# Patient Record
Sex: FEMALE | Race: BLACK OR AFRICAN AMERICAN | NOT HISPANIC OR LATINO | Employment: FULL TIME | URBAN - METROPOLITAN AREA
[De-identification: names, ages, dates, MRNs, and addresses within clinical notes are randomized per-mention and may not be internally consistent; named-entity substitution may affect disease eponyms.]

---

## 2017-01-05 ENCOUNTER — TELEPHONE (OUTPATIENT)
Dept: ORTHOPEDICS | Facility: CLINIC | Age: 52
End: 2017-01-05

## 2017-01-05 DIAGNOSIS — M17.12 PRIMARY OSTEOARTHRITIS OF LEFT KNEE: Primary | ICD-10-CM

## 2017-01-05 DIAGNOSIS — F90.2 ATTENTION DEFICIT HYPERACTIVITY DISORDER (ADHD), COMBINED TYPE: Primary | ICD-10-CM

## 2017-01-05 RX ORDER — DEXTROAMPHETAMINE SACCHARATE, AMPHETAMINE ASPARTATE MONOHYDRATE, DEXTROAMPHETAMINE SULFATE AND AMPHETAMINE SULFATE 5; 5; 5; 5 MG/1; MG/1; MG/1; MG/1
20 CAPSULE, EXTENDED RELEASE ORAL EVERY MORNING
Qty: 30 CAPSULE | Refills: 0 | Status: SHIPPED | OUTPATIENT
Start: 2017-01-05 | End: 2017-02-28

## 2017-01-05 RX ORDER — DEXTROAMPHETAMINE SACCHARATE, AMPHETAMINE ASPARTATE MONOHYDRATE, DEXTROAMPHETAMINE SULFATE AND AMPHETAMINE SULFATE 7.5; 7.5; 7.5; 7.5 MG/1; MG/1; MG/1; MG/1
30 CAPSULE, EXTENDED RELEASE ORAL DAILY
Qty: 30 CAPSULE | Refills: 0 | Status: SHIPPED | OUTPATIENT
Start: 2017-01-05 | End: 2017-02-28

## 2017-01-05 NOTE — TELEPHONE ENCOUNTER
Adderall XR 20mg      Last Written Prescription Date: 11/22/16  Last Fill Quantity: 30,  # refills: 0   Last Office Visit with [facility], P or  Health prescribing provider: 12/14/16          Adderall XR 30mg      Last Written Prescription Date: 11/22/16  Last Fill Quantity: 30,  # refills: 0   Last Office Visit with [facility], [facility] or [facility] prescribing provider: 12/14/16          Thanks,  Angélica Garg, Technician ([facility])  [facility]

## 2017-02-20 DIAGNOSIS — B00.9 HERPES SIMPLEX VIRUS INFECTION: ICD-10-CM

## 2017-02-20 RX ORDER — VALACYCLOVIR HYDROCHLORIDE 500 MG/1
500 TABLET, FILM COATED ORAL 2 TIMES DAILY
Qty: 180 TABLET | Refills: 2 | Status: SHIPPED | OUTPATIENT
Start: 2017-02-20 | End: 2017-04-17 | Stop reason: DRUGHIGH

## 2017-02-20 NOTE — TELEPHONE ENCOUNTER
Valtrex 1gm tablet     Last Written Prescription Date: 11-22-16  Last Fill Quantity: 180, # refills: 2  Last Office Visit with G, P or [facility] prescribing provider: 12-14-16   Next 5 appointments (look out 90 days)     Apr 12, 2017 11:30 AM CDT   Office Visit with Susan Rachele Haase, APRN CNP   [facility] ([facility])    [address] [place] [id number] [phone]            May 02, 2017 10:40 AM CDT   Return Visit with Jimenez Knight PA-C   [facility] ([facility])    [address] [address] [address] [phone]                   Creatinine   Date Value Ref Range Status   12/14/2016 1.02 0.52 - 1.04 mg/dL Final     Thank you,  Yogesh Green, Pharmacy Technician  [facility]

## 2017-02-27 ENCOUNTER — TELEPHONE (OUTPATIENT)
Dept: FAMILY MEDICINE | Facility: CLINIC | Age: 52
End: 2017-02-27

## 2017-02-27 NOTE — TELEPHONE ENCOUNTER
Patient is requesting an rx for once daily aspirin and it was not on her active med list.  Please send rx if that is appropriate for her.  Thank you,   Seema Bejarano, PharmD  [facility]

## 2017-02-28 DIAGNOSIS — F90.2 ATTENTION DEFICIT HYPERACTIVITY DISORDER (ADHD), COMBINED TYPE: ICD-10-CM

## 2017-02-28 NOTE — TELEPHONE ENCOUNTER
Controlled Substance Refill Request for Adderall  Problem List Complete:  Yes    Patient is followed by HAASE, SUSAN RACHELE for ongoing prescription of stimulants.  All refills should be approved by this provider, or covering partner.    Medication(s): Aderall XR 20 mg every day.  Adderall XR 30 mg every day.   Maximum quantity per month: #30;  #30   Clinic visit frequency required: Q 6  months     Controlled substance agreement on file: Yes       Date(s): 3/10/2016  Neuropsych evaluation for ADD completed:  No    Last [facility] website verification:  done on 3/10/2016   [email or web address]       checked in past 6 months?  No, route to RN     Edelmira Escobar, Pharmacy [facility]

## 2017-02-28 NOTE — TELEPHONE ENCOUNTER
Can you see see why she wants to take this, is it a daily aspirin 81 mg for heart prevention?   Thanks,  Susan Haase, CNP

## 2017-03-01 RX ORDER — DEXTROAMPHETAMINE SACCHARATE, AMPHETAMINE ASPARTATE MONOHYDRATE, DEXTROAMPHETAMINE SULFATE AND AMPHETAMINE SULFATE 7.5; 7.5; 7.5; 7.5 MG/1; MG/1; MG/1; MG/1
30 CAPSULE, EXTENDED RELEASE ORAL DAILY
Qty: 30 CAPSULE | Refills: 0 | Status: SHIPPED | OUTPATIENT
Start: 2017-03-01 | End: 2017-03-30

## 2017-03-01 RX ORDER — DEXTROAMPHETAMINE SACCHARATE, AMPHETAMINE ASPARTATE MONOHYDRATE, DEXTROAMPHETAMINE SULFATE AND AMPHETAMINE SULFATE 5; 5; 5; 5 MG/1; MG/1; MG/1; MG/1
20 CAPSULE, EXTENDED RELEASE ORAL EVERY MORNING
Qty: 30 CAPSULE | Refills: 0 | Status: SHIPPED | OUTPATIENT
Start: 2017-03-01 | End: 2017-03-30

## 2017-03-01 NOTE — TELEPHONE ENCOUNTER
Routed,  updated, in your bin, NO ASSOCIATED DIAGNOSIS FOR ATT DEF IN PAST 12 MONTHS, CONFIRM IF APPOINTMENT DUE    Last OV: 12/14/16  Reason for visit: preop  Date last filled: 1/6/17 PER , CONFIRM START DATE    Genet Wolfe RN, BSN  Message handled by Nurse Triage.

## 2017-03-17 DIAGNOSIS — B00.9 HERPES SIMPLEX VIRUS INFECTION: Primary | ICD-10-CM

## 2017-03-17 RX ORDER — VALACYCLOVIR HYDROCHLORIDE 1 G/1
TABLET, FILM COATED ORAL
Qty: 180 TABLET | Refills: 2 | Status: SHIPPED | OUTPATIENT
Start: 2017-03-17 | End: 2017-04-17

## 2017-03-17 NOTE — TELEPHONE ENCOUNTER
Resent per e prescribe, has refills  Prescription approved per [facility] Refill Protocol.  Genet Wolfe RN, BSN

## 2017-03-17 NOTE — TELEPHONE ENCOUNTER
90 day supply sent 2/20/17 with 2RF.      valACYclovir (VALTREX) 1000 mg tablet     Sig: Take 1 tablet (500 mg) by mouth 2 times daily      Last Written Prescription Date: 2/20/17  Last Fill Quantity: 180, # refills: 2  Last Office Visit with [facility], [facility] or [facility] prescribing provider: 12/14/2016     Next 5 appointments (look out 90 days)     Apr 12, 2017 11:30 AM CDT   Office Visit with Susan Rachele Haase, APRN [facility] ([facility])    [address] [place] [id number] [phone]            May 02, 2017 10:40 AM CDT   Return Visit with Jimenez Knight PA-C   [facility] ([facility])    [address] [address] [address] [phone]                   Creatinine   Date Value Ref Range Status   12/14/2016 1.02 0.52 - 1.04 mg/dL Final       Hermelinda Mckinney, ORIONT

## 2017-03-30 DIAGNOSIS — F90.2 ATTENTION DEFICIT HYPERACTIVITY DISORDER (ADHD), COMBINED TYPE: ICD-10-CM

## 2017-03-30 NOTE — TELEPHONE ENCOUNTER
Adderall XR 20mg      Last Written Prescription Date:  3/1/17  Last Fill Quantity: 30,   # refills: 0  Last Office Visit with [facility], [facility] or [facility] prescribing provider: 12/14/16  Future Office visit:    Next 5 appointments (look out 90 days)     Apr 12, 2017 11:30 AM CDT   Office Visit with Susan Rachele Haase, APRN CNP   [facility]    [address] [place] [id number]   [phone]            May 02, 2017 10:40 AM CDT   Return Visit with Jimenez Knight PA-C   [facility] ([facility])    [address] [place] [address]  [address] [place] [address]   [phone]                   Routing refill request to provider for review/approval because:  Drug not on the [facility], [facility] or [facility] refill protocol or controlled substance      Adderall XR 30mg      Last Written Prescription Date:  3/1/17  Last Fill Quantity: 30,   # refills: 0  Last Office Visit with [facility], [facility] or [facility] prescribing provider: 12/14/16  Future Office visit:    Next 5 appointments (look out 90 days)     Apr 12, 2017 11:30 AM CDT   Office Visit with Susan Rachele Haase, APRN CNP   [facility] ([facility])    [address] [place] [id number] [phone]            May 02, 2017 10:40 AM CDT   Return Visit with Jimenez Knight PA-C   [facility] ([facility])    [address] [place] [address]   [phone]                   Routing refill request to provider for review/approval because:  Drug not on the [facility], [facility] or [facility] refill protocol or controlled substance        Silke Valdes CPhT  [facility]    On behalf of [facility]

## 2017-03-31 RX ORDER — DEXTROAMPHETAMINE SACCHARATE, AMPHETAMINE ASPARTATE MONOHYDRATE, DEXTROAMPHETAMINE SULFATE AND AMPHETAMINE SULFATE 7.5; 7.5; 7.5; 7.5 MG/1; MG/1; MG/1; MG/1
30 CAPSULE, EXTENDED RELEASE ORAL DAILY
Qty: 30 CAPSULE | Refills: 0 | Status: SHIPPED | OUTPATIENT
Start: 2017-03-31 | End: 2017-05-02

## 2017-03-31 RX ORDER — DEXTROAMPHETAMINE SACCHARATE, AMPHETAMINE ASPARTATE MONOHYDRATE, DEXTROAMPHETAMINE SULFATE AND AMPHETAMINE SULFATE 5; 5; 5; 5 MG/1; MG/1; MG/1; MG/1
20 CAPSULE, EXTENDED RELEASE ORAL EVERY MORNING
Qty: 30 CAPSULE | Refills: 0 | Status: SHIPPED | OUTPATIENT
Start: 2017-03-31 | End: 2017-05-02

## 2017-04-12 ENCOUNTER — OFFICE VISIT (OUTPATIENT)
Dept: FAMILY MEDICINE | Facility: CLINIC | Age: 52
End: 2017-04-12
Payer: COMMERCIAL

## 2017-04-12 ENCOUNTER — THERAPY VISIT (OUTPATIENT)
Dept: PHYSICAL THERAPY | Facility: CLINIC | Age: 52
End: 2017-04-12
Payer: COMMERCIAL

## 2017-04-12 VITALS
DIASTOLIC BLOOD PRESSURE: 72 MMHG | RESPIRATION RATE: 12 BRPM | SYSTOLIC BLOOD PRESSURE: 114 MMHG | BODY MASS INDEX: 24.83 KG/M2 | HEART RATE: 84 BPM | OXYGEN SATURATION: 100 % | HEIGHT: 65 IN | WEIGHT: 149 LBS | TEMPERATURE: 99.2 F

## 2017-04-12 DIAGNOSIS — G89.29 CHRONIC PAIN OF RIGHT KNEE: Primary | ICD-10-CM

## 2017-04-12 DIAGNOSIS — Z01.818 PREOP GENERAL PHYSICAL EXAM: Primary | ICD-10-CM

## 2017-04-12 DIAGNOSIS — M25.561 RIGHT KNEE PAIN, UNSPECIFIED CHRONICITY: ICD-10-CM

## 2017-04-12 DIAGNOSIS — M25.561 CHRONIC PAIN OF RIGHT KNEE: Primary | ICD-10-CM

## 2017-04-12 DIAGNOSIS — S83.281A TEAR OF LATERAL CARTILAGE OR MENISCUS OF KNEE, CURRENT, RIGHT, INITIAL ENCOUNTER: ICD-10-CM

## 2017-04-12 PROBLEM — M23.207 CHRONIC BUCKET HANDLE TEAR OF MEDIAL MENISCUS OF LEFT KNEE: Status: RESOLVED | Noted: 2017-04-12 | Resolved: 2017-04-12

## 2017-04-12 PROBLEM — M25.562 CHRONIC PAIN OF LEFT KNEE: Status: RESOLVED | Noted: 2017-04-12 | Resolved: 2017-04-12

## 2017-04-12 PROBLEM — M25.562 CHRONIC PAIN OF LEFT KNEE: Status: ACTIVE | Noted: 2017-04-12

## 2017-04-12 PROBLEM — M23.207 CHRONIC BUCKET HANDLE TEAR OF MEDIAL MENISCUS OF LEFT KNEE: Status: ACTIVE | Noted: 2017-04-12

## 2017-04-12 LAB
BASOPHILS # BLD AUTO: 0 10E9/L (ref 0–0.2)
BASOPHILS NFR BLD AUTO: 0.4 %
DIFFERENTIAL METHOD BLD: NORMAL
EOSINOPHIL # BLD AUTO: 0.1 10E9/L (ref 0–0.7)
EOSINOPHIL NFR BLD AUTO: 1 %
ERYTHROCYTE [DISTWIDTH] IN BLOOD BY AUTOMATED COUNT: 12.3 % (ref 10–15)
HCT VFR BLD AUTO: 42 % (ref 35–47)
HGB BLD-MCNC: 14.7 G/DL (ref 11.7–15.7)
LYMPHOCYTES # BLD AUTO: 1.8 10E9/L (ref 0.8–5.3)
LYMPHOCYTES NFR BLD AUTO: 34.1 %
MCH RBC QN AUTO: 32.2 PG (ref 26.5–33)
MCHC RBC AUTO-ENTMCNC: 35 G/DL (ref 31.5–36.5)
MCV RBC AUTO: 92 FL (ref 78–100)
MONOCYTES # BLD AUTO: 0.4 10E9/L (ref 0–1.3)
MONOCYTES NFR BLD AUTO: 7.1 %
NEUTROPHILS # BLD AUTO: 3 10E9/L (ref 1.6–8.3)
NEUTROPHILS NFR BLD AUTO: 57.4 %
PLATELET # BLD AUTO: 289 10E9/L (ref 150–450)
RBC # BLD AUTO: 4.56 10E12/L (ref 3.8–5.2)
WBC # BLD AUTO: 5.2 10E9/L (ref 4–11)

## 2017-04-12 PROCEDURE — 97161 PT EVAL LOW COMPLEX 20 MIN: CPT | Mod: GP | Performed by: PHYSICAL THERAPIST

## 2017-04-12 PROCEDURE — 36415 COLL VENOUS BLD VENIPUNCTURE: CPT | Performed by: NURSE PRACTITIONER

## 2017-04-12 PROCEDURE — 99396 PREV VISIT EST AGE 40-64: CPT | Performed by: NURSE PRACTITIONER

## 2017-04-12 PROCEDURE — 97116 GAIT TRAINING THERAPY: CPT | Mod: GP | Performed by: PHYSICAL THERAPIST

## 2017-04-12 PROCEDURE — 97110 THERAPEUTIC EXERCISES: CPT | Mod: GP | Performed by: PHYSICAL THERAPIST

## 2017-04-12 PROCEDURE — 85025 COMPLETE CBC W/AUTO DIFF WBC: CPT | Performed by: NURSE PRACTITIONER

## 2017-04-12 ASSESSMENT — ACTIVITIES OF DAILY LIVING (ADL)
RISE FROM A CHAIR: ACTIVITY IS SOMEWHAT DIFFICULT
LIMPING: NOT ANSWERED
KNEE_ACTIVITY_OF_DAILY_LIVING_SCORE: 27.69
PAIN: THE SYMPTOM PREVENTS ME FROM ALL DAILY ACTIVITIES
SQUAT: ACTIVITY IS VERY DIFFICULT
WALK: ACTIVITY IS VERY DIFFICULT
RAW_SCORE: 19.38
AS_A_RESULT_OF_YOUR_KNEE_INJURY,_HOW_WOULD_YOU_RATE_YOUR_CURRENT_LEVEL_OF_DAILY_ACTIVITY?: SEVERELY ABNORMAL
STIFFNESS: THE SYMPTOM AFFECTS MY ACTIVITY MODERATELY
KNEE_ACTIVITY_OF_DAILY_LIVING_SUM: 18
KNEEL ON THE FRONT OF YOUR KNEE: ACTIVITY IS VERY DIFFICULT
HOW_WOULD_YOU_RATE_THE_CURRENT_FUNCTION_OF_YOUR_KNEE_DURING_YOUR_USUAL_DAILY_ACTIVITIES_ON_A_SCALE_FROM_0_TO_100_WITH_100_BEING_YOUR_LEVEL_OF_KNEE_FUNCTION_PRIOR_TO_YOUR_INJURY_AND_0_BEING_THE_INABILITY_TO_PERFORM_ANY_OF_YOUR_USUAL_DAILY_ACTIVITIES?: 90
WEAKNESS: THE SYMPTOM PREVENTS ME FROM ALL DAILY ACTIVITIES
HOW_WOULD_YOU_RATE_THE_OVERALL_FUNCTION_OF_YOUR_KNEE_DURING_YOUR_USUAL_DAILY_ACTIVITIES?: SEVERELY ABNORMAL
SIT WITH YOUR KNEE BENT: ACTIVITY IS SOMEWHAT DIFFICULT
SWELLING: THE SYMPTOM AFFECTS MY ACTIVITY MODERATELY
GO DOWN STAIRS: ACTIVITY IS VERY DIFFICULT
STAND: ACTIVITY IS VERY DIFFICULT
GO UP STAIRS: ACTIVITY IS VERY DIFFICULT
GIVING WAY, BUCKLING OR SHIFTING OF KNEE: THE SYMPTOM AFFECTS MY ACTIVITY MODERATELY

## 2017-04-12 NOTE — PROGRESS NOTES
[facility] Initial Evaluation'  Subjective:    Patient is a 52 year old female presenting with rehab right knee hpi and rehab composite hpi. The history is provided by the patient. No  was used.   Additional Answers from Therapist interview    Condition occurred with:  Repetition/overuse.  This is a new condition   Patient reports that she had the onset of right knee pain in November 2015 after spending 4-5 days laying a vinyl floor. She had an MRI 10/2016 which showed fraying of the lateral  Meniscus with a possible tear.  She used Naproxsyn and had a cortisone injection without permanent relief.  She is scheduled for a scope on April 26, 2017.  Chief complaints are of constant right medial knee pain, stiffness, edema and weakness. Pain is worse with twisting, standing, walking, squatting, stairs one at a time.  She uses ice and a knee sleeve..                                  Objective:      Gait:    Gait Type:  Antalgic   Assistive Devices:  None  Deviations:  Knee:  Knee extension decr LGeneral Deviations:  Stance time decr                                                      Knee Evaluation:  ROM:    AROM    Hyperextension:  Left:  0    Right: 0  Extension:  Left: 0    Right:  5  Flexion: Left: 135    Right: 120        Strength:     Extension:  Left: 5-/5   Pain:      Right: 4-/5   Pain:  Flexion:  Left: 5-/5   Pain:      Right: 4/5   Pain:    Quad Set Left: Good    Pain:   Quad Set Right: Poor    Pain:  Ligament Testing:  Not Assessed                Special Tests: Not Assessed      Palpation:      Right knee tenderness present at:  Medial Joint Line  Edema:  Normal      Functional Testing:            Proprioception:   Stork Balance Test:  Left:  10 seconds.  Right:  5 seconds  % of Uninvolved:           General     ROS    Assessment/Plan:      Patient is a 52 year old female with left side knee complaints.    Patient has the following significant findings with  corresponding treatment plan.                Diagnosis 1:  Left knee pain/meniscal tear    Pain -  hot/cold therapy and home program  Decreased ROM/flexibility - home program  Decreased strength - home program  Inflammation - self management/home program  Impaired gait - gait training  Impaired muscle performance - home program  Decreased function - home program    Therapy Evaluation Codes:   1) History comprised of:   Personal factors that impact the plan of care:      None.    Comorbidity factors that impact the plan of care are:      Migraines/headaches.     Medications impacting care: None.  2) Examination of Body Systems comprised of:   Body structures and functions that impact the plan of care:      Knee.   Activity limitations that impact the plan of care are:      Squatting/kneeling, Stairs and Walking.  3) Clinical presentation characteristics are:   Stable/Uncomplicated.  4) Decision-Making    Low complexity using standardized patient assessment instrument and/or measureable assessment of functional outcome.  Cumulative Therapy Evaluation is: Low complexity.    Previous and current functional limitations:  (See Goal Flow Sheet for this information)    Short term and Long term goals: (See Goal Flow Sheet for this information)     Communication ability:  Patient appears to be able to clearly communicate and understand verbal and written communication and follow directions correctly.  Treatment Explanation - The following has been discussed with the patient:   RX ordered/plan of care  Anticipated outcomes  Possible risks and side effects  This patient would benefit from PT intervention to resume normal activities.   Rehab potential is good.    Frequency:  1 time only  Duration:  1 time only  Discharge Plan:  Independent in home treatment program.    Please refer to the daily flowsheet for treatment today, total treatment time and time spent performing 1:1 timed codes.

## 2017-04-12 NOTE — NURSING NOTE
"Chief Complaint   Patient presents with     Pre-Op Exam       Initial /72 (BP Location: Left arm, Patient Position: Chair, Cuff Size: Adult Regular)  Pulse 84  Temp 99.2  F (37.3  C) (Oral)  Resp 12  Ht 5' 4.5\" (1.638 m)  Wt 149 lb (67.6 kg)  LMP 02/25/2014  SpO2 100%  BMI 25.18 kg/m2 Estimated body mass index is 25.18 kg/(m^2) as calculated from the following:    Height as of this encounter: 5' 4.5\" (1.638 m).    Weight as of this encounter: 149 lb (67.6 kg).  Medication Reconciliation: complete   Krystal Chino CMA      "

## 2017-04-12 NOTE — PROGRESS NOTES
[facility]  [address] [facility] [address]  [phone]  Dept: [phone]    PRE-OP EVALUATION:  Today's date: 2017    Laney Maynard (: 1965) presents for pre-operative evaluation assessment as requested by Dr. Lindsay.  She requires evaluation and anesthesia risk assessment prior to undergoing surgery/procedure for treatment of R knee. Proposed procedure: scope.    Date of Surgery/ Procedure: 2017  Time of Surgery/ Procedure: 12:00pm  Hospital/Surgical Facility: [facility]  Primary Physician: Haase, Susan Rachele  Type of Anesthesia Anticipated: General    Patient has a Health Care Directive or Living Will:  NO    1. NO - Do you have a history of heart attack, stroke, stent, bypass or surgery on an artery in the head, neck, heart or legs?  2. NO - Do you ever have any pain or discomfort in your chest?  3. NO - Do you have a history of  Heart Failure?  4. NO - Are you troubled by shortness of breath when: walking on the level, up a slight hill or at night?  5. NO - Do you currently have a cold, bronchitis or other respiratory infection?  6. NO - Do you have a cough, shortness of breath or wheezing?  7. NO - Do you sometimes get pains in the calves of your legs when you walk?  8. NO - Do you or anyone in your family have previous history of blood clots?  9. NO - Do you or does anyone in your family have a serious bleeding problem such as prolonged bleeding following surgeries or cuts?  10. NO - Have you ever had problems with anemia or been told to take iron pills?  11. NO - Have you had any abnormal blood loss such as black, tarry or bloody stools, or abnormal vaginal bleeding?  12. NO - Have you ever had a blood transfusion?  13. NO - Have you or any of your relatives ever had problems with anesthesia?  14. NO - Do you have sleep apnea, excessive snoring or daytime drowsiness?  15. NO - Do you have any prosthetic heart  valves?  16. NO - Do you have prosthetic joints?  17. NO - Is there any chance that you may be pregnant?    This document serves as a record of the services and decisions personally performed and made by Susan Haase, CNP. It was created on her behalf by Marilynn Marquez, a trained medical scribe. The creation of this document is based the provider's statements to the medical scribe.  Marilynn Marquez April 12, 2017 11:48 AM   HPI:                                                      Brief HPI related to upcoming procedure: Laney reports for preoperative evaluation before R knee scope on 4/26. Pain is located along medial aspect of R knee w/ occasional swelling. Pain affects too many daily activities and consumes her thoughts. Denies sore throat, cough, fever, or chills.    MEDICAL HISTORY:                                                      Patient Active Problem List    Diagnosis Date Noted     Ureterolithiasis 04/22/2016     Priority: Medium     Complete rupture of rotator cuff 04/02/2015     Priority: Medium     Mixed incontinence urge and stress (male)(female) 04/02/2015     Priority: Medium     Plantar fasciitis, bilateral 11/05/2014     Priority: Medium     Attention deficit hyperactivity disorder (ADHD) 01/12/2014     Priority: Medium     Patient is followed by HAASE, SUSAN RACHELE for ongoing prescription of stimulants.  All refills should be approved by this provider, or covering partner.    Medication(s): Aderall XR 20 mg every day.  Adderall XR 30 mg every day.   Maximum quantity per month: #30;  #30   Clinic visit frequency required: Q 6  months     Controlled substance agreement on file: Yes       Date(s): 3/10/2016  Neuropsych evaluation for ADD completed:  No    Last [facility] website verification:  3/1/17   [email or web address]         Herpes simplex virus infection 08/02/2013     Priority: Medium     Urinary frequency      Priority: Medium     burning       Family history of rheumatoid  arthritis 2013     Priority: Medium     Family history of sarcoidosis (mother) 2013     Priority: Medium     Sleep disorder 10/05/2012     Priority: Medium     Lymphocytopenia 2011     Priority: Medium     Migraine headache 2011     Priority: Medium     (Problem list name updated by automated process. Provider to review and confirm.)       CARDIOVASCULAR SCREENING; LDL GOAL LESS THAN 160 10/31/2010     Priority: Medium     GERD (gastroesophageal reflux disease) 2010     Priority: Medium     Seasonal allergic rhinitis 2010     Priority: Medium     Alopecia 2004     Priority: Medium     Problem list name updated by automated process. Provider to review        Past Medical History:   Diagnosis Date     Abnormal glandular Papanicolaou smear of cervix      Allergic rhinitis, cause unspecified      Constipation      Gastro-oesophageal reflux disease      Heart murmur     murmur     Hematuria      Herpes simplex virus infection      Hydronephrosis, right      Migraine, unspecified, without mention of intractable migraine without mention of status migrainosus      Mumps      Numbness and tingling     LEFT ARM     Palpitations      Renal disease     hx stones x 2 episodes     Ureterolithiasis      Urinary frequency     burning     Vertigo      Past Surgical History:   Procedure Laterality Date     ARTHROSCOPY SHOULDER, OPEN ROTATOR CUFF REPAIR, COMBINED  2013    Procedure: COMBINED ARTHROSCOPY SHOULDER, OPEN ROTATOR CUFF REPAIR;  Left Shoulder Arthroscopy, Distal Clavicale Resection, Decompression, Mini Open Rotator Cuff Repair    ;  Surgeon: Fredi Lindsay MD;  Location: [facility] OR     C NONSPECIFIC PROCEDURE  age 16    colposcopy for abnormal pap     C NONSPECIFIC PROCEDURE      surgery L thumb     C NONSPECIFIC PROCEDURE      breast augmentation-silicone     C NONSPECIFIC PROCEDURE      s/p  x 2     C NONSPECIFIC PROCEDURE      Bilateral tubal ligation     C REMOVAL OF  KIDNEY STONE       COLONOSCOPY  2/7/2014    Procedure: COLONOSCOPY;  Colonoscopy/WW;  Surgeon: Pancho Olsen MD;  Location: [facility] GI     COMBINED CYSTOSCOPY, RETROGRADES, URETEROSCOPY, LASER HOLMIUM LITHOTRIPSY URETER(S), INSERT STENT Right 4/23/2016    Procedure: COMBINED CYSTOSCOPY, RETROGRADES, URETEROSCOPY, LASER HOLMIUM LITHOTRIPSY URETER(S), INSERT STENT;  Surgeon: Kushal Noriega MD;  Location: [facility] OR     CYSTOSCOPY       CYSTOSCOPY, RETROGRADES, EXTRACT STONE, COMBINED  1/9/2013    Procedure: COMBINED CYSTOSCOPY, RETROGRADES, EXTRACT STONE;  Video Cystoscopy,  Right Ureteroscopy, ureteral dilatation,  Stone extraction;  Surgeon: Subhash Vann MD;  Location: [facility] OR     EXTRACORPOREAL SHOCK WAVE LITHOTRIPSY (ESWL) Bilateral 4/29/2016    Procedure: EXTRACORPOREAL SHOCK WAVE LITHOTRIPSY (ESWL);  Surgeon: Bassam Vu MD;  Location: [facility] OR     GYN SURGERY      laparoscopy     LAPAROSCOPIC CHOLECYSTECTOMY WITH CHOLANGIOGRAMS  11/21/2012    Procedure: LAPAROSCOPIC CHOLECYSTECTOMY WITH CHOLANGIOGRAMS;  LAPAROSCOPIC CHOLECYSTECTOMY WITH CHOLANGIOGRAMS ;  Surgeon: Nataliya Gabriel MD;  Location: [facility] OR     TUBAL LIGATION       Current Outpatient Prescriptions   Medication Sig Dispense Refill     amphetamine-dextroamphetamine (ADDERALL XR) 30 MG per 24 hr capsule Take 1 capsule (30 mg) by mouth daily 30 capsule 0     amphetamine-dextroamphetamine (ADDERALL XR) 20 MG per 24 hr capsule Take 1 capsule (20 mg) by mouth every morning 30 capsule 0     valACYclovir (VALTREX) 1000 mg tablet TAKE ONE-HALF TABLET (500MG) BY MOUTH TWO TIMES A  tablet 2     valACYclovir (VALTREX) 500 MG tablet Take 1 tablet (500 mg) by mouth 2 times daily 180 tablet 2     naproxen (NAPROSYN) 500 MG tablet Take 1 tablet (500 mg) by mouth 2 times daily as needed for moderate pain 180 tablet 3     acyclovir (ZOVIRAX) 5 % ointment Apply topically 5 times daily 30 g 5     solifenacin (VESICARE) 5 MG tablet Take 1 tablet  (5 mg) by mouth every evening 90 tablet 2     ondansetron (ZOFRAN ODT) 4 MG disintegrating tablet Take 1-2 tablets (4-8 mg) by mouth every 8 hours as needed for nausea 20 tablet 1     topiramate (TOPAMAX) 50 MG tablet Take 150 mg by mouth 2 times daily       tobramycin-dexamethasone (TOBRADEX) ophthalmic suspension Place 2 drops into both eyes daily 1 Bottle 11     fluticasone (FLONASE) 50 MCG/ACT nasal spray Spray 2 sprays into both nostrils daily as needed 16 g 11     fexofenadine (ALLEGRA) 180 MG tablet Take 1 tablet (180 mg) by mouth daily as needed 30 tablet 6     zolpidem (AMBIEN) 5 MG tablet Take 1 tablet (5 mg) by mouth nightly as needed for sleep 30 tablet 2     meclizine (ANTIVERT) 25 MG tablet Take 1 tablet (25 mg) by mouth 3 times daily as needed 30 tablet 3     fluocinolone acetonide 0.01 % OIL Use on scalp once a week. 1 Bottle 11     ketoconazole (NIZORAL) 2 % shampoo Apply to the affected area and wash off after 5 minutes. 120 mL 1     conjugated estrogens (PREMARIN) vaginal cream Place 0.5 g vaginally three times a week 30 g 12     OTC products: none    Allergies   Allergen Reactions     Aloe Itching and Rash     Codeine      GI upset     Compazine Nausea and Itching     Darvocet [Acetaminophen] Nausea and Vomiting     Macrobid [Nitrofuran Derivatives]      Bladder spasms     Percocet [Oxycodone-Acetaminophen] Nausea and Vomiting     Sulphadimidine [Sulfamethazine] Rash      Latex Allergy: NO    Social History   Substance Use Topics     Smoking status: Never Smoker     Smokeless tobacco: Never Used     Alcohol use No     History   Drug Use No     REVIEW OF SYSTEMS:                                                    C: NEGATIVE for fever, chills, change in weight  I: NEGATIVE for worrisome rashes, moles or lesions  E: NEGATIVE for vision changes or irritation  E/M: NEGATIVE for ear, mouth and throat problems  R: NEGATIVE for significant cough or SOB  B: NEGATIVE for masses, tenderness or  "discharge  CV: NEGATIVE for chest pain, palpitations or peripheral edema  GI: NEGATIVE for nausea, abdominal pain, heartburn, or change in bowel habits  : NEGATIVE for frequency, dysuria, or hematuria  M: NEGATIVE for significant arthralgias or myalgia  N: NEGATIVE for weakness, dizziness or paresthesias  E: NEGATIVE for temperature intolerance, skin/hair changes  H: NEGATIVE for bleeding problems  P: NEGATIVE for changes in mood or affect    This document serves as a record of the services and decisions personally performed and made by Susan Haase, CNP. It was created on her behalf by Marilynn Marquez, a trained medical scribe. The creation of this document is based the provider's statements to the medical scribe.  Marilynn Marquez April 12, 2017 11:44 AM   EXAM:                                                    /72 (BP Location: Left arm, Patient Position: Chair, Cuff Size: Adult Regular)  Pulse 84  Temp 99.2  F (37.3  C) (Oral)  Resp 12  Ht 1.638 m (5' 4.5\")  Wt 67.6 kg (149 lb)  LMP 02/25/2014  SpO2 100%  BMI 25.18 kg/m2    GENERAL APPEARANCE: healthy, alert and no distress     HENT: Ear canals clear. R TM w/ fullness, no erythema. L TM normal and nose and mouth without ulcers or lesions     NECK: no adenopathy, no asymmetry, masses, or scars and thyroid normal to palpation     RESP: lungs clear to auscultation - no rales, rhonchi or wheezes     CV: regular rates and rhythm, normal S1 S2, no S3 or S4 and no murmur, click or rub     ABDOMEN:  soft, nontender, no HSM or masses and bowel sounds normal     MS: medial aspect of R knee tender to palpation, extremities normal- no gross deformities noted, no evidence of inflammation in joints, FROM in all extremities.     NEURO: Normal strength and tone, sensory exam grossly normal, mentation intact and speech normal     PSYCH: mentation appears normal. and affect normal/bright     LYMPHATICS: No axillary, cervical, or supraclavicular " nodes    DIAGNOSTICS:                                                    HGB: 14.7  IMPRESSION:                                                    Reason for surgery/procedure: R knee arthroscopy  Diagnosis/reason for consult: evaluation and anesthesia risk assessment prior to undergoing surgery/procedure       The proposed surgical procedure is considered INTERMEDIATE risk.    REVISED CARDIAC RISK INDEX  The patient has the following serious cardiovascular risks for perioperative complications such as (MI, PE, VFib and 3  AV Block):  No serious cardiac risks  INTERPRETATION: 0 risks: Class I (very low risk - 0.4% complication rate)    The patient has the following additional risks for perioperative complications:  No identified additional risks      ICD-10-CM    1. Preop general physical exam Z01.818        RECOMMENDATIONS:                                                    Laney was seen today for pre-op exam.    Diagnoses and all orders for this visit:    Preop general physical exam  -     CBC with platelets differential    Right knee pain, unspecified chronicity    Approval given to proceed with proposed procedure, without further diagnostic evaluation  Is aware of NPO orders and need to refrain from taking NSAIDS, Aspirin prior to surgery.  Follow up in 4 weeks for physical exam, arrive fasting, mammogram also due.    The information in this document, created by the medical scribe for me, accurately reflects the services I personally performed and the decisions made by me. I have reviewed and approved this document for accuracy.   Susan Haase, CNP     Signed Electronically by: Susan Haase, APRN CNP    Copy of this evaluation report is provided to requesting physician.    [facility] Preop Guidelines

## 2017-04-12 NOTE — MR AVS SNAPSHOT
After Visit Summary   4/12/2017    Laney Maynard    MRN: [id number]           Patient Information     Date Of Birth          1965        Visit Information        Provider Department      4/12/2017 10:50 AM Anaya Echols PT [place] [facility]        Today's Diagnoses     Chronic pain of right knee    -  1    Tear of lateral cartilage or meniscus of knee, current, right, initial encounter           Follow-ups after your visit        Your next 10 appointments already scheduled     Apr 17, 2017  8:00 AM CDT   PHYSICAL with Susan Rachele Haase, APRN CNP   [facility] ([facility]    [address] [address]   [phone]            Apr 18, 2017  9:15 AM CDT   MA SCREEN WITH IMPLANTS DIGITAL BILAT with RHBCMA2   [facility] ([facility])    [address]  [facility] [address]   [phone]           Do not use any powder, lotion or deodorant under your arms or on your breast. If you do, we will ask you to remove it before your exam.  Wear comfortable, two-piece clothing.  If you have any allergies, tell your care team.  Bring any previous mammograms from other facilities or have them mailed to the breast center. This mammogram location, [facility], now offers 3D mammography. It doesn't replace a screening mammogram and can be done with a regular screening mammogram. It is optional and not all insurances will pay for it. 3D mammography is a special kind of mammogram that produces a three-dimensional image of the breast by using low dose-xrays. 3D allows the radiologist to see the breast tissue differently from 2D, which reduces the chance of repeat testing due to overlapping breast tissue. If you are interested in have a 3D mammogram, please check with your insurance before you arrive for your exam. On the day of your  exam you will be asked if you would like 3D imaging.            May 02, 2017  9:30 AM CDT   FADUMO Extremity with Anaya Echols PT   [place] [facility] ([facility]    [address]  [facility] [id number] [phone]            May 02, 2017 10:40 AM CDT   Return Visit with Jimenez Knight PA-C   [facility] ORTHOPEDIC SURGERY ([facility] [place])    [address]  [address]  [facility] [address]   [phone]              Future tests that were ordered for you today     Open Future Orders        Priority Expected Expires Ordered    MA Screen w Implants Digital Bilat Routine  4/12/2018 4/12/2017            Who to contact     If you have questions or need follow up information about today's clinic visit or your schedule please contact [facility]ICAL THERAP[facility] directly at [phone].  Normal or non-critical lab and imaging results will be communicated to you by Shoppilothart, letter or phone within 4 business days after the clinic has received the results. If you do not hear from us within 7 days, please contact the clinic through [employer] or phone. If you have a critical or abnormal lab result, we will notify you by phone as soon as possible.  Submit refill requests through [employer] or call your pharmacy and they will forward the refill request to us. Please allow 3 business days for your refill to be completed.          Additional Information About Your Visit        [employer] Information     [employer] gives you secure access to your electronic health record. If you see a primary care provider, you can also send messages to your care team and make appointments. If you have questions, please call your primary care clinic.  If you do not have a primary care provider, please call [phone] and they will assist you.        Care EveryWhere ID     This is your Care EveryWhere ID. This could be used  by other organizations to access your [place] medical records  [id number]        Your Vitals Were     Last Period                   02/25/2014            Blood Pressure from Last 3 Encounters:   04/12/17 114/72   12/14/16 130/80   12/09/16 108/72    Weight from Last 3 Encounters:   04/12/17 67.6 kg (149 lb)   12/14/16 71.7 kg (158 lb)   12/09/16 71.2 kg (157 lb)              We Performed the Following     GAIT TRAINING THERAPY     HC PT EVAL, LOW COMPLEXITY     FADUMO INITIAL EVAL REPORT     THERAPEUTIC EXERCISES        Primary Care Provider Office Phone # Fax #    Susan Rachele Haase, APRN [phone]       [address] [address]        Thank you!     Thank you for choosing [place] [facility] PHYSICAL THERAP[facility]  for your care. Our goal is always to provide you with excellent care. Hearing back from our patients is one way we can continue to improve our services. Please take a few minutes to complete the written survey that you may receive in the mail after your visit with us. Thank you!             Your Updated Medication List - Protect others around you: Learn how to safely use, store and throw away your medicines at www.disposemymeds.org.          This list is accurate as of: 4/12/17  3:10 PM.  Always use your most recent med list.                   Brand Name Dispense Instructions for use    acyclovir 5 % ointment    ZOVIRAX    30 g    Apply topically 5 times daily       * amphetamine-dextroamphetamine 30 MG per 24 hr capsule    ADDERALL XR    30 capsule    Take 1 capsule (30 mg) by mouth daily       * amphetamine-dextroamphetamine 20 MG per 24 hr capsule    ADDERALL XR    30 capsule    Take 1 capsule (20 mg) by mouth every morning       conjugated estrogens cream    PREMARIN    30 g    Place 0.5 g vaginally three times a week       fexofenadine 180 MG tablet    ALLEGRA    30 tablet    Take 1 tablet (180 mg) by mouth daily  as needed       fluocinolone acetonide 0.01 % Oil     1 Bottle    Use on scalp once a week.       fluticasone 50 MCG/ACT spray    FLONASE    16 g    Spray 2 sprays into both nostrils daily as needed       ketoconazole 2 % shampoo    NIZORAL    120 mL    Apply to the affected area and wash off after 5 minutes.       meclizine 25 MG tablet    ANTIVERT    30 tablet    Take 1 tablet (25 mg) by mouth 3 times daily as needed       naproxen 500 MG tablet    NAPROSYN    180 tablet    Take 1 tablet (500 mg) by mouth 2 times daily as needed for moderate pain       ondansetron 4 MG ODT tab    ZOFRAN ODT    20 tablet    Take 1-2 tablets (4-8 mg) by mouth every 8 hours as needed for nausea       solifenacin 5 MG tablet    VESICARE    90 tablet    Take 1 tablet (5 mg) by mouth every evening       tobramycin-dexamethasone 0.3-0.1 % ophthalmic susp    TOBRADEX    1 Bottle    Place 2 drops into both eyes daily       topiramate 50 MG tablet    TOPAMAX     Take 150 mg by mouth 2 times daily       * valACYclovir 500 MG tablet    VALTREX    180 tablet    Take 1 tablet (500 mg) by mouth 2 times daily       * valACYclovir 1000 mg tablet    VALTREX    180 tablet    TAKE ONE-HALF TABLET (500MG) BY MOUTH TWO TIMES A DAY       zolpidem 5 MG tablet    AMBIEN    30 tablet    Take 1 tablet (5 mg) by mouth nightly as needed for sleep       * Notice:  This list has 4 medication(s) that are the same as other medications prescribed for you. Read the directions carefully, and ask your doctor or other care provider to review them with you.

## 2017-04-12 NOTE — MR AVS SNAPSHOT
After Visit Summary   4/12/2017    Laney Maynard    MRN: [id number]           Patient Information     Date Of Birth          1965        Visit Information        Provider Department      4/12/2017 11:30 AM Haase, Susan Rachele, APRN [facility]        Today's Diagnoses     Preop general physical exam    -  1      Care Instructions      Before Your Surgery      Call your surgeon if there is any change in your health. This includes signs of a cold or flu (such as a sore throat, runny nose, cough, rash or fever).    Do not smoke, drink alcohol or take over the counter medicine (unless your surgeon or primary care doctor tells you to) for the 24 hours before and after surgery.    If you take prescribed drugs: Follow your doctor s orders about which medicines to take and which to stop until after surgery.    Eating and drinking prior to surgery: follow the instructions from your surgeon    Take a shower or bath the night before surgery. Use the soap your surgeon gave you to gently clean your skin. If you do not have soap from your surgeon, use your regular soap. Do not shave or scrub the surgery site.  Wear clean pajamas and have clean sheets on your bed.         Follow-ups after your visit        Follow-up notes from your care team     Return in about 4 weeks (around 5/10/2017) for Physical Exam.      Your next 10 appointments already scheduled     May 02, 2017  9:30 AM CDT   [facility] Extremity with Anaya Echols PT   [place] [facility] ([facility]    [address]  [facility] [address] [phone]            May 02, 2017 10:40 AM CDT   Return Visit with Jimenez Knight PA-C   [facility]TH[facility]EDIC [facility] ([facility] [place])    [address]  [facility] [address]   [phone]              Who to contact     If you have questions or need follow up information  "about today's clinic visit or your schedule please contact [facility] directly at [phone].  Normal or non-critical lab and imaging results will be communicated to you by MyChart, letter or phone within 4 business days after the clinic has received the results. If you do not hear from us within 7 days, please contact the clinic through [employer] or phone. If you have a critical or abnormal lab result, we will notify you by phone as soon as possible.  Submit refill requests through [employer]iry or call your pharmacy and they will forward the refill request to us. Please allow 3 business days for your refill to be completed.          Additional Information About Your Visit        [employer] Information     [employer] gives you secure access to your electronic health record. If you see a primary care provider, you can also send messages to your care team and make appointments. If you have questions, please call your primary care clinic.  If you do not have a primary care provider, please call [phone] and they will assist you.        Care EveryWhere ID     This is your Care EveryWhere ID. This could be used by other organizations to access your [place] medical records  [id number]        Your Vitals Were     Pulse Temperature Respirations Height Last Period Pulse Oximetry    84 99.2  F (37.3  C) (Oral) 12 5' 4.5\" (1.638 m) 02/25/2014 100%    BMI (Body Mass Index)                   25.18 kg/m2            Blood Pressure from Last 3 Encounters:   04/12/17 114/72   12/14/16 130/80   12/09/16 108/72    Weight from Last 3 Encounters:   04/12/17 149 lb (67.6 kg)   12/14/16 158 lb (71.7 kg)   12/09/16 157 lb (71.2 kg)              We Performed the Following     CBC with platelets differential        Primary Care Provider Office Phone # Fax #    Susan Rachele Haase, APRN [phone]       [facility] [id number] [place] [address]        Thank you!     Thank you for " choosing [facility]  for your care. Our goal is always to provide you with excellent care. Hearing back from our patients is one way we can continue to improve our services. Please take a few minutes to complete the written survey that you may receive in the mail after your visit with us. Thank you!             Your Updated Medication List - Protect others around you: Learn how to safely use, store and throw away your medicines at www.disposemymeds.org.          This list is accurate as of: 4/12/17 11:53 AM.  Always use your most recent med list.                   Brand Name Dispense Instructions for use    acyclovir 5 % ointment    ZOVIRAX    30 g    Apply topically 5 times daily       * amphetamine-dextroamphetamine 30 MG per 24 hr capsule    ADDERALL XR    30 capsule    Take 1 capsule (30 mg) by mouth daily       * amphetamine-dextroamphetamine 20 MG per 24 hr capsule    ADDERALL XR    30 capsule    Take 1 capsule (20 mg) by mouth every morning       conjugated estrogens cream    PREMARIN    30 g    Place 0.5 g vaginally three times a week       fexofenadine 180 MG tablet    ALLEGRA    30 tablet    Take 1 tablet (180 mg) by mouth daily as needed       fluocinolone acetonide 0.01 % Oil     1 Bottle    Use on scalp once a week.       fluticasone 50 MCG/ACT spray    FLONASE    16 g    Spray 2 sprays into both nostrils daily as needed       ketoconazole 2 % shampoo    NIZORAL    120 mL    Apply to the affected area and wash off after 5 minutes.       meclizine 25 MG tablet    ANTIVERT    30 tablet    Take 1 tablet (25 mg) by mouth 3 times daily as needed       naproxen 500 MG tablet    NAPROSYN    180 tablet    Take 1 tablet (500 mg) by mouth 2 times daily as needed for moderate pain       ondansetron 4 MG ODT tab    ZOFRAN ODT    20 tablet    Take 1-2 tablets (4-8 mg) by mouth every 8 hours as needed for nausea       solifenacin 5 MG tablet    VESICARE    90 tablet    Take 1 tablet (5 mg) by mouth  every evening       tobramycin-dexamethasone 0.3-0.1 % ophthalmic susp    TOBRADEX    1 Bottle    Place 2 drops into both eyes daily       topiramate 50 MG tablet    TOPAMAX     Take 150 mg by mouth 2 times daily       * valACYclovir 500 MG tablet    VALTREX    180 tablet    Take 1 tablet (500 mg) by mouth 2 times daily       * valACYclovir 1000 mg tablet    VALTREX    180 tablet    TAKE ONE-HALF TABLET (500MG) BY MOUTH TWO TIMES A DAY       zolpidem 5 MG tablet    AMBIEN    30 tablet    Take 1 tablet (5 mg) by mouth nightly as needed for sleep       * Notice:  This list has 4 medication(s) that are the same as other medications prescribed for you. Read the directions carefully, and ask your doctor or other care provider to review them with you.

## 2017-04-13 ENCOUNTER — OFFICE VISIT (OUTPATIENT)
Dept: FAMILY MEDICINE | Facility: CLINIC | Age: 52
End: 2017-04-13
Payer: COMMERCIAL

## 2017-04-13 VITALS
BODY MASS INDEX: 25.18 KG/M2 | RESPIRATION RATE: 12 BRPM | TEMPERATURE: 98.3 F | WEIGHT: 149 LBS | DIASTOLIC BLOOD PRESSURE: 78 MMHG | OXYGEN SATURATION: 98 % | HEART RATE: 90 BPM | SYSTOLIC BLOOD PRESSURE: 102 MMHG

## 2017-04-13 DIAGNOSIS — N30.01 ACUTE CYSTITIS WITH HEMATURIA: Primary | ICD-10-CM

## 2017-04-13 LAB
ALBUMIN UR-MCNC: ABNORMAL MG/DL
APPEARANCE UR: CLEAR
BACTERIA #/AREA URNS HPF: ABNORMAL /HPF
BILIRUB UR QL STRIP: NEGATIVE
COLOR UR AUTO: YELLOW
GLUCOSE UR STRIP-MCNC: NEGATIVE MG/DL
HGB UR QL STRIP: ABNORMAL
KETONES UR STRIP-MCNC: NEGATIVE MG/DL
LEUKOCYTE ESTERASE UR QL STRIP: ABNORMAL
MUCOUS THREADS #/AREA URNS LPF: PRESENT /LPF
NITRATE UR QL: NEGATIVE
NON-SQ EPI CELLS #/AREA URNS LPF: ABNORMAL /LPF
PH UR STRIP: 6 PH (ref 5–7)
RBC #/AREA URNS AUTO: ABNORMAL /HPF (ref 0–2)
SP GR UR STRIP: 1.02 (ref 1–1.03)
URN SPEC COLLECT METH UR: ABNORMAL
UROBILINOGEN UR STRIP-ACNC: 0.2 EU/DL (ref 0.2–1)
WBC #/AREA URNS AUTO: ABNORMAL /HPF (ref 0–2)

## 2017-04-13 PROCEDURE — 81001 URINALYSIS AUTO W/SCOPE: CPT | Performed by: FAMILY MEDICINE

## 2017-04-13 PROCEDURE — 99213 OFFICE O/P EST LOW 20 MIN: CPT | Performed by: FAMILY MEDICINE

## 2017-04-13 RX ORDER — LEVOFLOXACIN 750 MG/1
750 TABLET, FILM COATED ORAL DAILY
Qty: 10 TABLET | Refills: 0 | Status: SHIPPED | OUTPATIENT
Start: 2017-04-13 | End: 2017-04-17

## 2017-04-13 NOTE — MR AVS SNAPSHOT
After Visit Summary   4/13/2017    Laney Maynard    MRN: [id number]           Patient Information     Date Of Birth          1965        Visit Information        Provider Department      4/13/2017 8:20 AM Monica Huitron,  [facility]        Today's Diagnoses     Acute cystitis with hematuria    -  1       Follow-ups after your visit        Your next 10 appointments already scheduled     Apr 17, 2017  8:00 AM CDT   PHYSICAL with Susan Rachele Haase, APRN CNP   [facility] ([facility])    [address] [place] [id number] [phone]            Apr 18, 2017  9:15 AM CDT   MA SCREEN WITH IMPLANTS DIGITAL BILAT with RHBCMA2   [facility] ([facility])    [address]  [facility] [address] [phone]           Do not use any powder, lotion or deodorant under your arms or on your breast. If you do, we will ask you to remove it before your exam.  Wear comfortable, two-piece clothing.  If you have any allergies, tell your care team.  Bring any previous mammograms from other facilities or have them mailed to the breast center. This mammogram location, [facility], now offers 3D mammography. It doesn't replace a screening mammogram and can be done with a regular screening mammogram. It is optional and not all insurances will pay for it. 3D mammography is a special kind of mammogram that produces a three-dimensional image of the breast by using low dose-xrays. 3D allows the radiologist to see the breast tissue differently from 2D, which reduces the chance of repeat testing due to overlapping breast tissue. If you are interested in have a 3D mammogram, please check with your insurance before you arrive for your exam. On the day of your exam you will be asked if you would like 3D imaging.            May 02, 2017  9:40 AM CDT   FADUMO Extremity with  Tray Stark, PT   [place] [facility] [place] [facility] ([facility]    [address]  [facility] [address] [phone]            May 02, 2017 10:40 AM CDT   Return Visit with Jimenez Knight PA-C   [facility] ([facility] [place])    [address]  [address]  [facility] [address]   [phone]              Future tests that were ordered for you today     Open Future Orders        Priority Expected Expires Ordered    MA Screen w Implants Digital Bilat Routine  4/12/2018 4/12/2017            Who to contact     If you have questions or need follow up information about today's clinic visit or your schedule please contact [facility] directly at [phone].  Normal or non-critical lab and imaging results will be communicated to you by Ded[employer], letter or phone within 4 business days after the clinic has received the results. If you do not hear from us within 7 days, please contact the clinic through [employer] or phone. If you have a critical or abnormal lab result, we will notify you by phone as soon as possible.  Submit refill requests through [employer] or call your pharmacy and they will forward the refill request to us. Please allow 3 business days for your refill to be completed.          Additional Information About Your Visit        [employer] Information     [employer] gives you secure access to your electronic health record. If you see a primary care provider, you can also send messages to your care team and make appointments. If you have questions, please call your primary care clinic.  If you do not have a primary care provider, please call [phone] and they will assist you.        Care EveryWhere ID     This is your Care EveryWhere ID. This could be used by other organizations to access your [place] medical records  [id number]        Your Vitals Were     Pulse Temperature Respirations Last  Period Pulse Oximetry BMI (Body Mass Index)    90 98.3  F (36.8  C) (Oral) 12 02/25/2014 98% 25.18 kg/m2       Blood Pressure from Last 3 Encounters:   04/13/17 102/78   04/12/17 114/72   12/14/16 130/80    Weight from Last 3 Encounters:   04/13/17 149 lb (67.6 kg)   04/12/17 149 lb (67.6 kg)   12/14/16 158 lb (71.7 kg)              We Performed the Following     UA reflex to Microscopic and Culture     Urine Microscopic          Today's Medication Changes          These changes are accurate as of: 4/13/17  1:28 PM.  If you have any questions, ask your nurse or doctor.               Start taking these medicines.        Dose/Directions    levofloxacin 750 MG tablet   Commonly known as:  LEVAQUIN   Used for:  Acute cystitis with hematuria   Started by:  Monica Huitron DO        Dose:  750 mg   Take 1 tablet (750 mg) by mouth daily   Quantity:  10 tablet   Refills:  0            Where to get your medicines      These medications were sent to [address] [address]     Phone:  [phone]     levofloxacin 750 MG tablet                Primary Care Provider Office Phone # Fax #    Susan Rachele Haase, APRN [phone]       [address] [address]        Thank you!     Thank you for choosing [facility]  for your care. Our goal is always to provide you with excellent care. Hearing back from our patients is one way we can continue to improve our services. Please take a few minutes to complete the written survey that you may receive in the mail after your visit with us. Thank you!             Your Updated Medication List - Protect others around you: Learn how to safely use, store and throw away your medicines at www.disposemymeds.org.          This list is accurate as of: 4/13/17  1:28 PM.  Always use your most recent med list.                   Brand Name  Dispense Instructions for use    acyclovir 5 % ointment    ZOVIRAX    30 g    Apply topically 5 times daily       * amphetamine-dextroamphetamine 30 MG per 24 hr capsule    ADDERALL XR    30 capsule    Take 1 capsule (30 mg) by mouth daily       * amphetamine-dextroamphetamine 20 MG per 24 hr capsule    ADDERALL XR    30 capsule    Take 1 capsule (20 mg) by mouth every morning       conjugated estrogens cream    PREMARIN    30 g    Place 0.5 g vaginally three times a week       fexofenadine 180 MG tablet    ALLEGRA    30 tablet    Take 1 tablet (180 mg) by mouth daily as needed       fluocinolone acetonide 0.01 % Oil     1 Bottle    Use on scalp once a week.       fluticasone 50 MCG/ACT spray    FLONASE    16 g    Spray 2 sprays into both nostrils daily as needed       ketoconazole 2 % shampoo    NIZORAL    120 mL    Apply to the affected area and wash off after 5 minutes.       levofloxacin 750 MG tablet    LEVAQUIN    10 tablet    Take 1 tablet (750 mg) by mouth daily       meclizine 25 MG tablet    ANTIVERT    30 tablet    Take 1 tablet (25 mg) by mouth 3 times daily as needed       naproxen 500 MG tablet    NAPROSYN    180 tablet    Take 1 tablet (500 mg) by mouth 2 times daily as needed for moderate pain       ondansetron 4 MG ODT tab    ZOFRAN ODT    20 tablet    Take 1-2 tablets (4-8 mg) by mouth every 8 hours as needed for nausea       solifenacin 5 MG tablet    VESICARE    90 tablet    Take 1 tablet (5 mg) by mouth every evening       tobramycin-dexamethasone 0.3-0.1 % ophthalmic susp    TOBRADEX    1 Bottle    Place 2 drops into both eyes daily       topiramate 50 MG tablet    TOPAMAX     Take 150 mg by mouth 2 times daily       * valACYclovir 500 MG tablet    VALTREX    180 tablet    Take 1 tablet (500 mg) by mouth 2 times daily       * valACYclovir 1000 mg tablet    VALTREX    180 tablet    TAKE ONE-HALF TABLET (500MG) BY MOUTH TWO TIMES A DAY       zolpidem 5 MG tablet    AMBIEN    30 tablet    Take 1  tablet (5 mg) by mouth nightly as needed for sleep       * Notice:  This list has 4 medication(s) that are the same as other medications prescribed for you. Read the directions carefully, and ask your doctor or other care provider to review them with you.

## 2017-04-13 NOTE — PROGRESS NOTES
SUBJECTIVE:                                                    Laney Maynard is a 52 year old female who presents to clinic today for the following health issues:      URINARY TRACT SYMPTOMS     Onset: 3 days    Description:   Painful urination (Dysuria): YES  Blood in urine (Hematuria): no   Delay in urine (Hesitency): YES    Intensity: moderate    Progression of Symptoms:  worsening    Accompanying Signs & Symptoms:  Fever/chills: YES  Flank pain no   Nausea and vomiting: no   Any vaginal symptoms: none  Abdominal/Pelvic Pain: no    History:   History of frequent UTI's: YES  History of kidney stones: YES  Sexually Active: YES  Possibility of pregnancy: No    Precipitating factors:   none         Therapies Tried and outcome: none      Problem list and histories reviewed & adjusted, as indicated.  Additional history: as documented      ROS:  Constitutional, HEENT, cardiovascular, pulmonary, gi and gu systems are negative, except as otherwise noted.    OBJECTIVE:                                                    /78 (BP Location: Right arm, Patient Position: Chair, Cuff Size: Adult Regular)  Pulse 90  Temp 98.3  F (36.8  C) (Oral)  Resp 12  Wt 149 lb (67.6 kg)  LMP 02/25/2014  SpO2 98%  BMI 25.18 kg/m2  Body mass index is 25.18 kg/(m^2).  GENERAL: healthy, alert and no distress  ABDOMEN: soft, nontender, no hepatosplenomegaly, no masses and bowel sounds normal  BACK: no CVA tenderness, no paralumbar tenderness    Diagnostic Test Results:  Results for orders placed or performed in visit on 04/13/17 (from the past 24 hour(s))   UA reflex to Microscopic and Culture   Result Value Ref Range    Color Urine Yellow     Appearance Urine Clear     Glucose Urine Negative NEG mg/dL    Bilirubin Urine Negative NEG    Ketones Urine Negative NEG mg/dL    Specific Gravity Urine 1.020 1.003 - 1.035    Blood Urine Moderate (A) NEG    pH Urine 6.0 5.0 - 7.0 pH    Protein Albumin Urine Trace (A) NEG mg/dL     Urobilinogen Urine 0.2 0.2 - 1.0 EU/dL    Nitrite Urine Negative NEG    Leukocyte Esterase Urine Small (A) NEG    Source Midstream Urine    Urine Microscopic   Result Value Ref Range    WBC Urine 5-10 (A) 0 - 2 /HPF    RBC Urine 2-5 (A) 0 - 2 /HPF    Squamous Epithelial /LPF Urine Moderate (A) FEW /LPF    Bacteria Urine Moderate (A) NEG /HPF    Mucous Urine Present (A) NEG /LPF        ASSESSMENT/PLAN:                                                      1. Acute cystitis with hematuria  - Patient gets UTIs frequently and notes the most effective tx for her has been Levaquin for 10 days   - UA reflex to Microscopic and Culture; Future  - UA reflex to Microscopic and Culture  - Urine Microscopic  - levofloxacin (LEVAQUIN) 750 MG tablet; Take 1 tablet (750 mg) by mouth daily  Dispense: 10 tablet; Refill: 0    Follow up if symptoms are worsening or not improving    Monica Huitron,   [facility]

## 2017-04-14 NOTE — PROGRESS NOTES
[facility] Initial Evaluation    Subjective:    Patient is a 52 year old female presenting with rehab right knee hpi. The history is provided by the patient.   Laney Maynard is a 52 year old female with a right knee condition.  Condition occurred with:  Other reason (laying vinyl refugio).    This is a chronic condition   Patient reports that she had the onset of right knee pain in November 2015 after spending 4-5 days laying a vinyl floor. She had an MRI 10/2016 which showed fraying of the lateral  Meniscus with a possible tear.  She used Naproxsyn and had a cortisone injection without permanent relief.  She is scheduled for a scope on April 26, 2017.  Chief complaints are of constant right medial knee pain, stiffness, edema and weakness. Pain is worse with twisting, standing, walking, squatting, stairs one at a time.  She uses ice and a knee sleeve..              .    Patient reports pain:  Medial.    Pain is described as sharp and aching and is constant and reported as 7/10.  Associated symptoms:  Loss of motion/stiffness, loss of strength and edema. Pain is worse during the day.  Symptoms are exacerbated by activity, walking, bending/squatting, ascending stairs, certain positions, descending stairs, standing and kneeling and relieved by rest, ice, bracing/immobilizing and NSAID's.  Since onset symptoms are gradually worsening.  Special tests:  MRI (fraying and possible tear of right medial meniscus).      General health as reported by patient is good.  Pertinent medical history includes:  Migraines.  Medical allergies: no.  Other surgeries include:  None reported.  Current medications:  None as reported by the patient.  Current occupation is nurse.  Patient is working in normal job without restrictions.  Primary job tasks include:  Prolonged sitting.                   Knee Activity of Daily Living Score: 27.69            Objective:    Standing Alignment:              Knee:   Normal      Gait:    Gait Type:  Antalgic   Assistive Devices:  None  Deviations:  Knee:  Knee extension decr RGeneral Deviations:  Stance time decr and alivia decr                                                      Knee Evaluation:  ROM:    AROM    Hyperextension:  Left:  0    Right: 0  Extension:  Left: 0    Right:  5  Flexion: Left: 135    Right: 120        Strength:     Extension:  Left: 5-/5   Pain:      Right: 4-/5   Pain:  Flexion:  Left: 5-/5   Pain:      Right: 4-/5   Pain:    Quad Set Left: Good    Pain:   Quad Set Right: Poor    Pain:  Ligament Testing:  Not Assessed                Special Tests: Not Assessed      Palpation:      Right knee tenderness present at:  Medial Joint Line  Edema:  Normal      Functional Testing:            Proprioception:   Stork Balance Test:  Left:  10 seconds  Right:  5 seconds  % of Uninvolved:           General     ROS    Assessment/Plan:      Patient is a 52 year old female with right side knee complaints.    Patient has the following significant findings with corresponding treatment plan.                Diagnosis 1:  Right knee pain/meniscal tear   Pain -  hot/cold therapy and home program  Decreased ROM/flexibility - home program  Decreased strength - home program  Inflammation - cold therapy and self management/home program  Impaired gait - gait training  Impaired muscle performance - home program  Decreased function - home program    Therapy Evaluation Codes:   1) History comprised of:   Personal factors that impact the plan of care:      None.    Comorbidity factors that impact the plan of care are:      Migraines/headaches.     Medications impacting care: None.  2) Examination of Body Systems comprised of:   Body structures and functions that impact the plan of care:      Knee.   Activity limitations that impact the plan of care are:      Squatting/kneeling, Stairs, Standing and Walking.  3) Clinical presentation characteristics  are:   Stable/Uncomplicated.  4) Decision-Making    Low complexity using standardized patient assessment instrument and/or measureable assessment of functional outcome.  Cumulative Therapy Evaluation is: Low complexity.    Previous and current functional limitations:  (See Goal Flow Sheet for this information)    Short term and Long term goals: (See Goal Flow Sheet for this information)     Communication ability:  Patient appears to be able to clearly communicate and understand verbal and written communication and follow directions correctly.  Treatment Explanation - The following has been discussed with the patient:   RX ordered/plan of care  Anticipated outcomes  Possible risks and side effects  This patient would benefit from PT intervention to resume normal activities.   Rehab potential is good.    Frequency:  1 time only  Duration:  1 time only  Discharge Plan:  Independent in home treatment program.    Please refer to the daily flowsheet for treatment today, total treatment time and time spent performing 1:1 timed codes.

## 2017-04-17 ENCOUNTER — OFFICE VISIT (OUTPATIENT)
Dept: FAMILY MEDICINE | Facility: CLINIC | Age: 52
End: 2017-04-17
Payer: COMMERCIAL

## 2017-04-17 VITALS
OXYGEN SATURATION: 96 % | BODY MASS INDEX: 25.18 KG/M2 | WEIGHT: 149 LBS | HEART RATE: 84 BPM | SYSTOLIC BLOOD PRESSURE: 110 MMHG | RESPIRATION RATE: 12 BRPM | DIASTOLIC BLOOD PRESSURE: 70 MMHG | TEMPERATURE: 98.2 F

## 2017-04-17 DIAGNOSIS — Z00.00 ENCOUNTER FOR ROUTINE ADULT HEALTH EXAMINATION WITHOUT ABNORMAL FINDINGS: Primary | ICD-10-CM

## 2017-04-17 DIAGNOSIS — B00.9 HERPES SIMPLEX VIRUS INFECTION: ICD-10-CM

## 2017-04-17 DIAGNOSIS — N30.01 ACUTE CYSTITIS WITH HEMATURIA: ICD-10-CM

## 2017-04-17 LAB
ALBUMIN SERPL-MCNC: 3.2 G/DL (ref 3.4–5)
ALP SERPL-CCNC: 126 U/L (ref 40–150)
ALT SERPL W P-5'-P-CCNC: 15 U/L (ref 0–50)
ANION GAP SERPL CALCULATED.3IONS-SCNC: 6 MMOL/L (ref 3–14)
AST SERPL W P-5'-P-CCNC: 12 U/L (ref 0–45)
BILIRUB SERPL-MCNC: 0.4 MG/DL (ref 0.2–1.3)
BUN SERPL-MCNC: 13 MG/DL (ref 7–30)
CALCIUM SERPL-MCNC: 9.2 MG/DL (ref 8.5–10.1)
CHLORIDE SERPL-SCNC: 112 MMOL/L (ref 94–109)
CHOLEST SERPL-MCNC: 199 MG/DL
CO2 SERPL-SCNC: 26 MMOL/L (ref 20–32)
CREAT SERPL-MCNC: 1.03 MG/DL (ref 0.52–1.04)
GFR SERPL CREATININE-BSD FRML MDRD: 56 ML/MIN/1.7M2
GLUCOSE SERPL-MCNC: 102 MG/DL (ref 70–99)
HDLC SERPL-MCNC: 56 MG/DL
LDLC SERPL CALC-MCNC: 129 MG/DL
NONHDLC SERPL-MCNC: 143 MG/DL
POTASSIUM SERPL-SCNC: 3.7 MMOL/L (ref 3.4–5.3)
PROT SERPL-MCNC: 7.6 G/DL (ref 6.8–8.8)
SODIUM SERPL-SCNC: 144 MMOL/L (ref 133–144)
TRIGL SERPL-MCNC: 72 MG/DL
TSH SERPL DL<=0.005 MIU/L-ACNC: 0.7 MU/L (ref 0.4–4)

## 2017-04-17 PROCEDURE — 99396 PREV VISIT EST AGE 40-64: CPT | Performed by: NURSE PRACTITIONER

## 2017-04-17 PROCEDURE — 80061 LIPID PANEL: CPT | Performed by: NURSE PRACTITIONER

## 2017-04-17 PROCEDURE — 36415 COLL VENOUS BLD VENIPUNCTURE: CPT | Performed by: NURSE PRACTITIONER

## 2017-04-17 PROCEDURE — 80053 COMPREHEN METABOLIC PANEL: CPT | Performed by: NURSE PRACTITIONER

## 2017-04-17 PROCEDURE — 84443 ASSAY THYROID STIM HORMONE: CPT | Performed by: NURSE PRACTITIONER

## 2017-04-17 RX ORDER — VALACYCLOVIR HYDROCHLORIDE 1 G/1
1000 TABLET, FILM COATED ORAL DAILY
Qty: 90 TABLET | Refills: 3 | Status: SHIPPED | OUTPATIENT
Start: 2017-04-17 | End: 2018-03-11

## 2017-04-17 RX ORDER — LEVOFLOXACIN 750 MG/1
750 TABLET, FILM COATED ORAL DAILY
COMMUNITY
Start: 2017-04-17 | End: 2017-12-11

## 2017-04-17 NOTE — PROGRESS NOTES
SUBJECTIVE:     CC: Laney Maynard is an 52 year old woman who presents for preventive health visit.     Healthy Habits:    Do you get at least three servings of calcium containing foods daily (dairy, green leafy vegetables, etc.)? no, taking calcium and/or vitamin D supplement: no    Amount of exercise or daily activities, outside of work: 7 day(s) per week    Problems taking medications regularly No    Medication side effects: No    Have you had an eye exam in the past two years? no    Do you see a dentist twice per year? yes    Do you have sleep apnea, excessive snoring or daytime drowsiness?no    Health Maintenance -- Not taking Vit. D. Exercising as best as she can w/ knee problems (surgery on 4/26, R knee scope). Denies vaginal discharge or abdominal pain.      Mammogram -- last 1/2016, normal; recommended yearly - DUE (has scheduled for tomorrow)    Pap Smear -- last 92015, NIL; recommended every 3 years - due 9/2018   Colonoscopy -- last 2/2014, normal; recommended every 10 years - due 2/2024    Today's PHQ-2 Score:   PHQ-2 ( 1999 [employer]) 4/17/2017 8/26/2016   Q1: Little interest or pleasure in doing things 0 0   Q2: Feeling down, depressed or hopeless 0 0   PHQ-2 Score 0 0     Abuse: Current or Past(Physical, Sexual or Emotional)- No  Do you feel safe in your environment - Yes    Social History   Substance Use Topics     Smoking status: Never Smoker     Smokeless tobacco: Never Used     Alcohol use No     The patient does not drink >3 drinks per day nor >7 drinks per week.    Recent Labs   Lab Test  09/28/15   0934  03/18/14   0943   CHOL  184  193   HDL  52  38*   LDL  117  140*   TRIG  76  74   CHOLHDLRATIO  3.5  5.1*     Reviewed orders with patient.  Reviewed health maintenance and updated orders accordingly - Yes    Mammo Decision Support:  Patient over age 50, mutual decision to screen reflected in health maintenance.    Pertinent mammograms are reviewed under the imaging tab.  History of  abnormal Pap smear: NO - age 30- 65 PAP every 3 years recommended    Reviewed and updated as needed this visit by clinical staff  Tobacco  Allergies  Med Hx  Surg Hx  Fam Hx  Soc Hx      Reviewed and updated as needed this visit by Provider    Past Medical History:   Diagnosis Date     Abnormal glandular Papanicolaou smear of cervix      Allergic rhinitis, cause unspecified      Constipation      Gastro-oesophageal reflux disease      Heart murmur     murmur     Hematuria      Herpes simplex virus infection      Hydronephrosis, right      Migraine, unspecified, without mention of intractable migraine without mention of status migrainosus      Mumps      Numbness and tingling     LEFT ARM     Palpitations      Renal disease     hx stones x 2 episodes     Ureterolithiasis      Urinary frequency     burning     Vertigo       ROS:  Constitutional, HEENT, cardiovascular, pulmonary, GI, , musculoskeletal, neuro, skin, endocrine and psych systems are negative, except as in HPI or otherwise noted     This document serves as a record of the services and decisions personally performed and made by Susan Haase, CNP. It was created on her behalf by Marilynn Marquez, a trained medical scribe. The creation of this document is based the provider's statements to the medical scribe.  Marilynn Marquez April 17, 2017 8:44 AM     Problem list, Medication list, Allergies, and Medical/Social/Surgical histories reviewed in [facility] and updated as appropriate.  OBJECTIVE:     /70 (BP Location: Left arm, Patient Position: Chair, Cuff Size: Adult Regular)  Pulse 84  Temp 98.2  F (36.8  C) (Oral)  Resp 12  Wt 67.6 kg (149 lb)  LMP 02/25/2014  SpO2 96%  BMI 25.18 kg/m2  EXAM:  GENERAL: healthy, alert and no distress  HENT: ear canals and TM's normal, nose and mouth without ulcers or lesions  NECK: no adenopathy, no asymmetry, masses, or scars and thyroid normal to palpation  RESP: lungs clear to auscultation - no rales, rhonchi  "or wheezes  BREAST: normal without masses, tenderness or nipple discharge and no palpable axillary masses or adenopathy  CV: regular rate and rhythm, normal S1 S2, no S3 or S4, no murmur, click or rub, no peripheral edema  ABDOMEN: soft, nontender, no hepatosplenomegaly, no masses and bowel sounds normal  MS: no gross musculoskeletal defects noted, no edema  SKIN: no suspicious lesions or rashes  NEURO: Normal strength and tone, mentation intact and speech normal  PSYCH: mentation appears normal, affect normal/bright    ASSESSMENT/PLAN:       Laney was seen today for physical.    Diagnoses and all orders for this visit:    Encounter for routine adult health examination without abnormal findings  -     Lipid panel reflex to direct LDL  -     TSH with free T4 reflex  -     Comprehensive metabolic panel    Herpes simplex virus infection:  Well controlled, refill  -     valACYclovir (VALTREX) 1000 mg tablet; Take 1 tablet (1,000 mg) by mouth daily      COUNSELING:   Reviewed preventive health counseling, as reflected in patient instructions     reports that she has never smoked. She has never used smokeless tobacco.    Estimated body mass index is 25.18 kg/(m^2) as calculated from the following:    Height as of 4/12/17: 1.638 m (5' 4.5\").    Weight as of this encounter: 67.6 kg (149 lb).     Counseling Resources:  ATP IV Guidelines  Pooled Cohorts Equation Calculator  Breast Cancer Risk Calculator  FRAX Risk Assessment  ICSI Preventive Guidelines  Dietary Guidelines for Americans, 2010  USDA's MyPlate  ASA Prophylaxis  Lung CA Screening  Follow up in 6 months, sooner as needed.  The information in this document, created by the medical scribe for me, accurately reflects the services I personally performed and the decisions made by me. I have reviewed and approved this document for accuracy.   Susan Haase, APRN [facility]"

## 2017-04-17 NOTE — MR AVS SNAPSHOT
After Visit Summary   4/17/2017    Laney Maynard    MRN: [id number]           Patient Information     Date Of Birth          1965        Visit Information        Provider Department      4/17/2017 8:00 AM Haase, Susan Rachele, APRN [facility]        Today's Diagnoses     Encounter for routine adult health examination without abnormal findings    -  1    Acute cystitis with hematuria          Care Instructions      Preventive Health Recommendations  Female Ages 50 - 64    Yearly exam: See your health care provider every year in order to  o Review health changes.   o Discuss preventive care.    o Review your medicines if your doctor has prescribed any.      Get a Pap test every three years (unless you have an abnormal result and your provider advises testing more often).    If you get Pap tests with HPV test, you only need to test every 5 years, unless you have an abnormal result.     You do not need a Pap test if your uterus was removed (hysterectomy) and you have not had cancer.    You should be tested each year for STDs (sexually transmitted diseases) if you're at risk.     Have a mammogram every 1 to 2 years.    Have a colonoscopy at age 50, or have a yearly FIT test (stool test). These exams screen for colon cancer.      Have a cholesterol test every 5 years, or more often if advised.    Have a diabetes test (fasting glucose) every three years. If you are at risk for diabetes, you should have this test more often.     If you are at risk for osteoporosis (brittle bone disease), think about having a bone density scan (DEXA).    Shots: Get a flu shot each year. Get a tetanus shot every 10 years.    Nutrition:     Eat at least 5 servings of fruits and vegetables each day.    Eat whole-grain bread, whole-wheat pasta and brown rice instead of white grains and rice.    Talk to your provider about Calcium and Vitamin D.     Lifestyle    Exercise at least 150 minutes a  week (30 minutes a day, 5 days a week). This will help you control your weight and prevent disease.    Limit alcohol to one drink per day.    No smoking.     Wear sunscreen to prevent skin cancer.     See your dentist every six months for an exam and cleaning.    See your eye doctor every 1 to 2 years.          Follow-ups after your visit        Your next 10 appointments already scheduled     Apr 18, 2017  9:15 AM CDT   MA SCREEN WITH IMPLANTS DIGITAL BILAT with RHBCMA2   [facility] ([facility])    [address]  [facility] [address]   [phone]           Do not use any powder, lotion or deodorant under your arms or on your breast. If you do, we will ask you to remove it before your exam.  Wear comfortable, two-piece clothing.  If you have any allergies, tell your care team.  Bring any previous mammograms from other facilities or have them mailed to the breast center. This mammogram location, [facility], now offers 3D mammography. It doesn't replace a screening mammogram and can be done with a regular screening mammogram. It is optional and not all insurances will pay for it. 3D mammography is a special kind of mammogram that produces a three-dimensional image of the breast by using low dose-xrays. 3D allows the radiologist to see the breast tissue differently from 2D, which reduces the chance of repeat testing due to overlapping breast tissue. If you are interested in have a 3D mammogram, please check with your insurance before you arrive for your exam. On the day of your exam you will be asked if you would like 3D imaging.            May 02, 2017  9:40 AM CDT   FADUMO Extremity with Tray Stark, PT   [facility] ([facility] [place])    [address]  [facility] [id number] [phone]            May 02, 2017 10:40 AM CDT   Return Visit with Jimenez Knight PA-C   [facility] ([facility])    [address]  [address]  [facility] [address]   [phone]              Who to contact     If you have questions or need follow up information about today's clinic visit or your schedule please contact [facility] directly at [phone].  Normal or non-critical lab and imaging results will be communicated to you by MyChart, letter or phone within 4 business days after the clinic has received the results. If you do not hear from us within 7 days, please contact the clinic through [email or web address] or phone. If you have a critical or abnormal lab result, we will notify you by phone as soon as possible.  Submit refill requests through [employer] or call your pharmacy and they will forward the refill request to us. Please allow 3 business days for your refill to be completed.          Additional Information About Your Visit        [email or web address] Information     [employer] gives you secure access to your electronic health record. If you see a primary care provider, you can also send messages to your care team and make appointments. If you have questions, please call your primary care clinic.  If you do not have a primary care provider, please call [phone] and they will assist you.        Care EveryWhere ID     This is your Care EveryWhere ID. This could be used by other organizations to access your [facility] medical records  [id number]        Your Vitals Were     Pulse Temperature Respirations Last Period Pulse Oximetry BMI (Body Mass Index)    84 98.2  F (36.8  C) (Oral) 12 02/25/2014 96% 25.18 kg/m2       Blood Pressure from Last 3 Encounters:   04/17/17 110/70   04/13/17 102/78   04/12/17 114/72    Weight from Last 3 Encounters:   04/17/17 149 lb (67.6 kg)   04/13/17 149 lb (67.6 kg)   04/12/17 149 lb (67.6 kg)              We Performed the Following     Comprehensive metabolic panel     Lipid panel reflex to direct LDL     TSH with free  T4 reflex        Primary Care Provider Office Phone # Fax #    Susan Rachele Haase, APRN [phone]       [facility] [address] [address]        Thank you!     Thank you for choosing [facility]  for your care. Our goal is always to provide you with excellent care. Hearing back from our patients is one way we can continue to improve our services. Please take a few minutes to complete the written survey that you may receive in the mail after your visit with us. Thank you!             Your Updated Medication List - Protect others around you: Learn how to safely use, store and throw away your medicines at www.disposemymeds.org.          This list is accurate as of: 4/17/17  9:01 AM.  Always use your most recent med list.                   Brand Name Dispense Instructions for use    acyclovir 5 % ointment    ZOVIRAX    30 g    Apply topically 5 times daily       * amphetamine-dextroamphetamine 30 MG per 24 hr capsule    ADDERALL XR    30 capsule    Take 1 capsule (30 mg) by mouth daily       * amphetamine-dextroamphetamine 20 MG per 24 hr capsule    ADDERALL XR    30 capsule    Take 1 capsule (20 mg) by mouth every morning       conjugated estrogens cream    PREMARIN    30 g    Place 0.5 g vaginally three times a week       fexofenadine 180 MG tablet    ALLEGRA    30 tablet    Take 1 tablet (180 mg) by mouth daily as needed       fluocinolone acetonide 0.01 % Oil     1 Bottle    Use on scalp once a week.       fluticasone 50 MCG/ACT spray    FLONASE    16 g    Spray 2 sprays into both nostrils daily as needed       ketoconazole 2 % shampoo    NIZORAL    120 mL    Apply to the affected area and wash off after 5 minutes.       levofloxacin 750 MG tablet    LEVAQUIN     Take 1 tablet (750 mg) by mouth daily       meclizine 25 MG tablet    ANTIVERT    30 tablet    Take 1 tablet (25 mg) by mouth 3 times daily as needed       naproxen 500 MG tablet     NAPROSYN    180 tablet    Take 1 tablet (500 mg) by mouth 2 times daily as needed for moderate pain       ondansetron 4 MG ODT tab    ZOFRAN ODT    20 tablet    Take 1-2 tablets (4-8 mg) by mouth every 8 hours as needed for nausea       solifenacin 5 MG tablet    VESICARE    90 tablet    Take 1 tablet (5 mg) by mouth every evening       tobramycin-dexamethasone 0.3-0.1 % ophthalmic susp    TOBRADEX    1 Bottle    Place 2 drops into both eyes daily       topiramate 50 MG tablet    TOPAMAX     Take 150 mg by mouth 2 times daily       * valACYclovir 500 MG tablet    VALTREX    180 tablet    Take 1 tablet (500 mg) by mouth 2 times daily       * valACYclovir 1000 mg tablet    VALTREX    180 tablet    TAKE ONE-HALF TABLET (500MG) BY MOUTH TWO TIMES A DAY       zolpidem 5 MG tablet    AMBIEN    30 tablet    Take 1 tablet (5 mg) by mouth nightly as needed for sleep       * Notice:  This list has 4 medication(s) that are the same as other medications prescribed for you. Read the directions carefully, and ask your doctor or other care provider to review them with you.

## 2017-04-17 NOTE — NURSING NOTE
"Chief Complaint   Patient presents with     Physical       Initial /70 (BP Location: Left arm, Patient Position: Chair, Cuff Size: Adult Regular)  Pulse 84  Temp 98.2  F (36.8  C) (Oral)  Resp 12  Wt 149 lb (67.6 kg)  LMP 02/25/2014  SpO2 96%  BMI 25.18 kg/m2 Estimated body mass index is 25.18 kg/(m^2) as calculated from the following:    Height as of 4/12/17: 5' 4.5\" (1.638 m).    Weight as of this encounter: 149 lb (67.6 kg).  Medication Reconciliation: complete   Krystal Chino CMA      "

## 2017-04-18 ENCOUNTER — HOSPITAL ENCOUNTER (OUTPATIENT)
Dept: MAMMOGRAPHY | Facility: CLINIC | Age: 52
Discharge: HOME | End: 2017-04-18
Attending: NURSE PRACTITIONER | Admitting: NURSE PRACTITIONER
Payer: COMMERCIAL

## 2017-04-18 DIAGNOSIS — Z12.31 VISIT FOR SCREENING MAMMOGRAM: ICD-10-CM

## 2017-04-18 PROCEDURE — G0202 SCR MAMMO BI INCL CAD: HCPCS

## 2017-04-18 NOTE — PROGRESS NOTES
Pj Davison,  Your lab results are as below:  1)  TSH (thyroid level) 0.70 which is normal (range 0.4-4)  2)  Cholesterol was normal at 199, your LDL (bad cholesterol) and your HDL (good cholesterol) were also  within normal range. Continue to follow a low cholesterol diet and we will recheck this in 1 year.  3)  Glucose was slightly elevated at 102  (normal fasting is <100).    If you have any questions do not hesitate to call the clinic to discuss the results with me further.     Sincerely,    Susan Haase, CNP

## 2017-04-25 ENCOUNTER — TELEPHONE (OUTPATIENT)
Dept: ORTHOPEDICS | Facility: CLINIC | Age: 52
End: 2017-04-25

## 2017-04-25 NOTE — TELEPHONE ENCOUNTER
Pt LVM stating she had surgery in January and feels she may need to cancel her knee scope on 4/26/2016.  Returned call to patient, she states she was told after liposuction she would have to wait 6 months before having any surgical procedures.  As this was done on 1/3/2017 she is not at 6 months yet.  Explained to patient this precaution is coming from her prior surgeons office, she may speak with them regarding having her knee scope done as she does not want to put it off.  She will call back if she needs to reschedule after speaking with their office.

## 2017-04-26 ENCOUNTER — TRANSFERRED RECORDS (OUTPATIENT)
Dept: HEALTH INFORMATION MANAGEMENT | Facility: CLINIC | Age: 52
End: 2017-04-26

## 2017-05-02 DIAGNOSIS — F90.2 ATTENTION DEFICIT HYPERACTIVITY DISORDER (ADHD), COMBINED TYPE: ICD-10-CM

## 2017-05-02 RX ORDER — DEXTROAMPHETAMINE SACCHARATE, AMPHETAMINE ASPARTATE MONOHYDRATE, DEXTROAMPHETAMINE SULFATE AND AMPHETAMINE SULFATE 7.5; 7.5; 7.5; 7.5 MG/1; MG/1; MG/1; MG/1
30 CAPSULE, EXTENDED RELEASE ORAL DAILY
Qty: 30 CAPSULE | Refills: 0 | Status: SHIPPED | OUTPATIENT
Start: 2017-05-02 | End: 2017-06-12

## 2017-05-02 RX ORDER — DEXTROAMPHETAMINE SACCHARATE, AMPHETAMINE ASPARTATE MONOHYDRATE, DEXTROAMPHETAMINE SULFATE AND AMPHETAMINE SULFATE 5; 5; 5; 5 MG/1; MG/1; MG/1; MG/1
20 CAPSULE, EXTENDED RELEASE ORAL EVERY MORNING
Qty: 30 CAPSULE | Refills: 0 | Status: SHIPPED | OUTPATIENT
Start: 2017-05-02 | End: 2017-06-12

## 2017-05-02 NOTE — TELEPHONE ENCOUNTER
Controlled Substance Refill Request for Adderall  Problem List Complete:  Yes    Patient is followed by HAASE, SUSAN RACHELE for ongoing prescription of stimulants.  All refills should be approved by this provider, or covering partner.    Medication(s): Aderall XR 20 mg every day.  Adderall XR 30 mg every day.   Maximum quantity per month: #30;  #30   Clinic visit frequency required: Q 6  months     Controlled substance agreement on file: Yes       Date(s): 3/10/2016  Neuropsych evaluation for ADD completed:  No    Last [facility] website verification:  3/1/17   [email or web address]     checked in past 6 months?  Yes 3/1/17     Edelmira Escobar, Pharmacy [facility]

## 2017-06-12 DIAGNOSIS — F90.2 ATTENTION DEFICIT HYPERACTIVITY DISORDER (ADHD), COMBINED TYPE: ICD-10-CM

## 2017-06-12 DIAGNOSIS — H57.9 EYE DISORDER: ICD-10-CM

## 2017-06-12 RX ORDER — DEXTROAMPHETAMINE SACCHARATE, AMPHETAMINE ASPARTATE MONOHYDRATE, DEXTROAMPHETAMINE SULFATE AND AMPHETAMINE SULFATE 5; 5; 5; 5 MG/1; MG/1; MG/1; MG/1
20 CAPSULE, EXTENDED RELEASE ORAL EVERY MORNING
Qty: 30 CAPSULE | Refills: 0 | Status: SHIPPED | OUTPATIENT
Start: 2017-06-12 | End: 2017-06-12

## 2017-06-12 NOTE — TELEPHONE ENCOUNTER
Controlled Substance Refill Request for Adderall XR 20MG  Problem List Complete:  Yes  Patient is followed by HAASE, SUSAN RACHELE for ongoing prescription of stimulants.  All refills should be approved by this provider, or covering partner.    Medication(s): Aderall XR 20 mg every day.  Adderall XR 30 mg every day.   Maximum quantity per month: #30;  #30   Clinic visit frequency required: Q 6  months     Controlled substance agreement on file: Yes       Date(s): 3/10/2016  Neuropsych evaluation for ADD completed:  No    Last [facility] website verification:  3/1/17   [email or web address]   checked in past 6 months?  Yes 3-1-2017     Thanks!    Emeka Perez  Pharmacy Technician  [facility]  [id number] [place] [id number] [phone]

## 2017-06-12 NOTE — TELEPHONE ENCOUNTER
Pending Prescriptions:                       Disp   Refills    tobramycin-dexamethasone (TOBRADEX) 0.3-0*5 mL   11           Sig: INSTILL 2 DROPS INTO BOTH EYES DAILY                Last Written Prescription Date:  3/10/2016  Last Fill Quantity: 1 bottle,   # refills: 11  Last Office Visit with [facility] or [facility] prescribing provider: 4/17/2017, Haase  Future Office visit:       Routing refill request to provider for review/approval because:  Drug not on the [facility], [facility] or [facility] refill protocol or controlled substance

## 2017-06-13 RX ORDER — DEXTROAMPHETAMINE SACCHARATE, AMPHETAMINE ASPARTATE MONOHYDRATE, DEXTROAMPHETAMINE SULFATE AND AMPHETAMINE SULFATE 7.5; 7.5; 7.5; 7.5 MG/1; MG/1; MG/1; MG/1
30 CAPSULE, EXTENDED RELEASE ORAL DAILY
Qty: 30 CAPSULE | Refills: 0 | Status: SHIPPED | OUTPATIENT
Start: 2017-06-13 | End: 2017-07-17

## 2017-06-13 RX ORDER — DEXTROAMPHETAMINE SACCHARATE, AMPHETAMINE ASPARTATE MONOHYDRATE, DEXTROAMPHETAMINE SULFATE AND AMPHETAMINE SULFATE 5; 5; 5; 5 MG/1; MG/1; MG/1; MG/1
20 CAPSULE, EXTENDED RELEASE ORAL EVERY MORNING
Qty: 30 CAPSULE | Refills: 0 | Status: SHIPPED | OUTPATIENT
Start: 2017-06-13 | End: 2017-07-14

## 2017-06-15 RX ORDER — TOBRAMYCIN AND DEXAMETHASONE 3; 1 MG/ML; MG/ML
SUSPENSION/ DROPS OPHTHALMIC
Qty: 5 ML | Refills: 11
Start: 2017-06-15

## 2017-06-15 NOTE — TELEPHONE ENCOUNTER
Contacted pt, informed to contact eye dr, voiced understanding  Miriam Vann RN, BS  Clinical Nurse Triage.

## 2017-06-15 NOTE — TELEPHONE ENCOUNTER
Routing refill request to provider for review/approval because:  Drug not on the [facility] refill protocol     Miriam Vann RN, BS  Clinical Nurse Triage.

## 2017-07-07 ENCOUNTER — OFFICE VISIT (OUTPATIENT)
Dept: FAMILY MEDICINE | Facility: CLINIC | Age: 52
End: 2017-07-07
Payer: COMMERCIAL

## 2017-07-07 VITALS
HEART RATE: 68 BPM | OXYGEN SATURATION: 100 % | RESPIRATION RATE: 10 BRPM | TEMPERATURE: 97.7 F | DIASTOLIC BLOOD PRESSURE: 70 MMHG | SYSTOLIC BLOOD PRESSURE: 110 MMHG | BODY MASS INDEX: 25.18 KG/M2 | WEIGHT: 149 LBS

## 2017-07-07 DIAGNOSIS — R39.89 URINARY PROBLEM: Primary | ICD-10-CM

## 2017-07-07 LAB
ALBUMIN UR-MCNC: NEGATIVE MG/DL
AMORPH CRY #/AREA URNS HPF: ABNORMAL /HPF
APPEARANCE UR: CLEAR
BACTERIA #/AREA URNS HPF: ABNORMAL /HPF
BILIRUB UR QL STRIP: NEGATIVE
COLOR UR AUTO: YELLOW
GLUCOSE UR STRIP-MCNC: NEGATIVE MG/DL
GRAN CASTS #/AREA URNS LPF: ABNORMAL /LPF
HGB UR QL STRIP: ABNORMAL
KETONES UR STRIP-MCNC: NEGATIVE MG/DL
LEUKOCYTE ESTERASE UR QL STRIP: ABNORMAL
NITRATE UR QL: NEGATIVE
PH UR STRIP: 7 PH (ref 5–7)
RBC #/AREA URNS AUTO: ABNORMAL /HPF (ref 0–2)
SP GR UR STRIP: 1.01 (ref 1–1.03)
URN SPEC COLLECT METH UR: ABNORMAL
UROBILINOGEN UR STRIP-ACNC: 0.2 EU/DL (ref 0.2–1)
WBC #/AREA URNS AUTO: ABNORMAL /HPF (ref 0–2)

## 2017-07-07 PROCEDURE — 81001 URINALYSIS AUTO W/SCOPE: CPT | Performed by: NURSE PRACTITIONER

## 2017-07-07 PROCEDURE — 99213 OFFICE O/P EST LOW 20 MIN: CPT | Performed by: NURSE PRACTITIONER

## 2017-07-07 PROCEDURE — 87086 URINE CULTURE/COLONY COUNT: CPT | Performed by: NURSE PRACTITIONER

## 2017-07-07 RX ORDER — CIPROFLOXACIN 500 MG/1
500 TABLET, FILM COATED ORAL 2 TIMES DAILY
Qty: 20 TABLET | Refills: 0 | Status: SHIPPED | OUTPATIENT
Start: 2017-07-07 | End: 2018-02-08

## 2017-07-07 NOTE — PROGRESS NOTES
"  SUBJECTIVE:                                                    Laney Maynard is a 52 year old female who presents to clinic today for the following health issues:    URINARY TRACT SYMPTOMS    Duration: 3 days    Description  dysuria and lower back pain, describes as \"pins and needles\"    Intensity:  moderate    Accompanying signs and symptoms:  Fever/chills: no   Flank pain no   Nausea and vomiting: no   Vaginal symptoms: none  Abdominal/Pelvic Pain: no     History  History of frequent UTI's: YES  History of kidney stones: no   Sexually Active: YES  Possibility of pregnancy: No    Precipitating or alleviating factors: None    Therapies tried and outcome: none       Last treated for UTI in 4/2017. Was treated with levofloxacin at that time. Patient has hx of recurrent UTIs. Symptoms resolve for a short period of time after antibiotic use but then return again. Patient is very frustrated with the recurrence of these symptoms and states that she needs to be treated with something other than levofloxacin \"because it is obviously not working.\" She has allergies to Macrobid and Sulfa. Last saw urology about 3 years ago. Denies fever or chills, or flank pain.    Problem list and histories reviewed & adjusted, as indicated.  Additional history: as documented    Patient Active Problem List   Diagnosis     Alopecia     Seasonal allergic rhinitis     GERD (gastroesophageal reflux disease)     CARDIOVASCULAR SCREENING; LDL GOAL LESS THAN 160     Migraine headache     Lymphocytopenia     Sleep disorder     Family history of rheumatoid arthritis     Family history of sarcoidosis (mother)     Urinary frequency     Herpes simplex virus infection     Attention deficit hyperactivity disorder (ADHD)     Plantar fasciitis, bilateral     Complete rupture of rotator cuff     Mixed incontinence urge and stress (male)(female)     Ureterolithiasis     Past Surgical History:   Procedure Laterality Date     ARTHROSCOPY SHOULDER, " OPEN ROTATOR CUFF REPAIR, COMBINED  2013    Procedure: COMBINED ARTHROSCOPY SHOULDER, OPEN ROTATOR CUFF REPAIR;  Left Shoulder Arthroscopy, Distal Clavicale Resection, Decompression, Mini Open Rotator Cuff Repair    ;  Surgeon: Fredi Lindsay MD;  Location: [facility] OR     C NONSPECIFIC PROCEDURE  age 16    colposcopy for abnormal pap     C NONSPECIFIC PROCEDURE      surgery L thumb     C NONSPECIFIC PROCEDURE      breast augmentation-silicone     C NONSPECIFIC PROCEDURE      s/p  x 2     C NONSPECIFIC PROCEDURE      Bilateral tubal ligation     C REMOVAL OF KIDNEY STONE       COLONOSCOPY  2014    Procedure: COLONOSCOPY;  Colonoscopy/WW;  Surgeon: Pancho Olsen MD;  Location: [facility] GI     COMBINED CYSTOSCOPY, RETROGRADES, URETEROSCOPY, LASER HOLMIUM LITHOTRIPSY URETER(S), INSERT STENT Right 2016    Procedure: COMBINED CYSTOSCOPY, RETROGRADES, URETEROSCOPY, LASER HOLMIUM LITHOTRIPSY URETER(S), INSERT STENT;  Surgeon: Kushal Noriega MD;  Location: [facility] OR     CYSTOSCOPY       CYSTOSCOPY, RETROGRADES, EXTRACT STONE, COMBINED  2013    Procedure: COMBINED CYSTOSCOPY, RETROGRADES, EXTRACT STONE;  Video Cystoscopy,  Right Ureteroscopy, ureteral dilatation,  Stone extraction;  Surgeon: Subhash Vann MD;  Location: [facility] OR     EXTRACORPOREAL SHOCK WAVE LITHOTRIPSY (ESWL) Bilateral 2016    Procedure: EXTRACORPOREAL SHOCK WAVE LITHOTRIPSY (ESWL);  Surgeon: Bassam Vu MD;  Location: [facility] OR     GYN SURGERY      laparoscopy     LAPAROSCOPIC CHOLECYSTECTOMY WITH CHOLANGIOGRAMS  2012    Procedure: LAPAROSCOPIC CHOLECYSTECTOMY WITH CHOLANGIOGRAMS;  LAPAROSCOPIC CHOLECYSTECTOMY WITH CHOLANGIOGRAMS ;  Surgeon: Nataliya Gabriel MD;  Location: [facility] OR     TUBAL LIGATION         Social History   Substance Use Topics     Smoking status: Never Smoker     Smokeless tobacco: Never Used     Alcohol use No     Family History   Problem Relation Age of Onset     DIABETES Mother       type 2     Alcohol/Drug Mother      alcohol         Current Outpatient Prescriptions   Medication Sig Dispense Refill     ciprofloxacin (CIPRO) 500 MG tablet Take 1 tablet (500 mg) by mouth 2 times daily 20 tablet 0     amphetamine-dextroamphetamine (ADDERALL XR) 20 MG per 24 hr capsule Take 1 capsule (20 mg) by mouth every morning 30 capsule 0     amphetamine-dextroamphetamine (ADDERALL XR) 30 MG per 24 hr capsule Take 1 capsule (30 mg) by mouth daily 30 capsule 0     valACYclovir (VALTREX) 1000 mg tablet Take 1 tablet (1,000 mg) by mouth daily 90 tablet 3     naproxen (NAPROSYN) 500 MG tablet Take 1 tablet (500 mg) by mouth 2 times daily as needed for moderate pain 180 tablet 3     acyclovir (ZOVIRAX) 5 % ointment Apply topically 5 times daily 30 g 5     solifenacin (VESICARE) 5 MG tablet Take 1 tablet (5 mg) by mouth every evening 90 tablet 2     ondansetron (ZOFRAN ODT) 4 MG disintegrating tablet Take 1-2 tablets (4-8 mg) by mouth every 8 hours as needed for nausea 20 tablet 1     topiramate (TOPAMAX) 50 MG tablet Take 150 mg by mouth 2 times daily       tobramycin-dexamethasone (TOBRADEX) ophthalmic suspension Place 2 drops into both eyes daily 1 Bottle 11     fluticasone (FLONASE) 50 MCG/ACT nasal spray Spray 2 sprays into both nostrils daily as needed 16 g 11     fexofenadine (ALLEGRA) 180 MG tablet Take 1 tablet (180 mg) by mouth daily as needed 30 tablet 6     zolpidem (AMBIEN) 5 MG tablet Take 1 tablet (5 mg) by mouth nightly as needed for sleep 30 tablet 2     meclizine (ANTIVERT) 25 MG tablet Take 1 tablet (25 mg) by mouth 3 times daily as needed 30 tablet 3     fluocinolone acetonide 0.01 % OIL Use on scalp once a week. 1 Bottle 11     ketoconazole (NIZORAL) 2 % shampoo Apply to the affected area and wash off after 5 minutes. 120 mL 1     conjugated estrogens (PREMARIN) vaginal cream Place 0.5 g vaginally three times a week 30 g 12     levofloxacin (LEVAQUIN) 750 MG tablet Take 1 tablet (750 mg) by  mouth daily       Allergies   Allergen Reactions     Aloe Itching and Rash     Codeine      GI upset     Compazine Nausea and Itching     Darvocet [Acetaminophen] Nausea and Vomiting     Macrobid [Nitrofuran Derivatives]      Bladder spasms     Percocet [Oxycodone-Acetaminophen] Nausea and Vomiting     Sulphadimidine [Sulfamethazine] Rash     Reviewed and updated as needed this visit by clinical staff  Reviewed and updated as needed this visit by Provider    ROS:  Constitutional, HEENT, cardiovascular, pulmonary, GI, , musculoskeletal, neuro, skin, endocrine and psych systems are negative, except as otherwise noted.    This document serves as a record of the services and decisions personally performed and made by Susan Haase, CNP. It was created on her behalf by Marilynn Marquez, a trained medical scribe. The creation of this document is based the provider's statements to the medical scribe.  Marilynn Marquez July 7, 2017 3:34 PM   OBJECTIVE:     /70 (BP Location: Left arm, Patient Position: Chair, Cuff Size: Adult Regular)  Pulse 68  Temp 97.7  F (36.5  C) (Oral)  Resp 10  Wt 67.6 kg (149 lb)  LMP 02/25/2014  SpO2 100%  BMI 25.18 kg/m2  Body mass index is 25.18 kg/(m^2).  GENERAL: healthy, alert and no distress  RESP: lungs clear to auscultation - no rales, rhonchi or wheezes  CV: regular rate and rhythm, normal S1 S2, no S3 or S4, no murmur, click or rub, no peripheral edema  ABDOMEN: soft, nontender, no hepatosplenomegaly, no masses and bowel sounds normal.  No CVA tenderness  PSYCH: mentation appears normal, affect normal/bright    Diagnostic Test Results:  Results for orders placed or performed in visit on 07/07/17 (from the past 24 hour(s))   *UA reflex to Microscopic and Culture ([place] and [facility] (except [place] and [place])   Result Value Ref Range    Color Urine Yellow     Appearance Urine Clear     Glucose Urine Negative NEG mg/dL    Bilirubin Urine Negative NEG    Ketones  Urine Negative NEG mg/dL    Specific Gravity Urine 1.015 1.003 - 1.035    Blood Urine Small (A) NEG    pH Urine 7.0 5.0 - 7.0 pH    Protein Albumin Urine Negative NEG mg/dL    Urobilinogen Urine 0.2 0.2 - 1.0 EU/dL    Nitrite Urine Negative NEG    Leukocyte Esterase Urine Trace (A) NEG    Source Midstream Urine    Urine Microscopic   Result Value Ref Range    WBC Urine 5-10 (A) 0 - 2 /HPF    RBC Urine 2-5 (A) 0 - 2 /HPF    Granular Casts 0-2 (A) NEG /LPF    Bacteria Urine Few (A) NEG /HPF    Amorphous Crystals Few (A) NEG /HPF     ASSESSMENT/PLAN:   Laney was seen today for urinary problem.    Diagnoses and all orders for this visit:    Urinary problem:  UA with WBC,will treat with Cipro, will follow up in 2 weeks for repeat UA to ensure resolved.  -     *UA reflex to Microscopic and Culture ([place] and [facility] (except [place] and [place]); Future  -     *UA reflex to Microscopic and Culture ([place] [facility]] (except [place] and [place])  -     Urine Microscopic  -     ciprofloxacin (CIPRO) 500 MG tablet; Take 1 tablet (500 mg) by mouth 2 times daily  -     Urine Culture Aerobic Bacterial  -     UA reflex to Microscopic and Culture; Future    Follow up visit in 2 weeks, sooner as needed.    Patient Instructions   Follow up in 2 weeks for lab only appointment.    Referral for Urology has been placed.    The information in this document, created by the medical scribe for me, accurately reflects the services I personally performed and the decisions made by me. I have reviewed and approved this document for accuracy.   Susan Haase, APRN [facility]

## 2017-07-07 NOTE — MR AVS SNAPSHOT
After Visit Summary   7/7/2017    Laney Maynard    MRN: [id number]           Patient Information     Date Of Birth          1965        Visit Information        Provider Department      7/7/2017 3:00 PM Haase, Susan Rachele, APRN CNP [facility]        Today's Diagnoses     Urinary problem    -  1       Follow-ups after your visit        Future tests that were ordered for you today     Open Future Orders        Priority Expected Expires Ordered    UA reflex to Microscopic and Culture Routine  9/7/2017 7/7/2017            Who to contact     If you have questions or need follow up information about today's clinic visit or your schedule please contact [facility] directly at [phone].  Normal or non-critical lab and imaging results will be communicated to you by [employer], letter or phone within 4 business days after the clinic has received the results. If you do not hear from us within 7 days, please contact the clinic through [employer] or phone. If you have a critical or abnormal lab result, we will notify you by phone as soon as possible.  Submit refill requests through [employer] or call your pharmacy and they will forward the refill request to us. Please allow 3 business days for your refill to be completed.          Additional Information About Your Visit        MyChart Information     [employer] gives you secure access to your electronic health record. If you see a primary care provider, you can also send messages to your care team and make appointments. If you have questions, please call your primary care clinic.  If you do not have a primary care provider, please call [phone] and they will assist you.        Care EveryWhere ID     This is your Care EveryWhere ID. This could be used by other organizations to access your [facility] medical records  [id number]        Your Vitals Were     Pulse Temperature Respirations Last Period Pulse Oximetry BMI  (Body Mass Index)    68 97.7  F (36.5  C) (Oral) 10 02/25/2014 100% 25.18 kg/m2       Blood Pressure from Last 3 Encounters:   07/07/17 110/70   04/17/17 110/70   04/13/17 102/78    Weight from Last 3 Encounters:   07/07/17 149 lb (67.6 kg)   04/17/17 149 lb (67.6 kg)   04/13/17 149 lb (67.6 kg)              We Performed the Following     *UA reflex to Microscopic and Culture ([place] and [facility] (except [place] and [place])     Urine Culture Aerobic Bacterial     Urine Microscopic          Today's Medication Changes          These changes are accurate as of: 7/7/17  3:35 PM.  If you have any questions, ask your nurse or doctor.               Start taking these medicines.        Dose/Directions    ciprofloxacin 500 MG tablet   Commonly known as:  CIPRO   Used for:  Urinary problem   Started by:  Haase, Susan Rachele, APRN CNP        Dose:  500 mg   Take 1 tablet (500 mg) by mouth 2 times daily   Quantity:  20 tablet   Refills:  0            Where to get your medicines      These medications were sent to [facility] [address] [address]     Phone:  [phone]     ciprofloxacin 500 MG tablet                Primary Care Provider Office Phone # Fax #    Susan Rachele Haase, APRN -[phone]       [address] [address]        Equal Access to Services     ERIK SWEET : Hadii ezio ku hadasho Soomaali, waaxda luqadaha, qaybta kaalmada adeegyada, waxay yessy light adegaro gabriel . So [facility] [phone].    ATENCIÓN: Si habla español, tiene a ho disposición servicios gratuitos de asistencia lingüística. Llame al [phone].    We comply with applicable federal civil rights laws and [place] laws. We do not discriminate on the basis of race, color, national origin, age, disability sex, sexual orientation or gender identity.            Thank you!     Thank you for  choosing [facility]  for your care. Our goal is always to provide you with excellent care. Hearing back from our patients is one way we can continue to improve our services. Please take a few minutes to complete the written survey that you may receive in the mail after your visit with us. Thank you!             Your Updated Medication List - Protect others around you: Learn how to safely use, store and throw away your medicines at www.disposemymeds.org.          This list is accurate as of: 7/7/17  3:35 PM.  Always use your most recent med list.                   Brand Name Dispense Instructions for use Diagnosis    acyclovir 5 % ointment    ZOVIRAX    30 g    Apply topically 5 times daily    Herpes simplex virus infection       * amphetamine-dextroamphetamine 20 MG per 24 hr capsule    ADDERALL XR    30 capsule    Take 1 capsule (20 mg) by mouth every morning    Attention deficit hyperactivity disorder (ADHD), combined type       * amphetamine-dextroamphetamine 30 MG per 24 hr capsule    ADDERALL XR    30 capsule    Take 1 capsule (30 mg) by mouth daily    Attention deficit hyperactivity disorder (ADHD), combined type       ciprofloxacin 500 MG tablet    CIPRO    20 tablet    Take 1 tablet (500 mg) by mouth 2 times daily    Urinary problem       conjugated estrogens cream    PREMARIN    30 g    Place 0.5 g vaginally three times a week    Urinary tract infection with hematuria, site unspecified       fexofenadine 180 MG tablet    ALLEGRA    30 tablet    Take 1 tablet (180 mg) by mouth daily as needed    Seasonal allergic rhinitis       fluocinolone acetonide 0.01 % Oil     1 Bottle    Use on scalp once a week.    Scalp psoriasis       fluticasone 50 MCG/ACT spray    FLONASE    16 g    Spray 2 sprays into both nostrils daily as needed    Seasonal allergic rhinitis       ketoconazole 2 % shampoo    NIZORAL    120 mL    Apply to the affected area and wash off after 5 minutes.    Seborrheic dermatitis  of scalp       levofloxacin 750 MG tablet    LEVAQUIN     Take 1 tablet (750 mg) by mouth daily    Acute cystitis with hematuria       meclizine 25 MG tablet    ANTIVERT    30 tablet    Take 1 tablet (25 mg) by mouth 3 times daily as needed    Vertigo       naproxen 500 MG tablet    NAPROSYN    180 tablet    Take 1 tablet (500 mg) by mouth 2 times daily as needed for moderate pain    Plantar fasciitis, bilateral       ondansetron 4 MG ODT tab    ZOFRAN ODT    20 tablet    Take 1-2 tablets (4-8 mg) by mouth every 8 hours as needed for nausea    Ureterolithiasis       solifenacin 5 MG tablet    VESICARE    90 tablet    Take 1 tablet (5 mg) by mouth every evening    Stress incontinence       tobramycin-dexamethasone 0.3-0.1 % ophthalmic susp    TOBRADEX    1 Bottle    Place 2 drops into both eyes daily    Eye disorder       topiramate 50 MG tablet    TOPAMAX     Take 150 mg by mouth 2 times daily        valACYclovir 1000 mg tablet    VALTREX    90 tablet    Take 1 tablet (1,000 mg) by mouth daily    Herpes simplex virus infection       zolpidem 5 MG tablet    AMBIEN    30 tablet    Take 1 tablet (5 mg) by mouth nightly as needed for sleep    Sleep disorder       * Notice:  This list has 2 medication(s) that are the same as other medications prescribed for you. Read the directions carefully, and ask your doctor or other care provider to review them with you.

## 2017-07-07 NOTE — NURSING NOTE
"Chief Complaint   Patient presents with     Urinary Problem       Initial /70 (BP Location: Left arm, Patient Position: Chair, Cuff Size: Adult Regular)  Pulse 68  Temp 97.7  F (36.5  C) (Oral)  Resp 10  Wt 149 lb (67.6 kg)  LMP 02/25/2014  SpO2 100%  BMI 25.18 kg/m2 Estimated body mass index is 25.18 kg/(m^2) as calculated from the following:    Height as of 4/12/17: 5' 4.5\" (1.638 m).    Weight as of this encounter: 149 lb (67.6 kg).  Medication Reconciliation: complete   Krystal Chino CMA      "

## 2017-07-08 LAB
BACTERIA SPEC CULT: NO GROWTH
MICRO REPORT STATUS: NORMAL
SPECIMEN SOURCE: NORMAL

## 2017-07-09 NOTE — PROGRESS NOTES
Pj Davison,  Your urine culture does not show that you have a urinary tract infection, if your symptoms have not improved I would suggest coming in for a clinic visit to make sure something else isn't causing your symptoms.  Sincerely,  Susan Haase, CNP

## 2017-07-14 DIAGNOSIS — F90.2 ATTENTION DEFICIT HYPERACTIVITY DISORDER (ADHD), COMBINED TYPE: ICD-10-CM

## 2017-07-14 RX ORDER — DEXTROAMPHETAMINE SACCHARATE, AMPHETAMINE ASPARTATE MONOHYDRATE, DEXTROAMPHETAMINE SULFATE AND AMPHETAMINE SULFATE 5; 5; 5; 5 MG/1; MG/1; MG/1; MG/1
20 CAPSULE, EXTENDED RELEASE ORAL EVERY MORNING
Qty: 30 CAPSULE | Refills: 0 | Status: SHIPPED | OUTPATIENT
Start: 2017-07-14 | End: 2017-08-23

## 2017-07-14 RX ORDER — DEXTROAMPHETAMINE SACCHARATE, AMPHETAMINE ASPARTATE MONOHYDRATE, DEXTROAMPHETAMINE SULFATE AND AMPHETAMINE SULFATE 7.5; 7.5; 7.5; 7.5 MG/1; MG/1; MG/1; MG/1
30 CAPSULE, EXTENDED RELEASE ORAL DAILY
Qty: 30 CAPSULE | Refills: 0 | OUTPATIENT
Start: 2017-07-14

## 2017-07-14 NOTE — TELEPHONE ENCOUNTER
Controlled Substance Refill Request for Adderall xr 20  Problem List Complete:  Yes    Patient is followed by HAASE, SUSAN RACHELE for ongoing prescription of stimulants.  All refills should be approved by this provider, or covering partner.    Medication(s): Aderall XR 20 mg every day.  Adderall XR 30 mg every day.   Maximum quantity per month: #30;  #30   Clinic visit frequency required: Q 6  months     Controlled substance agreement on file: Yes       Date(s): 3/10/2016  Neuropsych evaluation for ADD completed:  No    Last [facility] website verification:  3/1/17   [email or web address]     checked in past 6 months?  Yes 03/01/2017     Please walk signed prescription to pharmacy.  Thanks.  Mary Anne Desai, [place]  [facility]  [phone]

## 2017-07-14 NOTE — TELEPHONE ENCOUNTER
Controlled Substance Refill Request for Adderall xr 30mg  Problem List Complete:  Yes    Patient is followed by HAASE, SUSAN RACHELE for ongoing prescription of stimulants.  All refills should be approved by this provider, or covering partner.    Medication(s): Aderall XR 20 mg every day.  Adderall XR 30 mg every day.   Maximum quantity per month: #30;  #30   Clinic visit frequency required: Q 6  months     Controlled substance agreement on file: Yes       Date(s): 3/10/2016  Neuropsych evaluation for ADD completed:  No    Last [facility] website verification:  3/1/17   [email or web address]     checked in past 6 months?  Yes 03/01/2017     Please walk signed prescription to pharmacy.  Thanks.  Mary Anne Desai, [place]  [facility]  [phone]

## 2017-07-17 RX ORDER — DEXTROAMPHETAMINE SACCHARATE, AMPHETAMINE ASPARTATE MONOHYDRATE, DEXTROAMPHETAMINE SULFATE AND AMPHETAMINE SULFATE 7.5; 7.5; 7.5; 7.5 MG/1; MG/1; MG/1; MG/1
30 CAPSULE, EXTENDED RELEASE ORAL DAILY
Qty: 30 CAPSULE | Refills: 0 | Status: SHIPPED | OUTPATIENT
Start: 2017-07-17 | End: 2017-08-23

## 2017-07-17 NOTE — TELEPHONE ENCOUNTER
Adderall xr 20mg was approved; this is for the 30mg.  Pt is on both strengths.    Please address 30mg caps    Thanks!!  Edelmira Escobar, Pharmacy [facility]

## 2017-07-21 DIAGNOSIS — R39.89 URINARY PROBLEM: ICD-10-CM

## 2017-07-21 LAB
ALBUMIN UR-MCNC: NEGATIVE MG/DL
APPEARANCE UR: CLEAR
BACTERIA #/AREA URNS HPF: ABNORMAL /HPF
BILIRUB UR QL STRIP: NEGATIVE
COLOR UR AUTO: YELLOW
GLUCOSE UR STRIP-MCNC: NEGATIVE MG/DL
HGB UR QL STRIP: ABNORMAL
KETONES UR STRIP-MCNC: NEGATIVE MG/DL
LEUKOCYTE ESTERASE UR QL STRIP: NEGATIVE
MUCOUS THREADS #/AREA URNS LPF: PRESENT /LPF
NITRATE UR QL: NEGATIVE
NON-SQ EPI CELLS #/AREA URNS LPF: ABNORMAL /LPF
PH UR STRIP: 6 PH (ref 5–7)
RBC #/AREA URNS AUTO: ABNORMAL /HPF (ref 0–2)
SP GR UR STRIP: 1.02 (ref 1–1.03)
URN SPEC COLLECT METH UR: ABNORMAL
UROBILINOGEN UR STRIP-ACNC: 0.2 EU/DL (ref 0.2–1)
WBC #/AREA URNS AUTO: ABNORMAL /HPF (ref 0–2)

## 2017-07-21 PROCEDURE — 81001 URINALYSIS AUTO W/SCOPE: CPT | Performed by: NURSE PRACTITIONER

## 2017-07-27 DIAGNOSIS — N39.3 STRESS INCONTINENCE: ICD-10-CM

## 2017-07-27 RX ORDER — SOLIFENACIN SUCCINATE 5 MG/1
TABLET, FILM COATED ORAL
Qty: 90 TABLET | Refills: 2 | Status: SHIPPED | OUTPATIENT
Start: 2017-07-27 | End: 2018-03-11

## 2017-07-27 NOTE — TELEPHONE ENCOUNTER
Vexicare      Last Written Prescription Date: 7/28/16  Last Fill Quantity: 90,  # refills: 2   Last Office Visit with [facility] or [facility] prescribing provider: 4/17/17    Approved per pharmacist refill protocol. Patient is current on office visit and labs. Thanks.    Cheryl Eaton, Pharm.D  Pharmacist in Charge  [facility]

## 2017-08-23 DIAGNOSIS — F90.2 ATTENTION DEFICIT HYPERACTIVITY DISORDER (ADHD), COMBINED TYPE: ICD-10-CM

## 2017-08-23 RX ORDER — DEXTROAMPHETAMINE SACCHARATE, AMPHETAMINE ASPARTATE MONOHYDRATE, DEXTROAMPHETAMINE SULFATE AND AMPHETAMINE SULFATE 5; 5; 5; 5 MG/1; MG/1; MG/1; MG/1
20 CAPSULE, EXTENDED RELEASE ORAL EVERY MORNING
Qty: 30 CAPSULE | Refills: 0 | Status: SHIPPED | OUTPATIENT
Start: 2017-08-23 | End: 2017-09-13

## 2017-08-23 RX ORDER — DEXTROAMPHETAMINE SACCHARATE, AMPHETAMINE ASPARTATE MONOHYDRATE, DEXTROAMPHETAMINE SULFATE AND AMPHETAMINE SULFATE 7.5; 7.5; 7.5; 7.5 MG/1; MG/1; MG/1; MG/1
30 CAPSULE, EXTENDED RELEASE ORAL DAILY
Qty: 30 CAPSULE | Refills: 0 | Status: SHIPPED | OUTPATIENT
Start: 2017-08-23 | End: 2017-09-13

## 2017-08-23 NOTE — TELEPHONE ENCOUNTER
Controlled Substance Refill Request for Adderall's  Problem List Complete:  Yes  Patient is followed by HAASE, SUSAN RACHELE for ongoing prescription of stimulants.  All refills should be approved by this provider, or covering partner.    Medication(s): Aderall XR 20 mg every day.  Adderall XR 30 mg every day.   Maximum quantity per month: #30;  #30   Clinic visit frequency required: Q 6  months Last Apt: 7/7/17, No Show'd 8/14 apt    Controlled substance agreement on file: Yes       Date(s): 3/10/2016  Neuropsych evaluation for ADD completed:  No    Last [facility] website verification:  3/1/17   [email or web address]       checked in past 6 months?  Yes 3/1/17     Last Picked Up: 7/20/17    Edelmira Escobar, Pharmacy [facility]

## 2017-09-04 ENCOUNTER — TELEPHONE (OUTPATIENT)
Dept: FAMILY MEDICINE | Facility: CLINIC | Age: 52
End: 2017-09-04

## 2017-09-04 NOTE — TELEPHONE ENCOUNTER
Patient called in wanting a referral to a Back & Spine Specialist for her sciatica nerve problem. Patient is requesting a call back once referral is placed to discuss next steps.      Thank you,      Arnulfo Villar Scheduling

## 2017-09-05 NOTE — TELEPHONE ENCOUNTER
Can you please call Laney and ask further about her symptoms, I do not see that she has been seen recently for back issues?    Thanks,  Susan Haase, CNP

## 2017-09-06 NOTE — TELEPHONE ENCOUNTER
Called pt, happened years ago, tailbone issue, will call back to schedule appointment  Genet Wolfe RN, BSN  Message handled by Nurse Triage.

## 2017-09-13 DIAGNOSIS — F90.2 ATTENTION DEFICIT HYPERACTIVITY DISORDER (ADHD), COMBINED TYPE: ICD-10-CM

## 2017-09-13 RX ORDER — DEXTROAMPHETAMINE SACCHARATE, AMPHETAMINE ASPARTATE MONOHYDRATE, DEXTROAMPHETAMINE SULFATE AND AMPHETAMINE SULFATE 7.5; 7.5; 7.5; 7.5 MG/1; MG/1; MG/1; MG/1
30 CAPSULE, EXTENDED RELEASE ORAL DAILY
Qty: 30 CAPSULE | Refills: 0 | Status: SHIPPED | OUTPATIENT
Start: 2017-09-22 | End: 2017-10-17

## 2017-09-13 RX ORDER — DEXTROAMPHETAMINE SACCHARATE, AMPHETAMINE ASPARTATE MONOHYDRATE, DEXTROAMPHETAMINE SULFATE AND AMPHETAMINE SULFATE 5; 5; 5; 5 MG/1; MG/1; MG/1; MG/1
20 CAPSULE, EXTENDED RELEASE ORAL DAILY
Qty: 30 CAPSULE | Refills: 0 | Status: SHIPPED | OUTPATIENT
Start: 2017-09-22 | End: 2017-10-17

## 2017-09-13 NOTE — TELEPHONE ENCOUNTER
Controlled Substance Refill Request for Adderall   Problem List Complete:  Yes  Patient is followed by HAASE, SUSAN RACHELE for ongoing prescription of stimulants.  All refills should be approved by this provider, or covering partner.    Medication(s): Aderall XR 20 mg every day.  Adderall XR 30 mg every day.   Maximum quantity per month: #30;  #30   Clinic visit frequency required: Q 6  months 7/7/17 with Haase; doesn't seem to be an adhd appt though    Controlled substance agreement on file: Yes       Date(s): 3/10/2016  Neuropsych evaluation for ADD completed:  No    Last [facility] website verification:  3/1/17   [email or web address]     checked in past 6 months?  Yes 3/1/17     Last picked up 8/26; due 9/25; two days early is 9/23    Edelmira Escobar, Pharmacy [facility]

## 2017-10-17 DIAGNOSIS — F90.2 ATTENTION DEFICIT HYPERACTIVITY DISORDER (ADHD), COMBINED TYPE: ICD-10-CM

## 2017-10-17 NOTE — TELEPHONE ENCOUNTER
Controlled Substance Refill Request for adderall's  Problem List Complete:  Yes  Patient is followed by HAASE, SUSAN RACHELE for ongoing prescription of stimulants.  All refills should be approved by this provider, or covering partner.    Medication(s): Aderall XR 20 mg every day.  Adderall XR 30 mg every day.   Maximum quantity per month: #30;  #30   Clinic visit frequency required: Q 6  months 7/7/17 with dr. haase (acute appt)    Controlled substance agreement on file: Yes       Date(s): 3/10/2016  Neuropsych evaluation for ADD completed:  No    Last [facility] website verification:  3/1/17   [email or web address]   checked in past 6 months?  No, route to RN Overdue    Last picked up: 9/28; due 10/28; ok 10/26    Edelmira Escobar, Pharmacy [facility]

## 2017-10-18 NOTE — TELEPHONE ENCOUNTER
Routing refill request to provider to review approval because:  Drug not on the [facility] or [facility] refill protocol or controlled substance    PLEASE CONFIRM IF 6 MONTH VISIT DUE, DO NOT SEE ASSOCIATED DIAGNOSIS OVER 1+ YEARS, pt has been seen multiple times but not for att def   updated, in your bin  Genet Wolfe RN, BSN  Message handled by Nurse Triage.

## 2017-10-19 RX ORDER — DEXTROAMPHETAMINE SACCHARATE, AMPHETAMINE ASPARTATE MONOHYDRATE, DEXTROAMPHETAMINE SULFATE AND AMPHETAMINE SULFATE 5; 5; 5; 5 MG/1; MG/1; MG/1; MG/1
20 CAPSULE, EXTENDED RELEASE ORAL DAILY
Qty: 30 CAPSULE | Refills: 0 | Status: SHIPPED | OUTPATIENT
Start: 2017-10-19 | End: 2017-11-27

## 2017-10-19 RX ORDER — DEXTROAMPHETAMINE SACCHARATE, AMPHETAMINE ASPARTATE MONOHYDRATE, DEXTROAMPHETAMINE SULFATE AND AMPHETAMINE SULFATE 7.5; 7.5; 7.5; 7.5 MG/1; MG/1; MG/1; MG/1
30 CAPSULE, EXTENDED RELEASE ORAL DAILY
Qty: 30 CAPSULE | Refills: 0 | Status: SHIPPED | OUTPATIENT
Start: 2017-10-19 | End: 2017-11-27

## 2017-10-19 NOTE — TELEPHONE ENCOUNTER
Walked 2 prescriptions over to the [facility] pharmacy.  Adderall XR 20 Mg & Adderall XR 30 Mg.    Lor Mendieta/

## 2017-11-27 DIAGNOSIS — F90.2 ATTENTION DEFICIT HYPERACTIVITY DISORDER (ADHD), COMBINED TYPE: ICD-10-CM

## 2017-11-27 NOTE — TELEPHONE ENCOUNTER
Pending Prescriptions:                       Disp   Refills    amphetamine-dextroamphetamine (ADDERALL X*30 cap*0            Sig: Take 1 capsule (20 mg) by mouth daily    amphetamine-dextroamphetamine (ADDERALL X*30 cap*0            Sig: Take 1 capsule (30 mg) by mouth daily    Controlled Substance Refill Request for   Problem List Complete:  No     PROVIDER TO CONSIDER COMPLETION OF PROBLEM LIST AND OVERVIEW/CONTROLLED SUBSTANCE AGREEMENT    Last Written Prescription Date:  10/17/2017  Last Fill Quantity: 30,   # refills: 0    Last Office Visit with [facility] primary care provider: 08/14/2017    Future Office visit:     Controlled substance agreement on file: No.     Processing:  Fax Rx to [facility] [place] pharmacy     checked in past 6 months?  No, route to EMILY CHASE

## 2017-11-29 RX ORDER — DEXTROAMPHETAMINE SACCHARATE, AMPHETAMINE ASPARTATE MONOHYDRATE, DEXTROAMPHETAMINE SULFATE AND AMPHETAMINE SULFATE 7.5; 7.5; 7.5; 7.5 MG/1; MG/1; MG/1; MG/1
30 CAPSULE, EXTENDED RELEASE ORAL DAILY
Qty: 30 CAPSULE | Refills: 0 | Status: SHIPPED | OUTPATIENT
Start: 2017-11-29 | End: 2017-12-29

## 2017-11-29 RX ORDER — DEXTROAMPHETAMINE SACCHARATE, AMPHETAMINE ASPARTATE MONOHYDRATE, DEXTROAMPHETAMINE SULFATE AND AMPHETAMINE SULFATE 5; 5; 5; 5 MG/1; MG/1; MG/1; MG/1
20 CAPSULE, EXTENDED RELEASE ORAL DAILY
Qty: 30 CAPSULE | Refills: 0 | Status: SHIPPED | OUTPATIENT
Start: 2017-11-29 | End: 2017-12-29

## 2017-11-29 NOTE — TELEPHONE ENCOUNTER
Routing refill request to provider to review approval because:  Drug not on the [facility] or [facility] refill protocol or controlled substance  Genet Wolfe RN, BSN  Message handled by Nurse Triage.

## 2017-12-11 ENCOUNTER — OFFICE VISIT (OUTPATIENT)
Dept: FAMILY MEDICINE | Facility: CLINIC | Age: 52
End: 2017-12-11
Payer: COMMERCIAL

## 2017-12-11 VITALS
HEART RATE: 90 BPM | TEMPERATURE: 98.1 F | RESPIRATION RATE: 16 BRPM | SYSTOLIC BLOOD PRESSURE: 104 MMHG | BODY MASS INDEX: 26.19 KG/M2 | WEIGHT: 155 LBS | DIASTOLIC BLOOD PRESSURE: 58 MMHG | OXYGEN SATURATION: 100 %

## 2017-12-11 DIAGNOSIS — R39.89 URINARY PROBLEM: ICD-10-CM

## 2017-12-11 DIAGNOSIS — M79.672 INTRACTABLE LEFT HEEL PAIN: Primary | ICD-10-CM

## 2017-12-11 LAB
ALBUMIN UR-MCNC: NEGATIVE MG/DL
APPEARANCE UR: CLEAR
BILIRUB UR QL STRIP: NEGATIVE
COLOR UR AUTO: YELLOW
GLUCOSE UR STRIP-MCNC: NEGATIVE MG/DL
HGB UR QL STRIP: ABNORMAL
KETONES UR STRIP-MCNC: NEGATIVE MG/DL
LEUKOCYTE ESTERASE UR QL STRIP: NEGATIVE
NITRATE UR QL: NEGATIVE
PH UR STRIP: 6 PH (ref 5–7)
RBC #/AREA URNS AUTO: ABNORMAL /HPF
SOURCE: ABNORMAL
SP GR UR STRIP: 1.02 (ref 1–1.03)
UROBILINOGEN UR STRIP-ACNC: 0.2 EU/DL (ref 0.2–1)
WBC #/AREA URNS AUTO: ABNORMAL /HPF

## 2017-12-11 PROCEDURE — 99214 OFFICE O/P EST MOD 30 MIN: CPT | Performed by: NURSE PRACTITIONER

## 2017-12-11 PROCEDURE — 81001 URINALYSIS AUTO W/SCOPE: CPT | Performed by: NURSE PRACTITIONER

## 2017-12-11 RX ORDER — METHYLPREDNISOLONE 4 MG
TABLET ORAL
Qty: 21 TABLET | Refills: 0 | Status: ON HOLD | OUTPATIENT
Start: 2017-12-11 | End: 2018-04-26

## 2017-12-11 RX ORDER — CIPROFLOXACIN 500 MG/1
500 TABLET, FILM COATED ORAL 2 TIMES DAILY
Qty: 14 TABLET | Refills: 0 | Status: SHIPPED | OUTPATIENT
Start: 2017-12-11 | End: 2018-02-08

## 2017-12-11 NOTE — MR AVS SNAPSHOT
After Visit Summary   12/11/2017    Laney Maynard    MRN: [id number]           Patient Information     Date Of Birth          1965        Visit Information        Provider Department      12/11/2017 11:00 AM Haase, Susan Rachele, APRN CNP [facility]        Today's Diagnoses     Urinary problem    -  1    Intractable left heel pain           Follow-ups after your visit        Follow-up notes from your care team     Return in about 3 months (around 3/11/2018).      Who to contact     If you have questions or need follow up information about today's clinic visit or your schedule please contact [facility] directly at [phone].  Normal or non-critical lab and imaging results will be communicated to you by MyChart, letter or phone within 4 business days after the clinic has received the results. If you do not hear from us within 7 days, please contact the clinic through [employer] or phone. If you have a critical or abnormal lab result, we will notify you by phone as soon as possible.  Submit refill requests through [employer] or call your pharmacy and they will forward the refill request to us. Please allow 3 business days for your refill to be completed.          Additional Information About Your Visit        MyChart Information     [employer] gives you secure access to your electronic health record. If you see a primary care provider, you can also send messages to your care team and make appointments. If you have questions, please call your primary care clinic.  If you do not have a primary care provider, please call [phone] and they will assist you.        Care EveryWhere ID     This is your Care EveryWhere ID. This could be used by other organizations to access your [place] medical records  [id number]        Your Vitals Were     Pulse Temperature Respirations Last Period Pulse Oximetry BMI (Body Mass Index)    90 98.1  F (36.7  C) (Oral) 16  02/25/2014 100% 26.19 kg/m2       Blood Pressure from Last 3 Encounters:   12/11/17 104/58   07/07/17 110/70   04/17/17 110/70    Weight from Last 3 Encounters:   12/11/17 155 lb (70.3 kg)   07/07/17 149 lb (67.6 kg)   04/17/17 149 lb (67.6 kg)              We Performed the Following     *UA reflex to Microscopic and Culture ([place] and [facility] (except [place] and [place])     Urine Microscopic          Today's Medication Changes          These changes are accurate as of: 12/11/17 11:07 AM.  If you have any questions, ask your nurse or doctor.               Start taking these medicines.        Dose/Directions    methylPREDNISolone 4 MG tablet   Commonly known as:  MEDROL   Used for:  Intractable left heel pain   Started by:  Haase, Susan Rachele, APRN CNP        follow package directions   Quantity:  21 tablet   Refills:  0         These medicines have changed or have updated prescriptions.        Dose/Directions    * ciprofloxacin 500 MG tablet   Commonly known as:  CIPRO   This may have changed:  Another medication with the same name was added. Make sure you understand how and when to take each.   Used for:  Urinary problem   Changed by:  Haase, Susan Rachele, APRN CNP        Dose:  500 mg   Take 1 tablet (500 mg) by mouth 2 times daily   Quantity:  20 tablet   Refills:  0       * ciprofloxacin 500 MG tablet   Commonly known as:  CIPRO   This may have changed:  You were already taking a medication with the same name, and this prescription was added. Make sure you understand how and when to take each.   Used for:  Urinary problem   Changed by:  Haase, Susan Rachele, APRN CNP        Dose:  500 mg   Take 1 tablet (500 mg) by mouth 2 times daily   Quantity:  14 tablet   Refills:  0       * Notice:  This list has 2 medication(s) that are the same as other medications prescribed for you. Read the directions carefully, and ask your doctor or other care provider to review them with you.         Where to get  your medicines      These medications were sent to [facility], [place] - [address]  [address] [facility] [address]     Phone:  [phone]     ciprofloxacin 500 MG tablet         Some of these will need a paper prescription and others can be bought over the counter.  Ask your nurse if you have questions.     Bring a paper prescription for each of these medications     methylPREDNISolone 4 MG tablet                Primary Care Provider Office Phone # Fax #    Susan Rachele Haase, APRN [phone] [address] [facility] [address]        Equal Access to Services     [facility] CenterJORDON : Hadii ezio ku hadasho Soomaali, waaxda luqadaha, qaybta kaalmada adeegyada, jay gabriel . So [facility] [phone].    ATENCIÓN: Si habla español, tiene a ho disposición servicios gratuitos de asistencia lingüística. [facility] [phone].    We comply with applicable federal civil rights laws and [place] laws. We do not discriminate on the basis of race, color, national origin, age, disability, sex, sexual orientation, or gender identity.            Thank you!     Thank you for choosing [facility]  for your care. Our goal is always to provide you with excellent care. Hearing back from our patients is one way we can continue to improve our services. Please take a few minutes to complete the written survey that you may receive in the mail after your visit with us. Thank you!             Your Updated Medication List - Protect others around you: Learn how to safely use, store and throw away your medicines at www.disposemymeds.org.          This list is accurate as of: 12/11/17 11:07 AM.  Always use your most recent med list.                   Brand Name Dispense Instructions for use Diagnosis    acyclovir 5 % ointment    ZOVIRAX    30 g    Apply topically 5 times daily    Herpes simplex virus infection       *  amphetamine-dextroamphetamine 20 MG per 24 hr capsule    ADDERALL XR    30 capsule    Take 1 capsule (20 mg) by mouth daily    Attention deficit hyperactivity disorder (ADHD), combined type       * amphetamine-dextroamphetamine 30 MG per 24 hr capsule    ADDERALL XR    30 capsule    Take 1 capsule (30 mg) by mouth daily    Attention deficit hyperactivity disorder (ADHD), combined type       * ciprofloxacin 500 MG tablet    CIPRO    20 tablet    Take 1 tablet (500 mg) by mouth 2 times daily    Urinary problem       * ciprofloxacin 500 MG tablet    CIPRO    14 tablet    Take 1 tablet (500 mg) by mouth 2 times daily    Urinary problem       conjugated estrogens cream    PREMARIN    30 g    Place 0.5 g vaginally three times a week    Urinary tract infection with hematuria, site unspecified       fexofenadine 180 MG tablet    ALLEGRA    30 tablet    Take 1 tablet (180 mg) by mouth daily as needed    Seasonal allergic rhinitis       fluocinolone acetonide 0.01 % Oil     1 Bottle    Use on scalp once a week.    Scalp psoriasis       fluticasone 50 MCG/ACT spray    FLONASE    16 g    Spray 2 sprays into both nostrils daily as needed    Seasonal allergic rhinitis       ketoconazole 2 % shampoo    NIZORAL    120 mL    Apply to the affected area and wash off after 5 minutes.    Seborrheic dermatitis of scalp       meclizine 25 MG tablet    ANTIVERT    30 tablet    Take 1 tablet (25 mg) by mouth 3 times daily as needed    Vertigo       methylPREDNISolone 4 MG tablet    MEDROL    21 tablet    follow package directions    Intractable left heel pain       naproxen 500 MG tablet    NAPROSYN    180 tablet    Take 1 tablet (500 mg) by mouth 2 times daily as needed for moderate pain    Plantar fasciitis, bilateral       ondansetron 4 MG ODT tab    ZOFRAN ODT    20 tablet    Take 1-2 tablets (4-8 mg) by mouth every 8 hours as needed for nausea    Ureterolithiasis       tobramycin-dexamethasone 0.3-0.1 % ophthalmic susp    TOBRADEX    1  Bottle    Place 2 drops into both eyes daily    Eye disorder       topiramate 50 MG tablet    TOPAMAX     Take 150 mg by mouth 2 times daily        valACYclovir 1000 mg tablet    VALTREX    90 tablet    Take 1 tablet (1,000 mg) by mouth daily    Herpes simplex virus infection       VESICARE 5 MG tablet   Generic drug:  solifenacin     90 tablet    TAKE ONE TABLET BY MOUTH EVERY EVENING    Stress incontinence       zolpidem 5 MG tablet    AMBIEN    30 tablet    Take 1 tablet (5 mg) by mouth nightly as needed for sleep    Sleep disorder       * Notice:  This list has 4 medication(s) that are the same as other medications prescribed for you. Read the directions carefully, and ask your doctor or other care provider to review them with you.

## 2017-12-11 NOTE — PROGRESS NOTES
SUBJECTIVE:   Laney Maynard is a 52 year old female who presents to clinic today for the following health issues:    Musculoskeletal problem/pain      Duration: 4 days    Description  Location: both feet L is worse    Intensity:  severe    Accompanying signs and symptoms: swelling    History  Previous similar problem: YES  Previous evaluation:  none    Precipitating or alleviating factors:  Trauma or overuse: no   Aggravating factors include: walking    Therapies tried and outcome: heat and ice    Her CL heel is more painful than R. Symptoms include inflammation, tenderness to ambulate, and describes pain as being on fire. To alleviate pain has tried heat and ice with little help. Her feet have not bothered her in 6-7 years, she's previously had injection in L heel with great relief for years.       URINARY TRACT SYMPTOMS    Duration: 1 week    Description  dysuria and urgency    Intensity:  severe    Accompanying signs and symptoms:  Fever/chills: no   Flank pain no   Nausea and vomiting: no   Vaginal symptoms: none  Abdominal/Pelvic Pain: no     History  History of frequent UTI's: YES  History of kidney stones: no   Sexually Active: YES  Possibility of pregnancy: No    Precipitating or alleviating factors: None    Therapies tried and outcome: increase fluid intake       Symptoms began 1 week ago, those include dysuria and increased frequency. History of UTI's, last in 7/2017      Problem list and histories reviewed & adjusted, as indicated.  Additional history: as documented    Patient Active Problem List   Diagnosis     Alopecia     Seasonal allergic rhinitis     GERD (gastroesophageal reflux disease)     CARDIOVASCULAR SCREENING; LDL GOAL LESS THAN 160     Migraine headache     Lymphocytopenia     Sleep disorder     Family history of rheumatoid arthritis     Family history of sarcoidosis (mother)     Urinary frequency     Herpes simplex virus infection     Attention deficit hyperactivity disorder  (ADHD)     Plantar fasciitis, bilateral     Complete rupture of rotator cuff     Mixed incontinence urge and stress (male)(female)     Ureterolithiasis     Past Surgical History:   Procedure Laterality Date     ARTHROSCOPY SHOULDER, OPEN ROTATOR CUFF REPAIR, COMBINED  2013    Procedure: COMBINED ARTHROSCOPY SHOULDER, OPEN ROTATOR CUFF REPAIR;  Left Shoulder Arthroscopy, Distal Clavicale Resection, Decompression, Mini Open Rotator Cuff Repair    ;  Surgeon: Fredi Lindsay MD;  Location: [facility] OR     C NONSPECIFIC PROCEDURE  age 16    colposcopy for abnormal pap     C NONSPECIFIC PROCEDURE      surgery L thumb     C NONSPECIFIC PROCEDURE      breast augmentation-silicone     C NONSPECIFIC PROCEDURE      s/p  x 2     C NONSPECIFIC PROCEDURE      Bilateral tubal ligation     C REMOVAL OF KIDNEY STONE       COLONOSCOPY  2014    Procedure: COLONOSCOPY;  Colonoscopy/WW;  Surgeon: Pancho Olsen MD;  Location:  GI     COMBINED CYSTOSCOPY, RETROGRADES, URETEROSCOPY, LASER HOLMIUM LITHOTRIPSY URETER(S), INSERT STENT Right 2016    Procedure: COMBINED CYSTOSCOPY, RETROGRADES, URETEROSCOPY, LASER HOLMIUM LITHOTRIPSY URETER(S), INSERT STENT;  Surgeon: Kushal Noriega MD;  Location:  OR     CYSTOSCOPY       CYSTOSCOPY, RETROGRADES, EXTRACT STONE, COMBINED  2013    Procedure: COMBINED CYSTOSCOPY, RETROGRADES, EXTRACT STONE;  Video Cystoscopy,  Right Ureteroscopy, ureteral dilatation,  Stone extraction;  Surgeon: Subhash Vann MD;  Location:  OR     EXTRACORPOREAL SHOCK WAVE LITHOTRIPSY (ESWL) Bilateral 2016    Procedure: EXTRACORPOREAL SHOCK WAVE LITHOTRIPSY (ESWL);  Surgeon: Bassam Vu MD;  Location:  OR     GYN SURGERY      laparoscopy     LAPAROSCOPIC CHOLECYSTECTOMY WITH CHOLANGIOGRAMS  2012    Procedure: LAPAROSCOPIC CHOLECYSTECTOMY WITH CHOLANGIOGRAMS;  LAPAROSCOPIC CHOLECYSTECTOMY WITH CHOLANGIOGRAMS ;  Surgeon: Nataliya Gabriel MD;  Location:   OR     TUBAL LIGATION         Social History   Substance Use Topics     Smoking status: Never Smoker     Smokeless tobacco: Never Used     Alcohol use No     Family History   Problem Relation Age of Onset     DIABETES Mother      type 2     Alcohol/Drug Mother      alcohol         Current Outpatient Prescriptions   Medication Sig Dispense Refill     methylPREDNISolone (MEDROL) 4 MG tablet follow package directions 21 tablet 0     amphetamine-dextroamphetamine (ADDERALL XR) 20 MG per 24 hr capsule Take 1 capsule (20 mg) by mouth daily 30 capsule 0     amphetamine-dextroamphetamine (ADDERALL XR) 30 MG per 24 hr capsule Take 1 capsule (30 mg) by mouth daily 30 capsule 0     VESICARE 5 MG tablet TAKE ONE TABLET BY MOUTH EVERY EVENING 90 tablet 2     ciprofloxacin (CIPRO) 500 MG tablet Take 1 tablet (500 mg) by mouth 2 times daily 20 tablet 0     valACYclovir (VALTREX) 1000 mg tablet Take 1 tablet (1,000 mg) by mouth daily 90 tablet 3     naproxen (NAPROSYN) 500 MG tablet Take 1 tablet (500 mg) by mouth 2 times daily as needed for moderate pain 180 tablet 3     acyclovir (ZOVIRAX) 5 % ointment Apply topically 5 times daily 30 g 5     ondansetron (ZOFRAN ODT) 4 MG disintegrating tablet Take 1-2 tablets (4-8 mg) by mouth every 8 hours as needed for nausea 20 tablet 1     topiramate (TOPAMAX) 50 MG tablet Take 150 mg by mouth 2 times daily       tobramycin-dexamethasone (TOBRADEX) ophthalmic suspension Place 2 drops into both eyes daily 1 Bottle 11     fluticasone (FLONASE) 50 MCG/ACT nasal spray Spray 2 sprays into both nostrils daily as needed 16 g 11     fexofenadine (ALLEGRA) 180 MG tablet Take 1 tablet (180 mg) by mouth daily as needed 30 tablet 6     zolpidem (AMBIEN) 5 MG tablet Take 1 tablet (5 mg) by mouth nightly as needed for sleep 30 tablet 2     meclizine (ANTIVERT) 25 MG tablet Take 1 tablet (25 mg) by mouth 3 times daily as needed 30 tablet 3     fluocinolone acetonide 0.01 % OIL Use on scalp once a week. 1  Bottle 11     ketoconazole (NIZORAL) 2 % shampoo Apply to the affected area and wash off after 5 minutes. 120 mL 1     conjugated estrogens (PREMARIN) vaginal cream Place 0.5 g vaginally three times a week 30 g 12     Allergies   Allergen Reactions     Aloe Itching and Rash     Codeine      GI upset     Compazine Nausea and Itching     Darvocet [Acetaminophen] Nausea and Vomiting     Macrobid [Nitrofuran Derivatives]      Bladder spasms     Percocet [Oxycodone-Acetaminophen] Nausea and Vomiting     Sulphadimidine [Sulfamethazine] Rash         Reviewed and updated as needed this visit by clinical staff     Reviewed and updated as needed this visit by Provider         ROS:  Constitutional, HEENT, cardiovascular, pulmonary, GI, , musculoskeletal, neuro, skin, endocrine and psych systems are negative, except as otherwise noted.    This document serves as a record of the services and decisions personally performed and made by Susan Haase, CNP. It was created on her behalf by Chioma Shaw, a trained medical scribe. The creation of this document is based on the provider's statements to the medical scribe.  Chioma Shaw 11:06 AM December 11, 2017  OBJECTIVE:   /58 (BP Location: Left arm, Patient Position: Chair, Cuff Size: Adult Regular)  Pulse 90  Temp 98.1  F (36.7  C) (Oral)  Resp 16  Wt 70.3 kg (155 lb)  LMP 02/25/2014  SpO2 100%  BMI 26.19 kg/m2  Body mass index is 26.19 kg/(m^2).  GENERAL: healthy, alert and no distress  MS: no gross musculoskeletal defects noted, bilateral heels with pain upon palpation, walking on toes of left foot.  NEURO: Normal strength and tone, mentation intact and speech normal  PSYCH: mentation appears normal, affect normal/bright    ASSESSMENT/PLAN:   Laney was seen today for musculoskeletal problem and urinary problem.    Diagnoses and all orders for this visit:    Intractable left heel pain: continue to apply ice, stretching, will also prescribe medrol dose packet since it  helped in the past.  Was also given the name of podiatry, Dr. Emerson, if pain increases   -     methylPREDNISolone (MEDROL) 4 MG tablet; follow package directions  -     order for DME; Equipment being ordered:heel pads    Urinary problem:  UA with leuk and heme, will place on cipro.    -     *UA reflex to Microscopic and Culture ([place] and [facility] (except [place] and [place])  -     Urine Microscopic  -     ciprofloxacin (CIPRO) 500 MG tablet; Take 1 tablet (500 mg) by mouth 2 times daily    Follow up as needed if symptoms get worse or do not improve.     The information in this document, created by the medical scribe for me, accurately reflects the services I personally performed and the decisions made by me. I have reviewed and approved this document for accuracy prior to leaving the patient care area.  December 11, 2017 11:09 AM  Susan Haase, APRN [facility]

## 2017-12-25 DIAGNOSIS — M72.2 PLANTAR FASCIITIS, BILATERAL: ICD-10-CM

## 2017-12-26 ENCOUNTER — OFFICE VISIT (OUTPATIENT)
Dept: PODIATRY | Facility: CLINIC | Age: 52
End: 2017-12-26
Payer: COMMERCIAL

## 2017-12-26 VITALS
DIASTOLIC BLOOD PRESSURE: 62 MMHG | WEIGHT: 155 LBS | BODY MASS INDEX: 26.46 KG/M2 | SYSTOLIC BLOOD PRESSURE: 110 MMHG | HEIGHT: 64 IN

## 2017-12-26 DIAGNOSIS — M79.671 PAIN IN BOTH FEET: Primary | ICD-10-CM

## 2017-12-26 DIAGNOSIS — M21.41 PES PLANUS OF BOTH FEET: ICD-10-CM

## 2017-12-26 DIAGNOSIS — M72.2 PLANTAR FASCIITIS, BILATERAL: ICD-10-CM

## 2017-12-26 DIAGNOSIS — M21.42 PES PLANUS OF BOTH FEET: ICD-10-CM

## 2017-12-26 DIAGNOSIS — M79.672 PAIN IN BOTH FEET: Primary | ICD-10-CM

## 2017-12-26 PROCEDURE — 99203 OFFICE O/P NEW LOW 30 MIN: CPT | Performed by: PODIATRIST

## 2017-12-26 NOTE — NURSING NOTE
"Chief Complaint   Patient presents with     Foot Problems     bilateral foot pain left foot worse than the right worried about plantar fasciitis pt had an injection about 6 years ago        Initial /62  Ht 5' 4\" (1.626 m)  Wt 155 lb (70.3 kg)  LMP 02/25/2014  BMI 26.61 kg/m2 Estimated body mass index is 26.61 kg/(m^2) as calculated from the following:    Height as of this encounter: 5' 4\" (1.626 m).    Weight as of this encounter: 155 lb (70.3 kg).  Medication Reconciliation: complete   Barry Thakkar MA      "

## 2017-12-26 NOTE — PATIENT INSTRUCTIONS
Thank you for choosing [facility]!    DR. BUTLER'S CLINIC LOCATIONS:   MONDAY AM - [place] TUESDAY - [place]   [address] [address] [address] [address] [place] [address]   [phone] / [phone] / [phone]       WEDNESDAY - [place] FRIDAY PM - [place]   [address]   [place] [address] [place], [place] [id number] [phone] / [phone] / [phone]       SCHEDULE SURGERY: [phone]    APPOINTMENTS: [phone]    BILLING QUESTIONS: [phone]      Please call to schedule your MRI/CT/Ultrasound/Arthrogram appointment.  The number is [phone].        PLANTAR FASCIITIS  Plantar fasciitis is often referred to as heel spurs or heel pain. Plantar fasciitis is a very common problem that affects people of all foot shapes, age, weight and activity level. Pain may be in the arch or on the weight-bearing surface of the heel. The pain may come on without injury or identifiable cause. Pain is generally present when first getting out of bed in the morning or up from a seated break.     CAUSES  The plantar fascia is a dense fibrous band of tissue that stretches across the bottom surface of the foot. The fascia helps support the foot muscles and arch. Plantar fasciitis is thought to be caused by mechanical strain or overload. Frequent walking without shoes or wearing unsupportive shoes is thought to cause structural overload and ultimately inflammation of the plantar fascia. Some people have heel spurs that can be seen on x-ray. The heel spur is actually a minor component of plantar fascitis and is largely ignored.       SELF TREATMENT   The easiest solution is to stop walking around your home without shoes. Plantar fasciitis is largely a shoe problem. Shoes are either not being worn often enough or your current shoes are inadequate for your weight, foot structure or activity level. The  majority of shoes on the market today are not sufficient to resist development of plantar fasciitis or to promote healing. Assume that your current shoes are inadequate and will need to be replaced. Even high quality shoes wear out with 6 months to one year of frequent use. Weight loss is another option. Losing ten pounds in the next two months may be enough to resolve the problem. Ice applied to the area of pain two to three times per day for ten minutes each session can be very helpful. Warm foot soaks in epsom salts can also relieve pain. This should continue until the problem resolves. Achilles tendon stretching is essential. Stretch multiple times daily to promote healing and to prevent recurrence in the future. Over all stretching of the body is helpful as well such as the calves, thighs and lower back. Normally when one area of the body is tight, other areas are too. Gentle Yoga can be good for this.     Over the counter topical anti inflammatories can be helpful such as biofreeze, bengay, salon pas, ect...  Oral ibuprofen or aleve is recommended as well to try to calm down inflammation.     Night splints can be helpful to gradually stretch the foot at night as a lot of pain is when you get up in the morning. Taking a towel or thera band and stretching the foot back multiple times before you get ou of bed can be beneficial as well.     MEDICAL TREATMENT  Medical treatments often include custom arch supports, cortisone injections, physical therapy, splints to be worn in bed, prescription medications and surgery. The home treatments listed above will be necessary regardless of these advanced medical treatments. Surgery is rarely needed but is very helpful in selected cases.     PROGNOSIS  Plantar fasciitis can last from one day to a lifetime. Some people get intermittent fascitis that is very short-lived. Others suffer daily for years. Excessive body weight, frequent bare foot walking, long hours on the feet,  inadequate shoes, predisposing foot structures and excessive activity such as running are all potential issues that lead to chronic and/or recurring plantar fascitis. Having plantar fasciitis means that you are forever prone to this problem and will require modification of some of the above factors. Most people seek treatment within one to four months. Healing usually requires a similar one to four month time frame. Healing time is relative to the amount of effort spent treating the problem.   Plantar fasciitis is highly recurrent. Risk factors often continue, including return to bare foot walking, inadequate shoes, excessive body weight, excessive activities, etc. Your life style and foot structure may predispose you to recurrent plantar fasciitis. A daily prevention regimen can be very helpful. Ongoing use of shoe inserts, careful attention to appropriate shoes, daily Achilles stretching, etc. may prevent recurrence. Prompt attention at the earliest warning signs of heel pain can resolve the problem in as short as a few days.     EXERCISES  Stair Exercise: Step on the stairs with the ball of your foot and hold your position for at least 15 seconds, then slowly step down with the heels of your foot. You can do this daily and as often as you want.   Picking the Towel: Sit comfortably and then pick the towel up with your toes. You can use any object other than a towel as long as the material can be soft and you can pick it up with your toes.  Rolling the Bottle: Use a small ball or frozen water bottle and then roll it around with your foot.   Flex the Toes: Sit comfortably and then flex your toes by pointing it towards the floor or towards your body. This will relax and flex your foot and exercise your plantar fascia, the calf, and the Achilles tendon. The inability of the foot to stretch often causes the bunching up of the plantar fascia area leading to the pain.  Calf/Achilles Stretching: Lay on you back and raise  one foot, then point your toes towards the floor. See photo below:               Hold each stretch for 10 seconds. Stretch 10 times per set, three sets per day. Morning, afternoon and evening. If your heel pain is very severe in the morning, consider doing the first set of stretches before you get out of bed.      OVER THE COUNTER INSERT RECOMMENDATIONS  SuperFeet   Sofsole Fit Spenco   Power Step   Walk-Fit Arch Cradles     Most of these can be found at your local [employer], sporting [employer] stores, or online.  **A good high quality over the counter insert should cost around $40-$50      [employer] LOCATIONS  [place]  [address] [place] [address]  [phone]   [address]  [phone] [place]  [address] [facility]  [phone]   [place]  [address] [facility] [address]  [phone]   [place]  [address]  [phone] [place] [address]  [phone]   [place]  [address]  [phone]   [place]  [address] [place] [address]  [phone] [place]  [address]  [phone]           Body Mass Index (BMI)  Many things can cause foot and ankle problems. Foot structure, activity level, foot mechanics and injuries are common causes of pain.  One very important issue that often goes unmentioned, is body weight. Extra weight can cause increased stress on muscles, ligaments, bones and tendons.  Sometimes just a few extra pounds is all it takes to put one over her/his threshold. Without reducing that stress, it can be difficult to alleviate pain. Some people are uncomfortable addressing this issue, but we feel it is important for you to think about it. As Foot &  Ankle specialists, our job is addressing the lower extremity problem and possible causes. Regarding extra body weight, we encourage patients to discuss diet and weight management plans with their primary care doctors. It is this team approach that gives you the best opportunity for pain relief and  getting you back on your feet.

## 2017-12-26 NOTE — PROGRESS NOTES
PATIENT HISTORY:  Dr. cm requested I see this patient for their foot issue.  Laney Maynard is a 52 year old female who presents to clinic for pain to both heels. Left is worse. Had plantar fasciitis diagnosis years ago. Had inserts but said they didn't help. It went away. Noted pain a few weeks ago. Pain is 10/10. Tried a medrol dose pack which helped a little she notes. Is wondering what can be done for the pain.     Review of Systems:  Patient denies fever, chills, rash, wound, stiffness, numbness, weakness, heart burn, blood in stool, chest pain with activity, calf pain when walking, shortness of breath with activity, chronic cough, easy bleeding/bruising, swelling of ankles, excessive thirst, fatigue, depression, anxiety.  Patient admits to limping.     PAST MEDICAL HISTORY:   Past Medical History:   Diagnosis Date     Abnormal glandular Papanicolaou smear of cervix      Allergic rhinitis, cause unspecified      Constipation      Gastro-oesophageal reflux disease      Heart murmur     murmur     Hematuria      Herpes simplex virus infection      Hydronephrosis, right      Migraine, unspecified, without mention of intractable migraine without mention of status migrainosus      Mumps      Numbness and tingling     LEFT ARM     Palpitations      Renal disease     hx stones x 2 episodes     Ureterolithiasis      Urinary frequency     burning     Vertigo         PAST SURGICAL HISTORY:   Past Surgical History:   Procedure Laterality Date     ARTHROSCOPY SHOULDER, OPEN ROTATOR CUFF REPAIR, COMBINED  7/31/2013    Procedure: COMBINED ARTHROSCOPY SHOULDER, OPEN ROTATOR CUFF REPAIR;  Left Shoulder Arthroscopy, Distal Clavicale Resection, Decompression, Mini Open Rotator Cuff Repair    ;  Surgeon: Fredi Lindsay MD;  Location: [facility] OR     C NONSPECIFIC PROCEDURE  age 16    colposcopy for abnormal pap     C NONSPECIFIC PROCEDURE      surgery L thumb     C NONSPECIFIC PROCEDURE  1987    breast  augmentation-silicone     C NONSPECIFIC PROCEDURE      s/p  x 2     C NONSPECIFIC PROCEDURE      Bilateral tubal ligation     C REMOVAL OF KIDNEY STONE       COLONOSCOPY  2014    Procedure: COLONOSCOPY;  Colonoscopy/WW;  Surgeon: Pancho Olsen MD;  Location: [facility] GI     COMBINED CYSTOSCOPY, RETROGRADES, URETEROSCOPY, LASER HOLMIUM LITHOTRIPSY URETER(S), INSERT STENT Right 2016    Procedure: COMBINED CYSTOSCOPY, RETROGRADES, URETEROSCOPY, LASER HOLMIUM LITHOTRIPSY URETER(S), INSERT STENT;  Surgeon: Kushal Noriega MD;  Location: [facility] OR     CYSTOSCOPY       CYSTOSCOPY, RETROGRADES, EXTRACT STONE, COMBINED  2013    Procedure: COMBINED CYSTOSCOPY, RETROGRADES, EXTRACT STONE;  Video Cystoscopy,  Right Ureteroscopy, ureteral dilatation,  Stone extraction;  Surgeon: Subhash Vann MD;  Location: [facility] OR     EXTRACORPOREAL SHOCK WAVE LITHOTRIPSY (ESWL) Bilateral 2016    Procedure: EXTRACORPOREAL SHOCK WAVE LITHOTRIPSY (ESWL);  Surgeon: Bassam Vu MD;  Location: [facility] OR     GYN SURGERY      laparoscopy     LAPAROSCOPIC CHOLECYSTECTOMY WITH CHOLANGIOGRAMS  2012    Procedure: LAPAROSCOPIC CHOLECYSTECTOMY WITH CHOLANGIOGRAMS;  LAPAROSCOPIC CHOLECYSTECTOMY WITH CHOLANGIOGRAMS ;  Surgeon: Nataliya Gabriel MD;  Location: [facility] OR     TUBAL LIGATION          MEDICATIONS:   Current Outpatient Prescriptions:      methylPREDNISolone (MEDROL) 4 MG tablet, follow package directions, Disp: 21 tablet, Rfl: 0     order for DME, Equipment being ordered:heel pads, Disp: 1 Package, Rfl: 0     amphetamine-dextroamphetamine (ADDERALL XR) 20 MG per 24 hr capsule, Take 1 capsule (20 mg) by mouth daily, Disp: 30 capsule, Rfl: 0     amphetamine-dextroamphetamine (ADDERALL XR) 30 MG per 24 hr capsule, Take 1 capsule (30 mg) by mouth daily, Disp: 30 capsule, Rfl: 0     VESICARE 5 MG tablet, TAKE ONE TABLET BY MOUTH EVERY EVENING, Disp: 90 tablet, Rfl: 2     valACYclovir (VALTREX) 1000 mg tablet,  Take 1 tablet (1,000 mg) by mouth daily, Disp: 90 tablet, Rfl: 3     naproxen (NAPROSYN) 500 MG tablet, Take 1 tablet (500 mg) by mouth 2 times daily as needed for moderate pain, Disp: 180 tablet, Rfl: 3     acyclovir (ZOVIRAX) 5 % ointment, Apply topically 5 times daily, Disp: 30 g, Rfl: 5     ondansetron (ZOFRAN ODT) 4 MG disintegrating tablet, Take 1-2 tablets (4-8 mg) by mouth every 8 hours as needed for nausea, Disp: 20 tablet, Rfl: 1     topiramate (TOPAMAX) 50 MG tablet, Take 150 mg by mouth 2 times daily, Disp: , Rfl:      tobramycin-dexamethasone (TOBRADEX) ophthalmic suspension, Place 2 drops into both eyes daily, Disp: 1 Bottle, Rfl: 11     fluticasone (FLONASE) 50 MCG/ACT nasal spray, Spray 2 sprays into both nostrils daily as needed, Disp: 16 g, Rfl: 11     fexofenadine (ALLEGRA) 180 MG tablet, Take 1 tablet (180 mg) by mouth daily as needed, Disp: 30 tablet, Rfl: 6     zolpidem (AMBIEN) 5 MG tablet, Take 1 tablet (5 mg) by mouth nightly as needed for sleep, Disp: 30 tablet, Rfl: 2     meclizine (ANTIVERT) 25 MG tablet, Take 1 tablet (25 mg) by mouth 3 times daily as needed, Disp: 30 tablet, Rfl: 3     fluocinolone acetonide 0.01 % OIL, Use on scalp once a week., Disp: 1 Bottle, Rfl: 11     ketoconazole (NIZORAL) 2 % shampoo, Apply to the affected area and wash off after 5 minutes., Disp: 120 mL, Rfl: 1     conjugated estrogens (PREMARIN) vaginal cream, Place 0.5 g vaginally three times a week, Disp: 30 g, Rfl: 12     ciprofloxacin (CIPRO) 500 MG tablet, Take 1 tablet (500 mg) by mouth 2 times daily (Patient not taking: Reported on 12/26/2017), Disp: 14 tablet, Rfl: 0     ciprofloxacin (CIPRO) 500 MG tablet, Take 1 tablet (500 mg) by mouth 2 times daily (Patient not taking: Reported on 12/26/2017), Disp: 20 tablet, Rfl: 0     ALLERGIES:    Allergies   Allergen Reactions     Aloe Itching and Rash     Codeine      GI upset     Compazine Nausea and Itching     Darvocet [Acetaminophen] Nausea and Vomiting  "    Macrobid [Nitrofuran Derivatives]      Bladder spasms     Percocet [Oxycodone-Acetaminophen] Nausea and Vomiting     Sulphadimidine [Sulfamethazine] Rash        SOCIAL HISTORY:   Social History     Social History     Marital status:      Spouse name: N/A     Number of children: 2     Years of education: N/A     Occupational History     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, lifting, wheeling, walking      [employer]     Social History Main Topics     Smoking status: Never Smoker     Smokeless tobacco: Never Used     Alcohol use No     Drug use: No     Sexual activity: Yes     Partners: Male     Birth control/ protection: Surgical      Comment: tubal     Other Topics Concern      Service No     Blood Transfusions No     Caffeine Concern No     Occupational Exposure No     Sleep Concern Yes     Stress Concern No     Weight Concern No     Special Diet No     Exercise Yes     2 days/week     Seat Belt No     Self-Exams Yes     Parent/Sibling W/ Cabg, Mi Or Angioplasty Before 65f 55m? No     Social History Narrative        FAMILY HISTORY:   Family History   Problem Relation Age of Onset     DIABETES Mother      type 2     Alcohol/Drug Mother      alcohol        EXAM:Vitals: /62  Ht 5' 4\" (1.626 m)  Wt 155 lb (70.3 kg)  LMP 02/25/2014  BMI 26.61 kg/m2  BMI= Body mass index is 26.61 kg/(m^2).    General appearance: Patient is alert and fully cooperative with history & exam.  No sign of distress is noted during the visit.     Psychiatric: Affect is pleasant & appropriate.  Patient appears motivated to improve health.     Respiratory: Breathing is regular & unlabored while sitting.     HEENT: Hearing is intact to spoken word.  Speech is clear.  No gross evidence of visual impairment that would impact ambulation.     Dermatologic: Skin is intact to both lower extremities without significant lesions, rash or abrasion.  No paronychia or evidence of soft tissue infection is noted.   "   Vascular: DP & PT pulses are intact & regular bilaterally.  No significant edema or varicosities noted.  CFT and skin temperature is normal to both lower extremities.     Neurologic: Lower extremity sensation is intact to light touch.  No evidence of weakness or contracture in the lower extremities.  No evidence of neuropathy.     Musculoskeletal: Patient is ambulatory without assistive device or brace.  Decrease arch height. Pain on palpation of the plantar heels, left worse than right.        ASSESSMENT:    Pain in both feet  Plantar fasciitis, bilateral  Pes planus of both feet     PLAN:  [facility] in [facility]. The potential causes and nature of plantar fasciitis were discussed with the patient.  We reviewed the natural history/prognosis of the condition and risks if left untreated.  These include chronic pain, other sites of pain due to gait changes, and potential plantar fascial rupture.      We discussed possible causes of the condition as it relates to the patients specific situation.      Conservative treatment options were reviewed:  appropriate shoes, avoidance of barefoot walking, inserts/orthoses, stretching, ice, massage, immobilization and NSAIDs.     We also reviewed the options of injection therapy and surgery.  However, it was made clear that surgery is only considered when conservative therapy fails.  The risks and benefits of injection therapy, and surgery were discussed.     We also discussed that sometimes pain can come from the lower back. We discussed PT. She does note untreated sciatica pain.      Will start with offloading boot and MRI to assess soft tissue. Will call with results. If postitive for plantar fasciitis, recommend PT with ionotophoresis and orthotics. If negative, referral to sports med for lower back issues.        Darlene Emerson DPM, Podiatry/Foot and Ankle Surgery    Weight management plan: Patient was referred to their PCP to discuss a diet and exercise  plan.

## 2017-12-26 NOTE — MR AVS SNAPSHOT
After Visit Summary   12/26/2017    Laney Maynard    MRN: [id number]           Patient Information     Date Of Birth          1965        Visit Information        Provider Department      12/26/2017 10:45 AM Darlene Emerson DPM, Podiatry/Foot and Ankle Surgery [facility]        Care Instructions    Thank you for choosing [facility]!    DR. EMERSON'S CLINIC LOCATIONS:   MONDAY AM - [place] TUESDAY - [place]   [address] [facility] [address] [place] [address] [place] [address]   [phone] / [phone] / [phone]       WEDNESDAY - [place] FRIDAY PM - [place]   [address]   [place] [address] [place] [address]   [phone] / [phone] / [phone]       SCHEDULE SURGERY: [phone]    APPOINTMENTS: [phone]    BILLING QUESTIONS: [phone]      Please call to schedule your MRI/CT/Ultrasound/Arthrogram appointment.  The number is [phone].        PLANTAR FASCIITIS  Plantar fasciitis is often referred to as heel spurs or heel pain. Plantar fasciitis is a very common problem that affects people of all foot shapes, age, weight and activity level. Pain may be in the arch or on the weight-bearing surface of the heel. The pain may come on without injury or identifiable cause. Pain is generally present when first getting out of bed in the morning or up from a seated break.     CAUSES  The plantar fascia is a dense fibrous band of tissue that stretches across the bottom surface of the foot. The fascia helps support the foot muscles and arch. Plantar fasciitis is thought to be caused by mechanical strain or overload. Frequent walking without shoes or wearing unsupportive shoes is thought to cause structural overload and ultimately inflammation of the plantar fascia. Some people have heel spurs that can be seen on x-ray. The heel spur is  actually a minor component of plantar fascitis and is largely ignored.       SELF TREATMENT   The easiest solution is to stop walking around your home without shoes. Plantar fasciitis is largely a shoe problem. Shoes are either not being worn often enough or your current shoes are inadequate for your weight, foot structure or activity level. The majority of shoes on the market today are not sufficient to resist development of plantar fasciitis or to promote healing. Assume that your current shoes are inadequate and will need to be replaced. Even high quality shoes wear out with 6 months to one year of frequent use. Weight loss is another option. Losing ten pounds in the next two months may be enough to resolve the problem. Ice applied to the area of pain two to three times per day for ten minutes each session can be very helpful. Warm foot soaks in epsom salts can also relieve pain. This should continue until the problem resolves. Achilles tendon stretching is essential. Stretch multiple times daily to promote healing and to prevent recurrence in the future. Over all stretching of the body is helpful as well such as the calves, thighs and lower back. Normally when one area of the body is tight, other areas are too. Gentle Yoga can be good for this.     Over the counter topical anti inflammatories can be helpful such as biofreeze, bengay, salon pas, ect...  Oral ibuprofen or aleve is recommended as well to try to calm down inflammation.     Night splints can be helpful to gradually stretch the foot at night as a lot of pain is when you get up in the morning. Taking a towel or thera band and stretching the foot back multiple times before you get ou of bed can be beneficial as well.     MEDICAL TREATMENT  Medical treatments often include custom arch supports, cortisone injections, physical therapy, splints to be worn in bed, prescription medications and surgery. The home treatments listed above will be necessary  regardless of these advanced medical treatments. Surgery is rarely needed but is very helpful in selected cases.     PROGNOSIS  Plantar fasciitis can last from one day to a lifetime. Some people get intermittent fascitis that is very short-lived. Others suffer daily for years. Excessive body weight, frequent bare foot walking, long hours on the feet, inadequate shoes, predisposing foot structures and excessive activity such as running are all potential issues that lead to chronic and/or recurring plantar fascitis. Having plantar fasciitis means that you are forever prone to this problem and will require modification of some of the above factors. Most people seek treatment within one to four months. Healing usually requires a similar one to four month time frame. Healing time is relative to the amount of effort spent treating the problem.   Plantar fasciitis is highly recurrent. Risk factors often continue, including return to bare foot walking, inadequate shoes, excessive body weight, excessive activities, etc. Your life style and foot structure may predispose you to recurrent plantar fasciitis. A daily prevention regimen can be very helpful. Ongoing use of shoe inserts, careful attention to appropriate shoes, daily Achilles stretching, etc. may prevent recurrence. Prompt attention at the earliest warning signs of heel pain can resolve the problem in as short as a few days.     EXERCISES  Stair Exercise: Step on the stairs with the ball of your foot and hold your position for at least 15 seconds, then slowly step down with the heels of your foot. You can do this daily and as often as you want.   Picking the Towel: Sit comfortably and then pick the towel up with your toes. You can use any object other than a towel as long as the material can be soft and you can pick it up with your toes.  Rolling the Bottle: Use a small ball or frozen water bottle and then roll it around with your foot.   Flex the Toes: Sit  comfortably and then flex your toes by pointing it towards the floor or towards your body. This will relax and flex your foot and exercise your plantar fascia, the calf, and the Achilles tendon. The inability of the foot to stretch often causes the bunching up of the plantar fascia area leading to the pain.  Calf/Achilles Stretching: Lay on you back and raise one foot, then point your toes towards the floor. See photo below:               Hold each stretch for 10 seconds. Stretch 10 times per set, three sets per day. Morning, afternoon and evening. If your heel pain is very severe in the morning, consider doing the first set of stretches before you get out of bed.      OVER THE COUNTER INSERT RECOMMENDATIONS  SuperFeet   Sofsole Fit Spenco   Power Step   Walk-Fit Arch Cradles     Most of these can be found at your local [employer], sporting [employer], or online.  **A good high quality over the counter insert should cost around $40-$50      [employer] LOCATIONS  [place]  [address] [place] [address]  [phone]   [address]  [phone] [place]  [address] [facility]  [phone]   [place]  [address]  [phone]   [place]  [address] [facility]  [phone] [place]  [address] [place] [address] [place]  [phone]   [place]  [address]  [phone]   [place]  [address] [place] [address]  [phone] [place]  [address]  [phone]           Body Mass Index (BMI)  Many things can cause foot and ankle problems. Foot structure, activity level, foot mechanics and injuries are common causes of pain.  One very important issue that often goes unmentioned, is body weight. Extra weight can cause increased stress on muscles, ligaments, bones and tendons.  Sometimes just a few extra pounds is all it takes to put one over her/his threshold. Without reducing that stress, it can be difficult to alleviate pain. Some people are uncomfortable addressing this issue, but we  "feel it is important for you to think about it. As Foot &  Ankle specialists, our job is addressing the lower extremity problem and possible causes. Regarding extra body weight, we encourage patients to discuss diet and weight management plans with their primary care doctors. It is this team approach that gives you the best opportunity for pain relief and getting you back on your feet.                  Follow-ups after your visit        Who to contact     If you have questions or need follow up information about today's clinic visit or your schedule please contact [facility] directly at [phone].  Normal or non-critical lab and imaging results will be communicated to you by [employer], letter or phone within 4 business days after the clinic has received the results. If you do not hear from us within 7 days, please contact the clinic through [employer] or phone. If you have a critical or abnormal lab result, we will notify you by phone as soon as possible.  Submit refill requests through [employer] or call your pharmacy and they will forward the refill request to us. Please allow 3 business days for your refill to be completed.          Additional Information About Your Visit        [employer] Information     [employer] gives you secure access to your electronic health record. If you see a primary care provider, you can also send messages to your care team and make appointments. If you have questions, please call your primary care clinic.  If you do not have a primary care provider, please call [phone] and they will assist you.        Care EveryWhere ID     This is your Care EveryWhere ID. This could be used by other organizations to access your [place] [facility] medical records  [id number]        Your Vitals Were     Height Last Period BMI (Body Mass Index)             5' 4\" (1.626 m) 02/25/2014 26.61 kg/m2          Blood Pressure from Last 3 Encounters:   12/26/17 110/62   12/11/17 104/58   07/07/17 110/70    " Weight from Last 3 Encounters:   12/26/17 155 lb (70.3 kg)   12/11/17 155 lb (70.3 kg)   07/07/17 149 lb (67.6 kg)              Today, you had the following     No orders found for display       Primary Care Provider Office Phone # Fax #    Susan Rachele Haase, APRN [phone]       [address] [facility] [address]        Equal Access to Services     ERIK SWEET : Hadii aad ku hadasho Soomaali, waaxda luqadaha, qaybta kaalmada adeegyada, waxay idiin hayaan adeeg kharash la'gayla . So [facility] [phone].    ATENCIÓN: Si habla español, tiene a ho disposición servicios gratuitos de asistencia lingüística. [facility] [phone].    We comply with applicable federal civil rights laws and [place] laws. We do not discriminate on the basis of race, color, national origin, age, disability, sex, sexual orientation, or gender identity.            Thank you!     Thank you for choosing [facility] and [facility]  for your care. Our goal is always to provide you with excellent care. Hearing back from our patients is one way we can continue to improve our services. Please take a few minutes to complete the written survey that you may receive in the mail after your visit with us. Thank you!             Your Updated Medication List - Protect others around you: Learn how to safely use, store and throw away your medicines at www.disposemymeds.org.          This list is accurate as of: 12/26/17 11:06 AM.  Always use your most recent med list.                   Brand Name Dispense Instructions for use Diagnosis    acyclovir 5 % ointment    ZOVIRAX    30 g    Apply topically 5 times daily    Herpes simplex virus infection       * amphetamine-dextroamphetamine 20 MG per 24 hr capsule    ADDERALL XR    30 capsule    Take 1 capsule (20 mg) by mouth daily    Attention deficit hyperactivity disorder (ADHD), combined type       * amphetamine-dextroamphetamine 30 MG per 24 hr capsule    ADDERALL XR    30 capsule     Take 1 capsule (30 mg) by mouth daily    Attention deficit hyperactivity disorder (ADHD), combined type       * ciprofloxacin 500 MG tablet    CIPRO    20 tablet    Take 1 tablet (500 mg) by mouth 2 times daily    Urinary problem       * ciprofloxacin 500 MG tablet    CIPRO    14 tablet    Take 1 tablet (500 mg) by mouth 2 times daily    Urinary problem       conjugated estrogens cream    PREMARIN    30 g    Place 0.5 g vaginally three times a week    Urinary tract infection with hematuria, site unspecified       fexofenadine 180 MG tablet    ALLEGRA    30 tablet    Take 1 tablet (180 mg) by mouth daily as needed    Seasonal allergic rhinitis       fluocinolone acetonide 0.01 % Oil     1 Bottle    Use on scalp once a week.    Scalp psoriasis       fluticasone 50 MCG/ACT spray    FLONASE    16 g    Spray 2 sprays into both nostrils daily as needed    Seasonal allergic rhinitis       ketoconazole 2 % shampoo    NIZORAL    120 mL    Apply to the affected area and wash off after 5 minutes.    Seborrheic dermatitis of scalp       meclizine 25 MG tablet    ANTIVERT    30 tablet    Take 1 tablet (25 mg) by mouth 3 times daily as needed    Vertigo       methylPREDNISolone 4 MG tablet    MEDROL    21 tablet    follow package directions    Intractable left heel pain       naproxen 500 MG tablet    NAPROSYN    180 tablet    Take 1 tablet (500 mg) by mouth 2 times daily as needed for moderate pain    Plantar fasciitis, bilateral       ondansetron 4 MG ODT tab    ZOFRAN ODT    20 tablet    Take 1-2 tablets (4-8 mg) by mouth every 8 hours as needed for nausea    Ureterolithiasis       order for DME     1 Package    Equipment being ordered:heel pads    Intractable left heel pain       tobramycin-dexamethasone 0.3-0.1 % ophthalmic susp    TOBRADEX    1 Bottle    Place 2 drops into both eyes daily    Eye disorder       topiramate 50 MG tablet    TOPAMAX     Take 150 mg by mouth 2 times daily        valACYclovir 1000 mg tablet     VALTREX    90 tablet    Take 1 tablet (1,000 mg) by mouth daily    Herpes simplex virus infection       VESICARE 5 MG tablet   Generic drug:  solifenacin     90 tablet    TAKE ONE TABLET BY MOUTH EVERY EVENING    Stress incontinence       zolpidem 5 MG tablet    AMBIEN    30 tablet    Take 1 tablet (5 mg) by mouth nightly as needed for sleep    Sleep disorder       * Notice:  This list has 4 medication(s) that are the same as other medications prescribed for you. Read the directions carefully, and ask your doctor or other care provider to review them with you.

## 2017-12-26 NOTE — LETTER
12/26/2017         RE: Laney Maynard  [address] [facility] [id number]        Dear Colleague,    Thank you for referring your patient, Laney Maynard, to the [facility]. Please see a copy of my visit note below.    PATIENT HISTORY:  Dr. cm requested I see this patient for their foot issue.  Laney Maynard is a 52 year old female who presents to clinic for pain to both heels. Left is worse. Had plantar fasciitis diagnosis years ago. Had inserts but said they didn't help. It went away. Noted pain a few weeks ago. Pain is 10/10. Tried a medrol dose pack which helped a little she notes. Is wondering what can be done for the pain.     Review of Systems:  Patient denies fever, chills, rash, wound, stiffness, numbness, weakness, heart burn, blood in stool, chest pain with activity, calf pain when walking, shortness of breath with activity, chronic cough, easy bleeding/bruising, swelling of ankles, excessive thirst, fatigue, depression, anxiety.  Patient admits to limping.     PAST MEDICAL HISTORY:   Past Medical History:   Diagnosis Date     Abnormal glandular Papanicolaou smear of cervix      Allergic rhinitis, cause unspecified      Constipation      Gastro-oesophageal reflux disease      Heart murmur     murmur     Hematuria      Herpes simplex virus infection      Hydronephrosis, right      Migraine, unspecified, without mention of intractable migraine without mention of status migrainosus      Mumps      Numbness and tingling     LEFT ARM     Palpitations      Renal disease     hx stones x 2 episodes     Ureterolithiasis      Urinary frequency     burning     Vertigo         PAST SURGICAL HISTORY:   Past Surgical History:   Procedure Laterality Date     ARTHROSCOPY SHOULDER, OPEN ROTATOR CUFF REPAIR, COMBINED  7/31/2013    Procedure: COMBINED ARTHROSCOPY SHOULDER, OPEN ROTATOR CUFF REPAIR;  Left Shoulder Arthroscopy, Distal Clavicale Resection,  Decompression, Mini Open Rotator Cuff Repair    ;  Surgeon: Fredi Lindsay MD;  Location: [facility] OR     C NONSPECIFIC PROCEDURE  age 16    colposcopy for abnormal pap     C NONSPECIFIC PROCEDURE      surgery L thumb     C NONSPECIFIC PROCEDURE      breast augmentation-silicone     C NONSPECIFIC PROCEDURE      s/p  x 2     C NONSPECIFIC PROCEDURE      Bilateral tubal ligation     C REMOVAL OF KIDNEY STONE       COLONOSCOPY  2014    Procedure: COLONOSCOPY;  Colonoscopy/WW;  Surgeon: Pancho Olsen MD;  Location: [facility] GI     COMBINED CYSTOSCOPY, RETROGRADES, URETEROSCOPY, LASER HOLMIUM LITHOTRIPSY URETER(S), INSERT STENT Right 2016    Procedure: COMBINED CYSTOSCOPY, RETROGRADES, URETEROSCOPY, LASER HOLMIUM LITHOTRIPSY URETER(S), INSERT STENT;  Surgeon: Kushal Noriega MD;  Location: [facility] OR     CYSTOSCOPY       CYSTOSCOPY, RETROGRADES, EXTRACT STONE, COMBINED  2013    Procedure: COMBINED CYSTOSCOPY, RETROGRADES, EXTRACT STONE;  Video Cystoscopy,  Right Ureteroscopy, ureteral dilatation,  Stone extraction;  Surgeon: Subhash Vann MD;  Location: [facility] OR     EXTRACORPOREAL SHOCK WAVE LITHOTRIPSY (ESWL) Bilateral 2016    Procedure: EXTRACORPOREAL SHOCK WAVE LITHOTRIPSY (ESWL);  Surgeon: Bassam Vu MD;  Location: [facility] OR     GYN SURGERY      laparoscopy     LAPAROSCOPIC CHOLECYSTECTOMY WITH CHOLANGIOGRAMS  2012    Procedure: LAPAROSCOPIC CHOLECYSTECTOMY WITH CHOLANGIOGRAMS;  LAPAROSCOPIC CHOLECYSTECTOMY WITH CHOLANGIOGRAMS ;  Surgeon: Nataliya Gabriel MD;  Location: [facility] OR     TUBAL LIGATION          MEDICATIONS:   Current Outpatient Prescriptions:      methylPREDNISolone (MEDROL) 4 MG tablet, follow package directions, Disp: 21 tablet, Rfl: 0     order for DME, Equipment being ordered:heel pads, Disp: 1 Package, Rfl: 0     amphetamine-dextroamphetamine (ADDERALL XR) 20 MG per 24 hr capsule, Take 1 capsule (20 mg) by mouth daily, Disp: 30 capsule, Rfl: 0      amphetamine-dextroamphetamine (ADDERALL XR) 30 MG per 24 hr capsule, Take 1 capsule (30 mg) by mouth daily, Disp: 30 capsule, Rfl: 0     VESICARE 5 MG tablet, TAKE ONE TABLET BY MOUTH EVERY EVENING, Disp: 90 tablet, Rfl: 2     valACYclovir (VALTREX) 1000 mg tablet, Take 1 tablet (1,000 mg) by mouth daily, Disp: 90 tablet, Rfl: 3     naproxen (NAPROSYN) 500 MG tablet, Take 1 tablet (500 mg) by mouth 2 times daily as needed for moderate pain, Disp: 180 tablet, Rfl: 3     acyclovir (ZOVIRAX) 5 % ointment, Apply topically 5 times daily, Disp: 30 g, Rfl: 5     ondansetron (ZOFRAN ODT) 4 MG disintegrating tablet, Take 1-2 tablets (4-8 mg) by mouth every 8 hours as needed for nausea, Disp: 20 tablet, Rfl: 1     topiramate (TOPAMAX) 50 MG tablet, Take 150 mg by mouth 2 times daily, Disp: , Rfl:      tobramycin-dexamethasone (TOBRADEX) ophthalmic suspension, Place 2 drops into both eyes daily, Disp: 1 Bottle, Rfl: 11     fluticasone (FLONASE) 50 MCG/ACT nasal spray, Spray 2 sprays into both nostrils daily as needed, Disp: 16 g, Rfl: 11     fexofenadine (ALLEGRA) 180 MG tablet, Take 1 tablet (180 mg) by mouth daily as needed, Disp: 30 tablet, Rfl: 6     zolpidem (AMBIEN) 5 MG tablet, Take 1 tablet (5 mg) by mouth nightly as needed for sleep, Disp: 30 tablet, Rfl: 2     meclizine (ANTIVERT) 25 MG tablet, Take 1 tablet (25 mg) by mouth 3 times daily as needed, Disp: 30 tablet, Rfl: 3     fluocinolone acetonide 0.01 % OIL, Use on scalp once a week., Disp: 1 Bottle, Rfl: 11     ketoconazole (NIZORAL) 2 % shampoo, Apply to the affected area and wash off after 5 minutes., Disp: 120 mL, Rfl: 1     conjugated estrogens (PREMARIN) vaginal cream, Place 0.5 g vaginally three times a week, Disp: 30 g, Rfl: 12     ciprofloxacin (CIPRO) 500 MG tablet, Take 1 tablet (500 mg) by mouth 2 times daily (Patient not taking: Reported on 12/26/2017), Disp: 14 tablet, Rfl: 0     ciprofloxacin (CIPRO) 500 MG tablet, Take 1 tablet (500 mg) by mouth 2  "times daily (Patient not taking: Reported on 12/26/2017), Disp: 20 tablet, Rfl: 0     ALLERGIES:    Allergies   Allergen Reactions     Aloe Itching and Rash     Codeine      GI upset     Compazine Nausea and Itching     Darvocet [Acetaminophen] Nausea and Vomiting     Macrobid [Nitrofuran Derivatives]      Bladder spasms     Percocet [Oxycodone-Acetaminophen] Nausea and Vomiting     Sulphadimidine [Sulfamethazine] Rash        SOCIAL HISTORY:   Social History     Social History     Marital status:      Spouse name: N/A     Number of children: 2     Years of education: N/A     Occupational History     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, lifting, wheeling, walking      [employer]     Social History Main Topics     Smoking status: Never Smoker     Smokeless tobacco: Never Used     Alcohol use No     Drug use: No     Sexual activity: Yes     Partners: Male     Birth control/ protection: Surgical      Comment: tubal     Other Topics Concern      Service No     Blood Transfusions No     Caffeine Concern No     Occupational Exposure No     Sleep Concern Yes     Stress Concern No     Weight Concern No     Special Diet No     Exercise Yes     2 days/week     Seat Belt No     Self-Exams Yes     Parent/Sibling W/ Cabg, Mi Or Angioplasty Before 65f 55m? No     Social History Narrative        FAMILY HISTORY:   Family History   Problem Relation Age of Onset     DIABETES Mother      type 2     Alcohol/Drug Mother      alcohol        EXAM:Vitals: /62  Ht 5' 4\" (1.626 m)  Wt 155 lb (70.3 kg)  LMP 02/25/2014  BMI 26.61 kg/m2  BMI= Body mass index is 26.61 kg/(m^2).    General appearance: Patient is alert and fully cooperative with history & exam.  No sign of distress is noted during the visit.     Psychiatric: Affect is pleasant & appropriate.  Patient appears motivated to improve health.     Respiratory: Breathing is regular & unlabored while sitting.     HEENT: Hearing is intact to " spoken word.  Speech is clear.  No gross evidence of visual impairment that would impact ambulation.     Dermatologic: Skin is intact to both lower extremities without significant lesions, rash or abrasion.  No paronychia or evidence of soft tissue infection is noted.     Vascular: DP & PT pulses are intact & regular bilaterally.  No significant edema or varicosities noted.  CFT and skin temperature is normal to both lower extremities.     Neurologic: Lower extremity sensation is intact to light touch.  No evidence of weakness or contracture in the lower extremities.  No evidence of neuropathy.     Musculoskeletal: Patient is ambulatory without assistive device or brace.  Decrease arch height. Pain on palpation of the plantar heels, left worse than right.        ASSESSMENT:    Pain in both feet  Plantar fasciitis, bilateral  Pes planus of both feet     PLAN:  Reviewed patient's chart in [facility]. The potential causes and nature of plantar fasciitis were discussed with the patient.  We reviewed the natural history/prognosis of the condition and risks if left untreated.  These include chronic pain, other sites of pain due to gait changes, and potential plantar fascial rupture.      We discussed possible causes of the condition as it relates to the patients specific situation.      Conservative treatment options were reviewed:  appropriate shoes, avoidance of barefoot walking, inserts/orthoses, stretching, ice, massage, immobilization and NSAIDs.     We also reviewed the options of injection therapy and surgery.  However, it was made clear that surgery is only considered when conservative therapy fails.  The risks and benefits of injection therapy, and surgery were discussed.     We also discussed that sometimes pain can come from the lower back. We discussed PT. She does note untreated sciatica pain.      Will start with offloading boot and MRI to assess soft tissue. Will call with results. If postitive for plantar  fasciitis, recommend PT with ionotophoresis and orthotics. If negative, referral to sports med for lower back issues.        Darlene Emerson DPM, Podiatry/Foot and Ankle Surgery    Weight management plan: Patient was referred to their PCP to discuss a diet and exercise plan.        Again, thank you for allowing me to participate in the care of your patient.        Sincerely,        Darlene Emerson DPM, Podiatry/Foot and Ankle Surgery

## 2017-12-27 DIAGNOSIS — G47.9 SLEEP DISORDER: ICD-10-CM

## 2017-12-27 RX ORDER — ZOLPIDEM TARTRATE 5 MG/1
5 TABLET ORAL
Qty: 30 TABLET | Refills: 2 | Status: SHIPPED | OUTPATIENT
Start: 2017-12-27 | End: 2018-04-13

## 2017-12-27 NOTE — TELEPHONE ENCOUNTER
Requested Prescriptions   Pending Prescriptions Disp Refills     naproxen (NAPROSYN) 500 MG tablet [Pharmacy Med Name: NAPROXEN 500MG TABS] 180 tablet 3    Last Written Prescription Date:  10/12/16  Last Fill Quantity: 180,  # refills: 3   Last Office Visit with FMSOFIYA, ROBERTO or [facility] prescribing provider:  12/11/2017   Future Office Visit:    Sig: TAKE ONE TABLET BY MOUTH TWO TIMES A DAY AS NEEDED FOR MODERATE PAIN    NSAID Medications Passed    12/25/2017 11:07 AM       Passed - Blood pressure under 140/90    BP Readings from Last 3 Encounters:   12/26/17 110/62   12/11/17 104/58   07/07/17 110/70          Passed - Normal ALT on file in past 12 months    Recent Labs   Lab Test  04/17/17   0904   ALT  15          Passed - Normal AST on file in past 12 months    Recent Labs   Lab Test  04/17/17   0904   AST  12          Passed - Recent or future visit with authorizing provider's specialty    Patient had office visit in the last year or has a visit in the next 30 days with authorizing provider.  See chart review.          Passed - Patient is age 6-64 years       Passed - Normal CBC on file in past 12 months    Recent Labs   Lab Test  04/12/17   1157   WBC  5.2   RBC  4.56   HGB  14.7   HCT  42.0   PLT  289          Passed - No active pregnancy on record       Passed - Normal serum creatinine on file in past 12 months    Recent Labs   Lab Test  04/17/17   0904   CR  1.03          Passed - No positive pregnancy test in past 12 months

## 2017-12-27 NOTE — TELEPHONE ENCOUNTER
Controlled Substance Refill Request for Zolpidem  Problem List Complete:  No     PROVIDER TO CONSIDER COMPLETION OF PROBLEM LIST AND OVERVIEW/CONTROLLED SUBSTANCE AGREEMENT    Last Written Prescription Date:  3/10/16  Last Fill Quantity: 30,   # refills: 2    Last Office Visit with [facility] primary care provider: 12/11/17    Future Office visit:     Controlled substance agreement on file: Yes:  Date 3/18/16.     Processing:  Staff will hand deliver Rx to on-site pharmacy     checked in past 6 months?  Yes 10/18/17   Last picked up: 3/11/17    Edelmira Escobar, Pharmacy [facility]

## 2017-12-28 ENCOUNTER — HOSPITAL ENCOUNTER (OUTPATIENT)
Dept: MRI IMAGING | Facility: CLINIC | Age: 52
Discharge: HOME | End: 2017-12-28
Attending: PODIATRIST | Admitting: PODIATRIST
Payer: COMMERCIAL

## 2017-12-28 DIAGNOSIS — M21.42 PES PLANUS OF BOTH FEET: ICD-10-CM

## 2017-12-28 DIAGNOSIS — M79.671 PAIN IN BOTH FEET: ICD-10-CM

## 2017-12-28 DIAGNOSIS — M79.672 PAIN IN BOTH FEET: ICD-10-CM

## 2017-12-28 DIAGNOSIS — M21.41 PES PLANUS OF BOTH FEET: ICD-10-CM

## 2017-12-28 DIAGNOSIS — M72.2 PLANTAR FASCIITIS, BILATERAL: ICD-10-CM

## 2017-12-28 PROCEDURE — 73721 MRI JNT OF LWR EXTRE W/O DYE: CPT | Mod: LT

## 2017-12-28 RX ORDER — NAPROXEN 500 MG/1
500 TABLET ORAL 2 TIMES DAILY PRN
Qty: 180 TABLET | Refills: 1 | Status: SHIPPED | OUTPATIENT
Start: 2017-12-28 | End: 2018-04-13

## 2017-12-29 ENCOUNTER — TELEPHONE (OUTPATIENT)
Dept: PODIATRY | Facility: CLINIC | Age: 52
End: 2017-12-29

## 2017-12-29 DIAGNOSIS — M21.41 BILATERAL PES PLANUS: ICD-10-CM

## 2017-12-29 DIAGNOSIS — M21.42 BILATERAL PES PLANUS: ICD-10-CM

## 2017-12-29 DIAGNOSIS — M79.671 PAIN IN BOTH FEET: Primary | ICD-10-CM

## 2017-12-29 DIAGNOSIS — M79.672 PAIN IN BOTH FEET: Primary | ICD-10-CM

## 2017-12-29 DIAGNOSIS — F90.2 ATTENTION DEFICIT HYPERACTIVITY DISORDER (ADHD), COMBINED TYPE: ICD-10-CM

## 2017-12-29 DIAGNOSIS — M54.31 SCIATICA, RIGHT SIDE: ICD-10-CM

## 2017-12-29 NOTE — TELEPHONE ENCOUNTER
Controlled Substance Refill Request for adderall's  Problem List Complete:  Yes  Patient is followed by HAASE, SUSAN RACHELE for ongoing prescription of stimulants.  All refills should be approved by this provider, or covering partner.    Medication(s): Aderall XR 20 mg every day.  Adderall XR 30 mg every day.   Maximum quantity per month: #30;  #30   Clinic visit frequency required: Q 6  months     Controlled substance agreement on file: Yes       Date(s): 3/10/2016  Neuropsych evaluation for ADD completed:  No    Last [facility] website verification: 10/18/17   [email or web address]     checked in past 6 months?  Yes 10/18/17     Edelmira Escobar, Pharmacy [facility]

## 2018-01-02 RX ORDER — DEXTROAMPHETAMINE SACCHARATE, AMPHETAMINE ASPARTATE MONOHYDRATE, DEXTROAMPHETAMINE SULFATE AND AMPHETAMINE SULFATE 7.5; 7.5; 7.5; 7.5 MG/1; MG/1; MG/1; MG/1
30 CAPSULE, EXTENDED RELEASE ORAL DAILY
Qty: 30 CAPSULE | Refills: 0 | Status: SHIPPED | OUTPATIENT
Start: 2018-01-02 | End: 2018-02-12

## 2018-01-02 RX ORDER — DEXTROAMPHETAMINE SACCHARATE, AMPHETAMINE ASPARTATE MONOHYDRATE, DEXTROAMPHETAMINE SULFATE AND AMPHETAMINE SULFATE 5; 5; 5; 5 MG/1; MG/1; MG/1; MG/1
20 CAPSULE, EXTENDED RELEASE ORAL DAILY
Qty: 30 CAPSULE | Refills: 0 | Status: SHIPPED | OUTPATIENT
Start: 2018-01-02 | End: 2018-02-12

## 2018-02-08 ENCOUNTER — OFFICE VISIT (OUTPATIENT)
Dept: FAMILY MEDICINE | Facility: CLINIC | Age: 53
End: 2018-02-08
Payer: COMMERCIAL

## 2018-02-08 VITALS
TEMPERATURE: 98.1 F | DIASTOLIC BLOOD PRESSURE: 62 MMHG | SYSTOLIC BLOOD PRESSURE: 112 MMHG | RESPIRATION RATE: 14 BRPM | HEIGHT: 64 IN | HEART RATE: 89 BPM | BODY MASS INDEX: 26.98 KG/M2 | WEIGHT: 158 LBS

## 2018-02-08 DIAGNOSIS — R30.0 DYSURIA: Primary | ICD-10-CM

## 2018-02-08 LAB
ALBUMIN UR-MCNC: NEGATIVE MG/DL
AMORPH CRY #/AREA URNS HPF: ABNORMAL /HPF
APPEARANCE UR: CLEAR
BILIRUB UR QL STRIP: NEGATIVE
COLOR UR AUTO: YELLOW
GLUCOSE UR STRIP-MCNC: NEGATIVE MG/DL
HGB UR QL STRIP: ABNORMAL
KETONES UR STRIP-MCNC: NEGATIVE MG/DL
LEUKOCYTE ESTERASE UR QL STRIP: NEGATIVE
NITRATE UR QL: NEGATIVE
PH UR STRIP: 7 PH (ref 5–7)
RBC #/AREA URNS AUTO: ABNORMAL /HPF
SOURCE: ABNORMAL
SP GR UR STRIP: 1.01 (ref 1–1.03)
UROBILINOGEN UR STRIP-ACNC: 0.2 EU/DL (ref 0.2–1)
WBC #/AREA URNS AUTO: ABNORMAL /HPF

## 2018-02-08 PROCEDURE — 99213 OFFICE O/P EST LOW 20 MIN: CPT | Performed by: FAMILY MEDICINE

## 2018-02-08 PROCEDURE — 81001 URINALYSIS AUTO W/SCOPE: CPT | Performed by: FAMILY MEDICINE

## 2018-02-08 PROCEDURE — 87086 URINE CULTURE/COLONY COUNT: CPT | Performed by: FAMILY MEDICINE

## 2018-02-08 RX ORDER — PHENAZOPYRIDINE HYDROCHLORIDE 200 MG/1
200 TABLET, FILM COATED ORAL 3 TIMES DAILY PRN
Qty: 9 TABLET | Refills: 0 | Status: SHIPPED | OUTPATIENT
Start: 2018-02-08 | End: 2018-03-16

## 2018-02-08 RX ORDER — DOXYCYCLINE 100 MG/1
100 CAPSULE ORAL 2 TIMES DAILY
Qty: 20 CAPSULE | Refills: 0 | Status: SHIPPED | OUTPATIENT
Start: 2018-02-08 | End: 2018-03-16

## 2018-02-08 NOTE — PROGRESS NOTES
SUBJECTIVE:   Laney Maynard is a 53 year old female who presents to clinic today for the following health issues:    URINARY TRACT SYMPTOMS  Onset: 2-3 days    Description:   Painful urination (Dysuria): YES  Blood in urine (Hematuria): no   Delay in urine (Hesitency): no     Intensity: moderate, severe    Progression of Symptoms:  worsening    Accompanying Signs & Symptoms:Urinary urgency   Fever/chills: no   Flank pain YES  Nausea and vomiting: no   Any vaginal symptoms: vaginal discharge  Abdominal/Pelvic Pain: no     History:   History of frequent UTI's: YES  History of kidney stones: YES  Sexually Active: YES- no new partners  Possibility of pregnancy: No    Precipitating factors:       Therapies Tried and outcome:     LMP 3.5 years ago  Takes vesicare for bladder spasms, works well.     Problem list and histories reviewed & adjusted, as indicated.  Additional history: as documented    Reviewed and updated as needed this visit by clinical staff  Tobacco        Past Medical History:   Diagnosis Date     Abnormal glandular Papanicolaou smear of cervix      Allergic rhinitis, cause unspecified      Constipation      Gastro-oesophageal reflux disease      Heart murmur     murmur     Hematuria      Herpes simplex virus infection      Hydronephrosis, right      Migraine, unspecified, without mention of intractable migraine without mention of status migrainosus      Mumps      Numbness and tingling     LEFT ARM     Palpitations      Renal disease     hx stones x 2 episodes     Ureterolithiasis      Urinary frequency     burning     Vertigo        Past Surgical History:   Procedure Laterality Date     ARTHROSCOPY KNEE Right 04/26/2017    Procedure: Right knee arthroscopy and anterior fat pad resection with medial plica resection. Partial lateral menisectomy. Surgeon:  Fredi Lindsay MD  Location: [facility]     ARTHROSCOPY SHOULDER, OPEN ROTATOR CUFF REPAIR, COMBINED  7/31/2013    Procedure:  COMBINED ARTHROSCOPY SHOULDER, OPEN ROTATOR CUFF REPAIR;  Left Shoulder Arthroscopy, Distal Clavicale Resection, Decompression, Mini Open Rotator Cuff Repair    ;  Surgeon: Fredi Lindsay MD;  Location: [facility] OR     C NONSPECIFIC PROCEDURE  age 16    colposcopy for abnormal pap     C NONSPECIFIC PROCEDURE      surgery L thumb     C NONSPECIFIC PROCEDURE      breast augmentation-silicone     C NONSPECIFIC PROCEDURE      s/p  x 2     C NONSPECIFIC PROCEDURE      Bilateral tubal ligation     C REMOVAL OF KIDNEY STONE       COLONOSCOPY  2014    Procedure: COLONOSCOPY;  Colonoscopy/WW;  Surgeon: Pancho Olsen MD;  Location: [facility] GI     COMBINED CYSTOSCOPY, RETROGRADES, URETEROSCOPY, LASER HOLMIUM LITHOTRIPSY URETER(S), INSERT STENT Right 2016    Procedure: COMBINED CYSTOSCOPY, RETROGRADES, URETEROSCOPY, LASER HOLMIUM LITHOTRIPSY URETER(S), INSERT STENT;  Surgeon: Kushal Noriega MD;  Location: [facility] OR     CYSTOSCOPY       CYSTOSCOPY, RETROGRADES, EXTRACT STONE, COMBINED  2013    Procedure: COMBINED CYSTOSCOPY, RETROGRADES, EXTRACT STONE;  Video Cystoscopy,  Right Ureteroscopy, ureteral dilatation,  Stone extraction;  Surgeon: Subhash Vann MD;  Location: [facility] OR     EXTRACORPOREAL SHOCK WAVE LITHOTRIPSY (ESWL) Bilateral 2016    Procedure: EXTRACORPOREAL SHOCK WAVE LITHOTRIPSY (ESWL);  Surgeon: Bassam Vu MD;  Location: [facility]     GYN SURGERY      laparoscopy     LAPAROSCOPIC CHOLECYSTECTOMY WITH CHOLANGIOGRAMS  2012    Procedure: LAPAROSCOPIC CHOLECYSTECTOMY WITH CHOLANGIOGRAMS;  LAPAROSCOPIC CHOLECYSTECTOMY WITH CHOLANGIOGRAMS ;  Surgeon: Nataliya Gabriel MD;  Location: [facility] OR     TUBAL LIGATION         Family History   Problem Relation Age of Onset     DIABETES Mother      type 2     Alcohol/Drug Mother      alcohol       Social History   Substance Use Topics     Smoking status: Never Smoker     Smokeless tobacco: Never Used     Alcohol use No       Reviewed  "and updated as needed this visit by Provider         ROS:v no fever    This document serves as a record of the services and decisions personally performed and made by Sadi Melendez MD. It was created on his behalf by Tara Connors, a trained medical scribe.  The creation of this document is based on the scribe's personal observations and the provider's statements to the medical scribe.  Tara Connors, February 8, 2018 2:28 PM    OBJECTIVE:     /62 (BP Location: Right arm, Patient Position: Chair, Cuff Size: Adult Regular)  Pulse 89  Temp 98.1  F (36.7  C) (Oral)  Resp 14  Ht 1.626 m (5' 4\")  Wt 71.7 kg (158 lb)  LMP 02/25/2014  Breastfeeding? No  BMI 27.12 kg/m2  Body mass index is 27.12 kg/(m^2).    No results found for this or any previous visit (from the past 24 hour(s)).  Results for orders placed or performed in visit on 02/08/18   *UA reflex to Microscopic and Culture ([place] and [facility] (except [place] and [place])   Result Value Ref Range    Color Urine Yellow     Appearance Urine Clear     Glucose Urine Negative NEG^Negative mg/dL    Bilirubin Urine Negative NEG^Negative    Ketones Urine Negative NEG^Negative mg/dL    Specific Gravity Urine 1.015 1.003 - 1.035    Blood Urine Small (A) NEG^Negative    pH Urine 7.0 5.0 - 7.0 pH    Protein Albumin Urine Negative NEG^Negative mg/dL    Urobilinogen Urine 0.2 0.2 - 1.0 EU/dL    Nitrite Urine Negative NEG^Negative    Leukocyte Esterase Urine Negative NEG^Negative    Source Midstream Urine    Urine Microscopic   Result Value Ref Range    WBC Urine O - 2 OTO2^O - 2 /HPF    RBC Urine 2-5 (A) OTO2^O - 2 /HPF    Amorphous Crystals Moderate (A) NEG^Negative /HPF   Urine Culture Aerobic Bacterial   Result Value Ref Range    Specimen Description Midstream Urine     Culture Micro No growth            ASSESSMENT/PLAN:       ASSESSMENT / PLAN:  (R30.0) Dysuria  (primary encounter diagnosis)  Comment: Not clearly cystitis.  Discussed additional " possibilities.  She is convinced that this is bladder infection  Plan: *UA reflex to Microscopic and Culture ([place]         and [facility] (except [place] and         [place]), Urine Culture Aerobic Bacterial,         Urine Microscopic, doxycycline (VIBRAMYCIN) 100        MG capsule, phenazopyridine (PYRIDIUM) 200 MG         tablet        Treat, culture,      RTC depending upon results    Sadi Melendez MD                 The information in this document, created by the medical scribe for me, accurately reflects the services I personally performed and the decisions made by me. I have reviewed and approved this document for accuracy prior to leaving the patient care area.  Sadi Melendez MD February 8, 2018 2:28 PM    Sadi Melendez MD  [facility]

## 2018-02-08 NOTE — NURSING NOTE
"Chief Complaint   Patient presents with     UTI     x 2-3 days        Initial /62 (BP Location: Right arm, Patient Position: Chair, Cuff Size: Adult Regular)  Pulse 89  Temp 98.1  F (36.7  C) (Oral)  Resp 14  Ht 5' 4\" (1.626 m)  Wt 158 lb (71.7 kg)  LMP 02/25/2014  Breastfeeding? No  BMI 27.12 kg/m2 Estimated body mass index is 27.12 kg/(m^2) as calculated from the following:    Height as of this encounter: 5' 4\" (1.626 m).    Weight as of this encounter: 158 lb (71.7 kg).  Medication Reconciliation: complete rt arm Sadaf Beltran MA      "

## 2018-02-08 NOTE — MR AVS SNAPSHOT
"              After Visit Summary   2/8/2018    Laney Maynard    MRN: [id number]           Patient Information     Date Of Birth          1965        Visit Information        Provider Department      2/8/2018 2:15 PM Sadi Melendez MD [facility]        Today's Diagnoses     Dysuria    -  1       Follow-ups after your visit        Who to contact     If you have questions or need follow up information about today's clinic visit or your schedule please contact [facility] directly at [phone].  Normal or non-critical lab and imaging results will be communicated to you by MyChart, letter or phone within 4 business days after the clinic has received the results. If you do not hear from us within 7 days, please contact the clinic through [employer] or phone. If you have a critical or abnormal lab result, we will notify you by phone as soon as possible.  Submit refill requests through [employer] or call your pharmacy and they will forward the refill request to us. Please allow 3 business days for your refill to be completed.          Additional Information About Your Visit        MyChart Information     [employer] gives you secure access to your electronic health record. If you see a primary care provider, you can also send messages to your care team and make appointments. If you have questions, please call your primary care clinic.  If you do not have a primary care provider, please call [phone] and they will assist you.        Care EveryWhere ID     This is your Care EveryWhere ID. This could be used by other organizations to access your [place] medical records  [id number]        Your Vitals Were     Pulse Temperature Respirations Height Last Period Breastfeeding?    89 98.1  F (36.7  C) (Oral) 14 5' 4\" (1.626 m) 02/25/2014 No    BMI (Body Mass Index)                   27.12 kg/m2            Blood Pressure from Last 3 Encounters:   02/08/18 112/62   12/26/17 110/62 "   12/11/17 104/58    Weight from Last 3 Encounters:   02/08/18 158 lb (71.7 kg)   12/26/17 155 lb (70.3 kg)   12/11/17 155 lb (70.3 kg)              We Performed the Following     *UA reflex to Microscopic and Culture ([place] and [facility] (except [place] and [place])     Urine Culture Aerobic Bacterial     Urine Microscopic          Today's Medication Changes          These changes are accurate as of 2/8/18 11:59 PM.  If you have any questions, ask your nurse or doctor.               Start taking these medicines.        Dose/Directions    doxycycline 100 MG capsule   Commonly known as:  VIBRAMYCIN   Used for:  Dysuria   Started by:  Sadi Melendez MD        Dose:  100 mg   Take 1 capsule (100 mg) by mouth 2 times daily   Quantity:  20 capsule   Refills:  0       phenazopyridine 200 MG tablet   Commonly known as:  PYRIDIUM   Used for:  Dysuria   Started by:  Sadi Melendez MD        Dose:  200 mg   Take 1 tablet (200 mg) by mouth 3 times daily as needed for irritation   Quantity:  9 tablet   Refills:  0            Where to get your medicines      These medications were sent to [facility] [address]  [address] [facility] [address]     Phone:  [phone]     doxycycline 100 MG capsule    phenazopyridine 200 MG tablet                Primary Care Provider Office Phone # Fax #    Susan Rachele Haase, APRN -[phone] [address] [facility] [address]        Equal Access to Services     ERIK SWEET AH: Hadii ezoi arellano hadasho Soomaali, waaxda luqadaha, qaybta kaalmada adeegyada, jay hurst. So [facility] [phone].    ATENCIÓN: Si habla español, tiene a ho disposición servicios gratuitos de asistencia lingüística. Llame al [phone].    We comply with applicable federal civil rights laws and [place] laws. We do not discriminate on the basis of race, color, national origin, age, disability, sex,  sexual orientation, or gender identity.            Thank you!     Thank you for choosing [facility]  for your care. Our goal is always to provide you with excellent care. Hearing back from our patients is one way we can continue to improve our services. Please take a few minutes to complete the written survey that you may receive in the mail after your visit with us. Thank you!             Your Updated Medication List - Protect others around you: Learn how to safely use, store and throw away your medicines at www.disposemymeds.org.          This list is accurate as of 2/8/18 11:59 PM.  Always use your most recent med list.                   Brand Name Dispense Instructions for use Diagnosis    acyclovir 5 % ointment    ZOVIRAX    30 g    Apply topically 5 times daily    Herpes simplex virus infection       * amphetamine-dextroamphetamine 20 MG per 24 hr capsule    ADDERALL XR    30 capsule    Take 1 capsule (20 mg) by mouth daily    Attention deficit hyperactivity disorder (ADHD), combined type       * amphetamine-dextroamphetamine 30 MG per 24 hr capsule    ADDERALL XR    30 capsule    Take 1 capsule (30 mg) by mouth daily    Attention deficit hyperactivity disorder (ADHD), combined type       conjugated estrogens cream    PREMARIN    30 g    Place 0.5 g vaginally three times a week    Urinary tract infection with hematuria, site unspecified       doxycycline 100 MG capsule    VIBRAMYCIN    20 capsule    Take 1 capsule (100 mg) by mouth 2 times daily    Dysuria       fexofenadine 180 MG tablet    ALLEGRA    30 tablet    Take 1 tablet (180 mg) by mouth daily as needed    Seasonal allergic rhinitis       fluocinolone acetonide 0.01 % Oil     1 Bottle    Use on scalp once a week.    Scalp psoriasis       fluticasone 50 MCG/ACT spray    FLONASE    16 g    Spray 2 sprays into both nostrils daily as needed    Seasonal allergic rhinitis       ketoconazole 2 % shampoo    NIZORAL    120 mL    Apply to the  affected area and wash off after 5 minutes.    Seborrheic dermatitis of scalp       meclizine 25 MG tablet    ANTIVERT    30 tablet    Take 1 tablet (25 mg) by mouth 3 times daily as needed    Vertigo       Menthol (Topical Analgesic) 4 % Gel    BIOFREEZE COLORLESS    118 mL    Externally apply topically 3 times daily as needed    Pain in both feet, Plantar fasciitis, bilateral, Pes planus of both feet       methylPREDNISolone 4 MG tablet    MEDROL    21 tablet    follow package directions    Intractable left heel pain       naproxen 500 MG tablet    NAPROSYN    180 tablet    Take 1 tablet (500 mg) by mouth 2 times daily as needed for moderate pain    Plantar fasciitis, bilateral       ondansetron 4 MG ODT tab    ZOFRAN ODT    20 tablet    Take 1-2 tablets (4-8 mg) by mouth every 8 hours as needed for nausea    Ureterolithiasis       * order for DME     1 Package    Equipment being ordered:heel pads    Intractable left heel pain       * order for DME     1 Device    Equipment being ordered: tall aircast boot    Pain in both feet, Plantar fasciitis, bilateral, Pes planus of both feet       phenazopyridine 200 MG tablet    PYRIDIUM    9 tablet    Take 1 tablet (200 mg) by mouth 3 times daily as needed for irritation    Dysuria       tobramycin-dexamethasone 0.3-0.1 % ophthalmic susp    TOBRADEX    1 Bottle    Place 2 drops into both eyes daily    Eye disorder       topiramate 50 MG tablet    TOPAMAX     Take 150 mg by mouth 2 times daily        valACYclovir 1000 mg tablet    VALTREX    90 tablet    Take 1 tablet (1,000 mg) by mouth daily    Herpes simplex virus infection       VESICARE 5 MG tablet   Generic drug:  solifenacin     90 tablet    TAKE ONE TABLET BY MOUTH EVERY EVENING    Stress incontinence       zolpidem 5 MG tablet    AMBIEN    30 tablet    Take 1 tablet (5 mg) by mouth nightly as needed for sleep    Sleep disorder       * Notice:  This list has 4 medication(s) that are the same as other medications  prescribed for you. Read the directions carefully, and ask your doctor or other care provider to review them with you.

## 2018-02-09 LAB
BACTERIA SPEC CULT: NO GROWTH
SPECIMEN SOURCE: NORMAL

## 2018-02-09 NOTE — PROGRESS NOTES
No infection. Complete the therapy. If you still have symptoms, see [place]. If not, we'll wait for the next episode  Sadi Melendez MD

## 2018-02-12 DIAGNOSIS — F90.2 ATTENTION DEFICIT HYPERACTIVITY DISORDER (ADHD), COMBINED TYPE: ICD-10-CM

## 2018-02-12 NOTE — TELEPHONE ENCOUNTER
Controlled Substance Refill Request for Adderall  Problem List Complete:  Yes    Patient is followed by HAASE, SUSAN RACHELE for ongoing prescription of stimulants.  All refills should be approved by this provider, or covering partner.    Medication(s): Aderall XR 20 mg every day.  Adderall XR 30 mg every day.   Maximum quantity per month: #30;  #30   Clinic visit frequency required: Q 6  months     Controlled substance agreement on file: Yes       Date(s): 3/10/2016  Neuropsych evaluation for ADD completed:  No    Last [facility] website verification: 10/18/17   [email or web address]     checked in past 6 months?  Yes 10/18/17     Edelmira Escobar, Pharmacy [facility]

## 2018-02-13 RX ORDER — DEXTROAMPHETAMINE SACCHARATE, AMPHETAMINE ASPARTATE MONOHYDRATE, DEXTROAMPHETAMINE SULFATE AND AMPHETAMINE SULFATE 7.5; 7.5; 7.5; 7.5 MG/1; MG/1; MG/1; MG/1
30 CAPSULE, EXTENDED RELEASE ORAL DAILY
Qty: 30 CAPSULE | Refills: 0 | Status: SHIPPED | OUTPATIENT
Start: 2018-02-13 | End: 2018-03-15

## 2018-02-13 RX ORDER — DEXTROAMPHETAMINE SACCHARATE, AMPHETAMINE ASPARTATE MONOHYDRATE, DEXTROAMPHETAMINE SULFATE AND AMPHETAMINE SULFATE 5; 5; 5; 5 MG/1; MG/1; MG/1; MG/1
20 CAPSULE, EXTENDED RELEASE ORAL DAILY
Qty: 30 CAPSULE | Refills: 0 | Status: SHIPPED | OUTPATIENT
Start: 2018-02-13 | End: 2018-03-16

## 2018-02-23 DIAGNOSIS — M72.2 PLANTAR FASCIITIS, BILATERAL: ICD-10-CM

## 2018-02-24 NOTE — TELEPHONE ENCOUNTER
"Requested Prescriptions   Pending Prescriptions Disp Refills     naproxen (NAPROSYN) 500 MG tablet [Pharmacy Med Name: NAPROXEN 500MG TABS] 180 tablet 3    Last Written Prescription Date:  12/28/2017  Last Fill Quantity: 180 tablet,  # refills: 1   Last office visit: 2/8/2018 with prescribing provider:  02/08/2018   Future Office Visit:     Sig: TAKE ONE TABLET BY MOUTH TWO TIMES A DAY AS NEEDED FOR MODERATE PAIN    NSAID Medications Passed    2/23/2018  9:06 AM       Passed - Blood pressure under 140/90 in past 12 months    BP Readings from Last 3 Encounters:   02/08/18 112/62   12/26/17 110/62   12/11/17 104/58                Passed - Normal ALT on file in past 12 months    Recent Labs   Lab Test  04/17/17   0904   ALT  15            Passed - Normal AST on file in past 12 months    Recent Labs   Lab Test  04/17/17   0904   AST  12            Passed - Recent or future visit with authorizing provider's specialty    Patient had office visit in the last year or has a visit in the next 30 days with authorizing provider.  See \"Patient Info\" tab in inbasket, or \"Choose Columns\" in Meds & Orders section of the refill encounter.            Passed - Patient is age 6-64 years       Passed - Normal CBC on file in past 12 months    Recent Labs   Lab Test  04/12/17   1157   WBC  5.2   RBC  4.56   HGB  14.7   HCT  42.0   PLT  289            Passed - No active pregnancy on record       Passed - Normal serum creatinine on file in past 12 months    Recent Labs   Lab Test  04/17/17   0904   CR  1.03            Passed - No positive pregnancy test in past 12 months          Mason Gordon XRT  "

## 2018-02-27 RX ORDER — NAPROXEN 500 MG/1
TABLET ORAL
Qty: 180 TABLET | Refills: 0 | Status: SHIPPED | OUTPATIENT
Start: 2018-02-27 | End: 2018-04-13

## 2018-02-27 NOTE — TELEPHONE ENCOUNTER
Has refill, resent  Prescription approved per [facility] Refill Protocol.  Genet Wolfe, RN, BSN

## 2018-03-11 DIAGNOSIS — N39.3 STRESS INCONTINENCE: ICD-10-CM

## 2018-03-11 DIAGNOSIS — B00.9 HERPES SIMPLEX VIRUS INFECTION: ICD-10-CM

## 2018-03-12 RX ORDER — SOLIFENACIN SUCCINATE 5 MG/1
TABLET, FILM COATED ORAL
Qty: 90 TABLET | Refills: 2 | Status: SHIPPED | OUTPATIENT
Start: 2018-03-12 | End: 2019-01-09

## 2018-03-12 RX ORDER — VALACYCLOVIR HYDROCHLORIDE 1 G/1
TABLET, FILM COATED ORAL
Qty: 90 TABLET | Refills: 0 | Status: SHIPPED | OUTPATIENT
Start: 2018-03-12 | End: 2018-04-13

## 2018-03-12 NOTE — TELEPHONE ENCOUNTER
Annual visit for med due next month  Prescription approved per [facility] Refill Protocol.  Genet Wolfe RN, BSN

## 2018-03-12 NOTE — TELEPHONE ENCOUNTER
"Requested Prescriptions   Pending Prescriptions Disp Refills     valACYclovir (VALTREX) 1000 mg tablet [Pharmacy Med Name: VALACYCLOVIR HCL 1GM TABS] 90 tablet 3    Last Written Prescription Date:  04/17/2017  Last Fill Quantity: 90 tablet,  # refills: 3   Last office visit: 2/8/2018 with prescribing provider:  02/08/2018   Future Office Visit:     Sig: TAKE ONE TABLET BY MOUTH EVERY DAY    Antivirals for Herpes Protocol Passed    3/11/2018 11:28 AM       Passed - Patient is age 12 or older       Passed - Recent (12 mo) or future (30 days) visit within the authorizing provider's specialty    Patient had office visit in the last 12 months or has a visit in the next 30 days with authorizing provider or within the authorizing provider's specialty.  See \"Patient Info\" tab in inbasket, or \"Choose Columns\" in Meds & Orders section of the refill encounter.           Passed - Normal serum creatinine on file in past 12 months    Recent Labs   Lab Test  04/17/17   0904   CR  1.03               Mason CHASE  "

## 2018-03-12 NOTE — TELEPHONE ENCOUNTER
"Requested Prescriptions   Pending Prescriptions Disp Refills     VESICARE 5 MG tablet [Pharmacy Med Name: VESICARE 5MG TABS] 90 tablet 2    Last Written Prescription Date:  07/27/2017  Last Fill Quantity: 90 tablet,  # refills: 2   Last office visit: 2/8/2018 with prescribing provider:  02/08/2018   Future Office Visit:     Sig: TAKE ONE TABLET BY MOUTH EVERY EVENING    Muscarinic Antagonists (Urinary Incontinence Agents) Passed    3/11/2018 11:28 AM       Passed - Recent (12 mo) or future (30 days) visit within the authorizing provider's specialty    Patient had office visit in the last 12 months or has a visit in the next 30 days with authorizing provider or within the authorizing provider's specialty.  See \"Patient Info\" tab in inbasket, or \"Choose Columns\" in Meds & Orders section of the refill encounter.           Passed - Patient does not have a diagnosis of glaucoma on the problem list    If glaucoma diagnosis is new, refer refill to physician.         Passed - Patient is 18 years of age or older          Mason SEARST  "

## 2018-03-12 NOTE — TELEPHONE ENCOUNTER
Prescription approved per [facility] Refill Protocol.  Genet Wolfe RN, BSN

## 2018-03-15 DIAGNOSIS — F90.2 ATTENTION DEFICIT HYPERACTIVITY DISORDER (ADHD), COMBINED TYPE: ICD-10-CM

## 2018-03-15 RX ORDER — DEXTROAMPHETAMINE SACCHARATE, AMPHETAMINE ASPARTATE MONOHYDRATE, DEXTROAMPHETAMINE SULFATE AND AMPHETAMINE SULFATE 7.5; 7.5; 7.5; 7.5 MG/1; MG/1; MG/1; MG/1
30 CAPSULE, EXTENDED RELEASE ORAL DAILY
Qty: 30 CAPSULE | Refills: 0 | Status: SHIPPED | OUTPATIENT
Start: 2018-03-15 | End: 2018-04-13

## 2018-03-15 NOTE — TELEPHONE ENCOUNTER
Controlled Substance Refill Request for adderall xr 20  Problem List Complete:  Yes  Patient is followed by HAASE, SUSAN RACHELE for ongoing prescription of stimulants.  All refills should be approved by this provider, or covering partner.    Medication(s): Aderall XR 20 mg every day.  Adderall XR 30 mg every day.   Maximum quantity per month: #30;  #30   Clinic visit frequency required: Q 6  months     Controlled substance agreement on file: Yes       Date(s): 3/10/2016  Neuropsych evaluation for ADD completed:  No    Last [facility] website verification: 10/18/17   [email or web address]     checked in past 6 months?  Yes 10/18/2017   Please walk signed prescription over to pharmacy.  Thanks!    Mary Anne Desai, [place]  [facility]  [phone]

## 2018-03-16 ENCOUNTER — OFFICE VISIT (OUTPATIENT)
Dept: FAMILY MEDICINE | Facility: CLINIC | Age: 53
End: 2018-03-16
Payer: COMMERCIAL

## 2018-03-16 VITALS
HEART RATE: 84 BPM | DIASTOLIC BLOOD PRESSURE: 70 MMHG | BODY MASS INDEX: 27.12 KG/M2 | TEMPERATURE: 98.1 F | SYSTOLIC BLOOD PRESSURE: 110 MMHG | WEIGHT: 158 LBS | RESPIRATION RATE: 10 BRPM | OXYGEN SATURATION: 100 %

## 2018-03-16 DIAGNOSIS — F90.2 ATTENTION DEFICIT HYPERACTIVITY DISORDER (ADHD), COMBINED TYPE: ICD-10-CM

## 2018-03-16 DIAGNOSIS — R39.89 URINARY PROBLEM: Primary | ICD-10-CM

## 2018-03-16 DIAGNOSIS — B37.31 CANDIDIASIS OF VULVA AND VAGINA: ICD-10-CM

## 2018-03-16 LAB
ALBUMIN UR-MCNC: NEGATIVE MG/DL
APPEARANCE UR: CLEAR
BACTERIA #/AREA URNS HPF: ABNORMAL /HPF
BILIRUB UR QL STRIP: NEGATIVE
COLOR UR AUTO: YELLOW
GLUCOSE UR STRIP-MCNC: NEGATIVE MG/DL
HGB UR QL STRIP: ABNORMAL
KETONES UR STRIP-MCNC: NEGATIVE MG/DL
LEUKOCYTE ESTERASE UR QL STRIP: ABNORMAL
MUCOUS THREADS #/AREA URNS LPF: PRESENT /LPF
NITRATE UR QL: NEGATIVE
NON-SQ EPI CELLS #/AREA URNS LPF: ABNORMAL /LPF
PH UR STRIP: 6 PH (ref 5–7)
RBC #/AREA URNS AUTO: ABNORMAL /HPF
SOURCE: ABNORMAL
SP GR UR STRIP: 1.02 (ref 1–1.03)
UROBILINOGEN UR STRIP-ACNC: 0.2 EU/DL (ref 0.2–1)
WBC #/AREA URNS AUTO: ABNORMAL /HPF

## 2018-03-16 PROCEDURE — 99214 OFFICE O/P EST MOD 30 MIN: CPT | Performed by: NURSE PRACTITIONER

## 2018-03-16 PROCEDURE — 81001 URINALYSIS AUTO W/SCOPE: CPT | Performed by: NURSE PRACTITIONER

## 2018-03-16 PROCEDURE — 87086 URINE CULTURE/COLONY COUNT: CPT | Performed by: NURSE PRACTITIONER

## 2018-03-16 RX ORDER — PHENAZOPYRIDINE HYDROCHLORIDE 200 MG/1
200 TABLET, FILM COATED ORAL 3 TIMES DAILY PRN
Qty: 30 TABLET | Refills: 3 | Status: SHIPPED | OUTPATIENT
Start: 2018-03-16 | End: 2019-04-08

## 2018-03-16 RX ORDER — DEXTROAMPHETAMINE SACCHARATE, AMPHETAMINE ASPARTATE MONOHYDRATE, DEXTROAMPHETAMINE SULFATE AND AMPHETAMINE SULFATE 5; 5; 5; 5 MG/1; MG/1; MG/1; MG/1
20 CAPSULE, EXTENDED RELEASE ORAL DAILY
Qty: 30 CAPSULE | Refills: 0 | Status: SHIPPED | OUTPATIENT
Start: 2018-03-16 | End: 2018-04-13 | Stop reason: DRUGHIGH

## 2018-03-16 RX ORDER — DEXTROAMPHETAMINE SACCHARATE, AMPHETAMINE ASPARTATE MONOHYDRATE, DEXTROAMPHETAMINE SULFATE AND AMPHETAMINE SULFATE 5; 5; 5; 5 MG/1; MG/1; MG/1; MG/1
20 CAPSULE, EXTENDED RELEASE ORAL DAILY
Qty: 30 CAPSULE | Refills: 0 | Status: CANCELLED | OUTPATIENT
Start: 2018-03-16

## 2018-03-16 RX ORDER — FLUCONAZOLE 150 MG/1
TABLET ORAL
Qty: 2 TABLET | Refills: 0 | Status: SHIPPED | OUTPATIENT
Start: 2018-03-16 | End: 2018-03-28

## 2018-03-16 RX ORDER — CIPROFLOXACIN 500 MG/1
500 TABLET, FILM COATED ORAL 2 TIMES DAILY
Qty: 14 TABLET | Refills: 0 | Status: SHIPPED | OUTPATIENT
Start: 2018-03-16 | End: 2018-03-28

## 2018-03-16 NOTE — MR AVS SNAPSHOT
After Visit Summary   3/16/2018    Laney Maynard    MRN: [id number]           Patient Information     Date Of Birth          1965        Visit Information        Provider Department      3/16/2018 1:00 PM Haase, Susan Rachele, APRN CNP [facility]        Today's Diagnoses     Urinary problem    -  1    Candidiasis of vulva and vagina           Follow-ups after your visit        Additional Services     UROLOGY ADULT REFERRAL       Your provider has referred you to: [place]: [facility] [phone]   [email or web address]    Please be aware that coverage of these services is subject to the terms and limitations of your health insurance plan.  Call member services at your health plan with any benefit or coverage questions.      Please bring the following with you to your appointment:    (1) Any X-Rays, CTs or MRIs which have been performed.  Contact the facility where they were done to arrange for  prior to your scheduled appointment.    (2) List of current medications  (3) This referral request   (4) Any documents/labs given to you for this referral                  Future tests that were ordered for you today     Open Future Orders        Priority Expected Expires Ordered    UA reflex to Microscopic and Culture Routine  4/16/2018 3/16/2018            Who to contact     If you have questions or need follow up information about today's clinic visit or your schedule please contact [facility] directly at [phone].  Normal or non-critical lab and imaging results will be communicated to you by MyChart, letter or phone within 4 business days after the clinic has received the results. If you do not hear from us within 7 days, please contact the clinic through MyChart or phone. If you have a critical or abnormal lab result, we will notify you by phone as soon as possible.  Submit refill requests  through [employer] or call your pharmacy and they will forward the refill request to us. Please allow 3 business days for your refill to be completed.          Additional Information About Your Visit        [employer] Information     [employer] gives you secure access to your electronic health record. If you see a primary care provider, you can also send messages to your care team and make appointments. If you have questions, please call your primary care clinic.  If you do not have a primary care provider, please call [phone] and they will assist you.        Care EveryWhere ID     This is your Care EveryWhere ID. This could be used by other organizations to access your [place] medical records  [id number]        Your Vitals Were     Pulse Temperature Respirations Last Period Pulse Oximetry BMI (Body Mass Index)    84 98.1  F (36.7  C) (Oral) 10 02/25/2014 100% 27.12 kg/m2       Blood Pressure from Last 3 Encounters:   03/16/18 110/70   02/08/18 112/62   12/26/17 110/62    Weight from Last 3 Encounters:   03/16/18 158 lb (71.7 kg)   02/08/18 158 lb (71.7 kg)   12/26/17 155 lb (70.3 kg)              We Performed the Following     *UA reflex to Microscopic and Culture ([place] and [facility] (except [place] and [place])     Urine Culture Aerobic Bacterial     Urine Microscopic     UROLOGY ADULT REFERRAL          Today's Medication Changes          These changes are accurate as of 3/16/18  1:33 PM.  If you have any questions, ask your nurse or doctor.               Start taking these medicines.        Dose/Directions    ciprofloxacin 500 MG tablet   Commonly known as:  CIPRO   Used for:  Urinary problem   Started by:  Haase, Susan Rachele, APRN CNP        Dose:  500 mg   Take 1 tablet (500 mg) by mouth 2 times daily   Quantity:  14 tablet   Refills:  0       fluconazole 150 MG tablet   Commonly known as:  DIFLUCAN   Used for:  Candidiasis of vulva and vagina   Started by:  Haase, Susan Rachele, APRN CNP         Take 1 tablet today, repeat in 7 days if needed.   Quantity:  2 tablet   Refills:  0       phenazopyridine 200 MG tablet   Commonly known as:  PYRIDIUM   Used for:  Urinary problem   Started by:  Haase, Susan Rachele, APRN CNP        Dose:  200 mg   Take 1 tablet (200 mg) by mouth 3 times daily as needed for irritation   Quantity:  30 tablet   Refills:  3            Where to get your medicines      These medications were sent to [facility] [address]  [id number] [place] [address]     Phone:  [phone]     ciprofloxacin 500 MG tablet    fluconazole 150 MG tablet    phenazopyridine 200 MG tablet                Primary Care Provider Office Phone # Fax #    Susan Rachele Haase, APRN -[phone] [address] [facility] [address]        Equal Access to Services     ERICA SWEET : Hadii aad ku hadasho Sopita, waaxda luqadaha, qaybta kaalmada adegaroyaivelisse, jay gabriel . So [facility] [phone].    ATENCIÓN: Si habla español, tiene a ho disposición servicios gratuitos de asistencia lingüística. Mahad al [phone].    We comply with applicable federal civil rights laws and [place] laws. We do not discriminate on the basis of race, color, national origin, age, disability, sex, sexual orientation, or gender identity.            Thank you!     Thank you for choosing [facility]  for your care. Our goal is always to provide you with excellent care. Hearing back from our patients is one way we can continue to improve our services. Please take a few minutes to complete the written survey that you may receive in the mail after your visit with us. Thank you!             Your Updated Medication List - Protect others around you: Learn how to safely use, store and throw away your medicines at www.disposemymeds.org.          This list is accurate as of 3/16/18  1:33 PM.  Always use your most recent med  list.                   Brand Name Dispense Instructions for use Diagnosis    acyclovir 5 % ointment    ZOVIRAX    30 g    Apply topically 5 times daily    Herpes simplex virus infection       * amphetamine-dextroamphetamine 20 MG per 24 hr capsule    ADDERALL XR    30 capsule    Take 1 capsule (20 mg) by mouth daily    Attention deficit hyperactivity disorder (ADHD), combined type       * amphetamine-dextroamphetamine 30 MG per 24 hr capsule    ADDERALL XR    30 capsule    Take 1 capsule (30 mg) by mouth daily    Attention deficit hyperactivity disorder (ADHD), combined type       ciprofloxacin 500 MG tablet    CIPRO    14 tablet    Take 1 tablet (500 mg) by mouth 2 times daily    Urinary problem       conjugated estrogens cream    PREMARIN    30 g    Place 0.5 g vaginally three times a week    Urinary tract infection with hematuria, site unspecified       fexofenadine 180 MG tablet    ALLEGRA    30 tablet    Take 1 tablet (180 mg) by mouth daily as needed    Seasonal allergic rhinitis       fluconazole 150 MG tablet    DIFLUCAN    2 tablet    Take 1 tablet today, repeat in 7 days if needed.    Candidiasis of vulva and vagina       fluocinolone acetonide 0.01 % Oil     1 Bottle    Use on scalp once a week.    Scalp psoriasis       fluticasone 50 MCG/ACT spray    FLONASE    16 g    Spray 2 sprays into both nostrils daily as needed    Seasonal allergic rhinitis       ketoconazole 2 % shampoo    NIZORAL    120 mL    Apply to the affected area and wash off after 5 minutes.    Seborrheic dermatitis of scalp       meclizine 25 MG tablet    ANTIVERT    30 tablet    Take 1 tablet (25 mg) by mouth 3 times daily as needed    Vertigo       Menthol (Topical Analgesic) 4 % Gel    BIOFREEZE COLORLESS    118 mL    Externally apply topically 3 times daily as needed    Pain in both feet, Plantar fasciitis, bilateral, Pes planus of both feet       methylPREDNISolone 4 MG tablet    MEDROL    21 tablet    follow package directions     Intractable left heel pain       * naproxen 500 MG tablet    NAPROSYN    180 tablet    Take 1 tablet (500 mg) by mouth 2 times daily as needed for moderate pain    Plantar fasciitis, bilateral       * naproxen 500 MG tablet    NAPROSYN    180 tablet    TAKE ONE TABLET BY MOUTH TWO TIMES A DAY AS NEEDED FOR MODERATE PAIN    Plantar fasciitis, bilateral       ondansetron 4 MG ODT tab    ZOFRAN ODT    20 tablet    Take 1-2 tablets (4-8 mg) by mouth every 8 hours as needed for nausea    Ureterolithiasis       * order for DME     1 Package    Equipment being ordered:heel pads    Intractable left heel pain       * order for DME     1 Device    Equipment being ordered: tall aircast boot    Pain in both feet, Plantar fasciitis, bilateral, Pes planus of both feet       phenazopyridine 200 MG tablet    PYRIDIUM    30 tablet    Take 1 tablet (200 mg) by mouth 3 times daily as needed for irritation    Urinary problem       tobramycin-dexamethasone 0.3-0.1 % ophthalmic susp    TOBRADEX    1 Bottle    Place 2 drops into both eyes daily    Eye disorder       topiramate 50 MG tablet    TOPAMAX     Take 150 mg by mouth 2 times daily        valACYclovir 1000 mg tablet    VALTREX    90 tablet    TAKE ONE TABLET BY MOUTH EVERY DAY    Herpes simplex virus infection       VESICARE 5 MG tablet   Generic drug:  solifenacin     90 tablet    TAKE ONE TABLET BY MOUTH EVERY EVENING    Stress incontinence       zolpidem 5 MG tablet    AMBIEN    30 tablet    Take 1 tablet (5 mg) by mouth nightly as needed for sleep    Sleep disorder       * Notice:  This list has 6 medication(s) that are the same as other medications prescribed for you. Read the directions carefully, and ask your doctor or other care provider to review them with you.

## 2018-03-16 NOTE — TELEPHONE ENCOUNTER
amphetamine-dextroamphetamine (ADDERALL XR) 20 MG per 24 hr capsule  Please disregard, this RX was taken care of yesterday.    Mary Anne Watters      3/15/18 1:47 PM   Note      Walked to the pharmacy  Mary Anne Watters/

## 2018-03-16 NOTE — PROGRESS NOTES
SUBJECTIVE:   Laney Maynard is a 53 year old female who presents to clinic today for the following health issues:    URINARY TRACT SYMPTOMS      Duration: 1 week    Description  dysuria    Intensity:  moderate    Accompanying signs and symptoms:  Fever/chills: no   Flank pain no   Nausea and vomiting: no   Vaginal symptoms: none  Abdominal/Pelvic Pain: no     History  History of frequent UTI's: YES  History of kidney stones: YES  Sexually Active: YES  Possibility of pregnancy: No    Precipitating or alleviating factors: None    Therapies tried and outcome: pyridium      Last UTI on 2/8/2018,symptoms did resolve but returned about 1 week ago.   She also notes that several days ago she was having intense flank pain which she believed to be a kidney stone. She spent the day in bed and reports she was able to pass the stone. She felt better the next day. Today she wants a referral to the urologist she has seen before for recurrent UTI's.    ADHD Follow-Up  Date of last ADHD office visit: 12/11/2017  Status since last visit: Stable  Taking controlled (daily) medications as prescribed: Yes                       Parent/Patient Concerns with Medications: None    Sleep: no problems    Medication Benefits:   Controlled symptoms: Attention span, Distractability, Finishing tasks and Impulse control  Uncontrolled symptoms: None    Medication side effects:  Side effects noted: none    Problem list and histories reviewed & adjusted, as indicated.  Additional history: as documented    Reviewed and updated as needed this visit by clinical staff       Reviewed and updated as needed this visit by Provider       ROS:  Constitutional, HEENT, cardiovascular, pulmonary and psych systems are negative, except as otherwise noted.    This document serves as a record of the services and decisions personally performed and made by Susan Haase, CNP. It was created on her behalf by Antwan Duran, a trained medical scribe. The creation  of this document is based the provider's statements to the medical scribe.  Antwan Duran March 16, 2018 1:18 PM      OBJECTIVE:   /70 (BP Location: Right arm, Patient Position: Chair, Cuff Size: Adult Regular)  Pulse 84  Temp 98.1  F (36.7  C) (Oral)  Resp 10  Wt 71.7 kg (158 lb)  LMP 02/25/2014  SpO2 100%  BMI 27.12 kg/m2  Body mass index is 27.12 kg/(m^2).  GENERAL: healthy, alert and no distress  NECK: no adenopathy, no asymmetry, masses, or scars and thyroid normal to palpation  RESP: lungs clear to auscultation - no rales, rhonchi or wheezes  CV: regular rate and rhythm, normal S1 S2, no S3 or S4, no murmur, click or rub, no peripheral edema   Abdomen: suprapubic tenderness, no CVA tenderness.  PSYCH: mentation appears normal, affect normal/bright    Diagnostic Test Results:  Results for orders placed or performed in visit on 03/16/18 (from the past 24 hour(s))   *UA reflex to Microscopic and Culture ([place] and [facility] (except [place] and [place])   Result Value Ref Range    Color Urine Yellow     Appearance Urine Clear     Glucose Urine Negative NEG^Negative mg/dL    Bilirubin Urine Negative NEG^Negative    Ketones Urine Negative NEG^Negative mg/dL    Specific Gravity Urine 1.020 1.003 - 1.035    Blood Urine Small (A) NEG^Negative    pH Urine 6.0 5.0 - 7.0 pH    Protein Albumin Urine Negative NEG^Negative mg/dL    Urobilinogen Urine 0.2 0.2 - 1.0 EU/dL    Nitrite Urine Negative NEG^Negative    Leukocyte Esterase Urine Trace (A) NEG^Negative    Source Midstream Urine    Urine Microscopic   Result Value Ref Range    WBC Urine 5-10 (A) OTO5^0 - 5 /HPF    RBC Urine 5-10 (A) OTO2^O - 2 /HPF    Squamous Epithelial /LPF Urine Moderate (A) FEW^Few /LPF    Bacteria Urine Moderate (A) NEG^Negative /HPF    Mucous Urine Present (A) NEG^Negative /LPF     ASSESSMENT/PLAN:   Laney was seen today for urinary problem.    Diagnoses and all orders for this visit:    Urinary problem: UA with 5-10 WBC,  will prescribe cipro, culture pending.  Order for future urine placed. With recurrent UTI type symptoms will refer to urology for further evaluation.   -     *UA reflex to Microscopic and Culture ([facility] (except [place] and [place]); Future  -     *UA reflex to Microscopic and Culture ([facility] (except [place] and [place])  -     Urine Microscopic  -     ciprofloxacin (CIPRO) 500 MG tablet; Take 1 tablet (500 mg) by mouth 2 times daily  -     phenazopyridine (PYRIDIUM) 200 MG tablet; Take 1 tablet (200 mg) by mouth 3 times daily as needed for irritation  -     UA reflex to Microscopic and Culture; Future  -     Urine Culture Aerobic Bacterial  -     UROLOGY ADULT REFERRAL    Candidiasis of vulva and vagina  -     fluconazole (DIFLUCAN) 150 MG tablet; Take 1 tablet today, repeat in 7 days if needed.    Attention deficit hyperactivity disorder (ADHD), combined type: continue on current medication as prescribed.  -     amphetamine-dextroamphetamine (ADDERALL XR) 20 MG per 24 hr capsule; Take 1 capsule (20 mg) by mouth daily    The information in this document, created by the medical scribe for me, accurately reflects the services I personally performed and the decisions made by me. I have reviewed and approved this document for accuracy.   Susan Haase, CNP   Follow up in 6 mo, sooner as needed.  Susan Haase, APRN CNP  [facility]

## 2018-03-17 LAB
BACTERIA SPEC CULT: NO GROWTH
SPECIMEN SOURCE: NORMAL

## 2018-03-19 NOTE — PROGRESS NOTES
Pj Davison,  Your urine culture does not show that you had a urinary tract infection.  Please stop taking the ciprofloxacin.  It is unclear why you are having symptoms of a UTI without an infection, a follow up with urology is important.  Sincerely,  Susan Haase, CNP

## 2018-03-27 PROCEDURE — 99000 SPECIMEN HANDLING OFFICE-LAB: CPT | Performed by: NURSE PRACTITIONER

## 2018-03-27 PROCEDURE — 82365 CALCULUS SPECTROSCOPY: CPT | Mod: 90 | Performed by: NURSE PRACTITIONER

## 2018-03-28 ENCOUNTER — OFFICE VISIT (OUTPATIENT)
Dept: FAMILY MEDICINE | Facility: CLINIC | Age: 53
End: 2018-03-28
Payer: COMMERCIAL

## 2018-03-28 VITALS
TEMPERATURE: 98.1 F | WEIGHT: 158 LBS | DIASTOLIC BLOOD PRESSURE: 72 MMHG | BODY MASS INDEX: 27.12 KG/M2 | OXYGEN SATURATION: 97 % | RESPIRATION RATE: 12 BRPM | HEART RATE: 88 BPM | SYSTOLIC BLOOD PRESSURE: 118 MMHG

## 2018-03-28 DIAGNOSIS — N20.1 URETEROLITHIASIS: ICD-10-CM

## 2018-03-28 DIAGNOSIS — R39.89 URINARY PROBLEM: Primary | ICD-10-CM

## 2018-03-28 LAB
ALBUMIN UR-MCNC: 30 MG/DL
APPEARANCE UR: ABNORMAL
BACTERIA #/AREA URNS HPF: ABNORMAL /HPF
BILIRUB UR QL STRIP: NEGATIVE
COLOR UR AUTO: YELLOW
GLUCOSE UR STRIP-MCNC: NEGATIVE MG/DL
HGB UR QL STRIP: ABNORMAL
KETONES UR STRIP-MCNC: NEGATIVE MG/DL
LEUKOCYTE ESTERASE UR QL STRIP: NEGATIVE
NITRATE UR QL: NEGATIVE
NON-SQ EPI CELLS #/AREA URNS LPF: ABNORMAL /LPF
PH UR STRIP: 6.5 PH (ref 5–7)
RBC #/AREA URNS AUTO: >100 /HPF
SOURCE: ABNORMAL
SP GR UR STRIP: 1.02 (ref 1–1.03)
SPECIMEN SOURCE: NORMAL
UROBILINOGEN UR STRIP-ACNC: 0.2 EU/DL (ref 0.2–1)
WBC #/AREA URNS AUTO: ABNORMAL /HPF
WET PREP SPEC: NORMAL

## 2018-03-28 PROCEDURE — 99213 OFFICE O/P EST LOW 20 MIN: CPT | Performed by: NURSE PRACTITIONER

## 2018-03-28 PROCEDURE — 81001 URINALYSIS AUTO W/SCOPE: CPT | Performed by: NURSE PRACTITIONER

## 2018-03-28 PROCEDURE — 87210 SMEAR WET MOUNT SALINE/INK: CPT | Performed by: NURSE PRACTITIONER

## 2018-03-28 RX ORDER — TAMSULOSIN HYDROCHLORIDE 0.4 MG/1
0.4 CAPSULE ORAL DAILY
Qty: 30 CAPSULE | Refills: 0 | Status: SHIPPED | OUTPATIENT
Start: 2018-03-28 | End: 2018-04-22

## 2018-03-28 NOTE — PROGRESS NOTES
SUBJECTIVE:   Laney Maynard is a 53 year old female who presents to clinic today for the following health issues:    URINARY TRACT SYMPTOMS      Duration: 5 days    Description  hematuria and back pain    Intensity:  severe    Accompanying signs and symptoms:  Fever/chills: no   Flank pain YES  Nausea and vomiting: no   Vaginal symptoms: itching  Abdominal/Pelvic Pain: no     History  History of frequent UTI's: YES  History of kidney stones: YES  Sexually Active: YES  Possibility of pregnancy: No    Precipitating or alleviating factors: None    Therapies tried and outcome: increase fluid intake      Left flank pain began Saturday, she's been straining her urine and has passed 3 stones since.  She's increased fluid intake to help pass stones. Associated hematuria, continues to have blood in urine. Past surgical history of cystoscopy with extract of stones in 2013 and 2016. Denies fever, nausea, vomiting.  Flank pain is mild, more acute on 3/25/2018    Of note, appointment scheduled for May 23rd with urology.      Problem list and histories reviewed & adjusted, as indicated.  Additional history: as documented    Patient Active Problem List   Diagnosis     Alopecia     Seasonal allergic rhinitis     GERD (gastroesophageal reflux disease)     CARDIOVASCULAR SCREENING; LDL GOAL LESS THAN 160     Migraine headache     Lymphocytopenia     Sleep disorder     Family history of rheumatoid arthritis     Family history of sarcoidosis (mother)     Urinary frequency     Herpes simplex virus infection     Attention deficit hyperactivity disorder (ADHD)     Plantar fasciitis, bilateral     Complete rupture of rotator cuff     Mixed incontinence urge and stress (male)(female)     Ureterolithiasis     Past Surgical History:   Procedure Laterality Date     ARTHROSCOPY KNEE Right 04/26/2017    Procedure: Right knee arthroscopy and anterior fat pad resection with medial plica resection. Partial lateral menisectomy. Surgeon:   Fredi Lindsay MD  Location: [facility]     ARTHROSCOPY SHOULDER, OPEN ROTATOR CUFF REPAIR, COMBINED  2013    Procedure: COMBINED ARTHROSCOPY SHOULDER, OPEN ROTATOR CUFF REPAIR;  Left Shoulder Arthroscopy, Distal Clavicale Resection, Decompression, Mini Open Rotator Cuff Repair    ;  Surgeon: Fredi Lindsay MD;  Location: [facility] OR     C NONSPECIFIC PROCEDURE  age 16    colposcopy for abnormal pap     C NONSPECIFIC PROCEDURE      surgery L thumb     C NONSPECIFIC PROCEDURE      breast augmentation-silicone     C NONSPECIFIC PROCEDURE      s/p  x 2     C NONSPECIFIC PROCEDURE      Bilateral tubal ligation     C REMOVAL OF KIDNEY STONE       COLONOSCOPY  2014    Procedure: COLONOSCOPY;  Colonoscopy/WW;  Surgeon: Pancho Olsen MD;  Location: [facility] GI     COMBINED CYSTOSCOPY, RETROGRADES, URETEROSCOPY, LASER HOLMIUM LITHOTRIPSY URETER(S), INSERT STENT Right 2016    Procedure: COMBINED CYSTOSCOPY, RETROGRADES, URETEROSCOPY, LASER HOLMIUM LITHOTRIPSY URETER(S), INSERT STENT;  Surgeon: Kushal Noriega MD;  Location: [facility] OR     CYSTOSCOPY       CYSTOSCOPY, RETROGRADES, EXTRACT STONE, COMBINED  2013    Procedure: COMBINED CYSTOSCOPY, RETROGRADES, EXTRACT STONE;  Video Cystoscopy,  Right Ureteroscopy, ureteral dilatation,  Stone extraction;  Surgeon: Subhash Vann MD;  Location: [facility] OR     EXTRACORPOREAL SHOCK WAVE LITHOTRIPSY (ESWL) Bilateral 2016    Procedure: EXTRACORPOREAL SHOCK WAVE LITHOTRIPSY (ESWL);  Surgeon: Bassam Vu MD;  Location: [facility] OR     GYN SURGERY      laparoscopy     LAPAROSCOPIC CHOLECYSTECTOMY WITH CHOLANGIOGRAMS  2012    Procedure: LAPAROSCOPIC CHOLECYSTECTOMY WITH CHOLANGIOGRAMS;  LAPAROSCOPIC CHOLECYSTECTOMY WITH CHOLANGIOGRAMS ;  Surgeon: Nataliya Gabriel MD;  Location: [facility] OR     TUBAL LIGATION         Social History   Substance Use Topics     Smoking status: Never Smoker     Smokeless tobacco: Never Used     Alcohol use  No     Family History   Problem Relation Age of Onset     DIABETES Mother      type 2     Alcohol/Drug Mother      alcohol         Current Outpatient Prescriptions   Medication Sig Dispense Refill     phenazopyridine (PYRIDIUM) 200 MG tablet Take 1 tablet (200 mg) by mouth 3 times daily as needed for irritation 30 tablet 3     amphetamine-dextroamphetamine (ADDERALL XR) 20 MG per 24 hr capsule Take 1 capsule (20 mg) by mouth daily 30 capsule 0     amphetamine-dextroamphetamine (ADDERALL XR) 30 MG per 24 hr capsule Take 1 capsule (30 mg) by mouth daily 30 capsule 0     valACYclovir (VALTREX) 1000 mg tablet TAKE ONE TABLET BY MOUTH EVERY DAY 90 tablet 0     VESICARE 5 MG tablet TAKE ONE TABLET BY MOUTH EVERY EVENING 90 tablet 2     naproxen (NAPROSYN) 500 MG tablet TAKE ONE TABLET BY MOUTH TWO TIMES A DAY AS NEEDED FOR MODERATE PAIN 180 tablet 0     naproxen (NAPROSYN) 500 MG tablet Take 1 tablet (500 mg) by mouth 2 times daily as needed for moderate pain 180 tablet 1     zolpidem (AMBIEN) 5 MG tablet Take 1 tablet (5 mg) by mouth nightly as needed for sleep 30 tablet 2     order for DME Equipment being ordered: tall aircast boot 1 Device 0     Menthol, Topical Analgesic, (BIOFREEZE COLORLESS) 4 % GEL Externally apply topically 3 times daily as needed 118 mL 1     methylPREDNISolone (MEDROL) 4 MG tablet follow package directions 21 tablet 0     order for DME Equipment being ordered:heel pads 1 Package 0     acyclovir (ZOVIRAX) 5 % ointment Apply topically 5 times daily 30 g 5     ondansetron (ZOFRAN ODT) 4 MG disintegrating tablet Take 1-2 tablets (4-8 mg) by mouth every 8 hours as needed for nausea 20 tablet 1     topiramate (TOPAMAX) 50 MG tablet Take 150 mg by mouth 2 times daily       tobramycin-dexamethasone (TOBRADEX) ophthalmic suspension Place 2 drops into both eyes daily 1 Bottle 11     fluticasone (FLONASE) 50 MCG/ACT nasal spray Spray 2 sprays into both nostrils daily as needed 16 g 11     fexofenadine  (ALLEGRA) 180 MG tablet Take 1 tablet (180 mg) by mouth daily as needed 30 tablet 6     meclizine (ANTIVERT) 25 MG tablet Take 1 tablet (25 mg) by mouth 3 times daily as needed 30 tablet 3     fluocinolone acetonide 0.01 % OIL Use on scalp once a week. 1 Bottle 11     ketoconazole (NIZORAL) 2 % shampoo Apply to the affected area and wash off after 5 minutes. 120 mL 1     conjugated estrogens (PREMARIN) vaginal cream Place 0.5 g vaginally three times a week 30 g 12     Allergies   Allergen Reactions     Aloe Itching and Rash     Codeine      GI upset     Compazine Nausea and Itching     Darvocet [Acetaminophen] Nausea and Vomiting     Macrobid [Nitrofuran Derivatives]      Bladder spasms     Percocet [Oxycodone-Acetaminophen] Nausea and Vomiting     Sulphadimidine [Sulfamethazine] Rash       Reviewed and updated as needed this visit by clinical staff       Reviewed and updated as needed this visit by Provider         ROS:  Constitutional, cardiovascular, pulmonary, GI, , and psych systems are negative, except as otherwise noted.    This document serves as a record of the services and decisions personally performed and made by Susan Haase, CNP. It was created on her behalf by Chioma Shaw, a trained medical scribe. The creation of this document is based on the provider's statements to the medical scribe.  Chioma Shaw 3:56 PM March 28, 2018  OBJECTIVE:   /72 (BP Location: Left arm, Patient Position: Chair, Cuff Size: Adult Regular)  Pulse 88  Temp 98.1  F (36.7  C) (Oral)  Resp 12  Wt 71.7 kg (158 lb)  LMP 02/25/2014  SpO2 97%  BMI 27.12 kg/m2  Body mass index is 27.12 kg/(m^2).  GENERAL: healthy, alert and no distress  RESP: lungs clear to auscultation - no rales, rhonchi or wheezes  CV: regular rate and rhythm, normal S1 S2, no S3 or S4, no murmur, click or rub,   ABDOMEN: soft, nontender, no hepatosplenomegaly, no masses and bowel sounds normal  BACK: Left CVA tenderness, no paralumbar  tenderness  PSYCH: mentation appears normal, affect normal/bright    ASSESSMENT/PLAN:   Laney was seen today for urinary problem.    Diagnoses and all orders for this visit:    Urinary problem:  UA with large blood, patient informed.    -     *UA reflex to Microscopic and Culture ([place] and [facility] (except [place] and [place]); Future  -     Wet prep; Future  -     *UA reflex to Microscopic and Culture ([facility] (except [place] and [place])  -     Wet prep  -     Urine Microscopic    Ureterolithiasis;  CT scheduled for tomorrow morning at 0730, will also start on Flomax.  Will call [facility] with CT results and discuss follow up plan based on findings.    -     CT Abdomen Pelvis w/o Contrast; Future  -     Stone analysis; Future  -     tamsulosin (FLOMAX) 0.4 MG capsule; Take 1 capsule (0.4 mg) by mouth daily  -     Stone analysis    Follow up: Will call [facility] with CT results and discuss follow up plan based on findings.  If pain is severe is aware of need to go to the ED.     The information in this document, created by the medical scribe for me, accurately reflects the services I personally performed and the decisions made by me. I have reviewed and approved this document for accuracy prior to leaving the patient care area.  March 28, 2018 3:58 PM  Susan Haase, APRN [facility]

## 2018-03-28 NOTE — MR AVS SNAPSHOT
After Visit Summary   3/28/2018    Laney Maynard    MRN: [id number]           Patient Information     Date Of Birth          1965        Visit Information        Provider Department      3/28/2018 3:30 PM Haase, Susan Rachele, APRN [facility]        Today's Diagnoses     Urinary problem    -  1    Ureterolithiasis           Follow-ups after your visit        Follow-up notes from your care team     Return in about 1 day (around 3/29/2018).      Your next 10 appointments already scheduled     Mar 29, 2018  7:45 AM CDT   CT ABDOMEN PELVIS W/O CONTRAST with [id number] [facility] ([facility])    [address]  [facility] [address] [phone]           Please bring any scans or X-rays taken at other hospitals, if similar tests were done. Also bring a list of your medicines, including vitamins, minerals and over-the-counter drugs. It is safest to leave personal items at home.  Be sure to tell your doctor:   If you have any allergies.   If there s any chance you are pregnant.   If you are breastfeeding.  You may have contrast for this exam. To prepare:   Do not eat or drink for 2 hours before your exam. If you need to take medicine, you may take it with small sips of water. (We may ask you to take liquid medicine as well.)   The day before your exam, drink extra fluids at least six 8-ounce glasses (unless your doctor tells you to restrict your fluids).   You will be given instructions on how to drink the contrast.  Patients over 70 or patients with diabetes or kidney problems:   If you haven t had a blood test (creatinine test) within the last 30 days, the Cardiologist/Radiologist may require you to get this test prior to your exam.  If you have diabetes:   Continue to take your metformin medication on the day of your exam  Please wear loose clothing, such as a sweat suit or jogging clothes. Avoid snaps,  zippers and other metal. We may ask you to undress and put on a hospital gown.  If you have any questions, please call the Imaging Department where you will have your exam.            May 23, 2018 12:15 PM CDT   Return Visit with Shawna Wei MD   [facility] ([facility])    [address]  [address]  [facility] [address] [phone]              Future tests that were ordered for you today     Open Future Orders        Priority Expected Expires Ordered    Stone analysis Routine  3/28/2019 3/28/2018    CT Abdomen Pelvis w/o Contrast STAT  3/28/2019 3/28/2018            Who to contact     If you have questions or need follow up information about today's clinic visit or your schedule please contact [facility] directly at [phone].  Normal or non-critical lab and imaging results will be communicated to you by MyChart, letter or phone within 4 business days after the clinic has received the results. If you do not hear from us within 7 days, please contact the clinic through [facility] or phone. If you have a critical or abnormal lab result, we will notify you by phone as soon as possible.  Submit refill requests through [facility] or call your pharmacy and they will forward the refill request to us. Please allow 3 business days for your refill to be completed.          Additional Information About Your Visit        MyChart Information     [facility] gives you secure access to your electronic health record. If you see a primary care provider, you can also send messages to your care team and make appointments. If you have questions, please call your primary care clinic.  If you do not have a primary care provider, please call [phone] and they will assist you.        Care EveryWhere ID     This is your Care EveryWhere ID. This could be used by other organizations to access your [place] medical records  [id number]        Your Vitals  Were     Pulse Temperature Respirations Last Period Pulse Oximetry BMI (Body Mass Index)    88 98.1  F (36.7  C) (Oral) 12 02/25/2014 97% 27.12 kg/m2       Blood Pressure from Last 3 Encounters:   03/28/18 118/72   03/16/18 110/70   02/08/18 112/62    Weight from Last 3 Encounters:   03/28/18 158 lb (71.7 kg)   03/16/18 158 lb (71.7 kg)   02/08/18 158 lb (71.7 kg)              We Performed the Following     *UA reflex to Microscopic and Culture ([place] and [facility] (except [place] and [place])     Urine Microscopic     Wet prep          Today's Medication Changes          These changes are accurate as of 3/28/18  4:07 PM.  If you have any questions, ask your nurse or doctor.               Start taking these medicines.        Dose/Directions    tamsulosin 0.4 MG capsule   Commonly known as:  FLOMAX   Used for:  Ureterolithiasis   Started by:  Haase, Susan Rachele, APRN CNP        Dose:  0.4 mg   Take 1 capsule (0.4 mg) by mouth daily   Quantity:  30 capsule   Refills:  0            Where to get your medicines      These medications were sent to [facility] [address]  [id number] [place], [place] [id number]     Phone:  [phone]     tamsulosin 0.4 MG capsule                Primary Care Provider Office Phone # Fax #    Susan Rachele Haase, APRN [phone] [address] [facility] [address]        Equal Access to Services     ERICA SWEET AH: Hadii ezio ku hadasho Soomaali, waaxda luqadaha, qaybta kaalmada adeegyada, waxay yessy hurst. So [facility] [phone].    ATENCIÓN: Si habla español, tiene a ho disposición servicios gratuitos de asistencia lingüística. Llame al [phone].    We comply with applicable federal civil rights laws and [place] laws. We do not discriminate on the basis of race, color, national origin, age, disability, sex, sexual orientation, or gender identity.            Thank you!     Thank  you for choosing [facility]  for your care. Our goal is always to provide you with excellent care. Hearing back from our patients is one way we can continue to improve our services. Please take a few minutes to complete the written survey that you may receive in the mail after your visit with us. Thank you!             Your Updated Medication List - Protect others around you: Learn how to safely use, store and throw away your medicines at www.disposemymeds.org.          This list is accurate as of 3/28/18  4:07 PM.  Always use your most recent med list.                   Brand Name Dispense Instructions for use Diagnosis    acyclovir 5 % ointment    ZOVIRAX    30 g    Apply topically 5 times daily    Herpes simplex virus infection       * amphetamine-dextroamphetamine 30 MG per 24 hr capsule    ADDERALL XR    30 capsule    Take 1 capsule (30 mg) by mouth daily    Attention deficit hyperactivity disorder (ADHD), combined type       * amphetamine-dextroamphetamine 20 MG per 24 hr capsule    ADDERALL XR    30 capsule    Take 1 capsule (20 mg) by mouth daily    Attention deficit hyperactivity disorder (ADHD), combined type       conjugated estrogens cream    PREMARIN    30 g    Place 0.5 g vaginally three times a week    Urinary tract infection with hematuria, site unspecified       fexofenadine 180 MG tablet    ALLEGRA    30 tablet    Take 1 tablet (180 mg) by mouth daily as needed    Seasonal allergic rhinitis       fluocinolone acetonide 0.01 % Oil     1 Bottle    Use on scalp once a week.    Scalp psoriasis       fluticasone 50 MCG/ACT spray    FLONASE    16 g    Spray 2 sprays into both nostrils daily as needed    Seasonal allergic rhinitis       ketoconazole 2 % shampoo    NIZORAL    120 mL    Apply to the affected area and wash off after 5 minutes.    Seborrheic dermatitis of scalp       meclizine 25 MG tablet    ANTIVERT    30 tablet    Take 1 tablet (25 mg) by mouth 3 times daily as needed     Vertigo       Menthol (Topical Analgesic) 4 % Gel    BIOFREEZE COLORLESS    118 mL    Externally apply topically 3 times daily as needed    Pain in both feet, Plantar fasciitis, bilateral, Pes planus of both feet       methylPREDNISolone 4 MG tablet    MEDROL    21 tablet    follow package directions    Intractable left heel pain       * naproxen 500 MG tablet    NAPROSYN    180 tablet    Take 1 tablet (500 mg) by mouth 2 times daily as needed for moderate pain    Plantar fasciitis, bilateral       * naproxen 500 MG tablet    NAPROSYN    180 tablet    TAKE ONE TABLET BY MOUTH TWO TIMES A DAY AS NEEDED FOR MODERATE PAIN    Plantar fasciitis, bilateral       ondansetron 4 MG ODT tab    ZOFRAN ODT    20 tablet    Take 1-2 tablets (4-8 mg) by mouth every 8 hours as needed for nausea    Ureterolithiasis       * order for DME     1 Package    Equipment being ordered:heel pads    Intractable left heel pain       * order for DME     1 Device    Equipment being ordered: tall aircast boot    Pain in both feet, Plantar fasciitis, bilateral, Pes planus of both feet       phenazopyridine 200 MG tablet    PYRIDIUM    30 tablet    Take 1 tablet (200 mg) by mouth 3 times daily as needed for irritation    Urinary problem       tamsulosin 0.4 MG capsule    FLOMAX    30 capsule    Take 1 capsule (0.4 mg) by mouth daily    Ureterolithiasis       tobramycin-dexamethasone 0.3-0.1 % ophthalmic susp    TOBRADEX    1 Bottle    Place 2 drops into both eyes daily    Eye disorder       topiramate 50 MG tablet    TOPAMAX     Take 150 mg by mouth 2 times daily        valACYclovir 1000 mg tablet    VALTREX    90 tablet    TAKE ONE TABLET BY MOUTH EVERY DAY    Herpes simplex virus infection       VESICARE 5 MG tablet   Generic drug:  solifenacin     90 tablet    TAKE ONE TABLET BY MOUTH EVERY EVENING    Stress incontinence       zolpidem 5 MG tablet    AMBIEN    30 tablet    Take 1 tablet (5 mg) by mouth nightly as needed for sleep    Sleep  disorder       * Notice:  This list has 6 medication(s) that are the same as other medications prescribed for you. Read the directions carefully, and ask your doctor or other care provider to review them with you.

## 2018-03-29 ENCOUNTER — HOSPITAL ENCOUNTER (OUTPATIENT)
Dept: CT IMAGING | Facility: CLINIC | Age: 53
Discharge: HOME | End: 2018-03-29
Attending: NURSE PRACTITIONER | Admitting: NURSE PRACTITIONER
Payer: COMMERCIAL

## 2018-03-29 DIAGNOSIS — N20.1 URETEROLITHIASIS: ICD-10-CM

## 2018-03-29 PROCEDURE — 74176 CT ABD & PELVIS W/O CONTRAST: CPT

## 2018-03-29 NOTE — PROGRESS NOTES
Pj Davison,  Here is the result of your CT that we discussed on the phone,please set up an appt with Dr. Vu for further evaluation.  Please let me know if you have any questions.  Sincerely,  Susan Haase, CNP

## 2018-04-01 LAB
APPEARANCE STONE: NORMAL
COMPN STONE: NORMAL
NUMBER STONE: 1
SIZE STONE: NORMAL MM
WT STONE: 2 MG

## 2018-04-03 NOTE — PROGRESS NOTES
Pj Davison,  The stone analysis shows that the stone was of calcium phosphate.    Sincerely,     Susan Haase, CNP

## 2018-04-09 ENCOUNTER — OFFICE VISIT (OUTPATIENT)
Dept: UROLOGY | Facility: CLINIC | Age: 53
End: 2018-04-09
Payer: COMMERCIAL

## 2018-04-09 VITALS
HEART RATE: 76 BPM | WEIGHT: 158 LBS | HEIGHT: 64 IN | SYSTOLIC BLOOD PRESSURE: 124 MMHG | DIASTOLIC BLOOD PRESSURE: 80 MMHG | BODY MASS INDEX: 26.98 KG/M2

## 2018-04-09 DIAGNOSIS — N20.0 CALCULUS OF BOTH KIDNEYS: Primary | ICD-10-CM

## 2018-04-09 DIAGNOSIS — N20.0 CALCULUS OF KIDNEY: Primary | ICD-10-CM

## 2018-04-09 DIAGNOSIS — R10.9 FLANK PAIN: ICD-10-CM

## 2018-04-09 DIAGNOSIS — R31.0 GROSS HEMATURIA: ICD-10-CM

## 2018-04-09 PROCEDURE — 99214 OFFICE O/P EST MOD 30 MIN: CPT | Performed by: UROLOGY

## 2018-04-09 ASSESSMENT — PAIN SCALES - GENERAL: PAINLEVEL: NO PAIN (0)

## 2018-04-09 NOTE — PROGRESS NOTES
History: This very pleasant 53-year-old lady, returns now with left-sided flank pain.  She does have a significant history of urinary stone disease having had both ureteroscopy and ESWL for stones in the right ureter and the right kidney 2 years ago.  She has been seen by a nephrologist and is working on a preventative program, but, recently she did pass some small stones and a CT scan does show an 8 mm stone in the region of the left ureteropelvic junction as well as new stones, in the lower calyx the right kidney as well as other small stones.  The scan reported below.    CT ABDOMEN PELVIS W/O CONTRAST 3/29/2018 7:55 AM     TECHNIQUE: Images from diaphragm to pubic symphysis without oral or IV  contrast  Radiation dose for this scan was reduced using automated exposure  control, adjustment of the mA and/or kV according to patient size, or  iterative reconstruction technique.     HISTORY: history of nephrolithiasis, left flank pain; Ureterolithiasis     COMPARISON: 4/22/2016 CT abdomen pelvis     FINDINGS:      Urinary tract; 0.4 x 0.8 x 0.7 cm stone in the left ureteropelvic  junction which is new since the prior CT. Location is consistent with  an obstructing stone but there is no significant hydronephrosis or  perinephric stranding at this time. Numerous bilateral additional  kidney stones are present and mildly increased since 4/22/2016, the  additional left intrarenal calculi are 0.6 cm or less in size. Largest  right intrarenal kidney stone is 0.9 cm in the lower pole. No right  hydronephrosis or ureteral calculi.     Bladder; partly distended. Unremarkable.     Remainder the abdomen and pelvis: Inferior aspect of the right breast  implant on superior images of this study. Lung bases clear.  Cholecystectomy clips. Within the limits of a noncontrast exam the  spleen, pancreas and adrenal glands appear normal. Low-attenuation  subcentimeter liver lesion(s) compatible with benign cysts or other  benign lesions.  No specific evaluation or follow-up is recommended in  a low risk patient. This is anterior medial segment left lobe series 2  image 21 0.5 cm and was present on the previous exam.     No acute bowel abnormality. No free fluid, adenopathy, or aggressive  bone lesions.             IMPRESSION:   1. 0.8 cm left ureteropelvic junction stone.  2. Additional bilateral intrarenal calculi.  3. No other acute-appearing abnormality.            General health is otherwise stable.  She has observed some blood in the urine.  Urinalysis today shows large blood in the urine very few white cells no significant bacteria    Past Medical History:   Diagnosis Date     Abnormal glandular Papanicolaou smear of cervix      Allergic rhinitis, cause unspecified      Constipation      Gastro-oesophageal reflux disease      Heart murmur     murmur     Hematuria      Herpes simplex virus infection      Hydronephrosis, right      Migraine, unspecified, without mention of intractable migraine without mention of status migrainosus      Mumps      Numbness and tingling     LEFT ARM     Palpitations      Renal disease     hx stones x 2 episodes     Ureterolithiasis      Urinary frequency     burning     Vertigo        Social History     Social History     Marital status:      Spouse name: N/A     Number of children: 2     Years of education: N/A     Occupational History     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     Social History Main Topics     Smoking status: Never Smoker     Smokeless tobacco: Never Used     Alcohol use No     Drug use: No     Sexual activity: Yes     Partners: Male     Birth control/ protection: Surgical      Comment: tubal     Other Topics Concern      Service No     Blood Transfusions No     Caffeine Concern No     Occupational Exposure No     Sleep Concern Yes     Stress Concern No     Weight Concern No     Special Diet No     Exercise Yes     2  days/week     Seat Belt No     Self-Exams Yes     Parent/Sibling W/ Cabg, Mi Or Angioplasty Before 65f 55m? No     Social History Narrative       Past Surgical History:   Procedure Laterality Date     ARTHROSCOPY KNEE Right 2017    Procedure: Right knee arthroscopy and anterior fat pad resection with medial plica resection. Partial lateral menisectomy. Surgeon:  Fredi Lindsay MD  [facility]: [facility]     ARTHROSCOPY SHOULDER, OPEN ROTATOR CUFF REPAIR, COMBINED  2013    Procedure: COMBINED ARTHROSCOPY SHOULDER, OPEN ROTATOR CUFF REPAIR;  Left Shoulder Arthroscopy, Distal Clavicale Resection, Decompression, Mini Open Rotator Cuff Repair    ;  Surgeon: Fredi Lindsay MD;  Location: [facility] OR     C NONSPECIFIC PROCEDURE  age 16    colposcopy for abnormal pap     C NONSPECIFIC PROCEDURE      surgery L thumb     C NONSPECIFIC PROCEDURE      breast augmentation-silicone     C NONSPECIFIC PROCEDURE      s/p  x 2     C NONSPECIFIC PROCEDURE      Bilateral tubal ligation     C REMOVAL OF KIDNEY STONE       COLONOSCOPY  2014    Procedure: COLONOSCOPY;  Colonoscopy/WW;  Surgeon: Pancho Olsen MD;  Location:  GI     COMBINED CYSTOSCOPY, RETROGRADES, URETEROSCOPY, LASER HOLMIUM LITHOTRIPSY URETER(S), INSERT STENT Right 2016    Procedure: COMBINED CYSTOSCOPY, RETROGRADES, URETEROSCOPY, LASER HOLMIUM LITHOTRIPSY URETER(S), INSERT STENT;  Surgeon: Kushal Noriega MD;  Location: [facility] OR     CYSTOSCOPY       CYSTOSCOPY, RETROGRADES, EXTRACT STONE, COMBINED  2013    Procedure: COMBINED CYSTOSCOPY, RETROGRADES, EXTRACT STONE;  Video Cystoscopy,  Right Ureteroscopy, ureteral dilatation,  Stone extraction;  Surgeon: Subhash Vann MD;  Location: [facility] OR     EXTRACORPOREAL SHOCK WAVE LITHOTRIPSY (ESWL) Bilateral 2016    Procedure: EXTRACORPOREAL SHOCK WAVE LITHOTRIPSY (ESWL);  Surgeon: Bassam Vu MD;  Location:  OR     GYN SURGERY      laparoscopy      LAPAROSCOPIC CHOLECYSTECTOMY WITH CHOLANGIOGRAMS  11/21/2012    Procedure: LAPAROSCOPIC CHOLECYSTECTOMY WITH CHOLANGIOGRAMS;  LAPAROSCOPIC CHOLECYSTECTOMY WITH CHOLANGIOGRAMS ;  Surgeon: Nataliya Gabriel MD;  Location: [facility] OR     TUBAL LIGATION         Family History   Problem Relation Age of Onset     DIABETES Mother      type 2     Alcohol/Drug Mother      alcohol         Current Outpatient Prescriptions:      tamsulosin (FLOMAX) 0.4 MG capsule, Take 1 capsule (0.4 mg) by mouth daily, Disp: 30 capsule, Rfl: 0     amphetamine-dextroamphetamine (ADDERALL XR) 20 MG per 24 hr capsule, Take 1 capsule (20 mg) by mouth daily, Disp: 30 capsule, Rfl: 0     amphetamine-dextroamphetamine (ADDERALL XR) 30 MG per 24 hr capsule, Take 1 capsule (30 mg) by mouth daily, Disp: 30 capsule, Rfl: 0     valACYclovir (VALTREX) 1000 mg tablet, TAKE ONE TABLET BY MOUTH EVERY DAY, Disp: 90 tablet, Rfl: 0     VESICARE 5 MG tablet, TAKE ONE TABLET BY MOUTH EVERY EVENING, Disp: 90 tablet, Rfl: 2     naproxen (NAPROSYN) 500 MG tablet, Take 1 tablet (500 mg) by mouth 2 times daily as needed for moderate pain, Disp: 180 tablet, Rfl: 1     order for DME, Equipment being ordered: tall aircast boot, Disp: 1 Device, Rfl: 0     order for DME, Equipment being ordered:heel pads, Disp: 1 Package, Rfl: 0     topiramate (TOPAMAX) 50 MG tablet, Take 150 mg by mouth 2 times daily, Disp: , Rfl:      tobramycin-dexamethasone (TOBRADEX) ophthalmic suspension, Place 2 drops into both eyes daily, Disp: 1 Bottle, Rfl: 11     meclizine (ANTIVERT) 25 MG tablet, Take 1 tablet (25 mg) by mouth 3 times daily as needed, Disp: 30 tablet, Rfl: 3     fluocinolone acetonide 0.01 % OIL, Use on scalp once a week., Disp: 1 Bottle, Rfl: 11     phenazopyridine (PYRIDIUM) 200 MG tablet, Take 1 tablet (200 mg) by mouth 3 times daily as needed for irritation, Disp: 30 tablet, Rfl: 3     naproxen (NAPROSYN) 500 MG tablet, TAKE ONE TABLET BY MOUTH TWO TIMES A DAY AS NEEDED FOR  "MODERATE PAIN, Disp: 180 tablet, Rfl: 0     zolpidem (AMBIEN) 5 MG tablet, Take 1 tablet (5 mg) by mouth nightly as needed for sleep, Disp: 30 tablet, Rfl: 2     Menthol, Topical Analgesic, (BIOFREEZE COLORLESS) 4 % GEL, Externally apply topically 3 times daily as needed, Disp: 118 mL, Rfl: 1     methylPREDNISolone (MEDROL) 4 MG tablet, follow package directions, Disp: 21 tablet, Rfl: 0     acyclovir (ZOVIRAX) 5 % ointment, Apply topically 5 times daily, Disp: 30 g, Rfl: 5     ondansetron (ZOFRAN ODT) 4 MG disintegrating tablet, Take 1-2 tablets (4-8 mg) by mouth every 8 hours as needed for nausea, Disp: 20 tablet, Rfl: 1     fluticasone (FLONASE) 50 MCG/ACT nasal spray, Spray 2 sprays into both nostrils daily as needed, Disp: 16 g, Rfl: 11     fexofenadine (ALLEGRA) 180 MG tablet, Take 1 tablet (180 mg) by mouth daily as needed, Disp: 30 tablet, Rfl: 6     ketoconazole (NIZORAL) 2 % shampoo, Apply to the affected area and wash off after 5 minutes., Disp: 120 mL, Rfl: 1     conjugated estrogens (PREMARIN) vaginal cream, Place 0.5 g vaginally three times a week, Disp: 30 g, Rfl: 12    10 point ROS of systems including Constitutional, Eyes, Respiratory, Cardiovascular, Gastroenterology, Genitourinary, Integumentary, Muscularskeletal, Psychiatric were all negative except for pertinent positives noted in my HPI.    Examination:   /80 (BP Location: Left arm, Patient Position: Sitting, Cuff Size: Adult Regular)  Pulse 76  Ht 1.626 m (5' 4\")  Wt 71.7 kg (158 lb)  LMP 02/25/2014  BMI 27.12 kg/m2  General Impression: Very pleasant lady in mild distress but otherwise well oriented in time place and person.  Mental Status: Anxious.  HEENT.  There is no clinical evidence of jaundice in the mucous membranes are normal  Skin: The skin is normal to examination  Respiratory System: The respiratory cycle is normal  Lymph Nodes: Not examined  Back/Flank Tenderness: There is a sliding increase in tenderness in the left " flank as compared to the right  Cardiovascular System: Not examined  Abdominal Examination: Not examined  Extremities: There is no peripheral edema  Genitial: Not examined  Rectal Examination: Not examined  Neurologic System: There are no focal abnormal clinical neurological signs in the central, or peripheral nervous systems    Impression: Reviewed the CT scan very carefully with the patient today and went over it in detail.  This shows approximately 8 mm stone in the region of the left ureteropelvic junction with some small stones in the calyces of the left kidney and 2 stones now of significant size in the lower calyx of the right kidney as well as some other small stones.  Clearly these will need to be treated given her tendency to form stones.  Previous stone analysis shows that the stone was domination 10% calcium oxalate 90% calcium phosphate.  She has seen a nephrologist who is trying to encourage her to take a low sodium diet but she is eating a lot of processed foods which is now very rich in sodium.    She is also very uncomfortable with stents.  I discussed how we should treat this.  With the stone on the left in the region of the ureteropelvic junction I would recommend that we treat the left kidney with ESWL but also place a stent temporarily because, I would also like to treat the stones in the right kidney under the same anesthetic so that we can try and clear both kidneys.  I would not wish to do both sides without any stents in.  I carefully explained this to the patient in detail and she is agreeable to this would anticipate the stent will only have to be in for a few days.  After we have completed this, will need to have a discussion about how we can prevent stones and may even have to encourage her to return to her nephrologist for further discussions and possibly even consider the use of hydrochlorothiazide.  I went over the procedures we are anticipating doing in the near future in detail with  "the patient today I carefully reviewed both current and previous CT scans, lab studies and records.  I answered many questions    Plan: Cystoscopy with probable left sided stent with left stone displacement and bilateral ESWL.  She will discontinue aspirin for 10 days prior    Time: 25 minutes with greater than 50% in discussion and consultation with extended discussion view of the bilateral nature of her stones and the recurrent nature of her stones and other issues as outlined above    \"This dictation was performed with voice recognition software and may contain errors,  omissions and inadvertent word substitution.\"      "

## 2018-04-09 NOTE — LETTER
4/9/2018       RE: Laney Maynard  [address] [facility] [address]     Dear Colleague,    Thank you for referring your patient, Laney Maynard, to the [facility]OLOG[facility] [place] at [facility]. Please see a copy of my visit note below.    History: This very pleasant 53-year-old lady, returns now with left-sided flank pain.  She does have a significant history of urinary stone disease having had both ureteroscopy and ESWL for stones in the right ureter and the right kidney 2 years ago.  She has been seen by a nephrologist and is working on a preventative program, but, recently she did pass some small stones and a CT scan does show an 8 mm stone in the region of the left ureteropelvic junction as well as new stones, in the lower calyx the right kidney as well as other small stones.  The scan reported below.    CT ABDOMEN PELVIS W/O CONTRAST 3/29/2018 7:55 AM     TECHNIQUE: Images from diaphragm to pubic symphysis without oral or IV  contrast  Radiation dose for this scan was reduced using automated exposure  control, adjustment of the mA and/or kV according to patient size, or  iterative reconstruction technique.     HISTORY: history of nephrolithiasis, left flank pain; Ureterolithiasis     COMPARISON: 4/22/2016 CT abdomen pelvis     FINDINGS:      Urinary tract; 0.4 x 0.8 x 0.7 cm stone in the left ureteropelvic  junction which is new since the prior CT. Location is consistent with  an obstructing stone but there is no significant hydronephrosis or  perinephric stranding at this time. Numerous bilateral additional  kidney stones are present and mildly increased since 4/22/2016, the  additional left intrarenal calculi are 0.6 cm or less in size. Largest  right intrarenal kidney stone is 0.9 cm in the lower pole. No right  hydronephrosis or ureteral calculi.     Bladder; partly distended. Unremarkable.     Remainder the  abdomen and pelvis: Inferior aspect of the right breast  implant on superior images of this study. Lung bases clear.  Cholecystectomy clips. Within the limits of a noncontrast exam the  spleen, pancreas and adrenal glands appear normal. Low-attenuation  subcentimeter liver lesion(s) compatible with benign cysts or other  benign lesions. No specific evaluation or follow-up is recommended in  a low risk patient. This is anterior medial segment left lobe series 2  image 21 0.5 cm and was present on the previous exam.     No acute bowel abnormality. No free fluid, adenopathy, or aggressive  bone lesions.             IMPRESSION:   1. 0.8 cm left ureteropelvic junction stone.  2. Additional bilateral intrarenal calculi.  3. No other acute-appearing abnormality.            General health is otherwise stable.  She has observed some blood in the urine.  Urinalysis today shows large blood in the urine very few white cells no significant bacteria    Past Medical History:   Diagnosis Date     Abnormal glandular Papanicolaou smear of cervix      Allergic rhinitis, cause unspecified      Constipation      Gastro-oesophageal reflux disease      Heart murmur     murmur     Hematuria      Herpes simplex virus infection      Hydronephrosis, right      Migraine, unspecified, without mention of intractable migraine without mention of status migrainosus      Mumps      Numbness and tingling     LEFT ARM     Palpitations      Renal disease     hx stones x 2 episodes     Ureterolithiasis      Urinary frequency     burning     Vertigo        Social History     Social History     Marital status:      Spouse name: N/A     Number of children: 2     Years of education: N/A     Occupational History     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, lifting, wheeling, walking      [employer]     Social History Main Topics     Smoking status: Never Smoker     Smokeless tobacco: Never Used     Alcohol use No     Drug use: No      Sexual activity: Yes     Partners: Male     Birth control/ protection: Surgical      Comment: tubal     Other Topics Concern      Service No     Blood Transfusions No     Caffeine Concern No     Occupational Exposure No     Sleep Concern Yes     Stress Concern No     Weight Concern No     Special Diet No     Exercise Yes     2 days/week     Seat Belt No     Self-Exams Yes     Parent/Sibling W/ Cabg, Mi Or Angioplasty Before 65f 55m? No     Social History Narrative       Past Surgical History:   Procedure Laterality Date     ARTHROSCOPY KNEE Right 2017    Procedure: Right knee arthroscopy and anterior fat pad resection with medial plica resection. Partial lateral menisectomy. Surgeon:  Fredi Lindsay MD  Location: [facility]     ARTHROSCOPY SHOULDER, OPEN ROTATOR CUFF REPAIR, COMBINED  2013    Procedure: COMBINED ARTHROSCOPY SHOULDER, OPEN ROTATOR CUFF REPAIR;  Left Shoulder Arthroscopy, Distal Clavicale Resection, Decompression, Mini Open Rotator Cuff Repair    ;  Surgeon: Fredi Lindsay MD;  Location: [facility] OR     C NONSPECIFIC PROCEDURE  age 16    colposcopy for abnormal pap     C NONSPECIFIC PROCEDURE      surgery L thumb     C NONSPECIFIC PROCEDURE      breast augmentation-silicone     C NONSPECIFIC PROCEDURE      s/p  x 2     C NONSPECIFIC PROCEDURE      Bilateral tubal ligation     C REMOVAL OF KIDNEY STONE       COLONOSCOPY  2014    Procedure: COLONOSCOPY;  Colonoscopy/WW;  Surgeon: Pancho Olsen MD;  Location:  GI     COMBINED CYSTOSCOPY, RETROGRADES, URETEROSCOPY, LASER HOLMIUM LITHOTRIPSY URETER(S), INSERT STENT Right 2016    Procedure: COMBINED CYSTOSCOPY, RETROGRADES, URETEROSCOPY, LASER HOLMIUM LITHOTRIPSY URETER(S), INSERT STENT;  Surgeon: Kushal Noriega MD;  Location: [facility] OR     CYSTOSCOPY       CYSTOSCOPY, RETROGRADES, EXTRACT STONE, COMBINED  2013    Procedure: COMBINED CYSTOSCOPY, RETROGRADES, EXTRACT STONE;  Video Cystoscopy,   Right Ureteroscopy, ureteral dilatation,  Stone extraction;  Surgeon: Subhash Vann MD;  Location: [facility] OR     EXTRACORPOREAL SHOCK WAVE LITHOTRIPSY (ESWL) Bilateral 4/29/2016    Procedure: EXTRACORPOREAL SHOCK WAVE LITHOTRIPSY (ESWL);  Surgeon: Bassam Vu MD;  Location: [facility] OR     GYN SURGERY      laparoscopy     LAPAROSCOPIC CHOLECYSTECTOMY WITH CHOLANGIOGRAMS  11/21/2012    Procedure: LAPAROSCOPIC CHOLECYSTECTOMY WITH CHOLANGIOGRAMS;  LAPAROSCOPIC CHOLECYSTECTOMY WITH CHOLANGIOGRAMS ;  Surgeon: Nataliya Gabriel MD;  Location: [facility] OR     TUBAL LIGATION         Family History   Problem Relation Age of Onset     DIABETES Mother      type 2     Alcohol/Drug Mother      alcohol         Current Outpatient Prescriptions:      tamsulosin (FLOMAX) 0.4 MG capsule, Take 1 capsule (0.4 mg) by mouth daily, Disp: 30 capsule, Rfl: 0     amphetamine-dextroamphetamine (ADDERALL XR) 20 MG per 24 hr capsule, Take 1 capsule (20 mg) by mouth daily, Disp: 30 capsule, Rfl: 0     amphetamine-dextroamphetamine (ADDERALL XR) 30 MG per 24 hr capsule, Take 1 capsule (30 mg) by mouth daily, Disp: 30 capsule, Rfl: 0     valACYclovir (VALTREX) 1000 mg tablet, TAKE ONE TABLET BY MOUTH EVERY DAY, Disp: 90 tablet, Rfl: 0     VESICARE 5 MG tablet, TAKE ONE TABLET BY MOUTH EVERY EVENING, Disp: 90 tablet, Rfl: 2     naproxen (NAPROSYN) 500 MG tablet, Take 1 tablet (500 mg) by mouth 2 times daily as needed for moderate pain, Disp: 180 tablet, Rfl: 1     order for DME, Equipment being ordered: tall aircast boot, Disp: 1 Device, Rfl: 0     order for DME, Equipment being ordered:heel pads, Disp: 1 Package, Rfl: 0     topiramate (TOPAMAX) 50 MG tablet, Take 150 mg by mouth 2 times daily, Disp: , Rfl:      tobramycin-dexamethasone (TOBRADEX) ophthalmic suspension, Place 2 drops into both eyes daily, Disp: 1 Bottle, Rfl: 11     meclizine (ANTIVERT) 25 MG tablet, Take 1 tablet (25 mg) by mouth 3 times daily as needed, Disp: 30 tablet, Rfl:  "3     fluocinolone acetonide 0.01 % OIL, Use on scalp once a week., Disp: 1 Bottle, Rfl: 11     phenazopyridine (PYRIDIUM) 200 MG tablet, Take 1 tablet (200 mg) by mouth 3 times daily as needed for irritation, Disp: 30 tablet, Rfl: 3     naproxen (NAPROSYN) 500 MG tablet, TAKE ONE TABLET BY MOUTH TWO TIMES A DAY AS NEEDED FOR MODERATE PAIN, Disp: 180 tablet, Rfl: 0     zolpidem (AMBIEN) 5 MG tablet, Take 1 tablet (5 mg) by mouth nightly as needed for sleep, Disp: 30 tablet, Rfl: 2     Menthol, Topical Analgesic, (BIOFREEZE COLORLESS) 4 % GEL, Externally apply topically 3 times daily as needed, Disp: 118 mL, Rfl: 1     methylPREDNISolone (MEDROL) 4 MG tablet, follow package directions, Disp: 21 tablet, Rfl: 0     acyclovir (ZOVIRAX) 5 % ointment, Apply topically 5 times daily, Disp: 30 g, Rfl: 5     ondansetron (ZOFRAN ODT) 4 MG disintegrating tablet, Take 1-2 tablets (4-8 mg) by mouth every 8 hours as needed for nausea, Disp: 20 tablet, Rfl: 1     fluticasone (FLONASE) 50 MCG/ACT nasal spray, Spray 2 sprays into both nostrils daily as needed, Disp: 16 g, Rfl: 11     fexofenadine (ALLEGRA) 180 MG tablet, Take 1 tablet (180 mg) by mouth daily as needed, Disp: 30 tablet, Rfl: 6     ketoconazole (NIZORAL) 2 % shampoo, Apply to the affected area and wash off after 5 minutes., Disp: 120 mL, Rfl: 1     conjugated estrogens (PREMARIN) vaginal cream, Place 0.5 g vaginally three times a week, Disp: 30 g, Rfl: 12    10 point ROS of systems including Constitutional, Eyes, Respiratory, Cardiovascular, Gastroenterology, Genitourinary, Integumentary, Muscularskeletal, Psychiatric were all negative except for pertinent positives noted in my HPI.    Examination:   /80 (BP Location: Left arm, Patient Position: Sitting, Cuff Size: Adult Regular)  Pulse 76  Ht 1.626 m (5' 4\")  Wt 71.7 kg (158 lb)  LMP 02/25/2014  BMI 27.12 kg/m2  General Impression: Very pleasant lady in mild distress but otherwise well oriented in time " place and person.  Mental Status: Anxious.  HEENT.  There is no clinical evidence of jaundice in the mucous membranes are normal  Skin: The skin is normal to examination  Respiratory System: The respiratory cycle is normal  Lymph Nodes: Not examined  Back/Flank Tenderness: There is a sliding increase in tenderness in the left flank as compared to the right  Cardiovascular System: Not examined  Abdominal Examination: Not examined  Extremities: There is no peripheral edema  Genitial: Not examined  Rectal Examination: Not examined  Neurologic System: There are no focal abnormal clinical neurological signs in the central, or peripheral nervous systems    Impression: Reviewed the CT scan very carefully with the patient today and went over it in detail.  This shows approximately 8 mm stone in the region of the left ureteropelvic junction with some small stones in the calyces of the left kidney and 2 stones now of significant size in the lower calyx of the right kidney as well as some other small stones.  Clearly these will need to be treated given her tendency to form stones.  Previous stone analysis shows that the stone was domination 10% calcium oxalate 90% calcium phosphate.  She has seen a nephrologist who is trying to encourage her to take a low sodium diet but she is eating a lot of processed foods which is now very rich in sodium.    She is also very uncomfortable with stents.  I discussed how we should treat this.  With the stone on the left in the region of the ureteropelvic junction I would recommend that we treat the left kidney with ESWL but also place a stent temporarily because, I would also like to treat the stones in the right kidney under the same anesthetic so that we can try and clear both kidneys.  I would not wish to do both sides without any stents in.  I carefully explained this to the patient in detail and she is agreeable to this would anticipate the stent will only have to be in for a few  "days.  After we have completed this, will need to have a discussion about how we can prevent stones and may even have to encourage her to return to her nephrologist for further discussions and possibly even consider the use of hydrochlorothiazide.  I went over the procedures we are anticipating doing in the near future in detail with the patient today I carefully reviewed both current and previous CT scans, lab studies and records.  I answered many questions    Plan: Cystoscopy with probable left sided stent with left stone displacement and bilateral ESWL.  She will discontinue aspirin for 10 days prior    Time: 25 minutes with greater than 50% in discussion and consultation with extended discussion view of the bilateral nature of her stones and the recurrent nature of her stones and other issues as outlined above    \"This dictation was performed with voice recognition software and may contain errors,  omissions and inadvertent word substitution.\"        Again, thank you for allowing me to participate in the care of your patient.      Sincerely,    Bassam Vu MD      "

## 2018-04-09 NOTE — NURSING NOTE
Chief Complaint   Patient presents with     Kidney Problem     Patient here today to go over her CT result and talk there next step       UA RESULTS:  Recent Labs   Lab Test  03/28/18   1530   COLOR  Yellow   APPEARANCE  Cloudy   URINEGLC  Negative   URINEBILI  Negative   URINEKETONE  Negative   SG  1.020   UBLD  Large*   URINEPH  6.5   PROTEIN  30*   UROBILINOGEN  0.2   NITRITE  Negative   LEUKEST  Negative   RBCU  >100*   WBCU  0 - 5         Patient urine showed Large blood today  Bella Hernandez MA

## 2018-04-09 NOTE — MR AVS SNAPSHOT
After Visit Summary   4/9/2018    Laney Maynard    MRN: [id number]           Patient Information     Date Of Birth          1965        Visit Information        Provider Department      4/9/2018 10:10 AM Bassam Vu MD [facility]        Today's Diagnoses     Calculus of both kidneys    -  1    Flank pain        Gross hematuria           Follow-ups after your visit        Follow-up notes from your care team     Return for Schedule surgery.      Your next 10 appointments already scheduled     Apr 26, 2018   Procedure with Bassam Vu MD   [facility] (--)    [address]  [facility] [address]   [phone]            May 23, 2018 12:15 PM CDT   Return Visit with Shawna Wei MD   [facility] ([facility])    [address]  [address]  [facility] [id number] [phone]              Future tests that were ordered for you today     Open Future Orders        Priority Expected Expires Ordered    Mariana-Operative Worksheet  (Urology General) Routine  4/9/2019 4/9/2018            Who to contact     If you have questions or need follow up information about today's clinic visit or your schedule please contact [facility]OLOGY [facility] directly at [phone].  Normal or non-critical lab and imaging results will be communicated to you by MyChart, letter or phone within 4 business days after the clinic has received the results. If you do not hear from us within 7 days, please contact the clinic through MyChart or phone. If you have a critical or abnormal lab result, we will notify you by phone as soon as possible.  Submit refill requests through [employer] or call your pharmacy and they will forward the refill request to us. Please allow 3 business days for your refill to be completed.          Additional  "Information About Your Visit        MyChart Information     [facility] gives you secure access to your electronic health record. If you see a primary care provider, you can also send messages to your care team and make appointments. If you have questions, please call your primary care clinic.  If you do not have a primary care provider, please call [phone] and they will assist you.        Care EveryWhere ID     This is your Care EveryWhere ID. This could be used by other organizations to access your [place] medical records  [id number]        Your Vitals Were     Pulse Height Last Period BMI (Body Mass Index)          76 1.626 m (5' 4\") 02/25/2014 27.12 kg/m2         Blood Pressure from Last 3 Encounters:   04/09/18 124/80   03/28/18 118/72   03/16/18 110/70    Weight from Last 3 Encounters:   04/09/18 71.7 kg (158 lb)   03/28/18 71.7 kg (158 lb)   03/16/18 71.7 kg (158 lb)              Today, you had the following     No orders found for display       Primary Care Provider Office Phone # Fax #    Socorro Bowleshele Haase, APRN -[phone] [address] [id number]        Equal Access to Services     ERIK SWEET : Hadii aad ku hadasho Soomaali, waaxda luqadaha, qaybta kaalmada adeegyada, waxay idiin haychinon adeeg shelbi laparis ah. So [facility] [phone].    ATENCIÓN: Si habla español, tiene a ho disposición servicios gratuitos de asistencia lingüística. Llame al [phone].    We comply with applicable federal civil rights laws and [place] laws. We do not discriminate on the basis of race, color, national origin, age, disability, sex, sexual orientation, or gender identity.            Thank you!     Thank you for choosing [facility] [place]  for your care. Our goal is always to provide you with excellent care. Hearing back from our patients is one way we can continue to improve our services. Please take a few minutes to complete the written " survey that you may receive in the mail after your visit with us. Thank you!             Your Updated Medication List - Protect others around you: Learn how to safely use, store and throw away your medicines at www.disposemymeds.org.          This list is accurate as of 4/9/18 10:51 AM.  Always use your most recent med list.                   Brand Name Dispense Instructions for use Diagnosis    acyclovir 5 % ointment    ZOVIRAX    30 g    Apply topically 5 times daily    Herpes simplex virus infection       * amphetamine-dextroamphetamine 30 MG per 24 hr capsule    ADDERALL XR    30 capsule    Take 1 capsule (30 mg) by mouth daily    Attention deficit hyperactivity disorder (ADHD), combined type       * amphetamine-dextroamphetamine 20 MG per 24 hr capsule    ADDERALL XR    30 capsule    Take 1 capsule (20 mg) by mouth daily    Attention deficit hyperactivity disorder (ADHD), combined type       conjugated estrogens cream    PREMARIN    30 g    Place 0.5 g vaginally three times a week    Urinary tract infection with hematuria, site unspecified       fexofenadine 180 MG tablet    ALLEGRA    30 tablet    Take 1 tablet (180 mg) by mouth daily as needed    Seasonal allergic rhinitis       fluocinolone acetonide 0.01 % Oil     1 Bottle    Use on scalp once a week.    Scalp psoriasis       fluticasone 50 MCG/ACT spray    FLONASE    16 g    Spray 2 sprays into both nostrils daily as needed    Seasonal allergic rhinitis       ketoconazole 2 % shampoo    NIZORAL    120 mL    Apply to the affected area and wash off after 5 minutes.    Seborrheic dermatitis of scalp       meclizine 25 MG tablet    ANTIVERT    30 tablet    Take 1 tablet (25 mg) by mouth 3 times daily as needed    Vertigo       Menthol (Topical Analgesic) 4 % Gel    BIOFREEZE COLORLESS    118 mL    Externally apply topically 3 times daily as needed    Pain in both feet, Plantar fasciitis, bilateral, Pes planus of both feet       methylPREDNISolone 4 MG tablet     MEDROL    21 tablet    follow package directions    Intractable left heel pain       * naproxen 500 MG tablet    NAPROSYN    180 tablet    Take 1 tablet (500 mg) by mouth 2 times daily as needed for moderate pain    Plantar fasciitis, bilateral       * naproxen 500 MG tablet    NAPROSYN    180 tablet    TAKE ONE TABLET BY MOUTH TWO TIMES A DAY AS NEEDED FOR MODERATE PAIN    Plantar fasciitis, bilateral       ondansetron 4 MG ODT tab    ZOFRAN ODT    20 tablet    Take 1-2 tablets (4-8 mg) by mouth every 8 hours as needed for nausea    Ureterolithiasis       * order for DME     1 Package    Equipment being ordered:heel pads    Intractable left heel pain       * order for DME     1 Device    Equipment being ordered: tall aircast boot    Pain in both feet, Plantar fasciitis, bilateral, Pes planus of both feet       phenazopyridine 200 MG tablet    PYRIDIUM    30 tablet    Take 1 tablet (200 mg) by mouth 3 times daily as needed for irritation    Urinary problem       tamsulosin 0.4 MG capsule    FLOMAX    30 capsule    Take 1 capsule (0.4 mg) by mouth daily    Ureterolithiasis       tobramycin-dexamethasone 0.3-0.1 % ophthalmic susp    TOBRADEX    1 Bottle    Place 2 drops into both eyes daily    Eye disorder       topiramate 50 MG tablet    TOPAMAX     Take 150 mg by mouth 2 times daily        valACYclovir 1000 mg tablet    VALTREX    90 tablet    TAKE ONE TABLET BY MOUTH EVERY DAY    Herpes simplex virus infection       VESICARE 5 MG tablet   Generic drug:  solifenacin     90 tablet    TAKE ONE TABLET BY MOUTH EVERY EVENING    Stress incontinence       zolpidem 5 MG tablet    AMBIEN    30 tablet    Take 1 tablet (5 mg) by mouth nightly as needed for sleep    Sleep disorder       * Notice:  This list has 6 medication(s) that are the same as other medications prescribed for you. Read the directions carefully, and ask your doctor or other care provider to review them with you.

## 2018-04-10 DIAGNOSIS — N20.1 URETERAL STONE: Primary | ICD-10-CM

## 2018-04-13 ENCOUNTER — OFFICE VISIT (OUTPATIENT)
Dept: FAMILY MEDICINE | Facility: CLINIC | Age: 53
End: 2018-04-13
Payer: COMMERCIAL

## 2018-04-13 ENCOUNTER — TELEPHONE (OUTPATIENT)
Dept: FAMILY MEDICINE | Facility: CLINIC | Age: 53
End: 2018-04-13

## 2018-04-13 VITALS
BODY MASS INDEX: 26.43 KG/M2 | SYSTOLIC BLOOD PRESSURE: 110 MMHG | DIASTOLIC BLOOD PRESSURE: 78 MMHG | TEMPERATURE: 97.8 F | RESPIRATION RATE: 12 BRPM | OXYGEN SATURATION: 100 % | HEART RATE: 72 BPM | WEIGHT: 154 LBS

## 2018-04-13 DIAGNOSIS — N20.1 URETEROLITHIASIS: ICD-10-CM

## 2018-04-13 DIAGNOSIS — E55.9 VITAMIN D DEFICIENCY: ICD-10-CM

## 2018-04-13 DIAGNOSIS — L40.9 SCALP PSORIASIS: ICD-10-CM

## 2018-04-13 DIAGNOSIS — R42 VERTIGO: ICD-10-CM

## 2018-04-13 DIAGNOSIS — J30.2 ACUTE SEASONAL ALLERGIC RHINITIS, UNSPECIFIED TRIGGER: ICD-10-CM

## 2018-04-13 DIAGNOSIS — L21.9 SEBORRHEIC DERMATITIS OF SCALP: ICD-10-CM

## 2018-04-13 DIAGNOSIS — F90.0 ATTENTION DEFICIT HYPERACTIVITY DISORDER (ADHD), PREDOMINANTLY INATTENTIVE TYPE: ICD-10-CM

## 2018-04-13 DIAGNOSIS — D72.810 LYMPHOCYTOPENIA: ICD-10-CM

## 2018-04-13 DIAGNOSIS — Z13.6 CARDIOVASCULAR SCREENING; LDL GOAL LESS THAN 160: ICD-10-CM

## 2018-04-13 DIAGNOSIS — Z01.818 PREOP GENERAL PHYSICAL EXAM: Primary | ICD-10-CM

## 2018-04-13 DIAGNOSIS — G47.9 SLEEP DISORDER: ICD-10-CM

## 2018-04-13 DIAGNOSIS — R35.0 URINARY FREQUENCY: ICD-10-CM

## 2018-04-13 DIAGNOSIS — N20.0 NEPHROLITHIASIS: ICD-10-CM

## 2018-04-13 DIAGNOSIS — M72.2 PLANTAR FASCIITIS, BILATERAL: ICD-10-CM

## 2018-04-13 DIAGNOSIS — F90.2 ATTENTION DEFICIT HYPERACTIVITY DISORDER (ADHD), COMBINED TYPE: ICD-10-CM

## 2018-04-13 DIAGNOSIS — B00.9 HERPES SIMPLEX VIRUS INFECTION: ICD-10-CM

## 2018-04-13 LAB
ALBUMIN UR-MCNC: ABNORMAL MG/DL
APPEARANCE UR: CLEAR
BASOPHILS # BLD AUTO: 0 10E9/L (ref 0–0.2)
BASOPHILS NFR BLD AUTO: 0.8 %
BILIRUB UR QL STRIP: NEGATIVE
COLOR UR AUTO: YELLOW
DIFFERENTIAL METHOD BLD: ABNORMAL
EOSINOPHIL # BLD AUTO: 0.1 10E9/L (ref 0–0.7)
EOSINOPHIL NFR BLD AUTO: 1.4 %
ERYTHROCYTE [DISTWIDTH] IN BLOOD BY AUTOMATED COUNT: 11.5 % (ref 10–15)
GLUCOSE UR STRIP-MCNC: NEGATIVE MG/DL
HBA1C MFR BLD: 4.7 % (ref 0–5.6)
HCT VFR BLD AUTO: 42.1 % (ref 35–47)
HGB BLD-MCNC: 14.8 G/DL (ref 11.7–15.7)
HGB UR QL STRIP: ABNORMAL
KETONES UR STRIP-MCNC: NEGATIVE MG/DL
LEUKOCYTE ESTERASE UR QL STRIP: NEGATIVE
LYMPHOCYTES # BLD AUTO: 1.7 10E9/L (ref 0.8–5.3)
LYMPHOCYTES NFR BLD AUTO: 47.6 %
MCH RBC QN AUTO: 33.9 PG (ref 26.5–33)
MCHC RBC AUTO-ENTMCNC: 35.2 G/DL (ref 31.5–36.5)
MCV RBC AUTO: 96 FL (ref 78–100)
MONOCYTES # BLD AUTO: 0.3 10E9/L (ref 0–1.3)
MONOCYTES NFR BLD AUTO: 7.1 %
NEUTROPHILS # BLD AUTO: 1.5 10E9/L (ref 1.6–8.3)
NEUTROPHILS NFR BLD AUTO: 43.1 %
NITRATE UR QL: NEGATIVE
PH UR STRIP: 5.5 PH (ref 5–7)
PLATELET # BLD AUTO: 220 10E9/L (ref 150–450)
RBC # BLD AUTO: 4.37 10E12/L (ref 3.8–5.2)
RBC #/AREA URNS AUTO: ABNORMAL /HPF
SOURCE: ABNORMAL
SP GR UR STRIP: 1.02 (ref 1–1.03)
UROBILINOGEN UR STRIP-ACNC: 0.2 EU/DL (ref 0.2–1)
WBC # BLD AUTO: 3.5 10E9/L (ref 4–11)
WBC #/AREA URNS AUTO: ABNORMAL /HPF

## 2018-04-13 PROCEDURE — 36415 COLL VENOUS BLD VENIPUNCTURE: CPT | Performed by: NURSE PRACTITIONER

## 2018-04-13 PROCEDURE — 84443 ASSAY THYROID STIM HORMONE: CPT | Performed by: NURSE PRACTITIONER

## 2018-04-13 PROCEDURE — 81001 URINALYSIS AUTO W/SCOPE: CPT | Performed by: NURSE PRACTITIONER

## 2018-04-13 PROCEDURE — 80061 LIPID PANEL: CPT | Performed by: NURSE PRACTITIONER

## 2018-04-13 PROCEDURE — 83036 HEMOGLOBIN GLYCOSYLATED A1C: CPT | Performed by: NURSE PRACTITIONER

## 2018-04-13 PROCEDURE — 80053 COMPREHEN METABOLIC PANEL: CPT | Performed by: NURSE PRACTITIONER

## 2018-04-13 PROCEDURE — 93000 ELECTROCARDIOGRAM COMPLETE: CPT | Performed by: NURSE PRACTITIONER

## 2018-04-13 PROCEDURE — 85025 COMPLETE CBC W/AUTO DIFF WBC: CPT | Performed by: NURSE PRACTITIONER

## 2018-04-13 PROCEDURE — 99214 OFFICE O/P EST MOD 30 MIN: CPT | Performed by: NURSE PRACTITIONER

## 2018-04-13 PROCEDURE — 82306 VITAMIN D 25 HYDROXY: CPT | Performed by: NURSE PRACTITIONER

## 2018-04-13 RX ORDER — ZOLPIDEM TARTRATE 5 MG/1
5 TABLET ORAL
Qty: 30 TABLET | Refills: 2 | Status: SHIPPED | OUTPATIENT
Start: 2018-04-13 | End: 2019-04-08

## 2018-04-13 RX ORDER — FEXOFENADINE HCL 180 MG/1
180 TABLET ORAL DAILY PRN
Qty: 30 TABLET | Refills: 6 | Status: SHIPPED | OUTPATIENT
Start: 2018-04-13 | End: 2019-04-08

## 2018-04-13 RX ORDER — ONDANSETRON 4 MG/1
4-8 TABLET, ORALLY DISINTEGRATING ORAL EVERY 8 HOURS PRN
Qty: 20 TABLET | Refills: 3 | Status: SHIPPED | OUTPATIENT
Start: 2018-04-13 | End: 2019-04-08

## 2018-04-13 RX ORDER — KETOCONAZOLE 20 MG/ML
SHAMPOO TOPICAL
Qty: 120 ML | Refills: 11 | Status: SHIPPED | OUTPATIENT
Start: 2018-04-13 | End: 2019-04-08

## 2018-04-13 RX ORDER — VALACYCLOVIR HYDROCHLORIDE 1 G/1
1000 TABLET, FILM COATED ORAL DAILY
Qty: 90 TABLET | Refills: 3 | Status: SHIPPED | OUTPATIENT
Start: 2018-04-13 | End: 2019-04-08

## 2018-04-13 RX ORDER — MECLIZINE HYDROCHLORIDE 25 MG/1
25 TABLET ORAL 3 TIMES DAILY PRN
Qty: 30 TABLET | Refills: 3 | Status: SHIPPED | OUTPATIENT
Start: 2018-04-13 | End: 2019-04-08

## 2018-04-13 RX ORDER — FLUTICASONE PROPIONATE 50 MCG
2 SPRAY, SUSPENSION (ML) NASAL DAILY PRN
Qty: 16 G | Refills: 11 | Status: SHIPPED | OUTPATIENT
Start: 2018-04-13 | End: 2019-04-08

## 2018-04-13 RX ORDER — ACYCLOVIR 50 MG/G
OINTMENT TOPICAL
Qty: 30 G | Refills: 5 | Status: SHIPPED | OUTPATIENT
Start: 2018-04-13 | End: 2019-10-28

## 2018-04-13 RX ORDER — FLUOCINOLONE ACETONIDE 0.11 MG/ML
OIL AURICULAR (OTIC)
Qty: 1 BOTTLE | Refills: 11 | Status: SHIPPED | OUTPATIENT
Start: 2018-04-13 | End: 2019-04-08

## 2018-04-13 RX ORDER — NAPROXEN 500 MG/1
500 TABLET ORAL 2 TIMES DAILY PRN
Qty: 180 TABLET | Refills: 1 | Status: ON HOLD | OUTPATIENT
Start: 2018-04-13 | End: 2018-04-26

## 2018-04-13 RX ORDER — DEXTROAMPHETAMINE SACCHARATE, AMPHETAMINE ASPARTATE MONOHYDRATE, DEXTROAMPHETAMINE SULFATE AND AMPHETAMINE SULFATE 7.5; 7.5; 7.5; 7.5 MG/1; MG/1; MG/1; MG/1
30 CAPSULE, EXTENDED RELEASE ORAL 2 TIMES DAILY
Qty: 60 CAPSULE | Refills: 0 | Status: SHIPPED | OUTPATIENT
Start: 2018-04-13 | End: 2018-06-06

## 2018-04-13 NOTE — PROGRESS NOTES
[facility]  [address] [facility] [address]  [phone]  Dept: [phone]    PRE-OP EVALUATION:  Today's date: 2018    Laney Maynard (: 1965) presents for pre-operative evaluation assessment as requested by Dr. Vu.  She requires evaluation and anesthesia risk assessment prior to undergoing surgery/procedure for treatment of ureteral stone .    Primary Physician: Haase, Susan Rachele  Type of Anesthesia Anticipated: General    Patient has a Health Care Directive or Living Will:  YES     Preop Questions 2018   Who is doing your surgery? Bassam Vu   What are you having done? Cystoscopy Left Stent Bilateral extracorporeal shockwave Lithotripsy   Date of Surgery/Procedure: 2018   Facility or Hospital where procedure/surgery will be performed: [facility] [place]   1.  Do you have a history of Heart attack, stroke, stent, coronary bypass surgery, or other heart surgery? No   2.  Do you ever have any pain or discomfort in your chest? No   3.  Do you have a history of  Heart Failure? No   4.   Are you troubled by shortness of breath when:  walking on a level surface, or up a slight hill, or at night? No   5.  Do you currently have a cold, bronchitis or other respiratory infection? No   6.  Do you have a cough, shortness of breath, or wheezing? No   7.  Do you sometimes get pains in the calves of your legs when you walk? No   8. Do you or anyone in your family have previous history of blood clots? No   9.  Do you or does anyone in your family have a serious bleeding problem such as prolonged bleeding following surgeries or cuts? No   10. Have you ever had problems with anemia or been told to take iron pills? No   11. Have you had any abnormal blood loss such as black, tarry or bloody stools, or abnormal vaginal bleeding? No   12. Have you ever had a blood transfusion? No   13. Have you or any of your relatives ever had problems with  anesthesia? No   14. Do you have sleep apnea, excessive snoring or daytime drowsiness? No   15. Do you have any prosthetic heart valves? No   16. Do you have prosthetic joints? No   17. Is there any chance that you may be pregnant? No     HPI:     HPI related to upcoming procedure: Laney reported to the clinic regarding a preoperative examination for a proposed cystoscopy procedure to treat ureteral stone.   Laney reports that starting yesterday she started having burning with urination and is concerned she may have a UTI.       See problem list for active medical problems.  Problems all longstanding and stable, except as noted/documented.  See ROS for pertinent symptoms related to these conditions.    MEDICAL HISTORY:     Patient Active Problem List    Diagnosis Date Noted     Ureterolithiasis 04/22/2016     Priority: Medium     Complete rupture of rotator cuff 04/02/2015     Priority: Medium     Mixed incontinence urge and stress (male)(female) 04/02/2015     Priority: Medium     Plantar fasciitis, bilateral 11/05/2014     Priority: Medium     Attention deficit hyperactivity disorder (ADHD) 01/12/2014     Priority: Medium     Patient is followed by HAASE, SUSAN RACHELE for ongoing prescription of stimulants.  All refills should be approved by this provider, or covering partner.    Medication(s): Aderall XR 20 mg every day.  Adderall XR 30 mg every day.   Maximum quantity per month: #30;  #30   Clinic visit frequency required: Q 6  months     Controlled substance agreement on file: Yes       Date(s): 3/10/2016  Neuropsych evaluation for ADD completed:  No    Last [facility] website verification: 10/18/17   [email or web address]         Herpes simplex virus infection 08/02/2013     Priority: Medium     Urinary frequency      Priority: Medium     burning       Family history of rheumatoid arthritis 03/08/2013     Priority: Medium     Family history of sarcoidosis (mother) 03/08/2013     Priority: Medium     Sleep  disorder 10/05/2012     Priority: Medium     Lymphocytopenia 04/04/2011     Priority: Medium     Migraine headache 03/29/2011     Priority: Medium     (Problem list name updated by automated process. Provider to review and confirm.)       CARDIOVASCULAR SCREENING; LDL GOAL LESS THAN 160 10/31/2010     Priority: Medium     GERD (gastroesophageal reflux disease) 07/05/2010     Priority: Medium     Seasonal allergic rhinitis 05/07/2010     Priority: Medium     Alopecia 05/18/2004     Priority: Medium     Problem list name updated by automated process. Provider to review        Current Outpatient Prescriptions   Medication Sig Dispense Refill     tamsulosin (FLOMAX) 0.4 MG capsule Take 1 capsule (0.4 mg) by mouth daily 30 capsule 0     phenazopyridine (PYRIDIUM) 200 MG tablet Take 1 tablet (200 mg) by mouth 3 times daily as needed for irritation 30 tablet 3     amphetamine-dextroamphetamine (ADDERALL XR) 20 MG per 24 hr capsule Take 1 capsule (20 mg) by mouth daily 30 capsule 0     amphetamine-dextroamphetamine (ADDERALL XR) 30 MG per 24 hr capsule Take 1 capsule (30 mg) by mouth daily 30 capsule 0     valACYclovir (VALTREX) 1000 mg tablet TAKE ONE TABLET BY MOUTH EVERY DAY 90 tablet 0     VESICARE 5 MG tablet TAKE ONE TABLET BY MOUTH EVERY EVENING 90 tablet 2     naproxen (NAPROSYN) 500 MG tablet TAKE ONE TABLET BY MOUTH TWO TIMES A DAY AS NEEDED FOR MODERATE PAIN 180 tablet 0     naproxen (NAPROSYN) 500 MG tablet Take 1 tablet (500 mg) by mouth 2 times daily as needed for moderate pain 180 tablet 1     zolpidem (AMBIEN) 5 MG tablet Take 1 tablet (5 mg) by mouth nightly as needed for sleep 30 tablet 2     order for DME Equipment being ordered: tall aircast boot 1 Device 0     Menthol, Topical Analgesic, (BIOFREEZE COLORLESS) 4 % GEL Externally apply topically 3 times daily as needed 118 mL 1     methylPREDNISolone (MEDROL) 4 MG tablet follow package directions 21 tablet 0     order for DME Equipment being ordered:heel  pads 1 Package 0     acyclovir (ZOVIRAX) 5 % ointment Apply topically 5 times daily 30 g 5     ondansetron (ZOFRAN ODT) 4 MG disintegrating tablet Take 1-2 tablets (4-8 mg) by mouth every 8 hours as needed for nausea 20 tablet 1     topiramate (TOPAMAX) 50 MG tablet Take 150 mg by mouth 2 times daily       tobramycin-dexamethasone (TOBRADEX) ophthalmic suspension Place 2 drops into both eyes daily 1 Bottle 11     fluticasone (FLONASE) 50 MCG/ACT nasal spray Spray 2 sprays into both nostrils daily as needed 16 g 11     fexofenadine (ALLEGRA) 180 MG tablet Take 1 tablet (180 mg) by mouth daily as needed 30 tablet 6     meclizine (ANTIVERT) 25 MG tablet Take 1 tablet (25 mg) by mouth 3 times daily as needed 30 tablet 3     fluocinolone acetonide 0.01 % OIL Use on scalp once a week. 1 Bottle 11     ketoconazole (NIZORAL) 2 % shampoo Apply to the affected area and wash off after 5 minutes. 120 mL 1     conjugated estrogens (PREMARIN) vaginal cream Place 0.5 g vaginally three times a week 30 g 12     OTC products: None, stopped naprosyn last week.     Allergies   Allergen Reactions     Aloe Itching and Rash     Codeine      GI upset     Compazine Nausea and Itching     Darvocet [Acetaminophen] Nausea and Vomiting     Macrobid [Nitrofuran Derivatives]      Bladder spasms     Percocet [Oxycodone-Acetaminophen] Nausea and Vomiting     Sulphadimidine [Sulfamethazine] Rash      Latex Allergy: NO    Social History   Substance Use Topics     Smoking status: Never Smoker     Smokeless tobacco: Never Used     Alcohol use No     History   Drug Use No     REVIEW OF SYSTEMS:   Constitutional, HEENT, cardiovascular, pulmonary, GI, , musculoskeletal, neuro, skin, endocrine and psych systems are negative, except as in HPI or otherwise noted     This document serves as a record of the services and decisions personally performed and made by Susan Haase, CNP. It was created on her behalf by Antwan Duran, a trained medical scribe. The  creation of this document is based the provider's statements to the medical scribe.  Antwan Duran April 13, 2018 1:06 PM      EXAM:   /78 (BP Location: Left arm, Patient Position: Chair, Cuff Size: Adult Regular)  Pulse 72  Temp 97.8  F (36.6  C) (Oral)  Resp 12  Wt 154 lb (69.9 kg)  LMP 02/25/2014  SpO2 100%  BMI 26.43 kg/m2    GENERAL APPEARANCE: healthy, alert and no distress        HENT: ear canals and TM's normal and nose and mouth without ulcers or lesions     NECK: no adenopathy, no asymmetry, masses, or scars and thyroid normal to palpation     RESP: lungs clear to auscultation - no rales, rhonchi or wheezes     CV: regular rates and rhythm, normal S1 S2, no S3 or S4 and no murmur, click or rub     ABDOMEN:  soft, nontender, no HSM or masses and bowel sounds normal     MS: extremities normal- no gross deformities noted, no evidence of inflammation in joints, FROM in all extremities.     SKIN: no suspicious lesions or rashes     NEURO: Normal strength and tone, sensory exam grossly normal, mentation intact and speech normal     PSYCH: mentation appears normal. and affect normal/bright     LYMPHATICS: No cervical adenopathy    DIAGNOSTICS:   EKG: appears normal, NSR, normal axis, normal intervals, no acute ST/T changes c/w ischemia, no LVH by voltage criteria, unchanged from previous tracings  HGB:  14.8    IMPRESSION:   Reason for surgery/procedure: Ureteral stone  Diagnosis/reason for consult: Preoperative anesthesia risk assessment    The proposed surgical procedure is considered INTERMEDIATE risk.    REVISED CARDIAC RISK INDEX  The patient has the following serious cardiovascular risks for perioperative complications such as (MI, PE, VFib and 3  AV Block):  No serious cardiac risks  INTERPRETATION: 0 risks: Class I (very low risk - 0.4% complication rate)    The patient has the following additional risks for perioperative complications: None  RECOMMENDATIONS:   Laney was seen today for pre-op  exam.    Diagnoses and all orders for this visit:    Preop general physical exam  -     Comprehensive metabolic panel  -     EKG 12-lead complete w/read - Clinics  -     Urine Microscopic    Nephrolithiasis      Attention deficit hyperactivity disorder (ADHD), combined type: increase adderall to 60 mg every day.,  -     amphetamine-dextroamphetamine (ADDERALL XR) 30 MG per 24 hr capsule; Take 1 capsule (30 mg) by mouth 2 times daily      Patient is aware of NPO orders and need to refrain from taking NSAIDS, Aspirin 7 days prior to surgery.    APPROVAL GIVEN to proceed with proposed procedure, without further diagnostic evaluation     The information in this document, created by the medical scribe for me, accurately reflects the services I personally performed and the decisions made by me. I have reviewed and approved this document for accuracy.   Susan Haase, CNP     Signed Electronically by: Susan Haase, APRN CNP    Copy of this evaluation report is provided to requesting physician.    [facility] Preop Guidelines  Revised Cardiac Risk Index

## 2018-04-13 NOTE — MR AVS SNAPSHOT
After Visit Summary   4/13/2018    Laney Maynard    MRN: [id number]           Patient Information     Date Of Birth          1965        Visit Information        Provider Department      4/13/2018 1:00 PM Haase, Susan Rachele, APRN [facility]        Today's Diagnoses     Preop general physical exam    -  1    CARDIOVASCULAR SCREENING; LDL GOAL LESS THAN 160        Lymphocytopenia        Urinary frequency          Care Instructions      Before Your Surgery      Call your surgeon if there is any change in your health. This includes signs of a cold or flu (such as a sore throat, runny nose, cough, rash or fever).    Do not smoke, drink alcohol or take over the counter medicine (unless your surgeon or primary care doctor tells you to) for the 24 hours before and after surgery.    If you take prescribed drugs: Follow your doctor s orders about which medicines to take and which to stop until after surgery.    Eating and drinking prior to surgery: follow the instructions from your surgeon    Take a shower or bath the night before surgery. Use the soap your surgeon gave you to gently clean your skin. If you do not have soap from your surgeon, use your regular soap. Do not shave or scrub the surgery site.  Wear clean pajamas and have clean sheets on your bed.           Follow-ups after your visit        Follow-up notes from your care team     Return in about 3 months (around 7/13/2018).      Your next 10 appointments already scheduled     Apr 26, 2018   Procedure with Bassam Vu MD   [facility] (--)    [address]  [facility] [address] [phone]            May 10, 2018 10:00 AM CDT   XR KUB with SHXR3   [facility] Radiology ([facility])    [address] [facility] [address] [phone]           Please bring a list of your current medicines to your exam. (Include vitamins, minerals  and over-thecounter medicines.) Leave your valuables at home.  Tell your doctor if there is a chance you may be pregnant.  You do not need to do anything special for this exam.            May 10, 2018 11:00 AM CDT   Cystoscopy with Laura Castillo MD, UA CYF   [facility] ([facility])    [address]  [address]  [facility] [address] [phone]            May 23, 2018 12:15 PM CDT   Return Visit with Shawna Wei MD   [facility] ([facility])    [address]  [address]  [facility] [address] [phone]              Who to contact     If you have questions or need follow up information about today's clinic visit or your schedule please contact [facility] directly at [phone].  Normal or non-critical lab and imaging results will be communicated to you by [employer], letter or phone within 4 business days after the clinic has received the results. If you do not hear from us within 7 days, please contact the clinic through [employer] or phone. If you have a critical or abnormal lab result, we will notify you by phone as soon as possible.  Submit refill requests through [employer] or call your pharmacy and they will forward the refill request to us. Please allow 3 business days for your refill to be completed.          Additional Information About Your Visit        St[employer]hart Information     [employer] gives you secure access to your electronic health record. If you see a primary care provider, you can also send messages to your care team and make appointments. If you have questions, please call your primary care clinic.  If you do not have a primary care provider, please call [phone] and they will assist you.        Care EveryWhere ID     This is your Care EveryWhere ID. This could be used by other organizations to access your [facility] medical records  [id number]         Your Vitals Were     Pulse Temperature Respirations Last Period Pulse Oximetry BMI (Body Mass Index)    72 97.8  F (36.6  C) (Oral) 12 02/25/2014 100% 26.43 kg/m2       Blood Pressure from Last 3 Encounters:   04/13/18 110/78   04/09/18 124/80   03/28/18 118/72    Weight from Last 3 Encounters:   04/13/18 154 lb (69.9 kg)   04/09/18 158 lb (71.7 kg)   03/28/18 158 lb (71.7 kg)              We Performed the Following     CBC with platelets differential     Comprehensive metabolic panel     EKG 12-lead complete w/read - Clinics     Hemoglobin A1c     Lipid panel reflex to direct LDL Fasting     TSH with free T4 reflex     UA reflex to Microscopic and Culture     Vitamin D Deficiency        Primary Care Provider Office Phone # Fax #    Socorro Nohemi Haase, APRN [phone] [address] [facility] [address]        Equal Access to Services     ERIK SWEET AH: Hadii aad ku hadasho Sopita, waaxda luqadaha, qaybta kaalmada adeegyada, jay gabriel . So [facility] [phone].    ATENCIÓN: Si habla español, tiene a ho disposición servicios gratuitos de asistencia lingüística. Llame al [phone].    We comply with applicable federal civil rights laws and [place] laws. We do not discriminate on the basis of race, color, national origin, age, disability, sex, sexual orientation, or gender identity.            Thank you!     Thank you for choosing [facility]  for your care. Our goal is always to provide you with excellent care. Hearing back from our patients is one way we can continue to improve our services. Please take a few minutes to complete the written survey that you may receive in the mail after your visit with us. Thank you!             Your Updated Medication List - Protect others around you: Learn how to safely use, store and throw away your medicines at www.disposemymeds.org.          This list is accurate as of 4/13/18  1:38 PM.  Always  use your most recent med list.                   Brand Name Dispense Instructions for use Diagnosis    acyclovir 5 % ointment    ZOVIRAX    30 g    Apply topically 5 times daily    Herpes simplex virus infection       * amphetamine-dextroamphetamine 30 MG per 24 hr capsule    ADDERALL XR    30 capsule    Take 1 capsule (30 mg) by mouth daily    Attention deficit hyperactivity disorder (ADHD), combined type       * amphetamine-dextroamphetamine 20 MG per 24 hr capsule    ADDERALL XR    30 capsule    Take 1 capsule (20 mg) by mouth daily    Attention deficit hyperactivity disorder (ADHD), combined type       conjugated estrogens cream    PREMARIN    30 g    Place 0.5 g vaginally three times a week    Urinary tract infection with hematuria, site unspecified       fexofenadine 180 MG tablet    ALLEGRA    30 tablet    Take 1 tablet (180 mg) by mouth daily as needed    Seasonal allergic rhinitis       fluocinolone acetonide 0.01 % Oil     1 Bottle    Use on scalp once a week.    Scalp psoriasis       fluticasone 50 MCG/ACT spray    FLONASE    16 g    Spray 2 sprays into both nostrils daily as needed    Seasonal allergic rhinitis       ketoconazole 2 % shampoo    NIZORAL    120 mL    Apply to the affected area and wash off after 5 minutes.    Seborrheic dermatitis of scalp       meclizine 25 MG tablet    ANTIVERT    30 tablet    Take 1 tablet (25 mg) by mouth 3 times daily as needed    Vertigo       Menthol (Topical Analgesic) 4 % Gel    BIOFREEZE COLORLESS    118 mL    Externally apply topically 3 times daily as needed    Pain in both feet, Plantar fasciitis, bilateral, Pes planus of both feet       methylPREDNISolone 4 MG tablet    MEDROL    21 tablet    follow package directions    Intractable left heel pain       * naproxen 500 MG tablet    NAPROSYN    180 tablet    Take 1 tablet (500 mg) by mouth 2 times daily as needed for moderate pain    Plantar fasciitis, bilateral       * naproxen 500 MG tablet    NAPROSYN    180  tablet    TAKE ONE TABLET BY MOUTH TWO TIMES A DAY AS NEEDED FOR MODERATE PAIN    Plantar fasciitis, bilateral       ondansetron 4 MG ODT tab    ZOFRAN ODT    20 tablet    Take 1-2 tablets (4-8 mg) by mouth every 8 hours as needed for nausea    Ureterolithiasis       * order for DME     1 Package    Equipment being ordered:heel pads    Intractable left heel pain       * order for DME     1 Device    Equipment being ordered: tall aircast boot    Pain in both feet, Plantar fasciitis, bilateral, Pes planus of both feet       phenazopyridine 200 MG tablet    PYRIDIUM    30 tablet    Take 1 tablet (200 mg) by mouth 3 times daily as needed for irritation    Urinary problem       tamsulosin 0.4 MG capsule    FLOMAX    30 capsule    Take 1 capsule (0.4 mg) by mouth daily    Ureterolithiasis       tobramycin-dexamethasone 0.3-0.1 % ophthalmic susp    TOBRADEX    1 Bottle    Place 2 drops into both eyes daily    Eye disorder       topiramate 50 MG tablet    TOPAMAX     Take 150 mg by mouth 2 times daily        valACYclovir 1000 mg tablet    VALTREX    90 tablet    TAKE ONE TABLET BY MOUTH EVERY DAY    Herpes simplex virus infection       VESICARE 5 MG tablet   Generic drug:  solifenacin     90 tablet    TAKE ONE TABLET BY MOUTH EVERY EVENING    Stress incontinence       zolpidem 5 MG tablet    AMBIEN    30 tablet    Take 1 tablet (5 mg) by mouth nightly as needed for sleep    Sleep disorder       * Notice:  This list has 6 medication(s) that are the same as other medications prescribed for you. Read the directions carefully, and ask your doctor or other care provider to review them with you.

## 2018-04-13 NOTE — TELEPHONE ENCOUNTER
Patient needs PA on: Adderall XR 30 mg    Insurance is: [employer]  ID number is: [id number]  BIN:[id number]  PCN:[id number]  Help desk phone: [phone]    Plan limitation Qty, insurance require PA since this it 2qd      Please let us know if PA granted/denied or change to different medication.  Thanks,    Emeka Perez  Pharmacy Technician  [facility]  [phone]

## 2018-04-14 LAB
ALBUMIN SERPL-MCNC: 3.7 G/DL (ref 3.4–5)
ALP SERPL-CCNC: 129 U/L (ref 40–150)
ALT SERPL W P-5'-P-CCNC: 16 U/L (ref 0–50)
ANION GAP SERPL CALCULATED.3IONS-SCNC: 6 MMOL/L (ref 3–14)
AST SERPL W P-5'-P-CCNC: 14 U/L (ref 0–45)
BILIRUB SERPL-MCNC: 0.3 MG/DL (ref 0.2–1.3)
BUN SERPL-MCNC: 15 MG/DL (ref 7–30)
CALCIUM SERPL-MCNC: 9 MG/DL (ref 8.5–10.1)
CHLORIDE SERPL-SCNC: 115 MMOL/L (ref 94–109)
CHOLEST SERPL-MCNC: 242 MG/DL
CO2 SERPL-SCNC: 24 MMOL/L (ref 20–32)
CREAT SERPL-MCNC: 0.87 MG/DL (ref 0.52–1.04)
GFR SERPL CREATININE-BSD FRML MDRD: 67 ML/MIN/1.7M2
GLUCOSE SERPL-MCNC: 90 MG/DL (ref 70–99)
HDLC SERPL-MCNC: 54 MG/DL
LDLC SERPL CALC-MCNC: 171 MG/DL
NONHDLC SERPL-MCNC: 188 MG/DL
POTASSIUM SERPL-SCNC: 3.7 MMOL/L (ref 3.4–5.3)
PROT SERPL-MCNC: 8 G/DL (ref 6.8–8.8)
SODIUM SERPL-SCNC: 145 MMOL/L (ref 133–144)
TRIGL SERPL-MCNC: 84 MG/DL
TSH SERPL DL<=0.005 MIU/L-ACNC: 0.83 MU/L (ref 0.4–4)

## 2018-04-14 NOTE — TELEPHONE ENCOUNTER
Can you initiate a PA her symptoms are not well controlled on the current dosage.  Thanks,  Susan Haase, CNP

## 2018-04-15 NOTE — PROGRESS NOTES
Pj Davison,   Your lab results are as below:  1)  TSH (thyroid level) 0.83 which is normal (range 0.4-4)  2)  Cholesterol was elevated at 242, your LDL (bad cholesterol) was also elevated at 171.  Your HDL (good cholesterol) was within normal range. Continue to follow a low cholesterol diet and we will recheck this in 1 year.  3)  Glucose was normal at 90 (normal fasting is <100).  4)  Your WBC (white blood cell count) is slightly low at 3.5, at your next visit we will recheck.    If you have any questions do not hesitate to call the clinic to discuss the results with me further.     Sincerely,    Susan Haase, CNP

## 2018-04-16 LAB — DEPRECATED CALCIDIOL+CALCIFEROL SERPL-MC: 14 UG/L (ref 20–75)

## 2018-04-16 RX ORDER — ERGOCALCIFEROL 1.25 MG/1
50000 CAPSULE, LIQUID FILLED ORAL
Qty: 12 CAPSULE | Refills: 0 | Status: SHIPPED | OUTPATIENT
Start: 2018-04-16 | End: 2018-07-03

## 2018-04-17 NOTE — PROGRESS NOTES
Pj Davison,  Your vitamin d level was low at 14, normal is 20-75. Having a low vitamin D level can cause body aches and fatigue.  I would like you to take vitamin D 50,000 iu every week for the next 12 weeks.  I have sent in a prescriptions for vitamin D to your pharmacy.  After 12 weeks I would like you to come in for a lab only appointment to recheck your vitamin D level, you do not have to be fasting for that lab appointment.  Sincerely,  Susan Haase, CNP

## 2018-04-19 NOTE — TELEPHONE ENCOUNTER
Fax from [employer]  PA approved.  Effective date: 4/17/18-4/17/19  Pharmacy informed  Genet Wolfe RN, BSN  Message handled by Nurse Triage.

## 2018-04-22 DIAGNOSIS — N20.1 URETEROLITHIASIS: ICD-10-CM

## 2018-04-23 NOTE — TELEPHONE ENCOUNTER
"Requested Prescriptions   Pending Prescriptions Disp Refills     tamsulosin (FLOMAX) 0.4 MG capsule [Pharmacy Med Name: TAMSULOSIN HCL 0.4MG CAPS] 30 capsule 0    Last Written Prescription Date:  3/28/18  Last Fill Quantity: 30,  # refills: 0   Last Office Visit: 4/13/2018   Future Office Visit:      Sig: TAKE ONE CAPSULE BY MOUTH EVERY DAY    Alpha Blockers Passed    4/22/2018  6:52 PM       Passed - Blood pressure under 140/90 in past 12 months    BP Readings from Last 3 Encounters:   04/13/18 110/78   04/09/18 124/80   03/28/18 118/72                Passed - Recent (12 mo) or future (30 days) visit within the authorizing provider's specialty    Patient had office visit in the last 12 months or has a visit in the next 30 days with authorizing provider or within the authorizing provider's specialty.  See \"Patient Info\" tab in inbasket, or \"Choose Columns\" in Meds & Orders section of the refill encounter.           Passed - Patient does not have Tadalafil, Vardenafil, or Sildenafil on their medication list       Passed - Patient is 18 years of age or older       Passed - No active pregnancy on record       Passed - No positive pregnancy test in past 12 months          "

## 2018-04-24 RX ORDER — TAMSULOSIN HYDROCHLORIDE 0.4 MG/1
CAPSULE ORAL
Qty: 30 CAPSULE | Refills: 0 | Status: SHIPPED | OUTPATIENT
Start: 2018-04-24 | End: 2018-12-12

## 2018-04-24 NOTE — H&P (VIEW-ONLY)
[facility]  [address] [address] [address]  [phone]  Dept: [phone]    PRE-OP EVALUATION:  Today's date: 2018    Laney Maynard (: 1965) presents for pre-operative evaluation assessment as requested by Dr. Vu.  She requires evaluation and anesthesia risk assessment prior to undergoing surgery/procedure for treatment of ureteral stone .    Primary Physician: Haase, Susan Rachele  Type of Anesthesia Anticipated: General    Patient has a Health Care Directive or Living Will:  YES     Preop Questions 2018   Who is doing your surgery? Bassam Vu   What are you having done? Cystoscopy Left Stent Bilateral extracorporeal shockwave Lithotripsy   Date of Surgery/Procedure: 2018   Facility or Hospital where procedure/surgery will be performed: [facility]   1.  Do you have a history of Heart attack, stroke, stent, coronary bypass surgery, or other heart surgery? No   2.  Do you ever have any pain or discomfort in your chest? No   3.  Do you have a history of  Heart Failure? No   4.   Are you troubled by shortness of breath when:  walking on a level surface, or up a slight hill, or at night? No   5.  Do you currently have a cold, bronchitis or other respiratory infection? No   6.  Do you have a cough, shortness of breath, or wheezing? No   7.  Do you sometimes get pains in the calves of your legs when you walk? No   8. Do you or anyone in your family have previous history of blood clots? No   9.  Do you or does anyone in your family have a serious bleeding problem such as prolonged bleeding following surgeries or cuts? No   10. Have you ever had problems with anemia or been told to take iron pills? No   11. Have you had any abnormal blood loss such as black, tarry or bloody stools, or abnormal vaginal bleeding? No   12. Have you ever had a blood transfusion? No   13. Have you or any of your relatives ever had problems with  anesthesia? No   14. Do you have sleep apnea, excessive snoring or daytime drowsiness? No   15. Do you have any prosthetic heart valves? No   16. Do you have prosthetic joints? No   17. Is there any chance that you may be pregnant? No     HPI:     HPI related to upcoming procedure: Laney reported to the clinic regarding a preoperative examination for a proposed cystoscopy procedure to treat ureteral stone.   Laney reports that starting yesterday she started having burning with urination and is concerned she may have a UTI.       See problem list for active medical problems.  Problems all longstanding and stable, except as noted/documented.  See ROS for pertinent symptoms related to these conditions.    MEDICAL HISTORY:     Patient Active Problem List    Diagnosis Date Noted     Ureterolithiasis 04/22/2016     Priority: Medium     Complete rupture of rotator cuff 04/02/2015     Priority: Medium     Mixed incontinence urge and stress (male)(female) 04/02/2015     Priority: Medium     Plantar fasciitis, bilateral 11/05/2014     Priority: Medium     Attention deficit hyperactivity disorder (ADHD) 01/12/2014     Priority: Medium     Patient is followed by HAASE, SUSAN RACHELE for ongoing prescription of stimulants.  All refills should be approved by this provider, or covering partner.    Medication(s): Aderall XR 20 mg every day.  Adderall XR 30 mg every day.   Maximum quantity per month: #30;  #30   Clinic visit frequency required: Q 6  months     Controlled substance agreement on file: Yes       Date(s): 3/10/2016  Neuropsych evaluation for ADD completed:  No    Last [facility] website verification: 10/18/17   [email or web address]         Herpes simplex virus infection 08/02/2013     Priority: Medium     Urinary frequency      Priority: Medium     burning       Family history of rheumatoid arthritis 03/08/2013     Priority: Medium     Family history of sarcoidosis (mother) 03/08/2013     Priority: Medium     Sleep  disorder 10/05/2012     Priority: Medium     Lymphocytopenia 04/04/2011     Priority: Medium     Migraine headache 03/29/2011     Priority: Medium     (Problem list name updated by automated process. Provider to review and confirm.)       CARDIOVASCULAR SCREENING; LDL GOAL LESS THAN 160 10/31/2010     Priority: Medium     GERD (gastroesophageal reflux disease) 07/05/2010     Priority: Medium     Seasonal allergic rhinitis 05/07/2010     Priority: Medium     Alopecia 05/18/2004     Priority: Medium     Problem list name updated by automated process. Provider to review        Current Outpatient Prescriptions   Medication Sig Dispense Refill     tamsulosin (FLOMAX) 0.4 MG capsule Take 1 capsule (0.4 mg) by mouth daily 30 capsule 0     phenazopyridine (PYRIDIUM) 200 MG tablet Take 1 tablet (200 mg) by mouth 3 times daily as needed for irritation 30 tablet 3     amphetamine-dextroamphetamine (ADDERALL XR) 20 MG per 24 hr capsule Take 1 capsule (20 mg) by mouth daily 30 capsule 0     amphetamine-dextroamphetamine (ADDERALL XR) 30 MG per 24 hr capsule Take 1 capsule (30 mg) by mouth daily 30 capsule 0     valACYclovir (VALTREX) 1000 mg tablet TAKE ONE TABLET BY MOUTH EVERY DAY 90 tablet 0     VESICARE 5 MG tablet TAKE ONE TABLET BY MOUTH EVERY EVENING 90 tablet 2     naproxen (NAPROSYN) 500 MG tablet TAKE ONE TABLET BY MOUTH TWO TIMES A DAY AS NEEDED FOR MODERATE PAIN 180 tablet 0     naproxen (NAPROSYN) 500 MG tablet Take 1 tablet (500 mg) by mouth 2 times daily as needed for moderate pain 180 tablet 1     zolpidem (AMBIEN) 5 MG tablet Take 1 tablet (5 mg) by mouth nightly as needed for sleep 30 tablet 2     order for DME Equipment being ordered: tall aircast boot 1 Device 0     Menthol, Topical Analgesic, (BIOFREEZE COLORLESS) 4 % GEL Externally apply topically 3 times daily as needed 118 mL 1     methylPREDNISolone (MEDROL) 4 MG tablet follow package directions 21 tablet 0     order for DME Equipment being ordered:heel  pads 1 Package 0     acyclovir (ZOVIRAX) 5 % ointment Apply topically 5 times daily 30 g 5     ondansetron (ZOFRAN ODT) 4 MG disintegrating tablet Take 1-2 tablets (4-8 mg) by mouth every 8 hours as needed for nausea 20 tablet 1     topiramate (TOPAMAX) 50 MG tablet Take 150 mg by mouth 2 times daily       tobramycin-dexamethasone (TOBRADEX) ophthalmic suspension Place 2 drops into both eyes daily 1 Bottle 11     fluticasone (FLONASE) 50 MCG/ACT nasal spray Spray 2 sprays into both nostrils daily as needed 16 g 11     fexofenadine (ALLEGRA) 180 MG tablet Take 1 tablet (180 mg) by mouth daily as needed 30 tablet 6     meclizine (ANTIVERT) 25 MG tablet Take 1 tablet (25 mg) by mouth 3 times daily as needed 30 tablet 3     fluocinolone acetonide 0.01 % OIL Use on scalp once a week. 1 Bottle 11     ketoconazole (NIZORAL) 2 % shampoo Apply to the affected area and wash off after 5 minutes. 120 mL 1     conjugated estrogens (PREMARIN) vaginal cream Place 0.5 g vaginally three times a week 30 g 12     OTC products: None, stopped naprosyn last week.     Allergies   Allergen Reactions     Aloe Itching and Rash     Codeine      GI upset     Compazine Nausea and Itching     Darvocet [Acetaminophen] Nausea and Vomiting     Macrobid [Nitrofuran Derivatives]      Bladder spasms     Percocet [Oxycodone-Acetaminophen] Nausea and Vomiting     Sulphadimidine [Sulfamethazine] Rash      Latex Allergy: NO    Social History   Substance Use Topics     Smoking status: Never Smoker     Smokeless tobacco: Never Used     Alcohol use No     History   Drug Use No     REVIEW OF SYSTEMS:   Constitutional, HEENT, cardiovascular, pulmonary, GI, , musculoskeletal, neuro, skin, endocrine and psych systems are negative, except as in HPI or otherwise noted     This document serves as a record of the services and decisions personally performed and made by Susan Haase, CNP. It was created on her behalf by Antwan Duran, a trained medical scribe. The  creation of this document is based the provider's statements to the medical scribe.  Antwan Duran April 13, 2018 1:06 PM      EXAM:   /78 (BP Location: Left arm, Patient Position: Chair, Cuff Size: Adult Regular)  Pulse 72  Temp 97.8  F (36.6  C) (Oral)  Resp 12  Wt 154 lb (69.9 kg)  LMP 02/25/2014  SpO2 100%  BMI 26.43 kg/m2    GENERAL APPEARANCE: healthy, alert and no distress        HENT: ear canals and TM's normal and nose and mouth without ulcers or lesions     NECK: no adenopathy, no asymmetry, masses, or scars and thyroid normal to palpation     RESP: lungs clear to auscultation - no rales, rhonchi or wheezes     CV: regular rates and rhythm, normal S1 S2, no S3 or S4 and no murmur, click or rub     ABDOMEN:  soft, nontender, no HSM or masses and bowel sounds normal     MS: extremities normal- no gross deformities noted, no evidence of inflammation in joints, FROM in all extremities.     SKIN: no suspicious lesions or rashes     NEURO: Normal strength and tone, sensory exam grossly normal, mentation intact and speech normal     PSYCH: mentation appears normal. and affect normal/bright     LYMPHATICS: No cervical adenopathy    DIAGNOSTICS:   EKG: appears normal, NSR, normal axis, normal intervals, no acute ST/T changes c/w ischemia, no LVH by voltage criteria, unchanged from previous tracings  HGB:  14.8    IMPRESSION:   Reason for surgery/procedure: Ureteral stone  Diagnosis/reason for consult: Preoperative anesthesia risk assessment    The proposed surgical procedure is considered INTERMEDIATE risk.    REVISED CARDIAC RISK INDEX  The patient has the following serious cardiovascular risks for perioperative complications such as (MI, PE, VFib and 3  AV Block):  No serious cardiac risks  INTERPRETATION: 0 risks: Class I (very low risk - 0.4% complication rate)    The patient has the following additional risks for perioperative complications: None  RECOMMENDATIONS:   Laney was seen today for pre-op  exam.    Diagnoses and all orders for this visit:    Preop general physical exam  -     Comprehensive metabolic panel  -     EKG 12-lead complete w/read - Clinics  -     Urine Microscopic    Nephrolithiasis      Attention deficit hyperactivity disorder (ADHD), combined type: increase adderall to 60 mg every day.,  -     amphetamine-dextroamphetamine (ADDERALL XR) 30 MG per 24 hr capsule; Take 1 capsule (30 mg) by mouth 2 times daily      Patient is aware of NPO orders and need to refrain from taking NSAIDS, Aspirin 7 days prior to surgery.    APPROVAL GIVEN to proceed with proposed procedure, without further diagnostic evaluation     The information in this document, created by the medical scribe for me, accurately reflects the services I personally performed and the decisions made by me. I have reviewed and approved this document for accuracy.   Susan Haase, CNP     Signed Electronically by: Susan Haase, APRN CNP    Copy of this evaluation report is provided to requesting physician.    [facility] Preop Guidelines  Revised Cardiac Risk Index

## 2018-04-24 NOTE — TELEPHONE ENCOUNTER
Routing refill request to provider to review approval because:  ? Ongoing  Genet Wolfe RN, BSN  Message handled by Nurse Triage.

## 2018-04-26 ENCOUNTER — ANESTHESIA EVENT (OUTPATIENT)
Dept: SURGERY | Facility: CLINIC | Age: 53
End: 2018-04-26
Payer: COMMERCIAL

## 2018-04-26 ENCOUNTER — HOSPITAL ENCOUNTER (OUTPATIENT)
Facility: CLINIC | Age: 53
Discharge: NURSING, REHAB OR LONG-TERM CARE | End: 2018-04-26
Attending: UROLOGY | Admitting: UROLOGY
Payer: COMMERCIAL

## 2018-04-26 ENCOUNTER — APPOINTMENT (OUTPATIENT)
Dept: GENERAL RADIOLOGY | Facility: CLINIC | Age: 53
End: 2018-04-26
Attending: UROLOGY
Payer: COMMERCIAL

## 2018-04-26 ENCOUNTER — ANESTHESIA (OUTPATIENT)
Dept: SURGERY | Facility: CLINIC | Age: 53
End: 2018-04-26
Payer: COMMERCIAL

## 2018-04-26 VITALS
SYSTOLIC BLOOD PRESSURE: 128 MMHG | BODY MASS INDEX: 25.78 KG/M2 | TEMPERATURE: 95 F | RESPIRATION RATE: 16 BRPM | HEIGHT: 64 IN | DIASTOLIC BLOOD PRESSURE: 74 MMHG | WEIGHT: 151 LBS | OXYGEN SATURATION: 97 %

## 2018-04-26 DIAGNOSIS — N20.0 BILATERAL KIDNEY STONES: Primary | ICD-10-CM

## 2018-04-26 DIAGNOSIS — N20.0 CALCULUS OF KIDNEY: ICD-10-CM

## 2018-04-26 PROCEDURE — 25000128 H RX IP 250 OP 636: Performed by: ANESTHESIOLOGY

## 2018-04-26 PROCEDURE — 25000128 H RX IP 250 OP 636: Performed by: UROLOGY

## 2018-04-26 PROCEDURE — 25000128 H RX IP 250 OP 636: Performed by: NURSE ANESTHETIST, CERTIFIED REGISTERED

## 2018-04-26 PROCEDURE — C1758 CATHETER, URETERAL: HCPCS | Performed by: UROLOGY

## 2018-04-26 PROCEDURE — 37000009 ZZH ANESTHESIA TECHNICAL FEE, EACH ADDTL 15 MIN: Performed by: UROLOGY

## 2018-04-26 PROCEDURE — 25000566 ZZH SEVOFLURANE, EA 15 MIN: Performed by: UROLOGY

## 2018-04-26 PROCEDURE — 71000013 ZZH RECOVERY PHASE 1 LEVEL 1 EA ADDTL HR: Performed by: UROLOGY

## 2018-04-26 PROCEDURE — 27210794 ZZH OR GENERAL SUPPLY STERILE: Performed by: UROLOGY

## 2018-04-26 PROCEDURE — 50590 FRAGMENTING OF KIDNEY STONE: CPT | Mod: 50 | Performed by: UROLOGY

## 2018-04-26 PROCEDURE — 40000648 ZZH STATISTIC LITHOTRIPSY IDENTIFIER: Performed by: UROLOGY

## 2018-04-26 PROCEDURE — 52332 CYSTOSCOPY AND TREATMENT: CPT | Mod: LT | Performed by: UROLOGY

## 2018-04-26 PROCEDURE — 36000093 ZZH SURGERY LEVEL 4 1ST 30 MIN: Performed by: UROLOGY

## 2018-04-26 PROCEDURE — 71000012 ZZH RECOVERY PHASE 1 LEVEL 1 FIRST HR: Performed by: UROLOGY

## 2018-04-26 PROCEDURE — 74019 RADEX ABDOMEN 2 VIEWS: CPT

## 2018-04-26 PROCEDURE — 37000008 ZZH ANESTHESIA TECHNICAL FEE, 1ST 30 MIN: Performed by: UROLOGY

## 2018-04-26 PROCEDURE — 71000027 ZZH RECOVERY PHASE 2 EACH 15 MINS: Performed by: UROLOGY

## 2018-04-26 PROCEDURE — 25000125 ZZHC RX 250: Performed by: NURSE ANESTHETIST, CERTIFIED REGISTERED

## 2018-04-26 PROCEDURE — 40000170 ZZH STATISTIC PRE-PROCEDURE ASSESSMENT II: Performed by: UROLOGY

## 2018-04-26 PROCEDURE — C1769 GUIDE WIRE: HCPCS | Performed by: UROLOGY

## 2018-04-26 PROCEDURE — 27210995 ZZH RX 272: Performed by: UROLOGY

## 2018-04-26 PROCEDURE — C2617 STENT, NON-COR, TEM W/O DEL: HCPCS | Performed by: UROLOGY

## 2018-04-26 PROCEDURE — 36000063 ZZH SURGERY LEVEL 4 EA 15 ADDTL MIN: Performed by: UROLOGY

## 2018-04-26 PROCEDURE — 52330 CYSTOSCOPY AND TREATMENT: CPT | Mod: 59 | Performed by: UROLOGY

## 2018-04-26 DEVICE — STENT URETERAL PERCUFLEX PLUS 6FRX26CM M0061752630: Type: IMPLANTABLE DEVICE | Site: URETER | Status: FUNCTIONAL

## 2018-04-26 RX ORDER — FENTANYL CITRATE 50 UG/ML
25-50 INJECTION, SOLUTION INTRAMUSCULAR; INTRAVENOUS EVERY 5 MIN PRN
Status: DISCONTINUED | OUTPATIENT
Start: 2018-04-26 | End: 2018-04-26 | Stop reason: HOSPADM

## 2018-04-26 RX ORDER — DEXAMETHASONE SODIUM PHOSPHATE 4 MG/ML
INJECTION, SOLUTION INTRA-ARTICULAR; INTRALESIONAL; INTRAMUSCULAR; INTRAVENOUS; SOFT TISSUE PRN
Status: DISCONTINUED | OUTPATIENT
Start: 2018-04-26 | End: 2018-04-26

## 2018-04-26 RX ORDER — CEFAZOLIN SODIUM 1 G/3ML
1 INJECTION, POWDER, FOR SOLUTION INTRAMUSCULAR; INTRAVENOUS SEE ADMIN INSTRUCTIONS
Status: DISCONTINUED | OUTPATIENT
Start: 2018-04-26 | End: 2018-04-26 | Stop reason: HOSPADM

## 2018-04-26 RX ORDER — ONDANSETRON 4 MG/1
4 TABLET, ORALLY DISINTEGRATING ORAL EVERY 30 MIN PRN
Status: DISCONTINUED | OUTPATIENT
Start: 2018-04-26 | End: 2018-04-26 | Stop reason: HOSPADM

## 2018-04-26 RX ORDER — PROPOFOL 10 MG/ML
INJECTION, EMULSION INTRAVENOUS PRN
Status: DISCONTINUED | OUTPATIENT
Start: 2018-04-26 | End: 2018-04-26

## 2018-04-26 RX ORDER — ONDANSETRON 2 MG/ML
4 INJECTION INTRAMUSCULAR; INTRAVENOUS EVERY 30 MIN PRN
Status: DISCONTINUED | OUTPATIENT
Start: 2018-04-26 | End: 2018-04-26 | Stop reason: HOSPADM

## 2018-04-26 RX ORDER — KETOROLAC TROMETHAMINE 10 MG/1
10 TABLET, FILM COATED ORAL EVERY 6 HOURS PRN
Qty: 10 TABLET | Refills: 0 | Status: SHIPPED | OUTPATIENT
Start: 2018-04-26 | End: 2019-04-08

## 2018-04-26 RX ORDER — CEFAZOLIN SODIUM 2 G/100ML
2 INJECTION, SOLUTION INTRAVENOUS
Status: COMPLETED | OUTPATIENT
Start: 2018-04-26 | End: 2018-04-26

## 2018-04-26 RX ORDER — FENTANYL CITRATE 50 UG/ML
INJECTION, SOLUTION INTRAMUSCULAR; INTRAVENOUS PRN
Status: DISCONTINUED | OUTPATIENT
Start: 2018-04-26 | End: 2018-04-26

## 2018-04-26 RX ORDER — MEPERIDINE HYDROCHLORIDE 25 MG/ML
12.5 INJECTION INTRAMUSCULAR; INTRAVENOUS; SUBCUTANEOUS
Status: DISCONTINUED | OUTPATIENT
Start: 2018-04-26 | End: 2018-04-26 | Stop reason: HOSPADM

## 2018-04-26 RX ORDER — SODIUM CHLORIDE, SODIUM LACTATE, POTASSIUM CHLORIDE, CALCIUM CHLORIDE 600; 310; 30; 20 MG/100ML; MG/100ML; MG/100ML; MG/100ML
INJECTION, SOLUTION INTRAVENOUS CONTINUOUS PRN
Status: DISCONTINUED | OUTPATIENT
Start: 2018-04-26 | End: 2018-04-26

## 2018-04-26 RX ORDER — CIPROFLOXACIN 250 MG/1
250 TABLET, FILM COATED ORAL 2 TIMES DAILY
Qty: 2 TABLET | Refills: 0 | Status: SHIPPED | OUTPATIENT
Start: 2018-04-26 | End: 2018-04-27

## 2018-04-26 RX ORDER — PROPOFOL 10 MG/ML
INJECTION, EMULSION INTRAVENOUS CONTINUOUS PRN
Status: DISCONTINUED | OUTPATIENT
Start: 2018-04-26 | End: 2018-04-26

## 2018-04-26 RX ORDER — SODIUM CHLORIDE, SODIUM LACTATE, POTASSIUM CHLORIDE, CALCIUM CHLORIDE 600; 310; 30; 20 MG/100ML; MG/100ML; MG/100ML; MG/100ML
INJECTION, SOLUTION INTRAVENOUS CONTINUOUS
Status: DISCONTINUED | OUTPATIENT
Start: 2018-04-26 | End: 2018-04-26 | Stop reason: HOSPADM

## 2018-04-26 RX ORDER — ONDANSETRON 2 MG/ML
INJECTION INTRAMUSCULAR; INTRAVENOUS PRN
Status: DISCONTINUED | OUTPATIENT
Start: 2018-04-26 | End: 2018-04-26

## 2018-04-26 RX ORDER — KETOROLAC TROMETHAMINE 30 MG/ML
30 INJECTION, SOLUTION INTRAMUSCULAR; INTRAVENOUS ONCE
Status: COMPLETED | OUTPATIENT
Start: 2018-04-26 | End: 2018-04-26

## 2018-04-26 RX ORDER — NALOXONE HYDROCHLORIDE 0.4 MG/ML
.1-.4 INJECTION, SOLUTION INTRAMUSCULAR; INTRAVENOUS; SUBCUTANEOUS
Status: DISCONTINUED | OUTPATIENT
Start: 2018-04-26 | End: 2018-04-26 | Stop reason: HOSPADM

## 2018-04-26 RX ORDER — EPHEDRINE SULFATE 50 MG/ML
INJECTION, SOLUTION INTRAMUSCULAR; INTRAVENOUS; SUBCUTANEOUS PRN
Status: DISCONTINUED | OUTPATIENT
Start: 2018-04-26 | End: 2018-04-26

## 2018-04-26 RX ORDER — HYDROMORPHONE HYDROCHLORIDE 1 MG/ML
.3-.5 INJECTION, SOLUTION INTRAMUSCULAR; INTRAVENOUS; SUBCUTANEOUS EVERY 10 MIN PRN
Status: DISCONTINUED | OUTPATIENT
Start: 2018-04-26 | End: 2018-04-26 | Stop reason: HOSPADM

## 2018-04-26 RX ORDER — LIDOCAINE HYDROCHLORIDE 20 MG/ML
INJECTION, SOLUTION INFILTRATION; PERINEURAL PRN
Status: DISCONTINUED | OUTPATIENT
Start: 2018-04-26 | End: 2018-04-26

## 2018-04-26 RX ORDER — FENTANYL CITRATE 50 UG/ML
25-50 INJECTION, SOLUTION INTRAMUSCULAR; INTRAVENOUS
Status: DISCONTINUED | OUTPATIENT
Start: 2018-04-26 | End: 2018-04-26 | Stop reason: HOSPADM

## 2018-04-26 RX ADMIN — Medication 5 MG: at 07:38

## 2018-04-26 RX ADMIN — PROPOFOL 200 MG: 10 INJECTION, EMULSION INTRAVENOUS at 07:35

## 2018-04-26 RX ADMIN — MIDAZOLAM 2 MG: 1 INJECTION INTRAMUSCULAR; INTRAVENOUS at 07:35

## 2018-04-26 RX ADMIN — CEFAZOLIN SODIUM 2 G: 2 INJECTION, SOLUTION INTRAVENOUS at 07:48

## 2018-04-26 RX ADMIN — FENTANYL CITRATE 50 MCG: 50 INJECTION, SOLUTION INTRAMUSCULAR; INTRAVENOUS at 10:45

## 2018-04-26 RX ADMIN — PHENYLEPHRINE HYDROCHLORIDE 100 MCG: 10 INJECTION, SOLUTION INTRAMUSCULAR; INTRAVENOUS; SUBCUTANEOUS at 07:49

## 2018-04-26 RX ADMIN — KETOROLAC TROMETHAMINE 30 MG: 30 INJECTION, SOLUTION INTRAMUSCULAR at 10:49

## 2018-04-26 RX ADMIN — DEXAMETHASONE SODIUM PHOSPHATE 4 MG: 4 INJECTION, SOLUTION INTRA-ARTICULAR; INTRALESIONAL; INTRAMUSCULAR; INTRAVENOUS; SOFT TISSUE at 07:43

## 2018-04-26 RX ADMIN — FENTANYL CITRATE 50 MCG: 50 INJECTION, SOLUTION INTRAMUSCULAR; INTRAVENOUS at 10:32

## 2018-04-26 RX ADMIN — PROPOFOL 200 MCG/KG/MIN: 10 INJECTION, EMULSION INTRAVENOUS at 07:37

## 2018-04-26 RX ADMIN — LIDOCAINE HYDROCHLORIDE 60 MG: 20 INJECTION, SOLUTION INFILTRATION; PERINEURAL at 07:35

## 2018-04-26 RX ADMIN — SODIUM CHLORIDE, POTASSIUM CHLORIDE, SODIUM LACTATE AND CALCIUM CHLORIDE: 600; 310; 30; 20 INJECTION, SOLUTION INTRAVENOUS at 07:34

## 2018-04-26 RX ADMIN — PHENYLEPHRINE HYDROCHLORIDE 100 MCG: 10 INJECTION, SOLUTION INTRAMUSCULAR; INTRAVENOUS; SUBCUTANEOUS at 08:15

## 2018-04-26 RX ADMIN — SODIUM CHLORIDE, POTASSIUM CHLORIDE, SODIUM LACTATE AND CALCIUM CHLORIDE: 600; 310; 30; 20 INJECTION, SOLUTION INTRAVENOUS at 09:09

## 2018-04-26 RX ADMIN — Medication 2.5 MG: at 08:15

## 2018-04-26 RX ADMIN — ONDANSETRON 4 MG: 2 INJECTION INTRAMUSCULAR; INTRAVENOUS at 07:53

## 2018-04-26 RX ADMIN — PHENYLEPHRINE HYDROCHLORIDE 100 MCG: 10 INJECTION, SOLUTION INTRAMUSCULAR; INTRAVENOUS; SUBCUTANEOUS at 08:04

## 2018-04-26 RX ADMIN — Medication 5 MG: at 07:42

## 2018-04-26 RX ADMIN — FENTANYL CITRATE 50 MCG: 50 INJECTION, SOLUTION INTRAMUSCULAR; INTRAVENOUS at 07:35

## 2018-04-26 RX ADMIN — FENTANYL CITRATE 25 MCG: 50 INJECTION, SOLUTION INTRAMUSCULAR; INTRAVENOUS at 08:00

## 2018-04-26 RX ADMIN — FENTANYL CITRATE 25 MCG: 50 INJECTION, SOLUTION INTRAMUSCULAR; INTRAVENOUS at 08:49

## 2018-04-26 NOTE — ANESTHESIA CARE TRANSFER NOTE
Patient: Laney Maynard    Procedure(s):  BILATERAL COMBINED EXTRACORPOREAL SHOCK WAVE LITHOTRIPSY, CYSTOSCOPY, LEFT STENT PLACEMENT - Wound Class: II-Clean Contaminated    Diagnosis: LEFT KIDNEY STONE  Diagnosis Additional Information: No value filed.    Anesthesia Type:   General, LMA     Note:  Airway :Face Mask  Patient transferred to:PACU  Comments: Transferred to PACU, spontaneous respirations, 10L oxygen via facemask.  All monitors and alarms on and functioning, VSS.  Patient awake, comfortable.  Report to PACU RN.Handoff Report: Identifed the Patient, Identified the Reponsible Provider, Reviewed the pertinent medical history, Discussed the surgical course, Reviewed Intra-OP anesthesia mangement and issues during anesthesia, Set expectations for post-procedure period and Allowed opportunity for questions and acknowledgement of understanding      Vitals: (Last set prior to Anesthesia Care Transfer)    CRNA VITALS  4/26/2018 0856 - 4/26/2018 0930      4/26/2018             Pulse: 83    SpO2: 99 %    Resp Rate (observed): 10                Electronically Signed By: RON Crystal CRNA  April 26, 2018  9:30 AM

## 2018-04-26 NOTE — IP AVS SNAPSHOT
MRN:[id number]                      After Visit Summary   4/26/2018    Laney Maynard    MRN: [id number]           Thank you!     Thank you for choosing [facility] for your care. Our goal is always to provide you with excellent care. Hearing back from our patients is one way we can continue to improve our services. Please take a few minutes to complete the written survey that you may receive in the mail after you visit with us. Thank you!        Patient Information     Date Of Birth          1965        About your hospital stay     You were admitted on:  April 26, 2018 You last received care in the:  [facility] PACU    You were discharged on:  April 26, 2018       Who to Call     For medical emergencies, please call 911.  For non-urgent questions about your medical care, please call your primary care provider or clinic, [phone]  For questions related to your surgery, please call your surgery clinic        Attending Provider     Provider Bassam Jiménez MD Urology       Primary Care Provider Office Phone # Fax #    [place] Rachele Haase, APRN [phone]      Follow-up Appointments     Follow-up and recommended labs and tests        Follow up with me,  Bassam Vu, within 1-2 weeks. to evaluate after surgery.  The following labs/tests are recommended: KUB and Stent out.                  Your next 10 appointments already scheduled     May 10, 2018 10:00 AM CDT   XR KUB with SHXR3   [facility] ([facility])    [address] [place] [id number] [phone]           Please bring a list of your current medicines to your exam. (Include vitamins, minerals and over-thecounter medicines.) Leave your valuables at home.  Tell your doctor if there is a chance you may be pregnant.  You do not need to do anything special for this exam.            May 10, 2018 11:00 AM CDT   Cystoscopy with Laura  Jessica Castillo MD, UA CYF   [facility] ([facility])    [address]  [address]  [facility] [address] [phone]            May 23, 2018 12:15 PM CDT   Return Visit with Shawna Wei MD   [facility] ([facility])    [address]  [address]  [facility] [id number] [phone]              Further instructions from your care team       **If you have questions or concerns about your procedure,   call Dr. Vu at [phone]**    DISCHARGE INSTRUCTIONS FOLLOWING EXTRACORPOREAL SHOCK LITHOTRIPSY (ESWL)      Your stone(s) has been fragmented into many tiny pieces, which must now pass in your urine.  Usually this process is uneventful.  Most fragments pass in the first one or two week, but some may continue to pass for three months or more.  Some pain or discomfort may accompany the passage of these fragments.    To aid in the passage of fragments, drink lots of fluid.  Aim for 1 glass an hour for the next 1 to 2 weeks (2 quarts or more a day).  Most stone patients will benefit from a continued high fluid intake and urine output indefinitely, even after the fragments are gone.  This helps prevent new stone formation.    Strain your urine.  Take the stone fragments to your urologist.  They will have them analyzed to help determine the cause of your stone(s).    You should walk around and resume every day activities.  Activity may help the stone fragments pass.  You should, however, avoid sports or really strenuous exercise for about a week, or at least until there is no more blood in your urine.    You may resume regular diet.    Call your urologist if you have:  a. Persistent severe pain not relieved by oral medications.  b. Fever (over 101 ).  c. Persistent vomiting.    See your urologist as directed.  They will need to take an x-ray to check your progress.  Take your stone fragments  to your follow-up appointment.      STENT INFORMATION SHEET  [facility], SARAH Vann M.D.,  SAMINA Vu M.D.    MED Noriega M.D., SOLITARIO Castillo M.D.  [phone]    During surgery, a stent may be place in the ureter.  The ureter is the tube that drains urine from the kidney to the bladder.  The stent is placed to dilate (open) the ureter so the stone fragments can pass easily through the ureter or to decrease ureteral swelling after surgery, or to relieve an obstruction.    The stent is made of rubber.  The upper end of the stent curls in the kidney while the lower end rests in the bladder.    While the stent is in place you may experience the following symptoms:    Blood and/or small blood clots in urine.    Bladder spasm (frequency and urgency of urination).    Discomfort or aching in the back or side where the stent is.    Burning or discomfort at the end of urine stream.    To decrease these symptoms you should:    Take pain medication as prescribed.    Drink plenty of fluids.    If you experience pain at the end of urination try not emptying your bladder completely.    If having discomfort in back or side, decrease activity.    Please call your physician or the physician on call if you experience:    Fever greater than 101 .    Severe pain not relieved by pain medication or rest.      Please make an appointment for removal of the stent according to your physician s instructions.  Same Day Surgery Discharge Instructions for  Sedation and General Anesthesia       It's not unusual to feel dizzy, light-headed or faint for up to 24 hours after surgery or while taking pain medication.  If you have these symptoms: sit for a few minutes before standing and have someone assist you when you get up to walk or use the bathroom.      You should rest and relax for the next 24 hours. We recommend you make arrangements to have an adult stay with you for at least 24 hours after your discharge.  Avoid hazardous  "and strenuous activity.      DO NOT DRIVE any vehicle or operate mechanical equipment for 24 hours following the end of your surgery.  Even though you may feel normal, your reactions may be affected by the medication you have received.      Do not drink alcoholic beverages for 24 hours following surgery.       Slowly progress to your regular diet as you feel able. It's not unusual to feel nauseated and/or vomit after receiving anesthesia.  If you develop these symptoms, drink clear liquids (apple juice, ginger ale, broth, 7-up, etc. ) until you feel better.  If your nausea and vomiting persists for 24 hours, please notify your surgeon.        All narcotic pain medications, along with inactivity and anesthesia, can cause constipation. Drinking plenty of liquids and increasing fiber intake will help.      For any questions of a medical nature, call your surgeon.      Do not make important decisions for 24 hours.      If you had general anesthesia, you may have a sore throat for a couple of days related to the breathing tube used during surgery.  You may use Cepacol lozenges to help with this discomfort.  If it worsens or if you develop a fever, contact your surgeon.       If you feel your pain is not well managed with the pain medications prescribed by your surgeon, please contact your surgeon's office to let them know so they can address your concerns.           Pending Results     No orders found from 4/24/2018 to 4/27/2018.            Admission Information     Date & Time Provider Department Dept. Phone    4/26/2018 Bassam Vu MD [facility] PACU [phone]      Your Vitals Were     Blood Pressure Temperature Respirations Height Weight Last Period    132/72 95  F (35  C) 12 1.626 m (5' 4\") 68.5 kg (151 lb) 02/25/2014    Pulse Oximetry BMI (Body Mass Index)                100% 25.92 kg/m2          [facility] Information     [facility] gives you secure access to your electronic health record. If you " see a primary care provider, you can also send messages to your care team and make appointments. If you have questions, please call your primary care clinic.  If you do not have a primary care provider, please call [phone] and they will assist you.        Care EveryWhere ID     This is your Care EveryWhere ID. This could be used by other organizations to access your [facility] medical records  [id number]        Equal Access to Services     ERIK SWEET : Hadii aad ku hadarnieo Somalgorzataali, waaxda luqadaha, qaybta kaalmada adeegyada, jay miradriannavianey hurst. So [facility] [phone].    ATENCIÓN: Si sami herrmann, tiene a ho disposición servicios gratuitos de asistencia lingüística. Llame al [phone].    We comply with applicable federal civil rights laws and [place] laws. We do not discriminate on the basis of race, color, national origin, age, disability, sex, sexual orientation, or gender identity.               Review of your medicines      UNREVIEWED medicines. Ask your doctor about these medicines        Dose / Directions    tobramycin-dexamethasone 0.3-0.1 % ophthalmic susp   Commonly known as:  TOBRADEX   Used for:  Eye disorder        Dose:  2 drop   Place 2 drops into both eyes daily   Quantity:  1 Bottle   Refills:  11       TOPAMAX PO        Dose:  150 mg   Take 150 mg by mouth every morning (3 X 50MG = 150MG)   Refills:  0       valACYclovir 1000 mg tablet   Commonly known as:  VALTREX   Used for:  Herpes simplex virus infection        Dose:  1000 mg   Take 1 tablet (1,000 mg) by mouth daily   Quantity:  90 tablet   Refills:  3       VESICARE 5 MG tablet   Used for:  Stress incontinence   Generic drug:  solifenacin        TAKE ONE TABLET BY MOUTH EVERY EVENING   Quantity:  90 tablet   Refills:  2       vitamin D 39674 UNIT capsule   Commonly known as:  ERGOCALCIFEROL   Used for:  Vitamin D deficiency        Dose:  51176 Units   Take 1 capsule (50,000 Units) by mouth every 7 days for  12 doses   Quantity:  12 capsule   Refills:  0       zolpidem 5 MG tablet   Commonly known as:  AMBIEN   Used for:  Sleep disorder        Dose:  5 mg   Take 1 tablet (5 mg) by mouth nightly as needed for sleep   Quantity:  30 tablet   Refills:  2         START taking        Dose / Directions    ciprofloxacin 250 MG tablet   Commonly known as:  CIPRO   Used for:  Calculus of kidney        Dose:  250 mg   Take 1 tablet (250 mg) by mouth 2 times daily for 2 doses   Quantity:  2 tablet   Refills:  0       ketorolac 10 MG tablet   Commonly known as:  TORADOL   Used for:  Calculus of kidney        Dose:  10 mg   Take 1 tablet (10 mg) by mouth every 6 hours as needed for moderate pain   Quantity:  10 tablet   Refills:  0         CONTINUE these medicines which may have CHANGED, or have new prescriptions. If we are uncertain of the size of tablets/capsules you have at home, strength may be listed as something that might have changed.        Dose / Directions    acyclovir 5 % ointment   Commonly known as:  ZOVIRAX   This may have changed:    - when to take this  - reasons to take this   Used for:  Herpes simplex virus infection        Apply topically 5 times daily   Quantity:  30 g   Refills:  5       amphetamine-dextroamphetamine 30 MG per 24 hr capsule   Commonly known as:  ADDERALL XR   This may have changed:  additional instructions   Used for:  Attention deficit hyperactivity disorder (ADHD), combined type        Dose:  30 mg   Take 1 capsule (30 mg) by mouth 2 times daily   Quantity:  60 capsule   Refills:  0       conjugated estrogens cream   Commonly known as:  PREMARIN   This may have changed:    - when to take this  - additional instructions   Used for:  Urinary tract infection with hematuria, site unspecified        Dose:  0.5 g   Place 0.5 g vaginally three times a week   Quantity:  30 g   Refills:  12       fluocinolone acetonide 0.01 % Oil   This may have changed:  additional instructions   Used for:  Scalp  psoriasis        Use on scalp once a week.   Quantity:  1 Bottle   Refills:  11       ketoconazole 2 % shampoo   Commonly known as:  NIZORAL   This may have changed:  additional instructions   Used for:  Seborrheic dermatitis of scalp   Notes to Patient:  Toreadol 30mg given IV at 1050 am          Apply to the affected area and wash off after 5 minutes.   Quantity:  120 mL   Refills:  11       meclizine 25 MG tablet   Commonly known as:  ANTIVERT   This may have changed:  when to take this   Used for:  Vertigo        Dose:  25 mg   Take 1 tablet (25 mg) by mouth 3 times daily as needed   Quantity:  30 tablet   Refills:  3       Menthol (Topical Analgesic) 4 % Gel   Commonly known as:  BIOFREEZE COLORLESS   This may have changed:    - when to take this  - reasons to take this   Used for:  Pain in both feet, Plantar fasciitis, bilateral, Pes planus of both feet        Externally apply topically 3 times daily as needed   Quantity:  118 mL   Refills:  1       ondansetron 4 MG ODT tab   Commonly known as:  ZOFRAN ODT   This may have changed:  when to take this   Used for:  Ureterolithiasis        Dose:  4-8 mg   Take 1-2 tablets (4-8 mg) by mouth every 8 hours as needed for nausea   Quantity:  20 tablet   Refills:  3         CONTINUE these medicines which have NOT CHANGED        Dose / Directions    fexofenadine 180 MG tablet   Commonly known as:  ALLEGRA   Used for:  Acute seasonal allergic rhinitis, unspecified trigger        Dose:  180 mg   Take 1 tablet (180 mg) by mouth daily as needed   Quantity:  30 tablet   Refills:  6       fluticasone 50 MCG/ACT spray   Commonly known as:  FLONASE   Used for:  Acute seasonal allergic rhinitis, unspecified trigger        Dose:  2 spray   Spray 2 sprays into both nostrils daily as needed   Quantity:  16 g   Refills:  11       * order for DME   Used for:  Intractable left heel pain        Equipment being ordered:heel pads   Quantity:  1 Package   Refills:  0       * order for DME    Used for:  Pain in both feet, Plantar fasciitis, bilateral, Pes planus of both feet        Equipment being ordered: tall aircast boot   Quantity:  1 Device   Refills:  0       phenazopyridine 200 MG tablet   Commonly known as:  PYRIDIUM   Used for:  Urinary problem        Dose:  200 mg   Take 1 tablet (200 mg) by mouth 3 times daily as needed for irritation   Quantity:  30 tablet   Refills:  3       tamsulosin 0.4 MG capsule   Commonly known as:  FLOMAX   Used for:  Ureterolithiasis        TAKE ONE CAPSULE BY MOUTH EVERY DAY   Quantity:  30 capsule   Refills:  0       * Notice:  This list has 2 medication(s) that are the same as other medications prescribed for you. Read the directions carefully, and ask your doctor or other care provider to review them with you.      STOP taking     ASPIRIN PO           naproxen 500 MG tablet   Commonly known as:  NAPROSYN                Where to get your medicines      These medications were sent to [facility] [place], [place] - [address]  [address], [place] [address]     Phone:  [phone]     ciprofloxacin 250 MG tablet    ketorolac 10 MG tablet                Protect others around you: Learn how to safely use, store and throw away your medicines at www.disposemymeds.org.        ANTIBIOTIC INSTRUCTION     You've Been Prescribed an Antibiotic - Now What?  Your healthcare team thinks that you or your loved one might have an infection. Some infections can be treated with antibiotics, which are powerful, life-saving drugs. Like all medications, antibiotics have side effects and should only be used when necessary. There are some important things you should know about your antibiotic treatment.      Your healthcare team may run tests before you start taking an antibiotic.    Your team may take samples (e.g., from your blood, urine or other areas) to run tests to look for bacteria. These test can be important to determine if you need an antibiotic  at all and, if you do, which antibiotic will work best.      Within a few days, your healthcare team might change or even stop your antibiotic.    Your team may start you on an antibiotic while they are working to find out what is making you sick.    Your team might change your antibiotic because test results show that a different antibiotic would be better to treat your infection.    In some cases, once your team has more information, they learn that you do not need an antibiotic at all. They may find out that you don't have an infection, or that the antibiotic you're taking won't work against your infection. For example, an infection caused by a virus can't be treated with antibiotics. Staying on an antibiotic when you don't need it is more likely to be harmful than helpful.      You may experience side effects from your antibiotic.    Like all medications, antibiotics have side effects. Some of these can be serious.    Let you healthcare team know if you have any known allergies when you are admitted to the hospital.    One significant side effect of nearly all antibiotics is the risk of severe and sometimes deadly diarrhea caused by Clostridium difficile (C. Difficile). This occurs when a person takes antibiotics because some good germs are destroyed. Antibiotic use allows C. diificile to take over, putting patients at high risk for this serious infection.    As a patient or caregiver, it is important to understand your or your loved one's antibiotic treatment. It is especially important for caregivers to speak up when patients can't speak for themselves. Here are some important questions to ask your healthcare team.    What infection is this antibiotic treating and how do you know I have that infection?    What side effects might occur from this antibiotic?    How long will I need to take this antibiotic?    Is it safe to take this antibiotic with other medications or supplements (e.g., vitamins) that I am  taking?     Are there any special directions I need to know about taking this antibiotic? For example, should I take it with food?    How will I be monitored to know whether my infection is responding to the antibiotic?    What tests may help to make sure the right antibiotic is prescribed for me?      Information provided by:  www.cdc.gov/getsmart  U.S. Department of Health and Human Services  Centers for disease Control and Prevention  National Center for Emerging and Zoonotic Infectious Diseases  Division of Healthcare Quality Promotion             Medication List: This is a list of all your medications and when to take them. Check marks below indicate your daily home schedule. Keep this list as a reference.      Medications           Morning Afternoon Evening Bedtime As Needed    acyclovir 5 % ointment   Commonly known as:  ZOVIRAX   Apply topically 5 times daily                                amphetamine-dextroamphetamine 30 MG per 24 hr capsule   Commonly known as:  ADDERALL XR   Take 1 capsule (30 mg) by mouth 2 times daily                                ciprofloxacin 250 MG tablet   Commonly known as:  CIPRO   Take 1 tablet (250 mg) by mouth 2 times daily for 2 doses                                conjugated estrogens cream   Commonly known as:  PREMARIN   Place 0.5 g vaginally three times a week                                fexofenadine 180 MG tablet   Commonly known as:  ALLEGRA   Take 1 tablet (180 mg) by mouth daily as needed                                fluocinolone acetonide 0.01 % Oil   Use on scalp once a week.                                fluticasone 50 MCG/ACT spray   Commonly known as:  FLONASE   Spray 2 sprays into both nostrils daily as needed                                ketoconazole 2 % shampoo   Commonly known as:  NIZORAL   Apply to the affected area and wash off after 5 minutes.   Notes to Patient:  Toreadol 30mg given IV at 1050 am                                  ketorolac 10 MG  tablet   Commonly known as:  TORADOL   Take 1 tablet (10 mg) by mouth every 6 hours as needed for moderate pain                                meclizine 25 MG tablet   Commonly known as:  ANTIVERT   Take 1 tablet (25 mg) by mouth 3 times daily as needed                                Menthol (Topical Analgesic) 4 % Gel   Commonly known as:  BIOFREEZE COLORLESS   Externally apply topically 3 times daily as needed                                ondansetron 4 MG ODT tab   Commonly known as:  ZOFRAN ODT   Take 1-2 tablets (4-8 mg) by mouth every 8 hours as needed for nausea                                * order for DME   Equipment being ordered:heel pads                                * order for DME   Equipment being ordered: tall aircast boot                                phenazopyridine 200 MG tablet   Commonly known as:  PYRIDIUM   Take 1 tablet (200 mg) by mouth 3 times daily as needed for irritation                                tamsulosin 0.4 MG capsule   Commonly known as:  FLOMAX   TAKE ONE CAPSULE BY MOUTH EVERY DAY                                tobramycin-dexamethasone 0.3-0.1 % ophthalmic susp   Commonly known as:  TOBRADEX   Place 2 drops into both eyes daily                                TOPAMAX PO   Take 150 mg by mouth every morning (3 X 50MG = 150MG)                                valACYclovir 1000 mg tablet   Commonly known as:  VALTREX   Take 1 tablet (1,000 mg) by mouth daily                                VESICARE 5 MG tablet   TAKE ONE TABLET BY MOUTH EVERY EVENING   Generic drug:  solifenacin                                vitamin D 86611 UNIT capsule   Commonly known as:  ERGOCALCIFEROL   Take 1 capsule (50,000 Units) by mouth every 7 days for 12 doses                                zolpidem 5 MG tablet   Commonly known as:  AMBIEN   Take 1 tablet (5 mg) by mouth nightly as needed for sleep                                * Notice:  This list has 2 medication(s) that are the same as other  medications prescribed for you. Read the directions carefully, and ask your doctor or other care provider to review them with you.

## 2018-04-26 NOTE — IP AVS SNAPSHOT
[facility] [address]    [place] [address]    Phone:  [phone]                                       After Visit Summary   4/26/2018    Laney Maynard    MRN: [id number]           After Visit Summary Signature Page     I have received my discharge instructions, and my questions have been answered. I have discussed any challenges I see with this plan with the nurse or doctor.    ..........................................................................................................................................  Patient/Patient Representative Signature      ..........................................................................................................................................  Patient Representative Print Name and Relationship to Patient    ..................................................               ................................................  Date                                            Time    ..........................................................................................................................................  Reviewed by Signature/Title    ...................................................              ..............................................  Date                                                            Time

## 2018-04-26 NOTE — OP NOTE
Procedure Date: 04/26/2018      OPERATIVE NOTE       PREOPERATIVE DIAGNOSIS:  Bilateral renal stones.      POSTOPERATIVE DIAGNOSIS:  Bilateral renal stones.      PROCEDURE:  Cystoscopy, left retrograde stone displacement without removal and insertion of left double-J stent, bilateral extracorporeal shock wave lithotripsy, fluoroscopic interpretation of images.      SURGEON:  Bassam Vu MD       ANESTHESIA:  General.      INDICATIONS:  This very pleasant 53-year-old lady had come to see us with pain in the left side with a previous history of urinary stone disease.  She was found to have a 1-cm stone in the left upper ureter in the region of the left ureteropelvic junction with other small stones in the left kidney, and also two significant stones in the right kidney, one of which was almost 1 cm in diameter in the lower calyx, the other somewhat smaller in the middle calyx.  We had discussed this situation carefully with her and decided that we should proceed with bilateral extracorporeal treatment, but in view of the fact there were stones in both kidneys she would almost certainly need a stent in the left side.       DESCRIPTION OF PROCEDURE:  The patient was brought to the operative suite and after the induction of anesthesia, was placed on the lithotripter table and was placed in the dorsal lithotomy position with the genitalia prepped in the customary fashion.       Timeout was then called.      The #24-Korean cystoscope was then passed into the urethra which was normal.  The interior of the bladder was carefully inspected.  There was some evidence of glomerulation of the bladder on filling.  There was no evidence of neoplasm or stone in the bladder.  The ureteric orifices were normal positioned and I passed a #6-Korean open-ended ureteral catheter into the left ureter up to a point just below the stone that was impacted in the region of the left ureteropelvic junction.  With a 10-mL syringe and 5 mL of  fluid, I was able to flush and disimpact the stone back into the renal pelvis.  I then passed an 0.035 Sensor wire through the open-ended catheter that curled in the renal pelvis, removed the open-ended catheter, and passed a #6-Belgian 26-cm double-J stent over the wire until one end was in the kidney, the other was released in the bladder.  I then drained the bladder and the patient was then repositioned in the supine position on the lithotripter table.  We then were able to fragment the stones in the left kidney very nicely with 2600 shocks at up to 18 kilovolts and then repositioned the patient so we could address the right kidney.  A total of 2500 shocks at up to 18 kilovolts were administered to the two areas of stone in the right kidney.  Good fragmentation was observed in all cases.  At completion of the procedure, the patient was then taken from the lithotripsy suite and to the recovery room.      CONCLUSION:  We will plan for the patient to go home today and then return in about one week for a KUB and removal of the stent if we continue to observe good fragmentation.       The patient will strain the urine and catch fragments.  We will send these to be analyzed and we will need to discuss methods to try and prevent further stones forming.  She has already seen a nephrologist and so she will need to continue with follow-up with her nephrologist.         NAVEEN TRENT MD             D: 2018   T: 2018   MT: CG      Name:     JESSY MONDRAGON   MRN:      [id number]        Account:        [id number]   :      1965           Procedure Date: 2018      Document: [id number]       cc: James Somerville MD John Hulbert MD Susan Haase APRN, CNP

## 2018-04-26 NOTE — DISCHARGE INSTRUCTIONS
**If you have questions or concerns about your procedure,   call Dr. Vu at [phone]**    DISCHARGE INSTRUCTIONS FOLLOWING EXTRACORPOREAL SHOCK LITHOTRIPSY (ESWL)      Your stone(s) has been fragmented into many tiny pieces, which must now pass in your urine.  Usually this process is uneventful.  Most fragments pass in the first one or two week, but some may continue to pass for three months or more.  Some pain or discomfort may accompany the passage of these fragments.    To aid in the passage of fragments, drink lots of fluid.  Aim for 1 glass an hour for the next 1 to 2 weeks (2 quarts or more a day).  Most stone patients will benefit from a continued high fluid intake and urine output indefinitely, even after the fragments are gone.  This helps prevent new stone formation.    Strain your urine.  Take the stone fragments to your urologist.  They will have them analyzed to help determine the cause of your stone(s).    You should walk around and resume every day activities.  Activity may help the stone fragments pass.  You should, however, avoid sports or really strenuous exercise for about a week, or at least until there is no more blood in your urine.    You may resume regular diet.    Call your urologist if you have:  a. Persistent severe pain not relieved by oral medications.  b. Fever (over 101 ).  c. Persistent vomiting.    See your urologist as directed.  They will need to take an x-ray to check your progress.  Take your stone fragments to your follow-up appointment.      STENT INFORMATION SHEET  [facility], PYARA Vann M.D.,  SAMINA Vu M.D.    MED Noriega M.D., SOLITARIO Castillo M.D.  [phone]    During surgery, a stent may be place in the ureter.  The ureter is the tube that drains urine from the kidney to the bladder.  The stent is placed to dilate (open) the ureter so the stone fragments can pass easily through the ureter or to decrease ureteral swelling after surgery, or to  relieve an obstruction.    The stent is made of rubber.  The upper end of the stent curls in the kidney while the lower end rests in the bladder.    While the stent is in place you may experience the following symptoms:    Blood and/or small blood clots in urine.    Bladder spasm (frequency and urgency of urination).    Discomfort or aching in the back or side where the stent is.    Burning or discomfort at the end of urine stream.    To decrease these symptoms you should:    Take pain medication as prescribed.    Drink plenty of fluids.    If you experience pain at the end of urination try not emptying your bladder completely.    If having discomfort in back or side, decrease activity.    Please call your physician or the physician on call if you experience:    Fever greater than 101 .    Severe pain not relieved by pain medication or rest.      Please make an appointment for removal of the stent according to your physician s instructions.  Same Day Surgery Discharge Instructions for  Sedation and General Anesthesia       It's not unusual to feel dizzy, light-headed or faint for up to 24 hours after surgery or while taking pain medication.  If you have these symptoms: sit for a few minutes before standing and have someone assist you when you get up to walk or use the bathroom.      You should rest and relax for the next 24 hours. We recommend you make arrangements to have an adult stay with you for at least 24 hours after your discharge.  Avoid hazardous and strenuous activity.      DO NOT DRIVE any vehicle or operate mechanical equipment for 24 hours following the end of your surgery.  Even though you may feel normal, your reactions may be affected by the medication you have received.      Do not drink alcoholic beverages for 24 hours following surgery.       Slowly progress to your regular diet as you feel able. It's not unusual to feel nauseated and/or vomit after receiving anesthesia.  If you develop these  symptoms, drink clear liquids (apple juice, ginger ale, broth, 7-up, etc. ) until you feel better.  If your nausea and vomiting persists for 24 hours, please notify your surgeon.        All narcotic pain medications, along with inactivity and anesthesia, can cause constipation. Drinking plenty of liquids and increasing fiber intake will help.      For any questions of a medical nature, call your surgeon.      Do not make important decisions for 24 hours.      If you had general anesthesia, you may have a sore throat for a couple of days related to the breathing tube used during surgery.  You may use Cepacol lozenges to help with this discomfort.  If it worsens or if you develop a fever, contact your surgeon.       If you feel your pain is not well managed with the pain medications prescribed by your surgeon, please contact your surgeon's office to let them know so they can address your concerns.

## 2018-04-26 NOTE — PROGRESS NOTES
Admission medication history interview status for the 4/26/2018  admission is complete. See EPIC admission navigator for prior to admission medications     Medication history source reliability:Poor    Medication history interview source(s):Patient    Medication history resources (including written lists, pill bottles, clinic record):None    Primary pharmacy. [facility]    Additional medication history information not noted on PTA med list :None    Time spent in this activity: 45 minutes    Prior to Admission medications    Medication Sig Last Dose Taking? Auth Provider   acyclovir (ZOVIRAX) 5 % ointment Apply topically 5 times daily  Patient taking differently: Apply topically 2 times daily as needed (outbreak)  emergenct med at 1+ year Yes Haase, Susan Rachele, APRN CNP   amphetamine-dextroamphetamine (ADDERALL XR) 30 MG per 24 hr capsule Take 1 capsule (30 mg) by mouth 2 times daily  Patient taking differently: Take 30 mg by mouth 2 times daily At 0800 and 1400. 4/25/2018 at 1400 Yes Haase, Susan Rachele, APRN CNP   ASPIRIN PO Take 81 mg by mouth daily 4/9/2018 at AM Yes Reported, Patient   conjugated estrogens (PREMARIN) vaginal cream Place 0.5 g vaginally three times a week  Patient taking differently: Place 0.5 g vaginally See Admin Instructions Once weekly as needed. 1+ year Yes Joslyn Del Rosario APRN CNP   fexofenadine (ALLEGRA) 180 MG tablet Take 1 tablet (180 mg) by mouth daily as needed 1+ year Yes Haase, Susan Rachele, APRN CNP   fluocinolone acetonide 0.01 % OIL Use on scalp once a week.  Patient taking differently: Use on scalp once a week, as needed. 2 months Yes Haase, Susan Rachele, APRN CNP   fluticasone (FLONASE) 50 MCG/ACT spray Spray 2 sprays into both nostrils daily as needed not yet started Yes Haase, Susan Rachele, APRN CNP   ketoconazole (NIZORAL) 2 % shampoo Apply to the affected area and wash off after 5 minutes.  Patient taking differently: Apply to the affected area and wash off  after 5 minutes, as needed. 4/12/2018 at Unknown Yes Haase, Susan Rachele, APRN CNP   meclizine (ANTIVERT) 25 MG tablet Take 1 tablet (25 mg) by mouth 3 times daily as needed  Patient taking differently: Take 25 mg by mouth daily as needed  1+ year Yes Haase, Susan Rachele, APRN CNP   Menthol, Topical Analgesic, (BIOFREEZE COLORLESS) 4 % GEL Externally apply topically 3 times daily as needed  Patient taking differently: Externally apply topically daily as needed (pain)  1+ year Yes Darlene Emerson DPM, Podiatry/Foot and Ankle Surgery   naproxen (NAPROSYN) 500 MG tablet Take 1 tablet (500 mg) by mouth 2 times daily as needed for moderate pain 3/26/2018 at Unknown Yes Haase, Susan Rachele, APRN CNP   ondansetron (ZOFRAN ODT) 4 MG ODT tab Take 1-2 tablets (4-8 mg) by mouth every 8 hours as needed for nausea  Patient taking differently: Take 4-8 mg by mouth daily as needed for nausea  4/12/2018 at Unknown Yes Haase, Susan Rachele, APRN CNP   phenazopyridine (PYRIDIUM) 200 MG tablet Take 1 tablet (200 mg) by mouth 3 times daily as needed for irritation Past Month at Unknown time Yes Haase, Susan Rachele, APRN CNP   tamsulosin (FLOMAX) 0.4 MG capsule TAKE ONE CAPSULE BY MOUTH EVERY DAY 4/25/2018 at AM Yes Haase, Susan Rachele, APRN CNP   tobramycin-dexamethasone (TOBRADEX) ophthalmic suspension Place 2 drops into both eyes daily  Patient taking differently: Place 2 drops into both eyes daily as needed  1 month Yes Haase, Susan Rachele, APRN CNP   Topiramate (TOPAMAX PO) Take 150 mg by mouth every morning (3 X 50MG = 150MG) 4/25/2018 at AM Yes Reported, Patient   valACYclovir (VALTREX) 1000 mg tablet Take 1 tablet (1,000 mg) by mouth daily 4/25/2018 at AM Yes Haase, Susan Rachele, APRN CNP   VESICARE 5 MG tablet TAKE ONE TABLET BY MOUTH EVERY EVENING  Patient taking differently: TAKE ONE TABLET BY MOUTH EVERY MORNING 4/25/2018 at AM Yes Tray Bruno MD   vitamin D (ERGOCALCIFEROL) 38511 UNIT capsule Take 1  capsule (50,000 Units) by mouth every 7 days for 12 doses  Patient taking differently: Take 50,000 Units by mouth every 7 days On Wednesday. 4/25/2018 at AM Yes Haase, Susan Rachele, APRN CNP   zolpidem (AMBIEN) 5 MG tablet Take 1 tablet (5 mg) by mouth nightly as needed for sleep 2 months at HS Yes Haase, Susan Rachele, APRN CNP   order for DME Equipment being ordered:heel pads   Haase, Susan Rachele, APRN CNP   order for DME Equipment being ordered: Darlene John DPM, Podiatry/Foot and Ankle Surgery

## 2018-04-26 NOTE — ANESTHESIA POSTPROCEDURE EVALUATION
Patient: Laney Maynard    Procedure(s):  BILATERAL COMBINED EXTRACORPOREAL SHOCK WAVE LITHOTRIPSY, CYSTOSCOPY, LEFT STENT PLACEMENT - Wound Class: II-Clean Contaminated    Diagnosis:LEFT KIDNEY STONE  Diagnosis Additional Information: No value filed.    Anesthesia Type:  General, LMA    Note:  Anesthesia Post Evaluation    Patient location during evaluation: PACU  Patient participation: Able to fully participate in evaluation  Level of consciousness: awake  Pain management: adequate  Airway patency: patent  Cardiovascular status: acceptable  Respiratory status: acceptable  Hydration status: acceptable  PONV: none     Anesthetic complications: None          Last vitals:  Vitals:    04/26/18 0548 04/26/18 0929   BP: 113/50 141/83   Resp: 20 12   Temp: 36.1  C (97  F) 34.9  C (94.9  F)   SpO2: 98% 100%         Electronically Signed By: Kushal Thompson MD  April 26, 2018  9:34 AM

## 2018-05-10 ENCOUNTER — OFFICE VISIT (OUTPATIENT)
Dept: UROLOGY | Facility: CLINIC | Age: 53
End: 2018-05-10
Payer: COMMERCIAL

## 2018-05-10 ENCOUNTER — HOSPITAL ENCOUNTER (OUTPATIENT)
Dept: GENERAL RADIOLOGY | Facility: CLINIC | Age: 53
Discharge: HOME | End: 2018-05-10
Attending: UROLOGY | Admitting: UROLOGY
Payer: COMMERCIAL

## 2018-05-10 VITALS
HEIGHT: 64 IN | DIASTOLIC BLOOD PRESSURE: 74 MMHG | WEIGHT: 145 LBS | BODY MASS INDEX: 24.75 KG/M2 | SYSTOLIC BLOOD PRESSURE: 110 MMHG

## 2018-05-10 DIAGNOSIS — N20.1 URETERAL STONE: ICD-10-CM

## 2018-05-10 DIAGNOSIS — N20.0 CALCULUS OF KIDNEY: Primary | ICD-10-CM

## 2018-05-10 PROCEDURE — 52310 CYSTOSCOPY AND TREATMENT: CPT | Mod: 58 | Performed by: UROLOGY

## 2018-05-10 PROCEDURE — 74019 RADEX ABDOMEN 2 VIEWS: CPT

## 2018-05-10 RX ORDER — NAPROXEN 500 MG/1
TABLET ORAL
COMMUNITY
Start: 2018-03-15 | End: 2019-08-29

## 2018-05-10 ASSESSMENT — PAIN SCALES - GENERAL: PAINLEVEL: NO PAIN (0)

## 2018-05-10 NOTE — LETTER
5/10/2018     RE: Laney Maynard  [address] [id number]     Dear Colleague,    Thank you for referring your patient, Laney Maynard, to the [facility] [place] at [facility]. Please see a copy of my visit note below.    ASSESSMENT AND PLAN  Follow up with Dr. Vu as scheduled   _________________________________________________________________    CHIEF COMPLAINT  It was my pleasure to see Laney Maynard who is a 53 year old female who is here for follow-up for kidney stones and removal of her ureteral stent.    HPI   Patient with renal calculi and s/p ESWL with bilateral renal calculi. KUB was negative. Presents for stent removal.     Patient Active Problem List    Diagnosis Date Noted     Ureterolithiasis 04/22/2016     Priority: Medium     Complete rupture of rotator cuff 04/02/2015     Priority: Medium     Mixed incontinence urge and stress (male)(female) 04/02/2015     Priority: Medium     Plantar fasciitis, bilateral 11/05/2014     Priority: Medium     Attention deficit hyperactivity disorder (ADHD) 01/12/2014     Priority: Medium     Patient is followed by HAASE, SUSAN RACHELE for ongoing prescription of stimulants.  All refills should be approved by this provider, or covering partner.    Medication(s): Aderall XR 20 mg every day.  Adderall XR 30 mg every day.   Maximum quantity per month: #30;  #30   Clinic visit frequency required: Q 6  months     Controlled substance agreement on file: Yes       Date(s): 3/10/2016  Neuropsych evaluation for ADD completed:  No    Last [facility] website verification: 10/18/17   [email or web address]         Herpes simplex virus infection 08/02/2013     Priority: Medium     Urinary frequency      Priority: Medium     burning       Family history of rheumatoid arthritis 03/08/2013     Priority: Medium     Family history of sarcoidosis (mother)  03/08/2013     Priority: Medium     Sleep disorder 10/05/2012     Priority: Medium     Lymphocytopenia 04/04/2011     Priority: Medium     Migraine headache 03/29/2011     Priority: Medium     (Problem list name updated by automated process. Provider to review and confirm.)       CARDIOVASCULAR SCREENING; LDL GOAL LESS THAN 160 10/31/2010     Priority: Medium     The 10-year ASCVD risk score (Suziebakari ALLEN Jr, et al., 2013) is: 2.1%    Values used to calculate the score:      Age: 53 years      Sex: Female      Is Non- : Yes      Diabetic: No      Tobacco smoker: No      Systolic Blood Pressure: 110 mmHg      Is BP treated: No      HDL Cholesterol: 54 mg/dL      Total Cholesterol: 242 mg/dL         GERD (gastroesophageal reflux disease) 07/05/2010     Priority: Medium     Seasonal allergic rhinitis 05/07/2010     Priority: Medium     Alopecia 05/18/2004     Priority: Medium     Problem list name updated by automated process. Provider to review       Past Medical History:   Diagnosis Date     Abnormal glandular Papanicolaou smear of cervix      Allergic rhinitis, cause unspecified      Constipation      Gastro-oesophageal reflux disease      Heart murmur     murmur     Hematuria      Herpes simplex virus infection      Hydronephrosis, right      Migraine, unspecified, without mention of intractable migraine without mention of status migrainosus      Mumps      Numbness and tingling     LEFT ARM     Palpitations      Renal disease     hx stones x 2 episodes     Ureterolithiasis      Urinary frequency     burning     Vertigo      Past Surgical History:   Procedure Laterality Date     ARTHROSCOPY KNEE Right 04/26/2017    Procedure: Right knee arthroscopy and anterior fat pad resection with medial plica resection. Partial lateral menisectomy. Surgeon:  Fredi Lindsay MD  Location: [facility]     ARTHROSCOPY SHOULDER, OPEN ROTATOR CUFF REPAIR, COMBINED  7/31/2013    Procedure: COMBINED  ARTHROSCOPY SHOULDER, OPEN ROTATOR CUFF REPAIR;  Left Shoulder Arthroscopy, Distal Clavicale Resection, Decompression, Mini Open Rotator Cuff Repair    ;  Surgeon: Fredi Lindsay MD;  Location: [facility] OR     C NONSPECIFIC PROCEDURE  age 16    colposcopy for abnormal pap     C NONSPECIFIC PROCEDURE      surgery L thumb     C NONSPECIFIC PROCEDURE      breast augmentation-silicone     C NONSPECIFIC PROCEDURE      s/p  x 2     C NONSPECIFIC PROCEDURE      Bilateral tubal ligation     C REMOVAL OF KIDNEY STONE       COLONOSCOPY  2014    Procedure: COLONOSCOPY;  Colonoscopy/WW;  Surgeon: Pancho Olsen MD;  Location:  GI     COMBINED CYSTOSCOPY, RETROGRADES, URETEROSCOPY, LASER HOLMIUM LITHOTRIPSY URETER(S), INSERT STENT Right 2016    Procedure: COMBINED CYSTOSCOPY, RETROGRADES, URETEROSCOPY, LASER HOLMIUM LITHOTRIPSY URETER(S), INSERT STENT;  Surgeon: Kushal Noriega MD;  Location: [facility] OR     CYSTOSCOPY       CYSTOSCOPY, RETROGRADES, EXTRACT STONE, COMBINED  2013    Procedure: COMBINED CYSTOSCOPY, RETROGRADES, EXTRACT STONE;  Video Cystoscopy,  Right Ureteroscopy, ureteral dilatation,  Stone extraction;  Surgeon: Subhash Vann MD;  Location:  OR     EXTRACORPOREAL SHOCK WAVE LITHOTRIPSY (ESWL) Bilateral 2016    Procedure: EXTRACORPOREAL SHOCK WAVE LITHOTRIPSY (ESWL);  Surgeon: Bassam Vu MD;  Location:  OR     EXTRACORPOREAL SHOCK WAVE LITHOTRIPSY, CYSTOSCOPY, INSERT STENT URETER(S), COMBINED Bilateral 2018    Procedure: COMBINED EXTRACORPOREAL SHOCK WAVE LITHOTRIPSY, CYSTOSCOPY, INSERT STENT URETER(S);  BILATERAL COMBINED EXTRACORPOREAL SHOCK WAVE LITHOTRIPSY, CYSTOSCOPY, LEFT STENT PLACEMENT;  Surgeon: Bassam Vu MD;  Location:  OR     GYN SURGERY      laparoscopy     LAPAROSCOPIC CHOLECYSTECTOMY WITH CHOLANGIOGRAMS  2012    Procedure: LAPAROSCOPIC CHOLECYSTECTOMY WITH CHOLANGIOGRAMS;  LAPAROSCOPIC CHOLECYSTECTOMY WITH CHOLANGIOGRAMS ;   Surgeon: Nataliya Gabriel MD;  Location: [facility] OR     TUBAL LIGATION       Current Outpatient Prescriptions   Medication Sig Dispense Refill     acyclovir (ZOVIRAX) 5 % ointment Apply topically 5 times daily (Patient taking differently: Apply topically 2 times daily as needed (outbreak) ) 30 g 5     amphetamine-dextroamphetamine (ADDERALL XR) 30 MG per 24 hr capsule Take 1 capsule (30 mg) by mouth 2 times daily (Patient taking differently: Take 30 mg by mouth 2 times daily At 0800 and 1400.) 60 capsule 0     conjugated estrogens (PREMARIN) vaginal cream Place 0.5 g vaginally three times a week (Patient taking differently: Place 0.5 g vaginally See Admin Instructions Once weekly as needed.) 30 g 12     fexofenadine (ALLEGRA) 180 MG tablet Take 1 tablet (180 mg) by mouth daily as needed 30 tablet 6     fluocinolone acetonide 0.01 % OIL Use on scalp once a week. (Patient taking differently: Use on scalp once a week, as needed.) 1 Bottle 11     fluticasone (FLONASE) 50 MCG/ACT spray Spray 2 sprays into both nostrils daily as needed 16 g 11     ketoconazole (NIZORAL) 2 % shampoo Apply to the affected area and wash off after 5 minutes. (Patient taking differently: Apply to the affected area and wash off after 5 minutes, as needed.) 120 mL 11     ketorolac (TORADOL) 10 MG tablet Take 1 tablet (10 mg) by mouth every 6 hours as needed for moderate pain 10 tablet 0     meclizine (ANTIVERT) 25 MG tablet Take 1 tablet (25 mg) by mouth 3 times daily as needed (Patient taking differently: Take 25 mg by mouth daily as needed ) 30 tablet 3     Menthol, Topical Analgesic, (BIOFREEZE COLORLESS) 4 % GEL Externally apply topically 3 times daily as needed (Patient taking differently: Externally apply topically daily as needed (pain) ) 118 mL 1     naproxen (NAPROSYN) 500 MG tablet        OMEGA-3 FATTY ACIDS-VITAMIN E PO        ondansetron (ZOFRAN ODT) 4 MG ODT tab Take 1-2 tablets (4-8 mg) by mouth every 8 hours as needed for nausea  (Patient taking differently: Take 4-8 mg by mouth daily as needed for nausea ) 20 tablet 3     order for DME Equipment being ordered:heel pads 1 Package 0     order for DME Equipment being ordered: tall aircast boot 1 Device 0     phenazopyridine (PYRIDIUM) 200 MG tablet Take 1 tablet (200 mg) by mouth 3 times daily as needed for irritation 30 tablet 3     tamsulosin (FLOMAX) 0.4 MG capsule TAKE ONE CAPSULE BY MOUTH EVERY DAY 30 capsule 0     tobramycin-dexamethasone (TOBRADEX) ophthalmic suspension Place 2 drops into both eyes daily (Patient taking differently: Place 2 drops into both eyes daily as needed ) 1 Bottle 11     Topiramate (TOPAMAX PO) Take 150 mg by mouth every morning (3 X 50MG = 150MG)       valACYclovir (VALTREX) 1000 mg tablet Take 1 tablet (1,000 mg) by mouth daily 90 tablet 3     VESICARE 5 MG tablet TAKE ONE TABLET BY MOUTH EVERY EVENING (Patient taking differently: TAKE ONE TABLET BY MOUTH EVERY MORNING) 90 tablet 2     vitamin D (ERGOCALCIFEROL) 91884 UNIT capsule Take 1 capsule (50,000 Units) by mouth every 7 days for 12 doses (Patient taking differently: Take 50,000 Units by mouth every 7 days On Wednesday.) 12 capsule 0     zolpidem (AMBIEN) 5 MG tablet Take 1 tablet (5 mg) by mouth nightly as needed for sleep 30 tablet 2      SOCIAL HISTORY: She  reports that she has never smoked. She has never used smokeless tobacco. She reports that she does not drink alcohol or use illicit drugs.    CYSTOSCOPY PROCEDURE  After a sterile prep and drape and an informed consent a 16-French flexible cystoscope was introduced via the urethra.  The urethra was open.  The stent was visualized, grasped and removed in its entirety.     Again, thank you for allowing me to participate in the care of your patient.      Sincerely,    Laura Castillo MD    CC  Patient Care Team:  Haase, Susan Rachele, APRN CNP as PCP - General (Nurse Practitioner)  Reyna Angelo MD as MD (Family Practice)  HAASE,  JOCELYN SWARTZ

## 2018-05-10 NOTE — PATIENT INSTRUCTIONS

## 2018-05-10 NOTE — MR AVS SNAPSHOT
"              After Visit Summary   5/10/2018    Laney Maynard    MRN: [id number]           Patient Information     Date Of Birth          1965        Visit Information        Provider Department      5/10/2018 11:00 AM Laura Castillo MD; [facility] [place]        Today's Diagnoses     Calculus of kidney    -  1      Care Instructions         AFTER YOUR CYSTOSCOPY         You have just completed a cystoscopy, or \"cysto\", which allowed your physician to learn more about your bladder (or to remove a stent placed after surgery). We suggest that you continue to avoid caffeine, fruit juice, and alcohol for the next 24 hours, however, you are encouraged to return to your normal activities.       A few things that are considered normal after your cystoscopy:    * small amount of bleeding (or spotting) that clears within the next 24 hours    * slight burning sensation with urination    * sensation to of needing to avoid more frequently    * the feeling of \"air\" in your urine    * mild discomfort that is relieved with Tylonol        Please contact our office promptly if you:    * develop a fever above 101 degrees    * are unable to urinate    * develop bright red blood that does not stop    * severe pain or swelling        And of course, please contact our office with any concerns or questions [phone]          AFTER YOUR CYSTOSCOPY        You have just completed a cystoscopy, or \"cysto\", which allowed your physician to learn more about your bladder (or to remove a stent placed after surgery). We suggest that you continue to avoid caffeine, fruit juice, and alcohol for the next 24 hours, however, you are encouraged to return to your normal activities.         A few things that are considered normal after your cystoscopy:     * Small amount of bleeding (or spotting) that clears within the next 24 hours     * Slight burning sensation with urination     * " "Sensation to of needing to avoid more frequently     * The feeling of \"air\" in your urine     * Mild discomfort that is relieved with Tylenol        Please contact our office promptly if you:     * Develop a fever above 101 degrees     * Are unable to urinate     * Develop bright red blood that does not stop     * Severe pain or swelling         Please contact our office with any concerns or questions @Counts include 234 beds at the [facility].          Follow-ups after your visit        Follow-up notes from your care team     Return in 12 months (on 5/10/2019).      Your next 10 appointments already scheduled     May 23, 2018 12:15 PM CDT   Return Visit with Shawna Wei MD   [facility] [place] ([facility])    [address]  [address]  [place] [address] [phone]              Who to contact     If you have questions or need follow up information about today's clinic visit or your schedule please contact [facility] [place] directly at [phone].  Normal or non-critical lab and imaging results will be communicated to you by Cubresahart, letter or phone within 4 business days after the clinic has received the results. If you do not hear from us within 7 days, please contact the clinic through DINKlife or phone. If you have a critical or abnormal lab result, we will notify you by phone as soon as possible.  Submit refill requests through DINKlife or call your pharmacy and they will forward the refill request to us. Please allow 3 business days for your refill to be completed.          Additional Information About Your Visit        CubresaharQuid Information     DINKlife gives you secure access to your electronic health record. If you see a primary care provider, you can also send messages to your care team and make appointments. If you have questions, please call your primary care clinic.  If you do not have a primary care provider, please call [phone] and " "they will assist you.        Care EveryWhere ID     This is your Care EveryWhere ID. This could be used by other organizations to access your [facility] medical records  [id number]        Your Vitals Were     Height Last Period BMI (Body Mass Index)             1.626 m (5' 4\") 02/25/2014 24.89 kg/m2          Blood Pressure from Last 3 Encounters:   05/10/18 110/74   04/26/18 128/74   04/13/18 110/78    Weight from Last 3 Encounters:   05/10/18 65.8 kg (145 lb)   04/26/18 68.5 kg (151 lb)   04/13/18 69.9 kg (154 lb)              We Performed the Following     CYSTOSCOPY W FOREIGN BODY REMOVAL, SIMPLE          Today's Medication Changes          These changes are accurate as of 5/10/18 11:59 PM.  If you have any questions, ask your nurse or doctor.               These medicines have changed or have updated prescriptions.        Dose/Directions    acyclovir 5 % ointment   Commonly known as:  ZOVIRAX   This may have changed:    - when to take this  - reasons to take this   Used for:  Herpes simplex virus infection        Apply topically 5 times daily   Quantity:  30 g   Refills:  5       amphetamine-dextroamphetamine 30 MG per 24 hr capsule   Commonly known as:  ADDERALL XR   This may have changed:  additional instructions   Used for:  Attention deficit hyperactivity disorder (ADHD), combined type        Dose:  30 mg   Take 1 capsule (30 mg) by mouth 2 times daily   Quantity:  60 capsule   Refills:  0       conjugated estrogens cream   Commonly known as:  PREMARIN   This may have changed:    - when to take this  - additional instructions   Used for:  Urinary tract infection with hematuria, site unspecified        Dose:  0.5 g   Place 0.5 g vaginally three times a week   Quantity:  30 g   Refills:  12       fluocinolone acetonide 0.01 % Oil   This may have changed:  additional instructions   Used for:  Scalp psoriasis        Use on scalp once a week.   Quantity:  1 Bottle   Refills:  11       ketoconazole 2 % shampoo "   Commonly known as:  NIZORAL   This may have changed:  additional instructions   Used for:  Seborrheic dermatitis of scalp        Apply to the affected area and wash off after 5 minutes.   Quantity:  120 mL   Refills:  11       meclizine 25 MG tablet   Commonly known as:  ANTIVERT   This may have changed:  when to take this   Used for:  Vertigo        Dose:  25 mg   Take 1 tablet (25 mg) by mouth 3 times daily as needed   Quantity:  30 tablet   Refills:  3       Menthol (Topical Analgesic) 4 % Gel   Commonly known as:  BIOFREEZE COLORLESS   This may have changed:    - when to take this  - reasons to take this   Used for:  Pain in both feet, Plantar fasciitis, bilateral, Pes planus of both feet        Externally apply topically 3 times daily as needed   Quantity:  118 mL   Refills:  1       ondansetron 4 MG ODT tab   Commonly known as:  ZOFRAN ODT   This may have changed:  when to take this   Used for:  Ureterolithiasis        Dose:  4-8 mg   Take 1-2 tablets (4-8 mg) by mouth every 8 hours as needed for nausea   Quantity:  20 tablet   Refills:  3       tobramycin-dexamethasone 0.3-0.1 % ophthalmic susp   Commonly known as:  TOBRADEX   This may have changed:    - when to take this  - reasons to take this   Used for:  Eye disorder        Dose:  2 drop   Place 2 drops into both eyes daily   Quantity:  1 Bottle   Refills:  11       VESICARE 5 MG tablet   This may have changed:  See the new instructions.   Used for:  Stress incontinence   Generic drug:  solifenacin        TAKE ONE TABLET BY MOUTH EVERY EVENING   Quantity:  90 tablet   Refills:  2       vitamin D 88243 UNIT capsule   Commonly known as:  ERGOCALCIFEROL   This may have changed:  additional instructions   Used for:  Vitamin D deficiency        Dose:  91044 Units   Take 1 capsule (50,000 Units) by mouth every 7 days for 12 doses   Quantity:  12 capsule   Refills:  0                Primary Care Provider Office Phone # Fax #    Susan Rachele Haase, APRN CNP  [phone]       [address]  [facility] [address]        Equal Access to Services     ERIK SWEET : Hadii aad ku hadausten [place], waerasmoda lujeferson, frantzta kamingoda hitesh, jay mirleticia aris. So [facility] [phone].    ATENCIÓN: Si habla español, tiene a ho disposición servicios gratuitos de asistencia lingüística. Llame al [phone].    We comply with applicable federal civil rights laws and [place] laws. We do not discriminate on the basis of race, color, national origin, age, disability, sex, sexual orientation, or gender identity.            Thank you!     Thank you for choosing [facility] [place]  for your care. Our goal is always to provide you with excellent care. Hearing back from our patients is one way we can continue to improve our services. Please take a few minutes to complete the written survey that you may receive in the mail after your visit with us. Thank you!             Your Updated Medication List - Protect others around you: Learn how to safely use, store and throw away your medicines at www.disposemymeds.org.          This list is accurate as of 5/10/18 11:59 PM.  Always use your most recent med list.                   Brand Name Dispense Instructions for use Diagnosis    acyclovir 5 % ointment    ZOVIRAX    30 g    Apply topically 5 times daily    Herpes simplex virus infection       amphetamine-dextroamphetamine 30 MG per 24 hr capsule    ADDERALL XR    60 capsule    Take 1 capsule (30 mg) by mouth 2 times daily    Attention deficit hyperactivity disorder (ADHD), combined type       conjugated estrogens cream    PREMARIN    30 g    Place 0.5 g vaginally three times a week    Urinary tract infection with hematuria, site unspecified       fexofenadine 180 MG tablet    ALLEGRA    30 tablet    Take 1 tablet (180 mg) by mouth daily as needed    Acute seasonal allergic rhinitis, unspecified trigger        fluocinolone acetonide 0.01 % Oil     1 Bottle    Use on scalp once a week.    Scalp psoriasis       fluticasone 50 MCG/ACT spray    FLONASE    16 g    Spray 2 sprays into both nostrils daily as needed    Acute seasonal allergic rhinitis, unspecified trigger       ketoconazole 2 % shampoo    NIZORAL    120 mL    Apply to the affected area and wash off after 5 minutes.    Seborrheic dermatitis of scalp       ketorolac 10 MG tablet    TORADOL    10 tablet    Take 1 tablet (10 mg) by mouth every 6 hours as needed for moderate pain    Calculus of kidney       meclizine 25 MG tablet    ANTIVERT    30 tablet    Take 1 tablet (25 mg) by mouth 3 times daily as needed    Vertigo       Menthol (Topical Analgesic) 4 % Gel    BIOFREEZE COLORLESS    118 mL    Externally apply topically 3 times daily as needed    Pain in both feet, Plantar fasciitis, bilateral, Pes planus of both feet       naproxen 500 MG tablet    NAPROSYN          OMEGA-3 FATTY ACIDS-VITAMIN E PO           ondansetron 4 MG ODT tab    ZOFRAN ODT    20 tablet    Take 1-2 tablets (4-8 mg) by mouth every 8 hours as needed for nausea    Ureterolithiasis       * order for DME     1 Package    Equipment being ordered:heel pads    Intractable left heel pain       * order for DME     1 Device    Equipment being ordered: tall aircast boot    Pain in both feet, Plantar fasciitis, bilateral, Pes planus of both feet       phenazopyridine 200 MG tablet    PYRIDIUM    30 tablet    Take 1 tablet (200 mg) by mouth 3 times daily as needed for irritation    Urinary problem       tamsulosin 0.4 MG capsule    FLOMAX    30 capsule    TAKE ONE CAPSULE BY MOUTH EVERY DAY    Ureterolithiasis       tobramycin-dexamethasone 0.3-0.1 % ophthalmic susp    TOBRADEX    1 Bottle    Place 2 drops into both eyes daily    Eye disorder       TOPAMAX PO      Take 150 mg by mouth every morning (3 X 50MG = 150MG)        valACYclovir 1000 mg tablet    VALTREX    90 tablet    Take 1 tablet (1,000 mg)  by mouth daily    Herpes simplex virus infection       VESICARE 5 MG tablet   Generic drug:  solifenacin     90 tablet    TAKE ONE TABLET BY MOUTH EVERY EVENING    Stress incontinence       vitamin D 49160 UNIT capsule    ERGOCALCIFEROL    12 capsule    Take 1 capsule (50,000 Units) by mouth every 7 days for 12 doses    Vitamin D deficiency       zolpidem 5 MG tablet    AMBIEN    30 tablet    Take 1 tablet (5 mg) by mouth nightly as needed for sleep    Sleep disorder       * Notice:  This list has 2 medication(s) that are the same as other medications prescribed for you. Read the directions carefully, and ask your doctor or other care provider to review them with you.

## 2018-05-11 NOTE — PROGRESS NOTES
ASSESSMENT AND PLAN  Follow up with Dr. Vu as scheduled   _________________________________________________________________    CHIEF COMPLAINT  It was my pleasure to see Laney Maynard who is a 53 year old female who is here for follow-up for kidney stones and removal of her ureteral stent.    HPI   Patient with renal calculi and s/p ESWL with bilateral renal calculi. KUB was negative. Presents for stent removal.     Patient Active Problem List    Diagnosis Date Noted     Ureterolithiasis 04/22/2016     Priority: Medium     Complete rupture of rotator cuff 04/02/2015     Priority: Medium     Mixed incontinence urge and stress (male)(female) 04/02/2015     Priority: Medium     Plantar fasciitis, bilateral 11/05/2014     Priority: Medium     Attention deficit hyperactivity disorder (ADHD) 01/12/2014     Priority: Medium     Patient is followed by HAASE, SUSAN RACHELE for ongoing prescription of stimulants.  All refills should be approved by this provider, or covering partner.    Medication(s): Aderall XR 20 mg every day.  Adderall XR 30 mg every day.   Maximum quantity per month: #30;  #30   Clinic visit frequency required: Q 6  months     Controlled substance agreement on file: Yes       Date(s): 3/10/2016  Neuropsych evaluation for ADD completed:  No    [facility] website verification: 10/18/17   [email or web address]         Herpes simplex virus infection 08/02/2013     Priority: Medium     Urinary frequency      Priority: Medium     burning       Family history of rheumatoid arthritis 03/08/2013     Priority: Medium     Family history of sarcoidosis (mother) 03/08/2013     Priority: Medium     Sleep disorder 10/05/2012     Priority: Medium     Lymphocytopenia 04/04/2011     Priority: Medium     Migraine headache 03/29/2011     Priority: Medium     (Problem list name updated by automated process. Provider to review and confirm.)       CARDIOVASCULAR SCREENING; LDL GOAL LESS THAN 160  10/31/2010     Priority: Medium     The 10-year ASCVD risk score (Suziebakari ALLEN Jr, et al., 2013) is: 2.1%    Values used to calculate the score:      Age: 53 years      Sex: Female      Is Non- : Yes      Diabetic: No      Tobacco smoker: No      Systolic Blood Pressure: 110 mmHg      Is BP treated: No      HDL Cholesterol: 54 mg/dL      Total Cholesterol: 242 mg/dL         GERD (gastroesophageal reflux disease) 07/05/2010     Priority: Medium     Seasonal allergic rhinitis 05/07/2010     Priority: Medium     Alopecia 05/18/2004     Priority: Medium     Problem list name updated by automated process. Provider to review       Past Medical History:   Diagnosis Date     Abnormal glandular Papanicolaou smear of cervix      Allergic rhinitis, cause unspecified      Constipation      Gastro-oesophageal reflux disease      Heart murmur     murmur     Hematuria      Herpes simplex virus infection      Hydronephrosis, right      Migraine, unspecified, without mention of intractable migraine without mention of status migrainosus      Mumps      Numbness and tingling     LEFT ARM     Palpitations      Renal disease     hx stones x 2 episodes     Ureterolithiasis      Urinary frequency     burning     Vertigo      Past Surgical History:   Procedure Laterality Date     ARTHROSCOPY KNEE Right 04/26/2017    Procedure: Right knee arthroscopy and anterior fat pad resection with medial plica resection. Partial lateral menisectomy. Surgeon:  Fredi Lindsay MD  [facility]: [facility]     ARTHROSCOPY SHOULDER, OPEN ROTATOR CUFF REPAIR, COMBINED  7/31/2013    Procedure: COMBINED ARTHROSCOPY SHOULDER, OPEN ROTATOR CUFF REPAIR;  Left Shoulder Arthroscopy, Distal Clavicale Resection, Decompression, Mini Open Rotator Cuff Repair    ;  Surgeon: Fredi Lindsay MD;  Location: [facility] OR     C NONSPECIFIC PROCEDURE  age 16    colposcopy for abnormal pap     C NONSPECIFIC PROCEDURE      surgery L thumb     C  NONSPECIFIC PROCEDURE      breast augmentation-silicone     C NONSPECIFIC PROCEDURE      s/p  x 2     C NONSPECIFIC PROCEDURE      Bilateral tubal ligation     C REMOVAL OF KIDNEY STONE       COLONOSCOPY  2014    Procedure: COLONOSCOPY;  Colonoscopy/WW;  Surgeon: Pancho Olsen MD;  Location:  GI     COMBINED CYSTOSCOPY, RETROGRADES, URETEROSCOPY, LASER HOLMIUM LITHOTRIPSY URETER(S), INSERT STENT Right 2016    Procedure: COMBINED CYSTOSCOPY, RETROGRADES, URETEROSCOPY, LASER HOLMIUM LITHOTRIPSY URETER(S), INSERT STENT;  Surgeon: Kushal Noriega MD;  Location: [facility] OR     CYSTOSCOPY       CYSTOSCOPY, RETROGRADES, EXTRACT STONE, COMBINED  2013    Procedure: COMBINED CYSTOSCOPY, RETROGRADES, EXTRACT STONE;  Video Cystoscopy,  Right Ureteroscopy, ureteral dilatation,  Stone extraction;  Surgeon: Subhash Vann MD;  Location: [facility] OR     EXTRACORPOREAL SHOCK WAVE LITHOTRIPSY (ESWL) Bilateral 2016    Procedure: EXTRACORPOREAL SHOCK WAVE LITHOTRIPSY (ESWL);  Surgeon: Bassam Vu MD;  Location:  OR     EXTRACORPOREAL SHOCK WAVE LITHOTRIPSY, CYSTOSCOPY, INSERT STENT URETER(S), COMBINED Bilateral 2018    Procedure: COMBINED EXTRACORPOREAL SHOCK WAVE LITHOTRIPSY, CYSTOSCOPY, INSERT STENT URETER(S);  BILATERAL COMBINED EXTRACORPOREAL SHOCK WAVE LITHOTRIPSY, CYSTOSCOPY, LEFT STENT PLACEMENT;  Surgeon: Bassam Vu MD;  Location:  OR     GYN SURGERY      laparoscopy     LAPAROSCOPIC CHOLECYSTECTOMY WITH CHOLANGIOGRAMS  2012    Procedure: LAPAROSCOPIC CHOLECYSTECTOMY WITH CHOLANGIOGRAMS;  LAPAROSCOPIC CHOLECYSTECTOMY WITH CHOLANGIOGRAMS ;  Surgeon: Nataliya Gabriel MD;  Location: [facility] OR     TUBAL LIGATION       Current Outpatient Prescriptions   Medication Sig Dispense Refill     acyclovir (ZOVIRAX) 5 % ointment Apply topically 5 times daily (Patient taking differently: Apply topically 2 times daily as needed (outbreak) ) 30 g 5      amphetamine-dextroamphetamine (ADDERALL XR) 30 MG per 24 hr capsule Take 1 capsule (30 mg) by mouth 2 times daily (Patient taking differently: Take 30 mg by mouth 2 times daily At 0800 and 1400.) 60 capsule 0     conjugated estrogens (PREMARIN) vaginal cream Place 0.5 g vaginally three times a week (Patient taking differently: Place 0.5 g vaginally See Admin Instructions Once weekly as needed.) 30 g 12     fexofenadine (ALLEGRA) 180 MG tablet Take 1 tablet (180 mg) by mouth daily as needed 30 tablet 6     fluocinolone acetonide 0.01 % OIL Use on scalp once a week. (Patient taking differently: Use on scalp once a week, as needed.) 1 Bottle 11     fluticasone (FLONASE) 50 MCG/ACT spray Spray 2 sprays into both nostrils daily as needed 16 g 11     ketoconazole (NIZORAL) 2 % shampoo Apply to the affected area and wash off after 5 minutes. (Patient taking differently: Apply to the affected area and wash off after 5 minutes, as needed.) 120 mL 11     ketorolac (TORADOL) 10 MG tablet Take 1 tablet (10 mg) by mouth every 6 hours as needed for moderate pain 10 tablet 0     meclizine (ANTIVERT) 25 MG tablet Take 1 tablet (25 mg) by mouth 3 times daily as needed (Patient taking differently: Take 25 mg by mouth daily as needed ) 30 tablet 3     Menthol, Topical Analgesic, (BIOFREEZE COLORLESS) 4 % GEL Externally apply topically 3 times daily as needed (Patient taking differently: Externally apply topically daily as needed (pain) ) 118 mL 1     naproxen (NAPROSYN) 500 MG tablet        OMEGA-3 FATTY ACIDS-VITAMIN E PO        ondansetron (ZOFRAN ODT) 4 MG ODT tab Take 1-2 tablets (4-8 mg) by mouth every 8 hours as needed for nausea (Patient taking differently: Take 4-8 mg by mouth daily as needed for nausea ) 20 tablet 3     order for DME Equipment being ordered:heel pads 1 Package 0     order for DME Equipment being ordered: tall aircast boot 1 Device 0     phenazopyridine (PYRIDIUM) 200 MG tablet Take 1 tablet (200 mg) by mouth  3 times daily as needed for irritation 30 tablet 3     tamsulosin (FLOMAX) 0.4 MG capsule TAKE ONE CAPSULE BY MOUTH EVERY DAY 30 capsule 0     tobramycin-dexamethasone (TOBRADEX) ophthalmic suspension Place 2 drops into both eyes daily (Patient taking differently: Place 2 drops into both eyes daily as needed ) 1 Bottle 11     Topiramate (TOPAMAX PO) Take 150 mg by mouth every morning (3 X 50MG = 150MG)       valACYclovir (VALTREX) 1000 mg tablet Take 1 tablet (1,000 mg) by mouth daily 90 tablet 3     VESICARE 5 MG tablet TAKE ONE TABLET BY MOUTH EVERY EVENING (Patient taking differently: TAKE ONE TABLET BY MOUTH EVERY MORNING) 90 tablet 2     vitamin D (ERGOCALCIFEROL) 56773 UNIT capsule Take 1 capsule (50,000 Units) by mouth every 7 days for 12 doses (Patient taking differently: Take 50,000 Units by mouth every 7 days On Wednesday.) 12 capsule 0     zolpidem (AMBIEN) 5 MG tablet Take 1 tablet (5 mg) by mouth nightly as needed for sleep 30 tablet 2      SOCIAL HISTORY: She  reports that she has never smoked. She has never used smokeless tobacco. She reports that she does not drink alcohol or use illicit drugs.    CYSTOSCOPY PROCEDURE  After a sterile prep and drape and an informed consent a 16-French flexible cystoscope was introduced via the urethra.  The urethra was open.  The stent was visualized, grasped and removed in its entirety.     CC  Patient Care Team:  Haase, Susan Rachele, APRN CNP as PCP - General (Nurse Practitioner)  Reyna Angelo MD as MD (Family Practice)  HAASE, SUSAN RACHELE

## 2018-05-23 ENCOUNTER — OFFICE VISIT (OUTPATIENT)
Dept: UROLOGY | Facility: CLINIC | Age: 53
End: 2018-05-23
Payer: COMMERCIAL

## 2018-05-23 VITALS
BODY MASS INDEX: 24.75 KG/M2 | SYSTOLIC BLOOD PRESSURE: 124 MMHG | DIASTOLIC BLOOD PRESSURE: 74 MMHG | WEIGHT: 145 LBS | HEIGHT: 64 IN

## 2018-05-23 DIAGNOSIS — N39.0 RECURRENT UTI: Primary | ICD-10-CM

## 2018-05-23 DIAGNOSIS — N20.0 CALCULUS OF KIDNEY: ICD-10-CM

## 2018-05-23 PROCEDURE — 99024 POSTOP FOLLOW-UP VISIT: CPT | Performed by: UROLOGY

## 2018-05-23 RX ORDER — FLUOCINOLONE ACETONIDE 0.11 MG/ML
OIL TOPICAL
COMMUNITY
Start: 2018-04-13 | End: 2019-10-28

## 2018-05-23 ASSESSMENT — PAIN SCALES - GENERAL: PAINLEVEL: NO PAIN (0)

## 2018-05-23 NOTE — LETTER
"5/23/2018     RE: Laney Maynard  [address] [address]     Dear Colleague,    Thank you for referring your patient, Laney Maynard, to the [facility] [place] at [facility]. Please see a copy of my visit note below.    May 23, 2018    Return visit    Patient returns today for follow up.  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  She has had a history of UTIs and kidney stones.  This appointment was made awhile ago when she started having UTIs but was found to have kidney stones that required surgery, stents removed a couple weeks ago.  No UTI since. She denies any changes in her health since last visit.    She kept the visit to establish care.    /74  Ht 1.626 m (5' 4\")  Wt 65.8 kg (145 lb)  LMP 02/25/2014  BMI 24.89 kg/m2  She is comfortable, in no distress, non-labored breathing.      A/P: 53 year old F with Benedict in the setting of nephrolithiasis that has now been treated    Nephrology for kidney stone prevention    Urine culture if she thinks she has symptoms.    Discussed supplements to prevent UTI    RTC 6 months, sooner if needed    15 minutes were spent with the patient today, > 50% in counseling and coordination of care    Shawna Wei MD MPH   of Urology    CC  Patient Care Team:  Haase, Susan Rachele, APRN CNP as PCP - General (Nurse Practitioner)  Reyna Angelo MD as MD (Family Practice)    "

## 2018-05-23 NOTE — MR AVS SNAPSHOT
After Visit Summary   5/23/2018    Laney Maynard    MRN: [id number]           Patient Information     Date Of Birth          1965        Visit Information        Provider Department      5/23/2018 12:15 PM Shawna Wei See MD [facility] [place]        Today's Diagnoses     Recurrent UTI    -  1    Calculus of kidney          Care Instructions    Make sure you are following with nephrology for kidney stone prevention    Please come in for a urine culture if you think you have a urine infection    Supplements to prevent UTI  -probiotics  -cranberry   Ellura: [email or web address]   Theracran HP by Theralogix  -d-mannose    It was a pleasure meeting with you today.  Thank you for allowing me and my team the privilege of caring for you today.  YOU are the reason we are here, and I truly hope we provided you with the excellent service you deserve.  Please let us know if there is anything else we can do for you so that we can be sure you are leaving completely satisfied with your care experience.            Follow-ups after your visit        Follow-up notes from your care team     Return in about 6 months (around 11/23/2018).      Your next 10 appointments already scheduled     Nov 28, 2018 11:30 AM CST   Return Visit with Shawna Wei MD   [facility] ([facility] [place])    [address]  [address]  [facility] [address] [phone]              Who to contact     If you have questions or need follow up information about today's clinic visit or your schedule please contact [facility] directly at [phone].  Normal or non-critical lab and imaging results will be communicated to you by MyChart, letter or phone within 4 business days after the clinic has received the results. If you do not hear from us within 7 days, please contact the clinic through [employer]  "or phone. If you have a critical or abnormal lab result, we will notify you by phone as soon as possible.  Submit refill requests through Tre[employer]ie or call your pharmacy and they will forward the refill request to us. Please allow 3 business days for your refill to be completed.          Additional Information About Your Visit        Thomas[employer]Krenn[employer] Information     Trellie gives you secure access to your electronic health record. If you see a primary care provider, you can also send messages to your care team and make appointments. If you have questions, please call your primary care clinic.  If you do not have a primary care provider, please call [phone] and they will assist you.        Care EveryWhere ID     This is your Care EveryWhere ID. This could be used by other organizations to access your [facility] medical records  [id number]        Your Vitals Were     Height Last Period BMI (Body Mass Index)             1.626 m (5' 4\") 02/25/2014 24.89 kg/m2          Blood Pressure from Last 3 Encounters:   05/23/18 124/74   05/10/18 110/74   04/26/18 128/74    Weight from Last 3 Encounters:   05/23/18 65.8 kg (145 lb)   05/10/18 65.8 kg (145 lb)   04/26/18 68.5 kg (151 lb)              Today, you had the following     No orders found for display         Today's Medication Changes          These changes are accurate as of 5/23/18 12:39 PM.  If you have any questions, ask your nurse or doctor.               These medicines have changed or have updated prescriptions.        Dose/Directions    acyclovir 5 % ointment   Commonly known as:  ZOVIRAX   This may have changed:    - when to take this  - reasons to take this   Used for:  Herpes simplex virus infection        Apply topically 5 times daily   Quantity:  30 g   Refills:  5       amphetamine-dextroamphetamine 30 MG per 24 hr capsule   Commonly known as:  ADDERALL XR   This may have changed:  additional instructions   Used for:  Attention deficit hyperactivity disorder " (ADHD), combined type        Dose:  30 mg   Take 1 capsule (30 mg) by mouth 2 times daily   Quantity:  60 capsule   Refills:  0       conjugated estrogens cream   Commonly known as:  PREMARIN   This may have changed:    - when to take this  - additional instructions   Used for:  Urinary tract infection with hematuria, site unspecified        Dose:  0.5 g   Place 0.5 g vaginally three times a week   Quantity:  30 g   Refills:  12       fluocinolone acetonide 0.01 % Oil   This may have changed:  additional instructions   Used for:  Scalp psoriasis        Use on scalp once a week.   Quantity:  1 Bottle   Refills:  11       ketoconazole 2 % shampoo   Commonly known as:  NIZORAL   This may have changed:  additional instructions   Used for:  Seborrheic dermatitis of scalp        Apply to the affected area and wash off after 5 minutes.   Quantity:  120 mL   Refills:  11       meclizine 25 MG tablet   Commonly known as:  ANTIVERT   This may have changed:  when to take this   Used for:  Vertigo        Dose:  25 mg   Take 1 tablet (25 mg) by mouth 3 times daily as needed   Quantity:  30 tablet   Refills:  3       Menthol (Topical Analgesic) 4 % Gel   Commonly known as:  BIOFREEZE COLORLESS   This may have changed:    - when to take this  - reasons to take this   Used for:  Pain in both feet, Plantar fasciitis, bilateral, Pes planus of both feet        Externally apply topically 3 times daily as needed   Quantity:  118 mL   Refills:  1       ondansetron 4 MG ODT tab   Commonly known as:  ZOFRAN ODT   This may have changed:  when to take this   Used for:  Ureterolithiasis        Dose:  4-8 mg   Take 1-2 tablets (4-8 mg) by mouth every 8 hours as needed for nausea   Quantity:  20 tablet   Refills:  3       tobramycin-dexamethasone 0.3-0.1 % ophthalmic susp   Commonly known as:  TOBRADEX   This may have changed:    - when to take this  - reasons to take this   Used for:  Eye disorder        Dose:  2 drop   Place 2 drops into both  eyes daily   Quantity:  1 Bottle   Refills:  11       VESICARE 5 MG tablet   This may have changed:  See the new instructions.   Used for:  Stress incontinence   Generic drug:  solifenacin        TAKE ONE TABLET BY MOUTH EVERY EVENING   Quantity:  90 tablet   Refills:  2       vitamin D 78107 UNIT capsule   Commonly known as:  ERGOCALCIFEROL   This may have changed:  additional instructions   Used for:  Vitamin D deficiency        Dose:  27048 Units   Take 1 capsule (50,000 Units) by mouth every 7 days for 12 doses   Quantity:  12 capsule   Refills:  0                Primary Care Provider Office Phone # Fax #    Susan Rachele Haase, APRN [phone]       [address] [address]        Equal Access to Services     REIK SWEET : Sven hudsono Sopita, waaxda luqadaha, qaybta kaalmada adeegyada, jay gabriel . So [facility] [phone].    ATENCIÓN: Si habla español, tiene a ho disposición servicios gratuitos de asistencia lingüística. Llame al [phone].    We comply with applicable federal civil rights laws and [place] laws. We do not discriminate on the basis of race, color, national origin, age, disability, sex, sexual orientation, or gender identity.            Thank you!     Thank you for choosing [facility] [place]  for your care. Our goal is always to provide you with excellent care. Hearing back from our patients is one way we can continue to improve our services. Please take a few minutes to complete the written survey that you may receive in the mail after your visit with us. Thank you!             Your Updated Medication List - Protect others around you: Learn how to safely use, store and throw away your medicines at www.disposemymeds.org.          This list is accurate as of 5/23/18 12:39 PM.  Always use your most recent med list.                   Brand Name Dispense Instructions for use Diagnosis     acyclovir 5 % ointment    ZOVIRAX    30 g    Apply topically 5 times daily    Herpes simplex virus infection       amphetamine-dextroamphetamine 30 MG per 24 hr capsule    ADDERALL XR    60 capsule    Take 1 capsule (30 mg) by mouth 2 times daily    Attention deficit hyperactivity disorder (ADHD), combined type       conjugated estrogens cream    PREMARIN    30 g    Place 0.5 g vaginally three times a week    Urinary tract infection with hematuria, site unspecified       fexofenadine 180 MG tablet    ALLEGRA    30 tablet    Take 1 tablet (180 mg) by mouth daily as needed    Acute seasonal allergic rhinitis, unspecified trigger       fluocinolone acetonide 0.01 % Oil     1 Bottle    Use on scalp once a week.    Scalp psoriasis       Fluocinolone Acetonide Scalp 0.01 % Oil oil           fluticasone 50 MCG/ACT spray    FLONASE    16 g    Spray 2 sprays into both nostrils daily as needed    Acute seasonal allergic rhinitis, unspecified trigger       ketoconazole 2 % shampoo    NIZORAL    120 mL    Apply to the affected area and wash off after 5 minutes.    Seborrheic dermatitis of scalp       ketorolac 10 MG tablet    TORADOL    10 tablet    Take 1 tablet (10 mg) by mouth every 6 hours as needed for moderate pain    Calculus of kidney       meclizine 25 MG tablet    ANTIVERT    30 tablet    Take 1 tablet (25 mg) by mouth 3 times daily as needed    Vertigo       Menthol (Topical Analgesic) 4 % Gel    BIOFREEZE COLORLESS    118 mL    Externally apply topically 3 times daily as needed    Pain in both feet, Plantar fasciitis, bilateral, Pes planus of both feet       naproxen 500 MG tablet    NAPROSYN          OMEGA-3 FATTY ACIDS-VITAMIN E PO           ondansetron 4 MG ODT tab    ZOFRAN ODT    20 tablet    Take 1-2 tablets (4-8 mg) by mouth every 8 hours as needed for nausea    Ureterolithiasis       * order for DME     1 Package    Equipment being ordered:heel pads    Intractable left heel pain       * order for DME     1  Device    Equipment being ordered: tall aircast boot    Pain in both feet, Plantar fasciitis, bilateral, Pes planus of both feet       phenazopyridine 200 MG tablet    PYRIDIUM    30 tablet    Take 1 tablet (200 mg) by mouth 3 times daily as needed for irritation    Urinary problem       tamsulosin 0.4 MG capsule    FLOMAX    30 capsule    TAKE ONE CAPSULE BY MOUTH EVERY DAY    Ureterolithiasis       tobramycin-dexamethasone 0.3-0.1 % ophthalmic susp    TOBRADEX    1 Bottle    Place 2 drops into both eyes daily    Eye disorder       TOPAMAX PO      Take 150 mg by mouth every morning (3 X 50MG = 150MG)        valACYclovir 1000 mg tablet    VALTREX    90 tablet    Take 1 tablet (1,000 mg) by mouth daily    Herpes simplex virus infection       VESICARE 5 MG tablet   Generic drug:  solifenacin     90 tablet    TAKE ONE TABLET BY MOUTH EVERY EVENING    Stress incontinence       vitamin D 93243 UNIT capsule    ERGOCALCIFEROL    12 capsule    Take 1 capsule (50,000 Units) by mouth every 7 days for 12 doses    Vitamin D deficiency       zolpidem 5 MG tablet    AMBIEN    30 tablet    Take 1 tablet (5 mg) by mouth nightly as needed for sleep    Sleep disorder       * Notice:  This list has 2 medication(s) that are the same as other medications prescribed for you. Read the directions carefully, and ask your doctor or other care provider to review them with you.

## 2018-05-23 NOTE — PATIENT INSTRUCTIONS
Make sure you are following with nephrology for kidney stone prevention    Please come in for a urine culture if you think you have a urine infection    Supplements to prevent UTI  -probiotics  -cranberry   Ellura: [email or web address]   Theracran HP by Theralogix  -d-mannose    It was a pleasure meeting with you today.  Thank you for allowing me and my team the privilege of caring for you today.  YOU are the reason we are here, and I truly hope we provided you with the excellent service you deserve.  Please let us know if there is anything else we can do for you so that we can be sure you are leaving completely satisfied with your care experience.

## 2018-06-05 ENCOUNTER — TELEPHONE (OUTPATIENT)
Dept: FAMILY MEDICINE | Facility: CLINIC | Age: 53
End: 2018-06-05

## 2018-06-05 NOTE — TELEPHONE ENCOUNTER
Please do not close this encounter until this has been addressed.  (prior auth approved/denied, prescriber refusal to complete prior auth or medication changed/discontinued)    Prior Authorization needed on: ambien 5  Drug NDC: [address]-09     Insurance: medica, Bin:  [id number], Pcn:  [id number], Group:  [id number]   Member ID: [id number]   Insurance phone #: [phone]    Pharmacy NPI: [id number]  Pharmacy Phone #: [phone]  Pharmacy Fax #: [phone]    Please let us know if the PA gets approved or denied or if medication is changed      Please change medication or provide reasoning/documentation and forward to PA Team at [phone]  Thanks,  Mary Anne Desai, [place]  [facility]  [phone]

## 2018-06-06 DIAGNOSIS — F90.2 ATTENTION DEFICIT HYPERACTIVITY DISORDER (ADHD), COMBINED TYPE: ICD-10-CM

## 2018-06-06 RX ORDER — DEXTROAMPHETAMINE SACCHARATE, AMPHETAMINE ASPARTATE MONOHYDRATE, DEXTROAMPHETAMINE SULFATE AND AMPHETAMINE SULFATE 7.5; 7.5; 7.5; 7.5 MG/1; MG/1; MG/1; MG/1
30 CAPSULE, EXTENDED RELEASE ORAL 2 TIMES DAILY
Qty: 60 CAPSULE | Refills: 0 | Status: SHIPPED | OUTPATIENT
Start: 2018-06-06 | End: 2018-07-10

## 2018-06-06 NOTE — TELEPHONE ENCOUNTER
Prior Authorization Approval    Authorization Effective Date: 6/6/2018  Authorization Expiration Date: 6/5/2021  Medication: ambien-APPROVED  Approved Dose/Quantity:   Reference #:     Insurance Company: [employer] - Phone [phone] Fax [phone]  Expected CoPay:       CoPay Card Available:      Foundation Assistance Needed:    Which Pharmacy is filling the prescription (Not needed for infusion/clinic administered): [facility] [place], [place] - [address] [address]  Pharmacy Notified: Yes  Patient Notified: No    Pharmacy will notify patient when medication is ready.

## 2018-06-06 NOTE — TELEPHONE ENCOUNTER
Controlled Substance Refill Request for adderall  Problem List Complete:  Yes    Patient is followed by HAASE, SUSAN RACHELE for ongoing prescription of stimulants.  All refills should be approved by this provider, or covering partner.    Medication(s): Aderall XR 20 mg every day.  Adderall XR 30 mg every day.   Maximum quantity per month: #30;  #30   Clinic visit frequency required: Q 6  Months Last Office Visit: 4/13/18    Controlled substance agreement on file: Yes       Date(s): 3/10/2016  Neuropsych evaluation for ADD completed:  No    Last [facility] website verification: 10/18/17   [email or web address]   checked in past 6 months?  Yes 6/6/18     [facility] ran today; Dr. Haase is the only provider in 2018; dates all look good.    Edelmira Escobar, Pharmacy [facility]

## 2018-06-06 NOTE — TELEPHONE ENCOUNTER
Central Prior Authorization Team   Phone: [phone]      PA Initiation    Medication: ambien-Initiated  Insurance Company: [employer] - Phone [phone] Fax [phone]  Pharmacy Filling the Rx: [place], [place] - [address]  Filling Pharmacy Phone: [phone]  Filling Pharmacy Fax:    Start Date: 6/6/2018

## 2018-06-07 DIAGNOSIS — E55.9 VITAMIN D DEFICIENCY: ICD-10-CM

## 2018-06-08 NOTE — TELEPHONE ENCOUNTER
Requested Prescriptions   Pending Prescriptions Disp Refills     vitamin D (ERGOCALCIFEROL) 18249 UNIT capsule [Pharmacy Med Name: VIT D2 (ERGOCALCIF) 50,000 U] 12 capsule 0     Sig: TAKE ONE CAPSULE BY MOUTH EVERY 7 DAYS FOR 12 DOSES    There is no refill protocol information for this order        Last Written Prescription Date:  4/16/18  Last Fill Quantity: 12,  # refills: 0   Last Office Visit: 4/13/2018   Future Office Visit:

## 2018-06-11 RX ORDER — ERGOCALCIFEROL 1.25 MG/1
CAPSULE, LIQUID FILLED ORAL
Qty: 12 CAPSULE | Refills: 0 | OUTPATIENT
Start: 2018-06-11

## 2018-06-11 NOTE — TELEPHONE ENCOUNTER
Per result note:  Entered by Haase, Susan Rachele, APRN CNP at 4/16/2018  8:44 PM   Read by Laney Maynard at 4/18/2018 11:53 AM   Pj Davison,   Your vitamin d level was low at 14, normal is 20-75. Having a low vitamin D level can cause body aches and fatigue.  I would like you to take vitamin D 50,000 iu every week for the next 12 weeks.  I have sent in a prescriptions for vitamin D to your pharmacy.  After 12 weeks I would like you to come in for a lab only appointment to recheck your vitamin D level, you do not have to be fasting for that lab appointment.      Spoke with patient and she will call back to schedule at the end of June.    Marely Kuo RN, BSN

## 2018-06-23 ENCOUNTER — HEALTH MAINTENANCE LETTER (OUTPATIENT)
Age: 53
End: 2018-06-23

## 2018-06-26 ENCOUNTER — MEDICAL CORRESPONDENCE (OUTPATIENT)
Dept: HEALTH INFORMATION MANAGEMENT | Facility: CLINIC | Age: 53
End: 2018-06-26

## 2018-06-27 DIAGNOSIS — E55.9 VITAMIN D DEFICIENCY: ICD-10-CM

## 2018-06-27 DIAGNOSIS — G43.019 COMMON MIGRAINE WITH INTRACTABLE MIGRAINE: Primary | ICD-10-CM

## 2018-06-27 LAB
BASOPHILS # BLD AUTO: 0 10E9/L (ref 0–0.2)
BASOPHILS NFR BLD AUTO: 0.9 %
DIFFERENTIAL METHOD BLD: ABNORMAL
EOSINOPHIL # BLD AUTO: 0.1 10E9/L (ref 0–0.7)
EOSINOPHIL NFR BLD AUTO: 1.4 %
ERYTHROCYTE [DISTWIDTH] IN BLOOD BY AUTOMATED COUNT: 11.6 % (ref 10–15)
HCT VFR BLD AUTO: 40 % (ref 35–47)
HGB BLD-MCNC: 14.2 G/DL (ref 11.7–15.7)
LYMPHOCYTES # BLD AUTO: 1.6 10E9/L (ref 0.8–5.3)
LYMPHOCYTES NFR BLD AUTO: 44.9 %
MCH RBC QN AUTO: 34.4 PG (ref 26.5–33)
MCHC RBC AUTO-ENTMCNC: 35.5 G/DL (ref 31.5–36.5)
MCV RBC AUTO: 97 FL (ref 78–100)
MONOCYTES # BLD AUTO: 0.2 10E9/L (ref 0–1.3)
MONOCYTES NFR BLD AUTO: 7 %
NEUTROPHILS # BLD AUTO: 1.6 10E9/L (ref 1.6–8.3)
NEUTROPHILS NFR BLD AUTO: 45.8 %
PLATELET # BLD AUTO: 236 10E9/L (ref 150–450)
RBC # BLD AUTO: 4.13 10E12/L (ref 3.8–5.2)
WBC # BLD AUTO: 3.5 10E9/L (ref 4–11)

## 2018-06-27 PROCEDURE — 36415 COLL VENOUS BLD VENIPUNCTURE: CPT | Performed by: NURSE PRACTITIONER

## 2018-06-27 PROCEDURE — 84439 ASSAY OF FREE THYROXINE: CPT | Performed by: NURSE PRACTITIONER

## 2018-06-27 PROCEDURE — 82306 VITAMIN D 25 HYDROXY: CPT | Performed by: NURSE PRACTITIONER

## 2018-06-27 PROCEDURE — 85025 COMPLETE CBC W/AUTO DIFF WBC: CPT | Performed by: NURSE PRACTITIONER

## 2018-06-27 PROCEDURE — 80053 COMPREHEN METABOLIC PANEL: CPT | Performed by: NURSE PRACTITIONER

## 2018-06-27 PROCEDURE — 84443 ASSAY THYROID STIM HORMONE: CPT | Performed by: NURSE PRACTITIONER

## 2018-06-28 LAB
ALBUMIN SERPL-MCNC: 3.5 G/DL (ref 3.4–5)
ALP SERPL-CCNC: 132 U/L (ref 40–150)
ALT SERPL W P-5'-P-CCNC: 25 U/L (ref 0–50)
ANION GAP SERPL CALCULATED.3IONS-SCNC: 6 MMOL/L (ref 3–14)
AST SERPL W P-5'-P-CCNC: 18 U/L (ref 0–45)
BILIRUB SERPL-MCNC: 0.4 MG/DL (ref 0.2–1.3)
BUN SERPL-MCNC: 16 MG/DL (ref 7–30)
CALCIUM SERPL-MCNC: 9.3 MG/DL (ref 8.5–10.1)
CHLORIDE SERPL-SCNC: 109 MMOL/L (ref 94–109)
CO2 SERPL-SCNC: 27 MMOL/L (ref 20–32)
CREAT SERPL-MCNC: 1.03 MG/DL (ref 0.52–1.04)
DEPRECATED CALCIDIOL+CALCIFEROL SERPL-MC: 30 UG/L (ref 20–75)
GFR SERPL CREATININE-BSD FRML MDRD: 56 ML/MIN/1.7M2
GLUCOSE SERPL-MCNC: 70 MG/DL (ref 70–99)
POTASSIUM SERPL-SCNC: 4.1 MMOL/L (ref 3.4–5.3)
PROT SERPL-MCNC: 7.7 G/DL (ref 6.8–8.8)
SODIUM SERPL-SCNC: 142 MMOL/L (ref 133–144)
T4 FREE SERPL-MCNC: 1.16 NG/DL (ref 0.76–1.46)
TSH SERPL DL<=0.005 MIU/L-ACNC: 0.97 MU/L (ref 0.4–4)

## 2018-07-10 DIAGNOSIS — F90.2 ATTENTION DEFICIT HYPERACTIVITY DISORDER (ADHD), COMBINED TYPE: ICD-10-CM

## 2018-07-10 RX ORDER — DEXTROAMPHETAMINE SACCHARATE, AMPHETAMINE ASPARTATE MONOHYDRATE, DEXTROAMPHETAMINE SULFATE AND AMPHETAMINE SULFATE 7.5; 7.5; 7.5; 7.5 MG/1; MG/1; MG/1; MG/1
30 CAPSULE, EXTENDED RELEASE ORAL 2 TIMES DAILY
Qty: 60 CAPSULE | Refills: 0 | Status: SHIPPED | OUTPATIENT
Start: 2018-07-10 | End: 2018-08-14

## 2018-07-10 NOTE — TELEPHONE ENCOUNTER
Controlled Substance Refill Request for Adderall Xr  Problem List Complete:  Yes    Patient is followed by HAASE, SUSAN RACHELE for ongoing prescription of stimulants.  All refills should be approved by this provider, or covering partner.    Medication(s): Aderall XR 20 mg every day.  Adderall XR 30 mg every day.   Maximum quantity per month: #30;  #30   Clinic visit frequency required: Q 6  months  Last Office Visit: 4/13/18    Controlled substance agreement on file: Yes       Date(s): 3/10/2016  Neuropsych evaluation for ADD completed:  No    Last [facility] website verification: 06/06/2018   [email or web address]     checked in past 3 months?  Yes 6/6/18     Edelmira Escobar, Pharmacy [facility]

## 2018-08-14 DIAGNOSIS — F90.2 ATTENTION DEFICIT HYPERACTIVITY DISORDER (ADHD), COMBINED TYPE: ICD-10-CM

## 2018-08-14 NOTE — TELEPHONE ENCOUNTER
Controlled Substance Refill Request for Adderall Xr  Problem List Complete:  Yes    Patient is followed by HAASE, SUSAN RACHELE for ongoing prescription of stimulants.  All refills should be approved by this provider, or covering partner.    Medication(s): Aderall XR 20 mg every day.  Adderall XR 30 mg every day.   Maximum quantity per month: #30;  #30   Clinic visit frequency required: Q 6  Months Last Office Visit: 4/13/18    Controlled substance agreement on file: Yes       Date(s): 3/10/2016  Neuropsych evaluation for ADD completed:  No    Last [facility] website verification: 06/06/2018   [email or web address]     checked in past 3 months?  Yes 6/6/18     Edelmira Escobar, Pharmacy [facility]

## 2018-08-15 RX ORDER — DEXTROAMPHETAMINE SACCHARATE, AMPHETAMINE ASPARTATE MONOHYDRATE, DEXTROAMPHETAMINE SULFATE AND AMPHETAMINE SULFATE 7.5; 7.5; 7.5; 7.5 MG/1; MG/1; MG/1; MG/1
30 CAPSULE, EXTENDED RELEASE ORAL 2 TIMES DAILY
Qty: 60 CAPSULE | Refills: 0 | Status: SHIPPED | OUTPATIENT
Start: 2018-08-15 | End: 2018-09-24

## 2018-08-28 ENCOUNTER — OFFICE VISIT (OUTPATIENT)
Dept: FAMILY MEDICINE | Facility: CLINIC | Age: 53
End: 2018-08-28
Payer: COMMERCIAL

## 2018-08-28 VITALS
RESPIRATION RATE: 16 BRPM | WEIGHT: 153.6 LBS | SYSTOLIC BLOOD PRESSURE: 103 MMHG | TEMPERATURE: 98 F | OXYGEN SATURATION: 97 % | DIASTOLIC BLOOD PRESSURE: 68 MMHG | HEART RATE: 90 BPM | BODY MASS INDEX: 26.37 KG/M2

## 2018-08-28 DIAGNOSIS — N30.00 ACUTE CYSTITIS WITHOUT HEMATURIA: Primary | ICD-10-CM

## 2018-08-28 DIAGNOSIS — R82.90 NONSPECIFIC FINDING ON EXAMINATION OF URINE: ICD-10-CM

## 2018-08-28 LAB
ALBUMIN UR-MCNC: 30 MG/DL
APPEARANCE UR: CLEAR
BACTERIA #/AREA URNS HPF: ABNORMAL /HPF
BILIRUB UR QL STRIP: NEGATIVE
COLOR UR AUTO: YELLOW
GLUCOSE UR STRIP-MCNC: NEGATIVE MG/DL
HGB UR QL STRIP: ABNORMAL
KETONES UR STRIP-MCNC: NEGATIVE MG/DL
LEUKOCYTE ESTERASE UR QL STRIP: ABNORMAL
MUCOUS THREADS #/AREA URNS LPF: PRESENT /LPF
NITRATE UR QL: POSITIVE
NON-SQ EPI CELLS #/AREA URNS LPF: ABNORMAL /LPF
PH UR STRIP: 7 PH (ref 5–7)
RBC #/AREA URNS AUTO: ABNORMAL /HPF
SOURCE: ABNORMAL
SP GR UR STRIP: 1.01 (ref 1–1.03)
UROBILINOGEN UR STRIP-ACNC: 1 EU/DL (ref 0.2–1)
WBC #/AREA URNS AUTO: ABNORMAL /HPF
WBC CLUMPS #/AREA URNS HPF: PRESENT /HPF

## 2018-08-28 PROCEDURE — 87088 URINE BACTERIA CULTURE: CPT | Performed by: PHYSICIAN ASSISTANT

## 2018-08-28 PROCEDURE — 87186 SC STD MICRODIL/AGAR DIL: CPT | Performed by: PHYSICIAN ASSISTANT

## 2018-08-28 PROCEDURE — 81001 URINALYSIS AUTO W/SCOPE: CPT | Performed by: PHYSICIAN ASSISTANT

## 2018-08-28 PROCEDURE — 87086 URINE CULTURE/COLONY COUNT: CPT | Performed by: PHYSICIAN ASSISTANT

## 2018-08-28 PROCEDURE — 99213 OFFICE O/P EST LOW 20 MIN: CPT | Performed by: PHYSICIAN ASSISTANT

## 2018-08-28 RX ORDER — NITROFURANTOIN 25; 75 MG/1; MG/1
100 CAPSULE ORAL 2 TIMES DAILY
Qty: 20 CAPSULE | Refills: 0 | Status: SHIPPED | OUTPATIENT
Start: 2018-08-28 | End: 2018-09-07

## 2018-08-28 NOTE — PROGRESS NOTES
SUBJECTIVE:   Laney Maynard is a 53 year old female who presents to clinic today for the following health issues:      URINARY TRACT SYMPTOMS  Onset: 08/24/2018    Description:   Painful urination (Dysuria): YES  Blood in urine (Hematuria): no   Delay in urine (Hesitency): no   Increased frequency and urgency.    Intensity: severe    Progression of Symptoms:  worsening    Accompanying Signs & Symptoms:  Fever/chills: no   Flank pain YES  Nausea and vomiting: no   Any vaginal symptoms: none  Abdominal/Pelvic Pain: no     History:   History of frequent UTI's: YES  History of kidney stones: YES  Sexually Active: YES  Possibility of pregnancy: No    Precipitating factors:   none    Therapies Tried and outcome: water, juice and Pyridium with mild improvement in dysuria      Problem list and histories reviewed & adjusted, as indicated.  Additional history: as documented    Patient Active Problem List   Diagnosis     Alopecia     Seasonal allergic rhinitis     GERD (gastroesophageal reflux disease)     CARDIOVASCULAR SCREENING; LDL GOAL LESS THAN 160     Migraine headache     Lymphocytopenia     Sleep disorder     Family history of rheumatoid arthritis     Family history of sarcoidosis (mother)     Urinary frequency     Herpes simplex virus infection     Attention deficit hyperactivity disorder (ADHD)     Plantar fasciitis, bilateral     Complete rupture of rotator cuff     Mixed incontinence urge and stress (male)(female)     Ureterolithiasis     Past Surgical History:   Procedure Laterality Date     ARTHROSCOPY KNEE Right 04/26/2017    Procedure: Right knee arthroscopy and anterior fat pad resection with medial plica resection. Partial lateral menisectomy. Surgeon:  Fredi Lindsay MD  Location: [facility]     ARTHROSCOPY SHOULDER, OPEN ROTATOR CUFF REPAIR, COMBINED  7/31/2013    Procedure: COMBINED ARTHROSCOPY SHOULDER, OPEN ROTATOR CUFF REPAIR;  Left Shoulder Arthroscopy, Distal Clavicale  Resection, Decompression, Mini Open Rotator Cuff Repair    ;  Surgeon: Fredi Lindsay MD;  Location: [facility] OR     C NONSPECIFIC PROCEDURE  age 16    colposcopy for abnormal pap     C NONSPECIFIC PROCEDURE      surgery L thumb     C NONSPECIFIC PROCEDURE      breast augmentation-silicone     C NONSPECIFIC PROCEDURE      s/p  x 2     C NONSPECIFIC PROCEDURE      Bilateral tubal ligation     C REMOVAL OF KIDNEY STONE       COLONOSCOPY  2014    Procedure: COLONOSCOPY;  Colonoscopy/WW;  Surgeon: Pancho Olsen MD;  Location:  GI     COMBINED CYSTOSCOPY, RETROGRADES, URETEROSCOPY, LASER HOLMIUM LITHOTRIPSY URETER(S), INSERT STENT Right 2016    Procedure: COMBINED CYSTOSCOPY, RETROGRADES, URETEROSCOPY, LASER HOLMIUM LITHOTRIPSY URETER(S), INSERT STENT;  Surgeon: Kushal Noriega MD;  Location: [facility] OR     CYSTOSCOPY       CYSTOSCOPY, RETROGRADES, EXTRACT STONE, COMBINED  2013    Procedure: COMBINED CYSTOSCOPY, RETROGRADES, EXTRACT STONE;  Video Cystoscopy,  Right Ureteroscopy, ureteral dilatation,  Stone extraction;  Surgeon: Subhash Vann MD;  Location: [facility] OR     EXTRACORPOREAL SHOCK WAVE LITHOTRIPSY (ESWL) Bilateral 2016    Procedure: EXTRACORPOREAL SHOCK WAVE LITHOTRIPSY (ESWL);  Surgeon: Bassam Vu MD;  Location: [facility] OR     EXTRACORPOREAL SHOCK WAVE LITHOTRIPSY, CYSTOSCOPY, INSERT STENT URETER(S), COMBINED Bilateral 2018    Procedure: COMBINED EXTRACORPOREAL SHOCK WAVE LITHOTRIPSY, CYSTOSCOPY, INSERT STENT URETER(S);  BILATERAL COMBINED EXTRACORPOREAL SHOCK WAVE LITHOTRIPSY, CYSTOSCOPY, LEFT STENT PLACEMENT;  Surgeon: Bassam Vu MD;  Location:  OR     GYN SURGERY      laparoscopy     LAPAROSCOPIC CHOLECYSTECTOMY WITH CHOLANGIOGRAMS  2012    Procedure: LAPAROSCOPIC CHOLECYSTECTOMY WITH CHOLANGIOGRAMS;  LAPAROSCOPIC CHOLECYSTECTOMY WITH CHOLANGIOGRAMS ;  Surgeon: Nataliya Gabriel MD;  Location: [facility] OR     TUBAL LIGATION         Social  History   Substance Use Topics     Smoking status: Never Smoker     Smokeless tobacco: Never Used     Alcohol use No     Family History   Problem Relation Age of Onset     Diabetes Mother      type 2     Alcohol/Drug Mother      alcohol         Current Outpatient Prescriptions   Medication Sig Dispense Refill     acyclovir (ZOVIRAX) 5 % ointment Apply topically 5 times daily (Patient taking differently: Apply topically 2 times daily as needed (outbreak) ) 30 g 5     amphetamine-dextroamphetamine (ADDERALL XR) 30 MG per 24 hr capsule Take 1 capsule (30 mg) by mouth 2 times daily 60 capsule 0     conjugated estrogens (PREMARIN) vaginal cream Place 0.5 g vaginally three times a week (Patient taking differently: Place 0.5 g vaginally See Admin Instructions Once weekly as needed.) 30 g 12     fexofenadine (ALLEGRA) 180 MG tablet Take 1 tablet (180 mg) by mouth daily as needed 30 tablet 6     fluocinolone acetonide 0.01 % OIL Use on scalp once a week. (Patient taking differently: Use on scalp once a week, as needed.) 1 Bottle 11     Fluocinolone Acetonide Scalp 0.01 % OIL oil        fluticasone (FLONASE) 50 MCG/ACT spray Spray 2 sprays into both nostrils daily as needed 16 g 11     ketoconazole (NIZORAL) 2 % shampoo Apply to the affected area and wash off after 5 minutes. (Patient taking differently: Apply to the affected area and wash off after 5 minutes, as needed.) 120 mL 11     ketorolac (TORADOL) 10 MG tablet Take 1 tablet (10 mg) by mouth every 6 hours as needed for moderate pain 10 tablet 0     meclizine (ANTIVERT) 25 MG tablet Take 1 tablet (25 mg) by mouth 3 times daily as needed (Patient taking differently: Take 25 mg by mouth daily as needed ) 30 tablet 3     Menthol, Topical Analgesic, (BIOFREEZE COLORLESS) 4 % GEL Externally apply topically 3 times daily as needed (Patient taking differently: Externally apply topically daily as needed (pain) ) 118 mL 1     naproxen (NAPROSYN) 500 MG tablet        OMEGA-3  FATTY ACIDS-VITAMIN E PO        ondansetron (ZOFRAN ODT) 4 MG ODT tab Take 1-2 tablets (4-8 mg) by mouth every 8 hours as needed for nausea (Patient taking differently: Take 4-8 mg by mouth daily as needed for nausea ) 20 tablet 3     order for DME Equipment being ordered: tall aircast boot 1 Device 0     order for DME Equipment being ordered:heel pads 1 Package 0     phenazopyridine (PYRIDIUM) 200 MG tablet Take 1 tablet (200 mg) by mouth 3 times daily as needed for irritation 30 tablet 3     tamsulosin (FLOMAX) 0.4 MG capsule TAKE ONE CAPSULE BY MOUTH EVERY DAY 30 capsule 0     tobramycin-dexamethasone (TOBRADEX) ophthalmic suspension Place 2 drops into both eyes daily (Patient taking differently: Place 2 drops into both eyes daily as needed ) 1 Bottle 11     Topiramate (TOPAMAX PO) Take 150 mg by mouth every morning (3 X 50MG = 150MG)       valACYclovir (VALTREX) 1000 mg tablet Take 1 tablet (1,000 mg) by mouth daily 90 tablet 3     VESICARE 5 MG tablet TAKE ONE TABLET BY MOUTH EVERY EVENING (Patient taking differently: TAKE ONE TABLET BY MOUTH EVERY MORNING) 90 tablet 2     zolpidem (AMBIEN) 5 MG tablet Take 1 tablet (5 mg) by mouth nightly as needed for sleep 30 tablet 2     Allergies   Allergen Reactions     Aloe Itching and Rash     Codeine      GI upset     Compazine Nausea and Itching     Darvocet [Acetaminophen] Nausea and Vomiting     Macrobid [Nitrofuran Derivatives]      Bladder spasms     Percocet [Oxycodone-Acetaminophen] Nausea and Vomiting     Sulphadimidine [Sulfamethazine] Rash       Reviewed and updated as needed this visit by clinical staff       Reviewed and updated as needed this visit by Provider         ROS:  Constitutional, HEENT, cardiovascular, pulmonary, gi and gu systems are negative, except as otherwise noted.    OBJECTIVE:     /68 (BP Location: Right arm, Patient Position: Right side, Cuff Size: Adult Regular)  Pulse 90  Temp 98  F (36.7  C) (Oral)  Resp 16  Wt 153 lb 9.6 oz  (69.7 kg)  [facility] 02/25/2014  SpO2 97%  BMI 26.37 kg/m2  Body mass index is 26.37 kg/(m^2).  GENERAL: healthy, alert and no distress  EYES: Eyes grossly normal to inspection, PERRL and conjunctivae and sclerae normal  RESP: lungs clear to auscultation - no rales, rhonchi or wheezes  CV: regular rate and rhythm, normal S1 S2, no S3 or S4, no murmur, click or rub, no peripheral edema and peripheral pulses strong  ABDOMEN: soft, nontender, no hepatosplenomegaly, no masses and bowel sounds normal  MS: no gross musculoskeletal defects noted, no edema  SKIN: no suspicious lesions or rashes  NEURO: Normal strength and tone, mentation intact and speech normal  BACK: no CVA tenderness  PSYCH: mentation appears normal, affect normal/bright    Diagnostic Test Results:  Results for orders placed or performed in visit on 08/28/18 (from the past 24 hour(s))   UA reflex to Microscopic and Culture   Result Value Ref Range    Color Urine Yellow     Appearance Urine Clear     Glucose Urine Negative NEG^Negative mg/dL    Bilirubin Urine Negative NEG^Negative    Ketones Urine Negative NEG^Negative mg/dL    Specific Gravity Urine 1.015 1.003 - 1.035    Blood Urine Trace (A) NEG^Negative    pH Urine 7.0 5.0 - 7.0 pH    Protein Albumin Urine 30 (A) NEG^Negative mg/dL    Urobilinogen Urine 1.0 0.2 - 1.0 EU/dL    Nitrite Urine Positive (A) NEG^Negative    Leukocyte Esterase Urine Moderate (A) NEG^Negative    Source Midstream Urine    Urine Microscopic   Result Value Ref Range    WBC Urine 10-25 (A) OTO5^0 - 5 /HPF    RBC Urine O - 2 OTO2^O - 2 /HPF    WBC Clumps Present (A) NEG^Negative /HPF    Squamous Epithelial /LPF Urine Few FEW^Few /LPF    Bacteria Urine Moderate (A) NEG^Negative /HPF    Mucous Urine Present (A) NEG^Negative /LPF       ASSESSMENT/PLAN:       (N30.00) Acute cystitis without hematuria  (primary encounter diagnosis)    Comment: Patient is willing to try macrobid again although she has had bladder spasms in the past with  it. Will call if these occur or if not helping and we will change antibiotic.    Plan: UA reflex to Microscopic and Culture, Urine         Culture Aerobic Bacterial, Urine Microscopic,         nitroFURantoin, macrocrystal-monohydrate,         (MACROBID) 100 MG capsule            (R82.90) Nonspecific finding on examination of urine  Comment:   Plan: Urine Culture Aerobic Bacterial              Patient Instructions       Understanding Urinary Tract Infections (UTIs)  Most UTIs are caused by bacteria, although they may also be caused by viruses or fungi. Bacteria from the bowel are the most common source of infection. The infection may start because of any of the following:    Sexual activity. During sex, bacteria can travel from the penis, vagina, or rectum into the urethra.     Bacteria on the skin outside the rectum may travel into the urethra. This is more common in women since the rectum and urethra are closer to each other than in men. Wiping from front to back after using the toilet and keeping the area clean can help prevent germs from getting to the urethra.    Blockage of urine flow through the urinary tract. If urine sits too long, germs may start to grow out of control.      Parts of the urinary tract  The infection can occur in any part of the urinary tract.    The kidneys collect and store urine.    The ureters carry urine from the kidneys to the bladder.    The bladder holds urine until you are ready to let it out.    The urethra carries urine from the bladder out of the body. It is shorter in women, so bacteria can move through it more easily. The urethra is longer in men, so a UTI is less likely to reach the bladder or kidneys in men.  Date Last Reviewed: 1/1/2017 2000-2017 [employer]. [address] [place] [address], [place] [address]. All rights reserved. This information is not intended as a substitute for professional medical care. Always follow your healthcare professional's  instructions.            Asif Gray PA-C  [facility]

## 2018-08-28 NOTE — PATIENT INSTRUCTIONS
Understanding Urinary Tract Infections (UTIs)  Most UTIs are caused by bacteria, although they may also be caused by viruses or fungi. Bacteria from the bowel are the most common source of infection. The infection may start because of any of the following:    Sexual activity. During sex, bacteria can travel from the penis, vagina, or rectum into the urethra.     Bacteria on the skin outside the rectum may travel into the urethra. This is more common in women since the rectum and urethra are closer to each other than in men. Wiping from front to back after using the toilet and keeping the area clean can help prevent germs from getting to the urethra.    Blockage of urine flow through the urinary tract. If urine sits too long, germs may start to grow out of control.      Parts of the urinary tract  The infection can occur in any part of the urinary tract.    The kidneys collect and store urine.    The ureters carry urine from the kidneys to the bladder.    The bladder holds urine until you are ready to let it out.    The urethra carries urine from the bladder out of the body. It is shorter in women, so bacteria can move through it more easily. The urethra is longer in men, so a UTI is less likely to reach the bladder or kidneys in men.  Date Last Reviewed: 1/1/2017 2000-2017 [employer]. [address] [place] [address], [place] [address]. All rights reserved. This information is not intended as a substitute for professional medical care. Always follow your healthcare professional's instructions.

## 2018-08-28 NOTE — MR AVS SNAPSHOT
After Visit Summary   8/28/2018    Laney Maynard    MRN: [id number]           Patient Information     Date Of Birth          1965        Visit Information        Provider Department      8/28/2018 8:40 AM Asif Gray PA-C [facility]        Today's Diagnoses     Acute cystitis without hematuria    -  1    Nonspecific finding on examination of urine          Care Instructions      Understanding Urinary Tract Infections (UTIs)  Most UTIs are caused by bacteria, although they may also be caused by viruses or fungi. Bacteria from the bowel are the most common source of infection. The infection may start because of any of the following:    Sexual activity. During sex, bacteria can travel from the penis, vagina, or rectum into the urethra.     Bacteria on the skin outside the rectum may travel into the urethra. This is more common in women since the rectum and urethra are closer to each other than in men. Wiping from front to back after using the toilet and keeping the area clean can help prevent germs from getting to the urethra.    Blockage of urine flow through the urinary tract. If urine sits too long, germs may start to grow out of control.      Parts of the urinary tract  The infection can occur in any part of the urinary tract.    The kidneys collect and store urine.    The ureters carry urine from the kidneys to the bladder.    The bladder holds urine until you are ready to let it out.    The urethra carries urine from the bladder out of the body. It is shorter in women, so bacteria can move through it more easily. The urethra is longer in men, so a UTI is less likely to reach the bladder or kidneys in men.  Date Last Reviewed: 1/1/2017 2000-2017 [employer]. [address] [place] [address], [place] [address]. All rights reserved. This information is not intended as a substitute for professional medical care. Always follow your healthcare professional's  instructions.                Follow-ups after your visit        Your next 10 appointments already scheduled     Nov 21, 2018 11:30 AM CST   Return Visit with Shawna Wei MD   [facility] ([facility])    [address]  [address]  [facility] [address] [phone]              Who to contact     If you have questions or need follow up information about today's clinic visit or your schedule please contact [facility] directly at [phone].  Normal or non-critical lab and imaging results will be communicated to you by Mojo Mobilityhart, letter or phone within 4 business days after the clinic has received the results. If you do not hear from us within 7 days, please contact the clinic through Biomonitort or phone. If you have a critical or abnormal lab result, we will notify you by phone as soon as possible.  Submit refill requests through [employer] or call your pharmacy and they will forward the refill request to us. Please allow 3 business days for your refill to be completed.          Additional Information About Your Visit        [employer] Information     [employer] gives you secure access to your electronic health record. If you see a primary care provider, you can also send messages to your care team and make appointments. If you have questions, please call your primary care clinic.  If you do not have a primary care provider, please call [phone] and they will assist you.        Care EveryWhere ID     This is your Care EveryWhere ID. This could be used by other organizations to access your [place] medical records  [id number]        Your Vitals Were     Pulse Temperature Respirations Last Period Pulse Oximetry BMI (Body Mass Index)    90 98  F (36.7  C) (Oral) 16 02/25/2014 97% 26.37 kg/m2       Blood Pressure from Last 3 Encounters:   08/28/18 103/68   05/23/18 124/74   05/10/18 110/74    Weight from Last 3 Encounters:   08/28/18 153 lb  9.6 oz (69.7 kg)   05/23/18 145 lb (65.8 kg)   05/10/18 145 lb (65.8 kg)              We Performed the Following     UA reflex to Microscopic and Culture     Urine Culture Aerobic Bacterial     Urine Microscopic          Today's Medication Changes          These changes are accurate as of 8/28/18  9:25 AM.  If you have any questions, ask your nurse or doctor.               Start taking these medicines.        Dose/Directions    nitroFURantoin (macrocrystal-monohydrate) 100 MG capsule   Commonly known as:  MACROBID   Used for:  Acute cystitis without hematuria   Started by:  Asif Gray PA-C        Dose:  100 mg   Take 1 capsule (100 mg) by mouth 2 times daily for 10 days   Quantity:  20 capsule   Refills:  0         These medicines have changed or have updated prescriptions.        Dose/Directions    acyclovir 5 % ointment   Commonly known as:  ZOVIRAX   This may have changed:    - when to take this  - reasons to take this   Used for:  Herpes simplex virus infection        Apply topically 5 times daily   Quantity:  30 g   Refills:  5       conjugated estrogens cream   Commonly known as:  PREMARIN   This may have changed:    - when to take this  - additional instructions   Used for:  Urinary tract infection with hematuria, site unspecified        Dose:  0.5 g   Place 0.5 g vaginally three times a week   Quantity:  30 g   Refills:  12       fluocinolone acetonide 0.01 % Oil   This may have changed:  additional instructions   Used for:  Scalp psoriasis        Use on scalp once a week.   Quantity:  1 Bottle   Refills:  11       ketoconazole 2 % shampoo   Commonly known as:  NIZORAL   This may have changed:  additional instructions   Used for:  Seborrheic dermatitis of scalp        Apply to the affected area and wash off after 5 minutes.   Quantity:  120 mL   Refills:  11       meclizine 25 MG tablet   Commonly known as:  ANTIVERT   This may have changed:  when to take this   Used for:  Vertigo        Dose:  25 mg    Take 1 tablet (25 mg) by mouth 3 times daily as needed   Quantity:  30 tablet   Refills:  3       Menthol (Topical Analgesic) 4 % Gel   Commonly known as:  BIOFREEZE COLORLESS   This may have changed:    - when to take this  - reasons to take this   Used for:  Pain in both feet, Plantar fasciitis, bilateral, Pes planus of both feet        Externally apply topically 3 times daily as needed   Quantity:  118 mL   Refills:  1       ondansetron 4 MG ODT tab   Commonly known as:  ZOFRAN ODT   This may have changed:  when to take this   Used for:  Ureterolithiasis        Dose:  4-8 mg   Take 1-2 tablets (4-8 mg) by mouth every 8 hours as needed for nausea   Quantity:  20 tablet   Refills:  3       tobramycin-dexamethasone 0.3-0.1 % ophthalmic susp   Commonly known as:  TOBRADEX   This may have changed:    - when to take this  - reasons to take this   Used for:  Eye disorder        Dose:  2 drop   Place 2 drops into both eyes daily   Quantity:  1 Bottle   Refills:  11       VESICARE 5 MG tablet   This may have changed:  See the new instructions.   Used for:  Stress incontinence   Generic drug:  solifenacin        TAKE ONE TABLET BY MOUTH EVERY EVENING   Quantity:  90 tablet   Refills:  2            Where to get your medicines      These medications were sent to [facility] [address] [place] [address]  [id number] [place] [address]     Phone:  [phone]     nitroFURantoin (macrocrystal-monohydrate) 100 MG capsule                Primary Care Provider Office Phone # Fax #    Socorro Song Haase, APRN -[phone] [address] [facility] [address]        Equal Access to Services     [facility]: Hadii ezio arellano hadasho Soomaali, waaxda luqadaha, qaybta kaalmada adeegyada, jay hurst. So [facility] [phone].    ATENCIÓN: Si habla español, tiene a ho disposición servicios gratuitos de asistencia lingüística. Llame al  [phone].    We comply with applicable federal civil rights laws and [place] laws. We do not discriminate on the basis of race, color, national origin, age, disability, sex, sexual orientation, or gender identity.            Thank you!     Thank you for choosing [facility]  for your care. Our goal is always to provide you with excellent care. Hearing back from our patients is one way we can continue to improve our services. Please take a few minutes to complete the written survey that you may receive in the mail after your visit with us. Thank you!             Your Updated Medication List - Protect others around you: Learn how to safely use, store and throw away your medicines at www.disposemymeds.org.          This list is accurate as of 8/28/18  9:25 AM.  Always use your most recent med list.                   Brand Name Dispense Instructions for use Diagnosis    acyclovir 5 % ointment    ZOVIRAX    30 g    Apply topically 5 times daily    Herpes simplex virus infection       amphetamine-dextroamphetamine 30 MG per 24 hr capsule    ADDERALL XR    60 capsule    Take 1 capsule (30 mg) by mouth 2 times daily    Attention deficit hyperactivity disorder (ADHD), combined type       conjugated estrogens cream    PREMARIN    30 g    Place 0.5 g vaginally three times a week    Urinary tract infection with hematuria, site unspecified       fexofenadine 180 MG tablet    ALLEGRA    30 tablet    Take 1 tablet (180 mg) by mouth daily as needed    Acute seasonal allergic rhinitis, unspecified trigger       fluocinolone acetonide 0.01 % Oil     1 Bottle    Use on scalp once a week.    Scalp psoriasis       Fluocinolone Acetonide Scalp 0.01 % Oil oil           fluticasone 50 MCG/ACT spray    FLONASE    16 g    Spray 2 sprays into both nostrils daily as needed    Acute seasonal allergic rhinitis, unspecified trigger       ketoconazole 2 % shampoo    NIZORAL    120 mL    Apply to the affected area and wash  off after 5 minutes.    Seborrheic dermatitis of scalp       ketorolac 10 MG tablet    TORADOL    10 tablet    Take 1 tablet (10 mg) by mouth every 6 hours as needed for moderate pain    Calculus of kidney       meclizine 25 MG tablet    ANTIVERT    30 tablet    Take 1 tablet (25 mg) by mouth 3 times daily as needed    Vertigo       Menthol (Topical Analgesic) 4 % Gel    BIOFREEZE COLORLESS    118 mL    Externally apply topically 3 times daily as needed    Pain in both feet, Plantar fasciitis, bilateral, Pes planus of both feet       naproxen 500 MG tablet    NAPROSYN          nitroFURantoin (macrocrystal-monohydrate) 100 MG capsule    MACROBID    20 capsule    Take 1 capsule (100 mg) by mouth 2 times daily for 10 days    Acute cystitis without hematuria       OMEGA-3 FATTY ACIDS-VITAMIN E PO           ondansetron 4 MG ODT tab    ZOFRAN ODT    20 tablet    Take 1-2 tablets (4-8 mg) by mouth every 8 hours as needed for nausea    Ureterolithiasis       * order for DME     1 Package    Equipment being ordered:heel pads    Intractable left heel pain       * order for DME     1 Device    Equipment being ordered: tall aircast boot    Pain in both feet, Plantar fasciitis, bilateral, Pes planus of both feet       phenazopyridine 200 MG tablet    PYRIDIUM    30 tablet    Take 1 tablet (200 mg) by mouth 3 times daily as needed for irritation    Urinary problem       tamsulosin 0.4 MG capsule    FLOMAX    30 capsule    TAKE ONE CAPSULE BY MOUTH EVERY DAY    Ureterolithiasis       tobramycin-dexamethasone 0.3-0.1 % ophthalmic susp    TOBRADEX    1 Bottle    Place 2 drops into both eyes daily    Eye disorder       TOPAMAX PO      Take 150 mg by mouth every morning (3 X 50MG = 150MG)        valACYclovir 1000 mg tablet    VALTREX    90 tablet    Take 1 tablet (1,000 mg) by mouth daily    Herpes simplex virus infection       VESICARE 5 MG tablet   Generic drug:  solifenacin     90 tablet    TAKE ONE TABLET BY MOUTH EVERY EVENING     Stress incontinence       zolpidem 5 MG tablet    AMBIEN    30 tablet    Take 1 tablet (5 mg) by mouth nightly as needed for sleep    Sleep disorder       * Notice:  This list has 2 medication(s) that are the same as other medications prescribed for you. Read the directions carefully, and ask your doctor or other care provider to review them with you.

## 2018-08-30 LAB
BACTERIA SPEC CULT: ABNORMAL
BACTERIA SPEC CULT: ABNORMAL
SPECIMEN SOURCE: ABNORMAL

## 2018-09-10 ENCOUNTER — TELEPHONE (OUTPATIENT)
Dept: FAMILY MEDICINE | Facility: CLINIC | Age: 53
End: 2018-09-10

## 2018-09-10 DIAGNOSIS — N30.00 ACUTE CYSTITIS WITHOUT HEMATURIA: Primary | ICD-10-CM

## 2018-09-10 RX ORDER — CIPROFLOXACIN 500 MG/1
500 TABLET, FILM COATED ORAL 2 TIMES DAILY
Qty: 14 TABLET | Refills: 0 | Status: SHIPPED | OUTPATIENT
Start: 2018-09-10 | End: 2018-12-12

## 2018-09-10 NOTE — TELEPHONE ENCOUNTER
"Pt calls, wants antibiotic change, symptoms not improving, \"about the same\", see below, routed to AB, please advise, route to inform pt    \"Comment: Patient is willing to try macrobid again although she has had bladder spasms in the past with it. Will call if these occur or if not helping and we will change antibiotic\"      Telephone Information:   Mobile [phone]       Last office visit: 8/28/2018 with prescribing provider:  Acute cystitis   Future Office Visit:      Genet Wolfe RN, BSN  Message handled by Nurse Triage.        "

## 2018-09-10 NOTE — TELEPHONE ENCOUNTER
Called patient and advised of below.  Patient will come for recheck if not better with Cipro.  Samra Jalloh RN

## 2018-09-10 NOTE — TELEPHONE ENCOUNTER
I have sent in cipro. If not improving after finishing that, will need another appointment since her original one was almost 2 weeks ago.    Asif Gray PA-C on 9/10/2018 at 10:49 AM

## 2018-09-24 ENCOUNTER — OFFICE VISIT (OUTPATIENT)
Dept: FAMILY MEDICINE | Facility: CLINIC | Age: 53
End: 2018-09-24
Payer: COMMERCIAL

## 2018-09-24 VITALS
HEART RATE: 91 BPM | RESPIRATION RATE: 12 BRPM | OXYGEN SATURATION: 98 % | BODY MASS INDEX: 26.86 KG/M2 | DIASTOLIC BLOOD PRESSURE: 81 MMHG | SYSTOLIC BLOOD PRESSURE: 119 MMHG | WEIGHT: 156.5 LBS | TEMPERATURE: 98.1 F

## 2018-09-24 DIAGNOSIS — F90.2 ATTENTION DEFICIT HYPERACTIVITY DISORDER (ADHD), COMBINED TYPE: ICD-10-CM

## 2018-09-24 PROCEDURE — 99000 SPECIMEN HANDLING OFFICE-LAB: CPT | Performed by: FAMILY MEDICINE

## 2018-09-24 PROCEDURE — 99214 OFFICE O/P EST MOD 30 MIN: CPT | Performed by: FAMILY MEDICINE

## 2018-09-24 PROCEDURE — 80307 DRUG TEST PRSMV CHEM ANLYZR: CPT | Mod: 90 | Performed by: FAMILY MEDICINE

## 2018-09-24 RX ORDER — DEXTROAMPHETAMINE SACCHARATE, AMPHETAMINE ASPARTATE MONOHYDRATE, DEXTROAMPHETAMINE SULFATE AND AMPHETAMINE SULFATE 7.5; 7.5; 7.5; 7.5 MG/1; MG/1; MG/1; MG/1
30 CAPSULE, EXTENDED RELEASE ORAL 2 TIMES DAILY
Qty: 60 CAPSULE | Refills: 0 | Status: SHIPPED | OUTPATIENT
Start: 2018-09-24 | End: 2018-12-06

## 2018-09-24 NOTE — MR AVS SNAPSHOT
After Visit Summary   9/24/2018    Laney Maynard    MRN: [id number]           Patient Information     Date Of Birth          1965        Visit Information        Provider Department      9/24/2018 3:45 PM Tray Bruno MD [facility]        Today's Diagnoses     Attention deficit hyperactivity disorder (ADHD), combined type           Follow-ups after your visit        Follow-up notes from your care team     Return in about 6 months (around 3/24/2019) for Routine Visit.      Your next 10 appointments already scheduled     Nov 21, 2018 11:30 AM CST   Return Visit with Shawna Wei MD   [facility] ([facility])    [address]  [address]  [facility] [address] [phone]              Who to contact     If you have questions or need follow up information about today's clinic visit or your schedule please contact [facility] directly at [phone].  Normal or non-critical lab and imaging results will be communicated to you by Demibookshart, letter or phone within 4 business days after the clinic has received the results. If you do not hear from us within 7 days, please contact the clinic through 12Societyt or phone. If you have a critical or abnormal lab result, we will notify you by phone as soon as possible.  Submit refill requests through mojio or call your pharmacy and they will forward the refill request to us. Please allow 3 business days for your refill to be completed.          Additional Information About Your Visit        MyChart Information     [employer]io gives you secure access to your electronic health record. If you see a primary care provider, you can also send messages to your care team and make appointments. If you have questions, please call your primary care clinic.  If you do not have a primary care provider, please call [phone] and they will assist you.         Care EveryWhere ID     This is your Care EveryWhere ID. This could be used by other organizations to access your [place] medical records  [id number]        Your Vitals Were     Pulse Temperature Respirations Last Period Pulse Oximetry BMI (Body Mass Index)    91 98.1  F (36.7  C) (Oral) 12 02/25/2014 98% 26.86 kg/m2       Blood Pressure from Last 3 Encounters:   09/24/18 119/81   08/28/18 103/68   05/23/18 124/74    Weight from Last 3 Encounters:   09/24/18 156 lb 8 oz (71 kg)   08/28/18 153 lb 9.6 oz (69.7 kg)   05/23/18 145 lb (65.8 kg)              We Performed the Following     Drug  Screen Comprehensive, Urine w/o Reported Meds (Pain Care Package)          Today's Medication Changes          These changes are accurate as of 9/24/18  4:27 PM.  If you have any questions, ask your nurse or doctor.               These medicines have changed or have updated prescriptions.        Dose/Directions    acyclovir 5 % ointment   Commonly known as:  ZOVIRAX   This may have changed:    - when to take this  - reasons to take this   Used for:  Herpes simplex virus infection        Apply topically 5 times daily   Quantity:  30 g   Refills:  5       conjugated estrogens cream   Commonly known as:  PREMARIN   This may have changed:    - when to take this  - additional instructions   Used for:  Urinary tract infection with hematuria, site unspecified        Dose:  0.5 g   Place 0.5 g vaginally three times a week   Quantity:  30 g   Refills:  12       fluocinolone acetonide 0.01 % Oil   This may have changed:  additional instructions   Used for:  Scalp psoriasis        Use on scalp once a week.   Quantity:  1 Bottle   Refills:  11       ketoconazole 2 % shampoo   Commonly known as:  NIZORAL   This may have changed:  additional instructions   Used for:  Seborrheic dermatitis of scalp        Apply to the affected area and wash off after 5 minutes.   Quantity:  120 mL   Refills:  11       meclizine 25 MG tablet   Commonly known  as:  ANTIVERT   This may have changed:  when to take this   Used for:  Vertigo        Dose:  25 mg   Take 1 tablet (25 mg) by mouth 3 times daily as needed   Quantity:  30 tablet   Refills:  3       Menthol (Topical Analgesic) 4 % Gel   Commonly known as:  BIOFREEZE COLORLESS   This may have changed:    - when to take this  - reasons to take this   Used for:  Pain in both feet, Plantar fasciitis, bilateral, Pes planus of both feet        Externally apply topically 3 times daily as needed   Quantity:  118 mL   Refills:  1       ondansetron 4 MG ODT tab   Commonly known as:  ZOFRAN ODT   This may have changed:  when to take this   Used for:  Ureterolithiasis        Dose:  4-8 mg   Take 1-2 tablets (4-8 mg) by mouth every 8 hours as needed for nausea   Quantity:  20 tablet   Refills:  3       tobramycin-dexamethasone 0.3-0.1 % ophthalmic susp   Commonly known as:  TOBRADEX   This may have changed:    - when to take this  - reasons to take this   Used for:  Eye disorder        Dose:  2 drop   Place 2 drops into both eyes daily   Quantity:  1 Bottle   Refills:  11       VESICARE 5 MG tablet   This may have changed:  See the new instructions.   Used for:  Stress incontinence   Generic drug:  solifenacin        TAKE ONE TABLET BY MOUTH EVERY EVENING   Quantity:  90 tablet   Refills:  2            Where to get your medicines      Some of these will need a paper prescription and others can be bought over the counter.  Ask your nurse if you have questions.     Bring a paper prescription for each of these medications     amphetamine-dextroamphetamine 30 MG per 24 hr capsule                Primary Care Provider Office Phone # Fax #    Susan Rachele Haase, APRN -[phone] [address] [facility] [address]        Equal Access to Services     [facility]: Sven Westbrook, karen angeles, roel proctor, jay hurst. So [facility]  [phone].    ATENCIÓN: Si sami herrmann, tiene a ho disposición servicios gratuitos de asistencia lingüística. Mahad merino [phone].    We comply with applicable federal civil rights laws and [place] laws. We do not discriminate on the basis of race, color, national origin, age, disability, sex, sexual orientation, or gender identity.            Thank you!     Thank you for choosing [facility]  for your care. Our goal is always to provide you with excellent care. Hearing back from our patients is one way we can continue to improve our services. Please take a few minutes to complete the written survey that you may receive in the mail after your visit with us. Thank you!             Your Updated Medication List - Protect others around you: Learn how to safely use, store and throw away your medicines at www.disposemymeds.org.          This list is accurate as of 9/24/18  4:27 PM.  Always use your most recent med list.                   Brand Name Dispense Instructions for use Diagnosis    acyclovir 5 % ointment    ZOVIRAX    30 g    Apply topically 5 times daily    Herpes simplex virus infection       amphetamine-dextroamphetamine 30 MG per 24 hr capsule    ADDERALL XR    60 capsule    Take 1 capsule (30 mg) by mouth 2 times daily    Attention deficit hyperactivity disorder (ADHD), combined type       ciprofloxacin 500 MG tablet    CIPRO    14 tablet    Take 1 tablet (500 mg) by mouth 2 times daily    Acute cystitis without hematuria       conjugated estrogens cream    PREMARIN    30 g    Place 0.5 g vaginally three times a week    Urinary tract infection with hematuria, site unspecified       fexofenadine 180 MG tablet    ALLEGRA    30 tablet    Take 1 tablet (180 mg) by mouth daily as needed    Acute seasonal allergic rhinitis, unspecified trigger       fluocinolone acetonide 0.01 % Oil     1 Bottle    Use on scalp once a week.    Scalp psoriasis       Fluocinolone Acetonide Scalp 0.01 % Oil  oil           fluticasone 50 MCG/ACT spray    FLONASE    16 g    Spray 2 sprays into both nostrils daily as needed    Acute seasonal allergic rhinitis, unspecified trigger       ketoconazole 2 % shampoo    NIZORAL    120 mL    Apply to the affected area and wash off after 5 minutes.    Seborrheic dermatitis of scalp       ketorolac 10 MG tablet    TORADOL    10 tablet    Take 1 tablet (10 mg) by mouth every 6 hours as needed for moderate pain    Calculus of kidney       meclizine 25 MG tablet    ANTIVERT    30 tablet    Take 1 tablet (25 mg) by mouth 3 times daily as needed    Vertigo       Menthol (Topical Analgesic) 4 % Gel    BIOFREEZE COLORLESS    118 mL    Externally apply topically 3 times daily as needed    Pain in both feet, Plantar fasciitis, bilateral, Pes planus of both feet       naproxen 500 MG tablet    NAPROSYN          OMEGA-3 FATTY ACIDS-VITAMIN E PO           ondansetron 4 MG ODT tab    ZOFRAN ODT    20 tablet    Take 1-2 tablets (4-8 mg) by mouth every 8 hours as needed for nausea    Ureterolithiasis       * order for DME     1 Package    Equipment being ordered:heel pads    Intractable left heel pain       * order for DME     1 Device    Equipment being ordered: tall aircast boot    Pain in both feet, Plantar fasciitis, bilateral, Pes planus of both feet       phenazopyridine 200 MG tablet    PYRIDIUM    30 tablet    Take 1 tablet (200 mg) by mouth 3 times daily as needed for irritation    Urinary problem       tamsulosin 0.4 MG capsule    FLOMAX    30 capsule    TAKE ONE CAPSULE BY MOUTH EVERY DAY    Ureterolithiasis       tobramycin-dexamethasone 0.3-0.1 % ophthalmic susp    TOBRADEX    1 Bottle    Place 2 drops into both eyes daily    Eye disorder       TOPAMAX PO      Take 150 mg by mouth every morning (3 X 50MG = 150MG)        valACYclovir 1000 mg tablet    VALTREX    90 tablet    Take 1 tablet (1,000 mg) by mouth daily    Herpes simplex virus infection       VESICARE 5 MG tablet   Generic  drug:  solifenacin     90 tablet    TAKE ONE TABLET BY MOUTH EVERY EVENING    Stress incontinence       zolpidem 5 MG tablet    AMBIEN    30 tablet    Take 1 tablet (5 mg) by mouth nightly as needed for sleep    Sleep disorder       * Notice:  This list has 2 medication(s) that are the same as other medications prescribed for you. Read the directions carefully, and ask your doctor or other care provider to review them with you.

## 2018-09-24 NOTE — LETTER
[facility]    09/24/18    Patient: Laney Maynard  YOB: 1965  Medical Record Number: [id number]                                                                  Controlled Substance Agreement  I understand that my care provider has prescribed controlled substances (narcotics, tranquilizers, and/or stimulants) to help manage my condition(s).  I am taking this medicine to help me function or work.  I know that this is strong medicine.  It could have serious side effects and even cause a dependency on the drug.  If I stop these medicines suddenly, I could have severe withdrawal symptoms.    The risks, benefits, and side effects of these medication(s) were explained to me.  I agree that:  1. I will take part in other treatments as advised by my provider.  This may be psychiatry or counseling, physical therapy, behavioral therapy, group treatment, or a referral to a pain clinic.  I will reduce or stop my medicine when my provider tells me to do so.   2. I will take my medicines as prescribed.  I will not change the dose or schedule unless my provider tells me to.  There will be no refills if I  run out early.   I may be contacted at any time without warning and asked to complete a drug test or pill count.   3. I will keep all my appointments at the clinic.  If I miss appointments or fail to follow instructions, my provider may stop my medicine.  4. I will not ask other providers to prescribe controlled substances. And I will not accept controlled substances from other people. If I need another prescribed controlled substance for a new reason, I will notify my provider within one business day.  5. If I enroll in the [place] Medical Marijuana program, I will tell my provider.  I will also sign an agreement to share my medical records with my provider.  6. I will use one pharmacy to fill all of my controlled substance prescriptions.  If my prescription is mailed to my pharmacy, it  may take 5 to 7 days for my medicine to be ready.  7. I understand that my provider, clinic care team, and pharmacy can track controlled substance prescriptions from other providers through a central database (prescription monitoring program).  8. I will bring in my list of medications (or my medicine bottles) each time I come to the clinic.  [id number] REV-  07/2018                                                                                                                                   Page 1 of 2      [facility]    09/24/18    Patient: Laney Maynard  YOB: 1965  Medical Record Number: [id number]    9. Refills of controlled substances will be made only during office hours.  It is up to me to make sure that I do not run out of my medicines on weekends or holidays.    10. I am responsible for my prescriptions.  If the medicine/prescription is lost or stolen, it will not be replaced.   I also agree not to share these medicines with anyone.  11. I agree to not use ANY illegal or recreational drugs.  This includes marijuana, cocaine, bath salts or other drugs.  I agree not to use alcohol unless my provider says I may.  I agree to give urine samples whenever asked.  If I fail to give a urine sample, the provider may stop my medicine.     12. I will tell my nurse or provider right away if I become pregnant or have a new medical problem treated outside of [facility].  13. I understand that this medicine can affect my thinking and judgment.  It may be unsafe for me to drive, use machinery and do dangerous tasks.  I will not do any of these things until I know how the medicine affects me.  If my dose changes, I will wait to see how it affects me.  I will contact my provider if I have concerns about medicine side effects.  I understand that if I do not follow any of the conditions above, my prescriptions or treatment may be stopped.    I agree that my provider, clinic care  team, and pharmacy may work with any city, state or federal law enforcement agency that investigates the misuse, sale, or other diversion of my controlled medicine. I will allow my provider to discuss my care with or share a copy of this agreement with any other treating provider, pharmacy or emergency room where I receive care.  I agree to give up (waive) any right of privacy or confidentiality with respect to these authorizations.   I have read this agreement and have asked questions about anything I did not understand.   ___________________________________    ___________________________  Patient Signature                                                           Date and Time  ___________________________________     ____________________________  Witness                                                                            Date and Time  ___________________________________  Tray Bruno MD  [id number] REV-  07/2018                                                                                                                                                   Page 2 of 2

## 2018-09-24 NOTE — PROGRESS NOTES
SUBJECTIVE:   Laney Maynard is a 53 year old female who presents to clinic today for the following health issues:    Follow up add issues. Progress at school has been difficult in the past and medication and comprehensive behavioral regimen started as a teen  Progress has been good overall and maintained  since last evaluation  Associated Mental Health issues are none  Weight has been maintained or increased.  Behavioral changes noted include focus and productivity        Compliance with med regimen has been reliable  Patients personal satisfaction with the plan is high       REVIEW  Review of systems shows no problems with vision,hearing or learning  No heart murmer, asthma, bowel or bladder problems, rashes, back or muscle problems,dental problems, headaches,balance or frequent infections    No problems with vision, hearing, headache, chest pain,palpitations,  dyspnea,  lack of exercise tolerance, stomach ache, constipation,bruising,  myalgia, balance, heat or cold intolerance, No observed tics.  Anorexia is manageable. No head or neck pain. No rash, No vision or hearing changes or deficits. No back pain. No sign of tics or involuntary movements    Exam:  On exam the vital signs are stable. No neck masses or thyromegaly.  No bruits, murmers, rubs or extrasounds. Heart rate is regular.  No cardiomegaly or chest wall tenderness. Lungs clear, no abdominal masses.  Good peripheral pulses. No adenopathy.  Normal gait and stance. Neck is supple  .     shows regular heart rhythm and s1 s2, stable vs, lungs clear,  no adenotpathy, symmetrical dtrs, no epigastric tenderness,normal balance,mood and appropriateness in the clinic setting. Normal balance and coordination. No cardiac irregularity. ENT is normal. Radha.  Full range of muscular movement. No nystagmus. No tics    Plan   Current Outpatient Prescriptions   Medication     acyclovir (ZOVIRAX) 5 % ointment     amphetamine-dextroamphetamine (ADDERALL XR) 30  MG per 24 hr capsule     ciprofloxacin (CIPRO) 500 MG tablet     conjugated estrogens (PREMARIN) vaginal cream     fexofenadine (ALLEGRA) 180 MG tablet     fluocinolone acetonide 0.01 % OIL     Fluocinolone Acetonide Scalp 0.01 % OIL oil     fluticasone (FLONASE) 50 MCG/ACT spray     ketoconazole (NIZORAL) 2 % shampoo     ketorolac (TORADOL) 10 MG tablet     meclizine (ANTIVERT) 25 MG tablet     Menthol, Topical Analgesic, (BIOFREEZE COLORLESS) 4 % GEL     naproxen (NAPROSYN) 500 MG tablet     OMEGA-3 FATTY ACIDS-VITAMIN E PO     ondansetron (ZOFRAN ODT) 4 MG ODT tab     order for DME     order for DME     phenazopyridine (PYRIDIUM) 200 MG tablet     tamsulosin (FLOMAX) 0.4 MG capsule     tobramycin-dexamethasone (TOBRADEX) ophthalmic suspension     Topiramate (TOPAMAX PO)     valACYclovir (VALTREX) 1000 mg tablet     VESICARE 5 MG tablet     zolpidem (AMBIEN) 5 MG tablet     No current facility-administered medications for this visit.        Patient is here for a medication review and random urine check .      She signed a csa agreement for Susan Haase after disclosure and discussion today   (F90.2) Attention deficit hyperactivity disorder (ADHD), combined type  Comment:   Plan: amphetamine-dextroamphetamine (ADDERALL XR) 30         MG per 24 hr capsule, Drug  Screen         Comprehensive, Urine w/o Reported Meds (Pain         Care Package)

## 2018-09-28 LAB — COMPREHEN DRUG ANALYSIS UR: NORMAL

## 2018-12-01 ENCOUNTER — HEALTH MAINTENANCE LETTER (OUTPATIENT)
Age: 53
End: 2018-12-01

## 2018-12-06 DIAGNOSIS — F90.2 ATTENTION DEFICIT HYPERACTIVITY DISORDER (ADHD), COMBINED TYPE: ICD-10-CM

## 2018-12-06 NOTE — TELEPHONE ENCOUNTER
Controlled Substance Refill Request for  amphetamine-dextroamphetamine (ADDERALL XR) 30 MG per 24 hr capsule  Problem List Complete:  Yes   checked in past 3 months?  No, route to RN  6/6/18  Last Written Prescription Date:  9/24/18   Last Fill Quantity: 60 capsules,  # refills: 0   Last office visit: 9/24/2018 with prescribing provider:  Kiana Ray Office Visit:      Problem Detail   Noted:  1/12/2014    Priority:  Medium    Overview Addendum 9/25/2018  2:15 PM by Haase, Susan Rachele, APRN CNP   Patient is followed by HAASE, SUSAN RACHELE for ongoing prescription of stimulants.  All refills should be approved by this provider, or covering partner.     Medication(s): Aderall XR 20 mg every day.  Adderall XR 30 mg every day.   Maximum quantity per month: #30;  #30   Clinic visit frequency required: Q 6  months      Controlled substance agreement on file: Yes       Date(s): 9/24/2018  Neuropsych evaluation for ADD completed:  No     Last [facility] website verification: 06/06/2018   [email or web address]      Previous Version

## 2018-12-07 RX ORDER — DEXTROAMPHETAMINE SACCHARATE, AMPHETAMINE ASPARTATE MONOHYDRATE, DEXTROAMPHETAMINE SULFATE AND AMPHETAMINE SULFATE 7.5; 7.5; 7.5; 7.5 MG/1; MG/1; MG/1; MG/1
30 CAPSULE, EXTENDED RELEASE ORAL 2 TIMES DAILY
Qty: 60 CAPSULE | Refills: 0 | Status: SHIPPED | OUTPATIENT
Start: 2018-12-07 | End: 2019-01-14

## 2018-12-07 NOTE — TELEPHONE ENCOUNTER
Routing refill request to provider (MP as CAMERON OOO) to review approval because:  Drug not on the [facility], [facility] or  Health refill protocol or controlled substance     updated, in your bin  Last refill per : 9/24/18    Genet Wolfe RN, BSN  Message handled by Nurse Triage.

## 2018-12-12 ENCOUNTER — OFFICE VISIT (OUTPATIENT)
Dept: UROLOGY | Facility: CLINIC | Age: 53
End: 2018-12-12
Payer: COMMERCIAL

## 2018-12-12 VITALS
DIASTOLIC BLOOD PRESSURE: 80 MMHG | WEIGHT: 156 LBS | SYSTOLIC BLOOD PRESSURE: 118 MMHG | HEIGHT: 64 IN | BODY MASS INDEX: 26.63 KG/M2

## 2018-12-12 DIAGNOSIS — Z87.440 PERSONAL HISTORY OF URINARY TRACT INFECTION: Primary | ICD-10-CM

## 2018-12-12 DIAGNOSIS — N20.0 NEPHROLITHIASIS: ICD-10-CM

## 2018-12-12 LAB
ALBUMIN UR-MCNC: NEGATIVE MG/DL
APPEARANCE UR: CLEAR
BILIRUB UR QL STRIP: NEGATIVE
COLOR UR AUTO: YELLOW
GLUCOSE UR STRIP-MCNC: NEGATIVE MG/DL
HGB UR QL STRIP: ABNORMAL
KETONES UR STRIP-MCNC: NEGATIVE MG/DL
LEUKOCYTE ESTERASE UR QL STRIP: NEGATIVE
NITRATE UR QL: NEGATIVE
PH UR STRIP: 7 PH (ref 5–7)
SOURCE: ABNORMAL
SP GR UR STRIP: 1.01 (ref 1–1.03)
UROBILINOGEN UR STRIP-ACNC: 0.2 EU/DL (ref 0.2–1)

## 2018-12-12 PROCEDURE — 99213 OFFICE O/P EST LOW 20 MIN: CPT | Performed by: UROLOGY

## 2018-12-12 PROCEDURE — 81003 URINALYSIS AUTO W/O SCOPE: CPT | Performed by: UROLOGY

## 2018-12-12 ASSESSMENT — PAIN SCALES - GENERAL: PAINLEVEL: NO PAIN (0)

## 2018-12-12 ASSESSMENT — MIFFLIN-ST. JEOR: SCORE: 1292.61

## 2018-12-12 NOTE — PROGRESS NOTES
"December 12, 2018    Return visit    Patient returns today for follow up of Benedict.  She starting have Benedict and was found to have stones.  She had bee UTI free when she last saw me 6 months ago.  Has had 1 UTI since last visit-at end of August.  Has not changed anything since last visit aside from increasing water intake.  Wonders if for the past couple weeks her symptoms are related to UTI as she is having some bladder discomfort and pressure.  Denies any fevers, chills or symptoms of kidney stone.  Has not gotten her urine checked because she has been very busy at work.  She denies any changes in her health since last visit.    /80   Ht 1.626 m (5' 4\")   Wt 70.8 kg (156 lb)   LMP 02/25/2014   BMI 26.78 kg/m    She is comfortable, in no distress, non-labored breathing.  Pelvic exam declined by patient today    Urine dip trace blood otherwise negative    PVR 6 mL by bladder scan    A/P: 53 year old F with history of nephrolithiasis and UTI    Urine does not appear to be infected today    Discussed her symptoms may be pelvic floor muscle related and offered pelvic exam today which patient declined.  Advised to try some prn ibuprofen to see if helps    RTC 6 months, sooner if needed.  Discussed with patient she will need a pelvic exam at that time if symptoms persist    15 minutes were spent with the patient today, > 50% in counseling and coordination of care    Shawna Wei MD MPH   of Urology    CC  Patient Care Team:  Haase, Susan Rachele, APRN CNP as PCP - General (Nurse Practitioner)  Reyna Angelo MD as MD (Family Practice)                "

## 2018-12-12 NOTE — PATIENT INSTRUCTIONS
Supplements to prevent UTI  -probiotics  -cranberry   Ellura: [email or web address].com   Theracran HP by [employer]  -d-mannose    Consider trying ibuprofen for your bladder symptoms.  Do not take more than recommended on the directions    Let us know if you have a urine infection [phone]    Return to see us in 6 months, sooner if needed    It was a pleasure meeting with you today.  Thank you for allowing me and my team the privilege of caring for you today.  YOU are the reason we are here, and I truly hope we provided you with the excellent service you deserve.  Please let us know if there is anything else we can do for you so that we can be sure you are leaving completely satisfied with your care experience.

## 2018-12-12 NOTE — LETTER
"12/12/2018       RE: Laney Maynard  [address] [address] [facility] [id number]     Dear Colleague,    Thank you for referring your patient, Laney Maynard, to the [facility] [place] at [facility]. Please see a copy of my visit note below.    December 12, 2018    Return visit    Patient returns today for follow up of Benedict.  She starting have Benedict and was found to have stones.  She had bee UTI free when she last saw me 6 months ago.  Has had 1 UTI since last visit-at end of August.  Has not changed anything since last visit aside from increasing water intake.  Wonders if for the past couple weeks her symptoms are related to UTI as she is having some bladder discomfort and pressure.  Denies any fevers, chills or symptoms of kidney stone.  Has not gotten her urine checked because she has been very busy at work.  She denies any changes in her health since last visit.    /80   Ht 1.626 m (5' 4\")   Wt 70.8 kg (156 lb)   LMP 02/25/2014   BMI 26.78 kg/m     She is comfortable, in no distress, non-labored breathing.  Pelvic exam declined by patient today    Urine dip trace blood otherwise negative    PVR 6 mL by bladder scan    A/P: 53 year old F with history of nephrolithiasis and UTI    Urine does not appear to be infected today    Discussed her symptoms may be pelvic floor muscle related and offered pelvic exam today which patient declined.  Advised to try some prn ibuprofen to see if helps    RTC 6 months, sooner if needed.  Discussed with patient she will need a pelvic exam at that time if symptoms persist    15 minutes were spent with the patient today, > 50% in counseling and coordination of care      Shawna Wei MD MPH   of Urology      CC  Patient Care Team:  Haase, Susan Rachele, APRN CNP as PCP - General (Nurse Practitioner)  Reyna Angelo MD as MD (Family " Practice)

## 2018-12-13 ENCOUNTER — TRANSFERRED RECORDS (OUTPATIENT)
Dept: HEALTH INFORMATION MANAGEMENT | Facility: CLINIC | Age: 53
End: 2018-12-13

## 2018-12-13 LAB
CREAT SERPL-MCNC: 0.99 MG/DL (ref 0.5–1.05)
GFR SERPL CREATININE-BSD FRML MDRD: 65 ML/MIN/1.73M2
GLUCOSE SERPL-MCNC: 100 MG/DL (ref 65–139)
POTASSIUM SERPL-SCNC: 3.7 MMOL/L (ref 3.5–5.3)

## 2019-01-14 DIAGNOSIS — F90.2 ATTENTION DEFICIT HYPERACTIVITY DISORDER (ADHD), COMBINED TYPE: ICD-10-CM

## 2019-01-14 RX ORDER — DEXTROAMPHETAMINE SACCHARATE, AMPHETAMINE ASPARTATE MONOHYDRATE, DEXTROAMPHETAMINE SULFATE AND AMPHETAMINE SULFATE 7.5; 7.5; 7.5; 7.5 MG/1; MG/1; MG/1; MG/1
30 CAPSULE, EXTENDED RELEASE ORAL 2 TIMES DAILY
Qty: 60 CAPSULE | Refills: 0 | Status: SHIPPED | OUTPATIENT
Start: 2019-01-14 | End: 2019-02-27

## 2019-01-14 NOTE — TELEPHONE ENCOUNTER
Controlled Substance Refill Request for Adderall Xr  Problem List Complete:  Yes    Patient is followed by HAASE, SUSAN RACHELE for ongoing prescription of stimulants.  All refills should be approved by this provider, or covering partner.     Medication(s): Aderall XR 20 mg every day.  Adderall XR 30 mg every day.   Maximum quantity per month: #30;  #30   Clinic visit frequency required: Q 6  months last 9/24/18     Controlled substance agreement on file: Yes       Date(s): 9/24/2018  Neuropsych evaluation for ADD completed:  No     Last [facility] website verification: 12/7/18   [email or web address]   checked in past 3 months?  Yes 12/07/18     Edelmira Escobar, Pharmacy [facility]  [phone]

## 2019-02-27 DIAGNOSIS — F90.2 ATTENTION DEFICIT HYPERACTIVITY DISORDER (ADHD), COMBINED TYPE: ICD-10-CM

## 2019-02-27 NOTE — TELEPHONE ENCOUNTER
adderall xr      Last Written Prescription Date:  1/14/19 (picked up 1/21/19)  Last Fill Quantity: 60,   # refills: 0  Last Office Visit: 9/24/18  Future Office visit:       Routing refill request to provider for review/approval because:  Blood pressure out of range   Drug not on the [facility], [facility] or [facility] refill protocol or controlled substance    Edelmira Escobar, Pharmacy [facility]  [phone]

## 2019-03-01 RX ORDER — DEXTROAMPHETAMINE SACCHARATE, AMPHETAMINE ASPARTATE MONOHYDRATE, DEXTROAMPHETAMINE SULFATE AND AMPHETAMINE SULFATE 7.5; 7.5; 7.5; 7.5 MG/1; MG/1; MG/1; MG/1
30 CAPSULE, EXTENDED RELEASE ORAL 2 TIMES DAILY
Qty: 60 CAPSULE | Refills: 0 | Status: SHIPPED | OUTPATIENT
Start: 2019-03-01 | End: 2019-04-08 | Stop reason: ALTCHOICE

## 2019-03-01 NOTE — TELEPHONE ENCOUNTER
Routing refill request to provider to review approval because:  Drug not on the [facility], [facility] or [facility] refill protocol or controlled substance    INFORM PT APPOINTMENT DUE NEXT REFILL-CONFIRM    Medication(s): Aderall XR 20 mg every day.  Adderall XR 30 mg every day.   Maximum quantity per month: #30;  #30   Clinic visit frequency required: Q 6  months     Controlled substance agreement on file: Yes       Date(s): 9/24/2018  Neuropsych evaluation for ADD completed:  No    Last [facility] website verification: 12/7/18   [email or web address]  Genet Wolfe RN, BSN  Message handled by Nurse Triage.

## 2019-03-01 NOTE — TELEPHONE ENCOUNTER
Please let Laney know she is due for a visit next month, last seen in 9/2018,  Thanks,  Susan Haase, CNP

## 2019-04-03 ENCOUNTER — TRANSFERRED RECORDS (OUTPATIENT)
Dept: HEALTH INFORMATION MANAGEMENT | Facility: CLINIC | Age: 54
End: 2019-04-03

## 2019-04-08 ENCOUNTER — OFFICE VISIT (OUTPATIENT)
Dept: FAMILY MEDICINE | Facility: CLINIC | Age: 54
End: 2019-04-08
Payer: COMMERCIAL

## 2019-04-08 VITALS
DIASTOLIC BLOOD PRESSURE: 80 MMHG | HEART RATE: 112 BPM | HEIGHT: 64 IN | WEIGHT: 161 LBS | TEMPERATURE: 99.1 F | OXYGEN SATURATION: 98 % | BODY MASS INDEX: 27.49 KG/M2 | RESPIRATION RATE: 14 BRPM | SYSTOLIC BLOOD PRESSURE: 120 MMHG

## 2019-04-08 DIAGNOSIS — F90.2 ATTENTION DEFICIT HYPERACTIVITY DISORDER (ADHD), COMBINED TYPE: Primary | ICD-10-CM

## 2019-04-08 DIAGNOSIS — N20.0 CALCULUS OF KIDNEY: ICD-10-CM

## 2019-04-08 DIAGNOSIS — J30.2 ACUTE SEASONAL ALLERGIC RHINITIS: ICD-10-CM

## 2019-04-08 DIAGNOSIS — N20.1 URETEROLITHIASIS: ICD-10-CM

## 2019-04-08 DIAGNOSIS — L40.9 SCALP PSORIASIS: ICD-10-CM

## 2019-04-08 DIAGNOSIS — R39.89 URINARY PROBLEM: ICD-10-CM

## 2019-04-08 DIAGNOSIS — L21.9 SEBORRHEIC DERMATITIS OF SCALP: ICD-10-CM

## 2019-04-08 DIAGNOSIS — J01.10 ACUTE NON-RECURRENT FRONTAL SINUSITIS: ICD-10-CM

## 2019-04-08 DIAGNOSIS — G47.9 SLEEP DISORDER: ICD-10-CM

## 2019-04-08 DIAGNOSIS — B00.9 HERPES SIMPLEX VIRUS INFECTION: ICD-10-CM

## 2019-04-08 DIAGNOSIS — R42 VERTIGO: ICD-10-CM

## 2019-04-08 DIAGNOSIS — N30.00 ACUTE CYSTITIS WITHOUT HEMATURIA: ICD-10-CM

## 2019-04-08 LAB
ALBUMIN UR-MCNC: 30 MG/DL
AMORPH CRY #/AREA URNS HPF: ABNORMAL /HPF
APPEARANCE UR: ABNORMAL
BACTERIA #/AREA URNS HPF: ABNORMAL /HPF
BILIRUB UR QL STRIP: NEGATIVE
COLOR UR AUTO: YELLOW
GLUCOSE UR STRIP-MCNC: NEGATIVE MG/DL
HGB UR QL STRIP: NEGATIVE
KETONES UR STRIP-MCNC: NEGATIVE MG/DL
LEUKOCYTE ESTERASE UR QL STRIP: ABNORMAL
NITRATE UR QL: NEGATIVE
NON-SQ EPI CELLS #/AREA URNS LPF: ABNORMAL /LPF
PH UR STRIP: 8.5 PH (ref 5–7)
RBC #/AREA URNS AUTO: ABNORMAL /HPF
SOURCE: ABNORMAL
SP GR UR STRIP: 1.01 (ref 1–1.03)
UROBILINOGEN UR STRIP-ACNC: 0.2 EU/DL (ref 0.2–1)
WBC #/AREA URNS AUTO: ABNORMAL /HPF

## 2019-04-08 PROCEDURE — 99214 OFFICE O/P EST MOD 30 MIN: CPT | Performed by: NURSE PRACTITIONER

## 2019-04-08 PROCEDURE — 81001 URINALYSIS AUTO W/SCOPE: CPT | Performed by: NURSE PRACTITIONER

## 2019-04-08 PROCEDURE — 87088 URINE BACTERIA CULTURE: CPT | Performed by: NURSE PRACTITIONER

## 2019-04-08 PROCEDURE — 87186 SC STD MICRODIL/AGAR DIL: CPT | Performed by: NURSE PRACTITIONER

## 2019-04-08 PROCEDURE — 87086 URINE CULTURE/COLONY COUNT: CPT | Performed by: NURSE PRACTITIONER

## 2019-04-08 RX ORDER — ONDANSETRON 4 MG/1
4-8 TABLET, ORALLY DISINTEGRATING ORAL DAILY PRN
Qty: 15 TABLET | Refills: 5 | Status: SHIPPED | OUTPATIENT
Start: 2019-04-08 | End: 2019-10-28

## 2019-04-08 RX ORDER — FLUTICASONE PROPIONATE 50 MCG
2 SPRAY, SUSPENSION (ML) NASAL DAILY PRN
Qty: 16 G | Refills: 11 | Status: SHIPPED | OUTPATIENT
Start: 2019-04-08 | End: 2020-05-13

## 2019-04-08 RX ORDER — MECLIZINE HYDROCHLORIDE 25 MG/1
25 TABLET ORAL 3 TIMES DAILY PRN
Qty: 30 TABLET | Refills: 3 | Status: SHIPPED | OUTPATIENT
Start: 2019-04-08 | End: 2020-09-14

## 2019-04-08 RX ORDER — FEXOFENADINE HCL 180 MG/1
180 TABLET ORAL DAILY PRN
Qty: 30 TABLET | Refills: 6 | Status: SHIPPED | OUTPATIENT
Start: 2019-04-08 | End: 2019-11-21

## 2019-04-08 RX ORDER — ZOLPIDEM TARTRATE 5 MG/1
5 TABLET ORAL
Qty: 30 TABLET | Refills: 2 | Status: SHIPPED | OUTPATIENT
Start: 2019-04-08 | End: 2019-11-21

## 2019-04-08 RX ORDER — KETOROLAC TROMETHAMINE 10 MG/1
10 TABLET, FILM COATED ORAL EVERY 6 HOURS PRN
Qty: 10 TABLET | Refills: 0 | Status: SHIPPED | OUTPATIENT
Start: 2019-04-08 | End: 2019-11-20

## 2019-04-08 RX ORDER — VALACYCLOVIR HYDROCHLORIDE 1 G/1
1000 TABLET, FILM COATED ORAL DAILY
Qty: 90 TABLET | Refills: 3 | Status: SHIPPED | OUTPATIENT
Start: 2019-04-08 | End: 2020-05-13

## 2019-04-08 RX ORDER — FLUOCINOLONE ACETONIDE 0.11 MG/ML
OIL AURICULAR (OTIC)
Qty: 1 BOTTLE | Refills: 11 | Status: SHIPPED | OUTPATIENT
Start: 2019-04-08 | End: 2020-05-13

## 2019-04-08 RX ORDER — PHENAZOPYRIDINE HYDROCHLORIDE 200 MG/1
200 TABLET, FILM COATED ORAL 3 TIMES DAILY PRN
Qty: 30 TABLET | Refills: 3 | Status: SHIPPED | OUTPATIENT
Start: 2019-04-08 | End: 2019-08-29

## 2019-04-08 RX ORDER — AZITHROMYCIN 250 MG/1
TABLET, FILM COATED ORAL
Qty: 6 TABLET | Refills: 0 | Status: SHIPPED | OUTPATIENT
Start: 2019-04-08 | End: 2019-05-06

## 2019-04-08 RX ORDER — DEXTROAMPHETAMINE SACCHARATE, AMPHETAMINE ASPARTATE, DEXTROAMPHETAMINE SULFATE AND AMPHETAMINE SULFATE 7.5; 7.5; 7.5; 7.5 MG/1; MG/1; MG/1; MG/1
30 TABLET ORAL 2 TIMES DAILY
Qty: 60 TABLET | Refills: 0 | Status: SHIPPED | OUTPATIENT
Start: 2019-04-08 | End: 2019-05-03

## 2019-04-08 RX ORDER — KETOCONAZOLE 20 MG/ML
SHAMPOO TOPICAL
Qty: 120 ML | Refills: 11 | Status: SHIPPED | OUTPATIENT
Start: 2019-04-08 | End: 2019-11-21

## 2019-04-08 ASSESSMENT — MIFFLIN-ST. JEOR: SCORE: 1310.29

## 2019-04-08 NOTE — PROGRESS NOTES
SUBJECTIVE:                                                    Laney Maynard is a 54 year old female who presents to clinic today for the following health issues:    HPI  ADHD Follow-Up    Date of last ADHD office visit: 9/24/2018  Status since last visit: Stable  Taking controlled (daily) medications as prescribed: Yes                       Parent/Patient Concerns with Medications: None    Sleep: Insomnia; taking Ambien 2-3 times per week  Home/Family Concerns: None, stable  Peer Concerns: None, stable    Co-Morbid Diagnosis: None    Currently in counseling: No    Follow-up Salyer completed: Criteria met for ADHD -  Combined    Medication Benefits:   Controlled symptoms: does not feel symptoms are well controlled for the past 4 months.  Uncontrolled symptoms: Hyperactivity - motor restlessness, Attention span, Distractability, Finishing tasks,     Medication side effects:  Side effects noted: none    Taking Adderall XR 30 mg every day. She reports decreased effect of medication; noticed this 4-6   months ago. Increase in symptoms (insomnia, trouble concentrating, increased distractibility, fidgety). She's curious if possible to switch from capsule to pills.     Urinary: Reports dysuria and would like to have urine checked.    URI symptoms: Symptoms intermittently present throughout winter season: bilateral ear pain, sinus/facial pressure, sore throat.     Problem list and histories reviewed & adjusted, as indicated.  Additional history: as documented    Patient Active Problem List   Diagnosis     Alopecia     Seasonal allergic rhinitis     GERD (gastroesophageal reflux disease)     CARDIOVASCULAR SCREENING; LDL GOAL LESS THAN 160     Migraine headache     Lymphocytopenia     Sleep disorder     Family history of rheumatoid arthritis     Family history of sarcoidosis (mother)     Urinary frequency     Herpes simplex virus infection     Attention deficit hyperactivity disorder (ADHD)     Plantar  fasciitis, bilateral     Complete rupture of rotator cuff     Mixed incontinence urge and stress (male)(female)     Ureterolithiasis     Past Surgical History:   Procedure Laterality Date     ARTHROSCOPY KNEE Right 2017    Procedure: Right knee arthroscopy and anterior fat pad resection with medial plica resection. Partial lateral menisectomy. Surgeon:  Fredi Lindsay MD  Location: [facility]     ARTHROSCOPY SHOULDER, OPEN ROTATOR CUFF REPAIR, COMBINED  2013    Procedure: COMBINED ARTHROSCOPY SHOULDER, OPEN ROTATOR CUFF REPAIR;  Left Shoulder Arthroscopy, Distal Clavicale Resection, Decompression, Mini Open Rotator Cuff Repair    ;  Surgeon: Fredi Lindsay MD;  Location: [facility] OR     C NONSPECIFIC PROCEDURE  age 16    colposcopy for abnormal pap     C NONSPECIFIC PROCEDURE      surgery L thumb     C NONSPECIFIC PROCEDURE      breast augmentation-silicone     C NONSPECIFIC PROCEDURE      s/p  x 2     C NONSPECIFIC PROCEDURE      Bilateral tubal ligation     C REMOVAL OF KIDNEY STONE       COLONOSCOPY  2014    Procedure: COLONOSCOPY;  Colonoscopy/WW;  Surgeon: Pancho Olsen MD;  Location:  GI     COMBINED CYSTOSCOPY, RETROGRADES, URETEROSCOPY, LASER HOLMIUM LITHOTRIPSY URETER(S), INSERT STENT Right 2016    Procedure: COMBINED CYSTOSCOPY, RETROGRADES, URETEROSCOPY, LASER HOLMIUM LITHOTRIPSY URETER(S), INSERT STENT;  Surgeon: Kushal Noriega MD;  Location: [facility] OR     CYSTOSCOPY       CYSTOSCOPY, RETROGRADES, EXTRACT STONE, COMBINED  2013    Procedure: COMBINED CYSTOSCOPY, RETROGRADES, EXTRACT STONE;  Video Cystoscopy,  Right Ureteroscopy, ureteral dilatation,  Stone extraction;  Surgeon: Subhash Vann MD;  Location: [facility] OR     EXTRACORPOREAL SHOCK WAVE LITHOTRIPSY (ESWL) Bilateral 2016    Procedure: EXTRACORPOREAL SHOCK WAVE LITHOTRIPSY (ESWL);  Surgeon: Bassam Vu MD;  Location: [facility]     EXTRACORPOREAL SHOCK WAVE LITHOTRIPSY, CYSTOSCOPY,  INSERT STENT URETER(S), COMBINED Bilateral 4/26/2018    Procedure: COMBINED EXTRACORPOREAL SHOCK WAVE LITHOTRIPSY, CYSTOSCOPY, INSERT STENT URETER(S);  BILATERAL COMBINED EXTRACORPOREAL SHOCK WAVE LITHOTRIPSY, CYSTOSCOPY, LEFT STENT PLACEMENT;  Surgeon: Bassam Vu MD;  Location: [facility] OR     GYN SURGERY      laparoscopy     LAPAROSCOPIC CHOLECYSTECTOMY WITH CHOLANGIOGRAMS  11/21/2012    Procedure: LAPAROSCOPIC CHOLECYSTECTOMY WITH CHOLANGIOGRAMS;  LAPAROSCOPIC CHOLECYSTECTOMY WITH CHOLANGIOGRAMS ;  Surgeon: Nataliya Gabriel MD;  Location: [facility] OR     TUBAL LIGATION         Social History     Tobacco Use     Smoking status: Never Smoker     Smokeless tobacco: Never Used   Substance Use Topics     Alcohol use: No     Alcohol/week: 0.0 oz     Family History   Problem Relation Age of Onset     Diabetes Mother         type 2     Alcohol/Drug Mother         alcohol         Current Outpatient Medications   Medication Sig Dispense Refill     acyclovir (ZOVIRAX) 5 % ointment Apply topically 5 times daily (Patient taking differently: Apply topically 2 times daily as needed (outbreak) ) 30 g 5     amphetamine-dextroamphetamine (ADDERALL XR) 30 MG 24 hr capsule Take 1 capsule (30 mg) by mouth 2 times daily 60 capsule 0     fexofenadine (ALLEGRA) 180 MG tablet Take 1 tablet (180 mg) by mouth daily as needed 30 tablet 6     fluocinolone acetonide 0.01 % OIL Use on scalp once a week. (Patient taking differently: Use on scalp once a week, as needed.) 1 Bottle 11     Fluocinolone Acetonide Scalp 0.01 % OIL oil        fluticasone (FLONASE) 50 MCG/ACT spray Spray 2 sprays into both nostrils daily as needed 16 g 11     ketoconazole (NIZORAL) 2 % shampoo Apply to the affected area and wash off after 5 minutes. (Patient taking differently: Apply to the affected area and wash off after 5 minutes, as needed.) 120 mL 11     ketorolac (TORADOL) 10 MG tablet Take 1 tablet (10 mg) by mouth every 6 hours as needed for moderate  pain 10 tablet 0     meclizine (ANTIVERT) 25 MG tablet Take 1 tablet (25 mg) by mouth 3 times daily as needed (Patient taking differently: Take 25 mg by mouth daily as needed ) 30 tablet 3     Menthol, Topical Analgesic, (BIOFREEZE COLORLESS) 4 % GEL Externally apply topically 3 times daily as needed (Patient taking differently: Externally apply topically daily as needed (pain) ) 118 mL 1     naproxen (NAPROSYN) 500 MG tablet        ondansetron (ZOFRAN ODT) 4 MG ODT tab Take 1-2 tablets (4-8 mg) by mouth every 8 hours as needed for nausea (Patient taking differently: Take 4-8 mg by mouth daily as needed for nausea ) 20 tablet 3     order for DME Equipment being ordered: tall aircast boot 1 Device 0     order for DME Equipment being ordered:heel pads 1 Package 0     phenazopyridine (PYRIDIUM) 200 MG tablet Take 1 tablet (200 mg) by mouth 3 times daily as needed for irritation 30 tablet 3     tobramycin-dexamethasone (TOBRADEX) ophthalmic suspension Place 2 drops into both eyes daily (Patient taking differently: Place 2 drops into both eyes daily as needed ) 1 Bottle 11     Topiramate (TOPAMAX PO) Take 150 mg by mouth every morning (3 X 50MG = 150MG)       valACYclovir (VALTREX) 1000 mg tablet Take 1 tablet (1,000 mg) by mouth daily 90 tablet 3     VESICARE 5 MG tablet TAKE ONE TABLET BY MOUTH EVERY EVENING 90 tablet 0     zolpidem (AMBIEN) 5 MG tablet Take 1 tablet (5 mg) by mouth nightly as needed for sleep 30 tablet 2     Allergies   Allergen Reactions     Aloe Itching and Rash     Codeine      GI upset     Compazine Nausea and Itching     Darvocet [Acetaminophen] Nausea and Vomiting     Macrobid [Nitrofuran Derivatives]      Bladder spasms     Percocet [Oxycodone-Acetaminophen] Nausea and Vomiting     Sulphadimidine [Sulfamethazine] Rash       ROS:  Constitutional, HEENT, cardiovascular, pulmonary,  , neuro, endocrine and psych systems are negative, except as otherwise noted.    This document serves as a record  "of the services and decisions personally performed and made by Susan Haase, CNP. It was created on her behalf by Chioma Shaw, a trained medical scribe. The creation of this document is based on the provider's statements to the medical scribe.  Chioma Shaw 9:16 AM April 8, 2019  OBJECTIVE:   /80 (BP Location: Left arm, Patient Position: Chair, Cuff Size: Adult Regular)   Pulse 112   Temp 99.1  F (37.3  C) (Oral)   Resp 14   Ht 1.626 m (5' 4\")   Wt 73 kg (161 lb)   LMP 02/25/2014   SpO2 98%   BMI 27.64 kg/m    Body mass index is 27.64 kg/m .  GENERAL: healthy, alert and no distress  HENT: ear canals normal, bilateral TM with fullness, nares congested, frontal TM tenderness, mouth without ulcers or lesions  NECK: no adenopathy, no asymmetry, masses, or scars and thyroid normal to palpation  RESP: lungs clear to auscultation - no rales, rhonchi or wheezes  CV: regular rate and rhythm, normal S1 S2, no S3 or S4, no murmur, click or rub,   PSYCH: mentation appears normal, affect normal/bright    ASSESSMENT/PLAN:   Laney was seen today for recheck medication.  Diagnoses and all orders for this visit:  Attention deficit hyperactivity disorder (ADHD), combined type: Uncontrolled; increase in symptoms (lack of concentration/focus, restlessness). Will discontinue Adderall XR 30 mg. Will begin Adderall 30 mg BID immediate release.  -     amphetamine-dextroamphetamine (ADDERALL) 30 MG tablet; Take 1 tablet (30 mg) by mouth 2 times daily    Urinary problem:  UA with normal results, patient informed, culture pending.  -     UA reflex to Microscopic and Culture  -     Urine Microscopic  -     phenazopyridine (PYRIDIUM) 200 MG tablet; Take 1 tablet (200 mg) by mouth 3 times daily as needed for irritation  -     Urine Culture Aerobic Bacterial    Acute non-recurrent frontal sinusitis: discussed use of daily saline nasal spray as well as below allergy meds.  -     azithromycin (ZITHROMAX) 250 MG tablet; Two tablets " first day, then one tablet daily for four days.    Acute seasonal allergic rhinitis  -     fexofenadine (ALLEGRA) 180 MG tablet; Take 1 tablet (180 mg) by mouth daily as needed for allergies  -     fluticasone (FLONASE) 50 MCG/ACT nasal spray; Spray 2 sprays into both nostrils daily as needed for allergies    Scalp psoriasis  -     fluocinolone acetonide 0.01 % OIL; Use on scalp once a week, as needed.    Seborrheic dermatitis of scalp  -     ketoconazole (NIZORAL) 2 % external shampoo; Apply to the affected area and wash off after 5 minutes, as needed.    Calculus of kidney  -     ketorolac (TORADOL) 10 MG tablet; Take 1 tablet (10 mg) by mouth every 6 hours as needed for moderate pain    Vertigo  -     meclizine (ANTIVERT) 25 MG tablet; Take 1 tablet (25 mg) by mouth 3 times daily as needed for nausea    Ureterolithiasis  -     ondansetron (ZOFRAN ODT) 4 MG ODT tab; Take 1-2 tablets (4-8 mg) by mouth daily as needed for nausea    Herpes simplex virus infection  -     valACYclovir (VALTREX) 1000 mg tablet; Take 1 tablet (1,000 mg) by mouth daily    Sleep disorder: Currently taking Ambien 5 mg 2-3 times per week.   -     zolpidem (AMBIEN) 5 MG tablet; Take 1 tablet (5 mg) by mouth nightly as needed for sleep      Follow up in 4 weeks for physical exam, arrive fasting for labs.      The information in this document, created by the medical scribe for me, accurately reflects the services I personally performed and the decisions made by me. I have reviewed and approved this document for accuracy prior to leaving the patient care area.  April 8, 2019 9:31 AM  Susan Haase, APRN [facility]

## 2019-04-10 LAB
BACTERIA SPEC CULT: ABNORMAL
SPECIMEN SOURCE: ABNORMAL

## 2019-04-11 RX ORDER — CIPROFLOXACIN 500 MG/1
500 TABLET, FILM COATED ORAL 2 TIMES DAILY
Qty: 14 TABLET | Refills: 0 | Status: SHIPPED | OUTPATIENT
Start: 2019-04-11 | End: 2019-05-06

## 2019-04-12 ENCOUNTER — TELEPHONE (OUTPATIENT)
Dept: FAMILY MEDICINE | Facility: CLINIC | Age: 54
End: 2019-04-12

## 2019-04-12 DIAGNOSIS — R30.0 DYSURIA: Primary | ICD-10-CM

## 2019-04-12 RX ORDER — NITROFURANTOIN 25; 75 MG/1; MG/1
100 CAPSULE ORAL 2 TIMES DAILY
Qty: 14 CAPSULE | Refills: 0 | Status: SHIPPED | OUTPATIENT
Start: 2019-04-12 | End: 2019-05-06

## 2019-04-12 NOTE — TELEPHONE ENCOUNTER
"Pt informed.  She requests Macrobid taking off of \"allergy\" list.  She has taken in since an no side effects.    Noted.   Alberto Bolton RN    "

## 2019-04-12 NOTE — TELEPHONE ENCOUNTER
The patient reports she was expecting macrodantin because cipro was not effective.  Cipro is at pharmacy.  Pt requests [facility].    [phone] (home) OK detailed message on VM.  Our pharmacy t'd up.   Alberto Bolton, RN

## 2019-04-18 ENCOUNTER — TRANSFERRED RECORDS (OUTPATIENT)
Dept: HEALTH INFORMATION MANAGEMENT | Facility: CLINIC | Age: 54
End: 2019-04-18

## 2019-04-19 DIAGNOSIS — N39.3 STRESS INCONTINENCE: ICD-10-CM

## 2019-04-19 NOTE — TELEPHONE ENCOUNTER
"Requested Prescriptions   Pending Prescriptions Disp Refills     VESICARE 5 MG tablet [Pharmacy Med Name: VESICARE 5MG TABS] 90 tablet 0     Sig: TAKE ONE TABLET BY MOUTH EVERY EVENING   Last Written Prescription Date:  1/10/19  Last Fill Quantity: 90,  # refills: 0   Last Office Visit: 4/8/2019 Haase      Return in about 1 month (around 5/6/2019).     Future Office Visit:    Next 5 appointments (look out 90 days)    May 06, 2019  9:00 AM CDT  PHYSICAL with Susan Rachele Haase, APRN CNP  [facility] [address] [place] [id number] [phone]             Muscarinic Antagonists (Urinary Incontinence Agents) Passed - 4/19/2019  8:03 AM        Passed - Recent (12 mo) or future (30 days) visit within the authorizing provider's specialty     Patient had office visit in the last 12 months or has a visit in the next 30 days with authorizing provider or within the authorizing provider's specialty.  See \"Patient Info\" tab in inbasket, or \"Choose Columns\" in Meds & Orders section of the refill encounter.              Passed - Patient does not have a diagnosis of glaucoma on the problem list     If glaucoma diagnosis is new, refer refill to physician.          Passed - Medication is active on med list        Passed - Patient is 18 years of age or older        "

## 2019-04-22 RX ORDER — SOLIFENACIN SUCCINATE 5 MG/1
TABLET, FILM COATED ORAL
Qty: 30 TABLET | Refills: 0 | Status: SHIPPED | OUTPATIENT
Start: 2019-04-22 | End: 2019-05-06

## 2019-04-22 NOTE — TELEPHONE ENCOUNTER
Medication is being filled for 1 time refill only due to:  Patient needs to be seen because Due for follow up visit in May.     Lynn Grimm RN -- [facility]

## 2019-05-03 DIAGNOSIS — F90.2 ATTENTION DEFICIT HYPERACTIVITY DISORDER (ADHD), COMBINED TYPE: ICD-10-CM

## 2019-05-03 RX ORDER — DEXTROAMPHETAMINE SACCHARATE, AMPHETAMINE ASPARTATE, DEXTROAMPHETAMINE SULFATE AND AMPHETAMINE SULFATE 7.5; 7.5; 7.5; 7.5 MG/1; MG/1; MG/1; MG/1
TABLET ORAL
Qty: 60 TABLET | Refills: 0 | Status: SHIPPED | OUTPATIENT
Start: 2019-05-08 | End: 2019-06-07

## 2019-05-03 NOTE — TELEPHONE ENCOUNTER
Controlled Substance Refill Request for AMPHETAMINE SALTS 30MG TAB  Problem List Complete:    No     PROVIDER TO CONSIDER COMPLETION OF PROBLEM LIST AND OVERVIEW/CONTROLLED SUBSTANCE AGREEMENT    Last Written Prescription Date:  04/08/19  Last Fill Quantity: 60,   # refills: 0    THE MOST RECENT OFFICE VISIT MUST BE WITHIN THE PAST 3 MONTHS. AT LEAST ONE FACE TO FACE VISIT MUST OCCUR EVERY 6 MONTHS. ADDITIONAL VISITS CAN BE VIRTUAL.  (THIS STATEMENT SHOULD BE DELETED.)    [place] [facility] Susan Haase    Future Office visit:   Next 5 appointments (look out 90 days)    May 06, 2019  9:00 AM CDT  PHYSICAL with Susan Rachele Haase, APRN [facility] [id number] [place] [id number] [phone]          Controlled substance agreement:   Encounter-Level CSA - 09/24/2018:    Controlled Substance Agreement - Scan on 9/28/2018 10:42 AM: CONTROLLED SUBSTANCE AGREEMENT (below)       Encounter-Level CSA - 03/10/2016:    Controlled Substance Agreement - Scan on 3/18/2016 10:10 AM: CONTROLLED SUBSTANCE AGREEMENT (below)       Patient-Level CSA:    There are no patient-level csa.         Last Urine Drug Screen: No results found for: Filipe JOHNSON Drug Analysis [facility]   Date Value Ref Range Status   09/24/2018 FINAL  Final     Comment:     (Note)  ====================================================================  COMPREHENSIVE DRUG ANALYSIS,UR  ====================================================================  Test                             Result       Flag       Units        Drug Present   Topiramate                     PRESENT                              ====================================================================  Test                      Result    Flag   Units      Ref Range        Creatinine              126              mg/dL      >=20             ====================================================================  For clinical consultation, please call [phone].  ====================================================================  Analysis performed by [employer] [place] [address]     , No results found for: THC13, PCP13, COC13, MAMP13, OPI13, AMP13, BZO13, TCA13, MTD13, BAR13, OXY13, PPX13, BUP13     Processing:  Staff will hand deliver Rx to on-site pharmacy     [email or web address]       checked in past 3 months?  No, route to RN

## 2019-05-03 NOTE — TELEPHONE ENCOUNTER
[place] reviewed:  reviewed- no concerns    Not due for fill until 5/8/19    [place]:  [email or web address]

## 2019-05-06 ENCOUNTER — OFFICE VISIT (OUTPATIENT)
Dept: FAMILY MEDICINE | Facility: CLINIC | Age: 54
End: 2019-05-06
Payer: COMMERCIAL

## 2019-05-06 VITALS
SYSTOLIC BLOOD PRESSURE: 110 MMHG | DIASTOLIC BLOOD PRESSURE: 66 MMHG | TEMPERATURE: 97.9 F | BODY MASS INDEX: 28.34 KG/M2 | HEART RATE: 80 BPM | RESPIRATION RATE: 12 BRPM | OXYGEN SATURATION: 98 % | HEIGHT: 64 IN | WEIGHT: 166 LBS

## 2019-05-06 DIAGNOSIS — R82.90 NONSPECIFIC FINDING ON EXAMINATION OF URINE: ICD-10-CM

## 2019-05-06 DIAGNOSIS — N39.3 STRESS INCONTINENCE: ICD-10-CM

## 2019-05-06 DIAGNOSIS — F90.2 ATTENTION DEFICIT HYPERACTIVITY DISORDER (ADHD), COMBINED TYPE: ICD-10-CM

## 2019-05-06 DIAGNOSIS — Z00.00 ROUTINE GENERAL MEDICAL EXAMINATION AT A HEALTH CARE FACILITY: Primary | ICD-10-CM

## 2019-05-06 DIAGNOSIS — R39.89 URINARY PROBLEM: ICD-10-CM

## 2019-05-06 LAB
ALBUMIN SERPL-MCNC: 3.3 G/DL (ref 3.4–5)
ALBUMIN UR-MCNC: NEGATIVE MG/DL
ALP SERPL-CCNC: 142 U/L (ref 40–150)
ALT SERPL W P-5'-P-CCNC: 31 U/L (ref 0–50)
AMORPH CRY #/AREA URNS HPF: ABNORMAL /HPF
ANION GAP SERPL CALCULATED.3IONS-SCNC: 8 MMOL/L (ref 3–14)
APPEARANCE UR: CLEAR
AST SERPL W P-5'-P-CCNC: 27 U/L (ref 0–45)
BACTERIA #/AREA URNS HPF: ABNORMAL /HPF
BASOPHILS # BLD AUTO: 0 10E9/L (ref 0–0.2)
BASOPHILS NFR BLD AUTO: 0.5 %
BILIRUB SERPL-MCNC: 0.2 MG/DL (ref 0.2–1.3)
BILIRUB UR QL STRIP: NEGATIVE
BUN SERPL-MCNC: 15 MG/DL (ref 7–30)
CALCIUM SERPL-MCNC: 9.1 MG/DL (ref 8.5–10.1)
CHLORIDE SERPL-SCNC: 111 MMOL/L (ref 94–109)
CHOLEST SERPL-MCNC: 225 MG/DL
CO2 SERPL-SCNC: 22 MMOL/L (ref 20–32)
COLOR UR AUTO: YELLOW
CREAT SERPL-MCNC: 0.98 MG/DL (ref 0.52–1.04)
DEPRECATED CALCIDIOL+CALCIFEROL SERPL-MC: 51 UG/L (ref 20–75)
DIFFERENTIAL METHOD BLD: ABNORMAL
EOSINOPHIL # BLD AUTO: 0.1 10E9/L (ref 0–0.7)
EOSINOPHIL NFR BLD AUTO: 1.9 %
ERYTHROCYTE [DISTWIDTH] IN BLOOD BY AUTOMATED COUNT: 11.9 % (ref 10–15)
GFR SERPL CREATININE-BSD FRML MDRD: 65 ML/MIN/{1.73_M2}
GLUCOSE SERPL-MCNC: 93 MG/DL (ref 70–99)
GLUCOSE UR STRIP-MCNC: NEGATIVE MG/DL
HCT VFR BLD AUTO: 41.8 % (ref 35–47)
HDLC SERPL-MCNC: 55 MG/DL
HGB BLD-MCNC: 14.4 G/DL (ref 11.7–15.7)
HGB UR QL STRIP: ABNORMAL
KETONES UR STRIP-MCNC: NEGATIVE MG/DL
LDLC SERPL CALC-MCNC: 155 MG/DL
LEUKOCYTE ESTERASE UR QL STRIP: ABNORMAL
LYMPHOCYTES # BLD AUTO: 1.5 10E9/L (ref 0.8–5.3)
LYMPHOCYTES NFR BLD AUTO: 35.4 %
MCH RBC QN AUTO: 33.8 PG (ref 26.5–33)
MCHC RBC AUTO-ENTMCNC: 34.4 G/DL (ref 31.5–36.5)
MCV RBC AUTO: 98 FL (ref 78–100)
MONOCYTES # BLD AUTO: 0.3 10E9/L (ref 0–1.3)
MONOCYTES NFR BLD AUTO: 6.2 %
NEUTROPHILS # BLD AUTO: 2.3 10E9/L (ref 1.6–8.3)
NEUTROPHILS NFR BLD AUTO: 56 %
NITRATE UR QL: NEGATIVE
NONHDLC SERPL-MCNC: 170 MG/DL
PH UR STRIP: 7 PH (ref 5–7)
PLATELET # BLD AUTO: 221 10E9/L (ref 150–450)
POTASSIUM SERPL-SCNC: 3.9 MMOL/L (ref 3.4–5.3)
PROT SERPL-MCNC: 7.8 G/DL (ref 6.8–8.8)
RBC # BLD AUTO: 4.26 10E12/L (ref 3.8–5.2)
RBC #/AREA URNS AUTO: ABNORMAL /HPF
SODIUM SERPL-SCNC: 141 MMOL/L (ref 133–144)
SOURCE: ABNORMAL
SP GR UR STRIP: 1.02 (ref 1–1.03)
SPECIMEN SOURCE: NORMAL
TRIGL SERPL-MCNC: 73 MG/DL
TSH SERPL DL<=0.005 MIU/L-ACNC: 0.92 MU/L (ref 0.4–4)
UROBILINOGEN UR STRIP-ACNC: 0.2 EU/DL (ref 0.2–1)
WBC # BLD AUTO: 4.2 10E9/L (ref 4–11)
WBC #/AREA URNS AUTO: ABNORMAL /HPF
WBC CLUMPS #/AREA URNS HPF: PRESENT /HPF
WET PREP SPEC: NORMAL

## 2019-05-06 PROCEDURE — 87086 URINE CULTURE/COLONY COUNT: CPT | Performed by: NURSE PRACTITIONER

## 2019-05-06 PROCEDURE — 99396 PREV VISIT EST AGE 40-64: CPT | Performed by: NURSE PRACTITIONER

## 2019-05-06 PROCEDURE — 80053 COMPREHEN METABOLIC PANEL: CPT | Performed by: NURSE PRACTITIONER

## 2019-05-06 PROCEDURE — 80061 LIPID PANEL: CPT | Performed by: NURSE PRACTITIONER

## 2019-05-06 PROCEDURE — 87210 SMEAR WET MOUNT SALINE/INK: CPT | Performed by: NURSE PRACTITIONER

## 2019-05-06 PROCEDURE — 82306 VITAMIN D 25 HYDROXY: CPT | Performed by: NURSE PRACTITIONER

## 2019-05-06 PROCEDURE — 81001 URINALYSIS AUTO W/SCOPE: CPT | Performed by: NURSE PRACTITIONER

## 2019-05-06 PROCEDURE — 84443 ASSAY THYROID STIM HORMONE: CPT | Performed by: NURSE PRACTITIONER

## 2019-05-06 PROCEDURE — 87591 N.GONORRHOEAE DNA AMP PROB: CPT | Performed by: NURSE PRACTITIONER

## 2019-05-06 PROCEDURE — G0145 SCR C/V CYTO,THINLAYER,RESCR: HCPCS | Performed by: NURSE PRACTITIONER

## 2019-05-06 PROCEDURE — 87491 CHLMYD TRACH DNA AMP PROBE: CPT | Performed by: NURSE PRACTITIONER

## 2019-05-06 PROCEDURE — 87624 HPV HI-RISK TYP POOLED RSLT: CPT | Performed by: NURSE PRACTITIONER

## 2019-05-06 PROCEDURE — 85025 COMPLETE CBC W/AUTO DIFF WBC: CPT | Performed by: NURSE PRACTITIONER

## 2019-05-06 PROCEDURE — 36415 COLL VENOUS BLD VENIPUNCTURE: CPT | Performed by: NURSE PRACTITIONER

## 2019-05-06 RX ORDER — DEXTROAMPHETAMINE SACCHARATE, AMPHETAMINE ASPARTATE, DEXTROAMPHETAMINE SULFATE AND AMPHETAMINE SULFATE 7.5; 7.5; 7.5; 7.5 MG/1; MG/1; MG/1; MG/1
30 TABLET ORAL 2 TIMES DAILY
Qty: 60 TABLET | Refills: 0 | Status: SHIPPED | OUTPATIENT
Start: 2019-07-08 | End: 2019-06-07

## 2019-05-06 RX ORDER — DEXTROAMPHETAMINE SACCHARATE, AMPHETAMINE ASPARTATE, DEXTROAMPHETAMINE SULFATE AND AMPHETAMINE SULFATE 7.5; 7.5; 7.5; 7.5 MG/1; MG/1; MG/1; MG/1
30 TABLET ORAL 2 TIMES DAILY
Qty: 60 TABLET | Refills: 0 | Status: SHIPPED | OUTPATIENT
Start: 2019-06-08 | End: 2019-06-07

## 2019-05-06 RX ORDER — SOLIFENACIN SUCCINATE 5 MG/1
5 TABLET, FILM COATED ORAL EVERY MORNING
Qty: 90 TABLET | Refills: 3 | Status: SHIPPED | OUTPATIENT
Start: 2019-05-06 | End: 2019-08-22

## 2019-05-06 RX ORDER — POTASSIUM CITRATE 10 MEQ/1
TABLET, EXTENDED RELEASE ORAL
COMMUNITY
Start: 2019-04-18 | End: 2019-10-28

## 2019-05-06 ASSESSMENT — MIFFLIN-ST. JEOR: SCORE: 1332.97

## 2019-05-06 ASSESSMENT — ENCOUNTER SYMPTOMS
HEADACHES: 0
WEAKNESS: 0
ABDOMINAL PAIN: 0
JOINT SWELLING: 0
SORE THROAT: 0
FEVER: 0
SHORTNESS OF BREATH: 0
DYSURIA: 1
BREAST MASS: 0
HEMATURIA: 0
PALPITATIONS: 0
NERVOUS/ANXIOUS: 0
CHILLS: 0
HEMATOCHEZIA: 0
MYALGIAS: 0
EYE PAIN: 0
COUGH: 0
FREQUENCY: 0
CONSTIPATION: 0
NAUSEA: 0
DIZZINESS: 0
ARTHRALGIAS: 0
HEARTBURN: 0
DIARRHEA: 0
PARESTHESIAS: 0

## 2019-05-06 NOTE — PROGRESS NOTES
SUBJECTIVE:   CC: Laney Maynard is an 54 year old woman who presents for preventive health visit.     Healthy Habits:     Getting at least 3 servings of Calcium per day:  NO    Bi-annual eye exam:  Yes    Dental care twice a year:  Yes    Sleep apnea or symptoms of sleep apnea:  None    Diet:  Low salt and Low fat/cholesterol    Frequency of exercise:  1 day/week    Duration of exercise:  Less than 15 minutes    Taking medications regularly:  Yes    Medication side effects:  None    PHQ-2 Total Score: 0    Additional concerns today:  No    Urinary: Patient reports dysuria. Denies increased frequency, hematuria. Also, she states presence of vaginal discharge after recent antibiotic regimen.      Cervical cancer screening: Pap completed today. Last pap and HPV completed 2015, NIL.     Breast cancer screening: Last mammo 04/2017, normal.     Colon cancer screening: Last colonoscopy 2014, results normal. 10 year plan.     Today's PHQ-2 Score:   PHQ-2 ( 1999 [employer]) 5/6/2019   Q1: Little interest or pleasure in doing things 0   Q2: Feeling down, depressed or hopeless 0   PHQ-2 Score 0   Q1: Little interest or pleasure in doing things Not at all   Q2: Feeling down, depressed or hopeless Not at all   PHQ-2 Score 0     Abuse: Current or Past(Physical, Sexual or Emotional)- No  Do you feel safe in your environment? Yes    Social History     Tobacco Use     Smoking status: Never Smoker     Smokeless tobacco: Never Used   Substance Use Topics     Alcohol use: No     Alcohol/week: 0.0 oz     If you drink alcohol do you typically have >3 drinks per day or >7 drinks per week? No    Alcohol Use 5/6/2019   Prescreen: >3 drinks/day or >7 drinks/week? Not Applicable   Prescreen: >3 drinks/day or >7 drinks/week? -   No flowsheet data found.    Reviewed orders with patient.  Reviewed health maintenance and updated orders accordingly - Yes  BP Readings from Last 3 Encounters:   05/06/19 110/66   04/08/19 120/80   12/12/18  118/80    Wt Readings from Last 3 Encounters:   19 75.3 kg (166 lb)   19 73 kg (161 lb)   18 70.8 kg (156 lb)         Patient Active Problem List   Diagnosis     Alopecia     Seasonal allergic rhinitis     GERD (gastroesophageal reflux disease)     CARDIOVASCULAR SCREENING; LDL GOAL LESS THAN 160     Migraine headache     Lymphocytopenia     Sleep disorder     Family history of rheumatoid arthritis     Family history of sarcoidosis (mother)     Urinary frequency     Herpes simplex virus infection     Attention deficit hyperactivity disorder (ADHD)     Plantar fasciitis, bilateral     Complete rupture of rotator cuff     Mixed incontinence urge and stress (male)(female)     Ureterolithiasis     Past Surgical History:   Procedure Laterality Date     ARTHROSCOPY KNEE Right 2017    Procedure: Right knee arthroscopy and anterior fat pad resection with medial plica resection. Partial lateral menisectomy. Surgeon:  Fredi Lindsay MD  [facility]: [facility]     ARTHROSCOPY SHOULDER, OPEN ROTATOR CUFF REPAIR, COMBINED  2013    Procedure: COMBINED ARTHROSCOPY SHOULDER, OPEN ROTATOR CUFF REPAIR;  Left Shoulder Arthroscopy, Distal Clavicale Resection, Decompression, Mini Open Rotator Cuff Repair    ;  Surgeon: Fredi Lindsay MD;  Location: [facility] OR     C NONSPECIFIC PROCEDURE  age 16    colposcopy for abnormal pap     C NONSPECIFIC PROCEDURE      surgery L thumb     C NONSPECIFIC PROCEDURE      breast augmentation-silicone     C NONSPECIFIC PROCEDURE      s/p  x 2     C NONSPECIFIC PROCEDURE      Bilateral tubal ligation     C REMOVAL OF KIDNEY STONE       COLONOSCOPY  2014    Procedure: COLONOSCOPY;  Colonoscopy/WW;  Surgeon: Pancho Olsen MD;  Location:  GI     COMBINED CYSTOSCOPY, RETROGRADES, URETEROSCOPY, LASER HOLMIUM LITHOTRIPSY URETER(S), INSERT STENT Right 2016    Procedure: COMBINED CYSTOSCOPY, RETROGRADES, URETEROSCOPY, LASER HOLMIUM LITHOTRIPSY  URETER(S), INSERT STENT;  Surgeon: Kushal Noriega MD;  Location: [facility] OR     CYSTOSCOPY       CYSTOSCOPY, RETROGRADES, EXTRACT STONE, COMBINED  1/9/2013    Procedure: COMBINED CYSTOSCOPY, RETROGRADES, EXTRACT STONE;  Video Cystoscopy,  Right Ureteroscopy, ureteral dilatation,  Stone extraction;  Surgeon: Subhash Vann MD;  Location: [facility] OR     EXTRACORPOREAL SHOCK WAVE LITHOTRIPSY (ESWL) Bilateral 4/29/2016    Procedure: EXTRACORPOREAL SHOCK WAVE LITHOTRIPSY (ESWL);  Surgeon: Bassam Vu MD;  Location: [facility] OR     EXTRACORPOREAL SHOCK WAVE LITHOTRIPSY, CYSTOSCOPY, INSERT STENT URETER(S), COMBINED Bilateral 4/26/2018    Procedure: COMBINED EXTRACORPOREAL SHOCK WAVE LITHOTRIPSY, CYSTOSCOPY, INSERT STENT URETER(S);  BILATERAL COMBINED EXTRACORPOREAL SHOCK WAVE LITHOTRIPSY, CYSTOSCOPY, LEFT STENT PLACEMENT;  Surgeon: Bassam Vu MD;  Location:  OR     GYN SURGERY      laparoscopy     LAPAROSCOPIC CHOLECYSTECTOMY WITH CHOLANGIOGRAMS  11/21/2012    Procedure: LAPAROSCOPIC CHOLECYSTECTOMY WITH CHOLANGIOGRAMS;  LAPAROSCOPIC CHOLECYSTECTOMY WITH CHOLANGIOGRAMS ;  Surgeon: Nataliya Gabriel MD;  Location: [facility] OR     TUBAL LIGATION         Social History     Tobacco Use     Smoking status: Never Smoker     Smokeless tobacco: Never Used   Substance Use Topics     Alcohol use: No     Alcohol/week: 0.0 oz     Family History   Problem Relation Age of Onset     Diabetes Mother         type 2     Alcohol/Drug Mother         alcohol         Current Outpatient Medications   Medication Sig Dispense Refill     acyclovir (ZOVIRAX) 5 % ointment Apply topically 5 times daily (Patient taking differently: Apply topically 2 times daily as needed (outbreak) ) 30 g 5     [START ON 5/8/2019] amphetamine-dextroamphetamine (ADDERALL) 30 MG tablet TAKE ONE TABLET BY MOUTH TWICE A DAY 60 tablet 0     fexofenadine (ALLEGRA) 180 MG tablet Take 1 tablet (180 mg) by mouth daily as needed for allergies 30 tablet 6      fluocinolone acetonide 0.01 % OIL Use on scalp once a week, as needed. 1 Bottle 11     Fluocinolone Acetonide Scalp 0.01 % OIL oil        fluticasone (FLONASE) 50 MCG/ACT nasal spray Spray 2 sprays into both nostrils daily as needed for allergies 16 g 11     ketoconazole (NIZORAL) 2 % external shampoo Apply to the affected area and wash off after 5 minutes, as needed. 120 mL 11     ketorolac (TORADOL) 10 MG tablet Take 1 tablet (10 mg) by mouth every 6 hours as needed for moderate pain 10 tablet 0     meclizine (ANTIVERT) 25 MG tablet Take 1 tablet (25 mg) by mouth 3 times daily as needed for nausea 30 tablet 3     Menthol, Topical Analgesic, (BIOFREEZE COLORLESS) 4 % GEL Externally apply topically 3 times daily as needed (Patient taking differently: Externally apply topically daily as needed (pain) ) 118 mL 1     naproxen (NAPROSYN) 500 MG tablet        ondansetron (ZOFRAN ODT) 4 MG ODT tab Take 1-2 tablets (4-8 mg) by mouth daily as needed for nausea 15 tablet 5     order for DME Equipment being ordered: tall aircast boot 1 Device 0     order for DME Equipment being ordered:heel pads 1 Package 0     phenazopyridine (PYRIDIUM) 200 MG tablet Take 1 tablet (200 mg) by mouth 3 times daily as needed for irritation 30 tablet 3     tobramycin-dexamethasone (TOBRADEX) ophthalmic suspension Place 2 drops into both eyes daily (Patient taking differently: Place 2 drops into both eyes daily as needed ) 1 Bottle 11     Topiramate (TOPAMAX PO) Take 150 mg by mouth every morning (3 X 50MG = 150MG)       valACYclovir (VALTREX) 1000 mg tablet Take 1 tablet (1,000 mg) by mouth daily 90 tablet 3     VESICARE 5 MG tablet TAKE ONE TABLET BY MOUTH EVERY EVENING 30 tablet 0     zolpidem (AMBIEN) 5 MG tablet Take 1 tablet (5 mg) by mouth nightly as needed for sleep 30 tablet 2     potassium citrate (UROCIT-K) 10 MEQ (1080 MG) CR tablet        Mammogram Screening: Patient over age 50, mutual decision to screen reflected in health  maintenance.    Pertinent mammograms are reviewed under the imaging tab.  History of abnormal Pap smear:   NO - age 30-65 PAP every 3 years with negative HPV co-testing recommended  Last 3 Pap Results:   PAP (no units)   Date Value   09/28/2015 NIL   03/08/2013 NIL   03/29/2011 NIL     PAP / HPV Latest Ref Rng & Units 9/28/2015 3/8/2013 3/29/2011   PAP - NIL NIL NIL   HPV 16 DNA NEG Negative - -   HPV 18 DNA NEG Negative - -   OTHER HR HPV NEG Negative - -     Reviewed and updated as needed this visit by clinical staff         Reviewed and updated as needed this visit by Provider        Past Medical History:   Diagnosis Date     Abnormal glandular Papanicolaou smear of cervix      Allergic rhinitis, cause unspecified      Constipation      Gastro-oesophageal reflux disease      Heart murmur     murmur     Hematuria      Herpes simplex virus infection      Hydronephrosis, right      Migraine, unspecified, without mention of intractable migraine without mention of status migrainosus      Mumps      Numbness and tingling     LEFT ARM     Palpitations      Renal disease     hx stones x 2 episodes     Ureterolithiasis      Urinary frequency     burning     Vertigo       Past Surgical History:   Procedure Laterality Date     ARTHROSCOPY KNEE Right 04/26/2017    Procedure: Right knee arthroscopy and anterior fat pad resection with medial plica resection. Partial lateral menisectomy. Surgeon:  Fredi Lindsay MD  Location: [facility]     ARTHROSCOPY SHOULDER, OPEN ROTATOR CUFF REPAIR, COMBINED  7/31/2013    Procedure: COMBINED ARTHROSCOPY SHOULDER, OPEN ROTATOR CUFF REPAIR;  Left Shoulder Arthroscopy, Distal Clavicale Resection, Decompression, Mini Open Rotator Cuff Repair    ;  Surgeon: Fredi Lindsay MD;  Location: [facility]     C NONSPECIFIC PROCEDURE  age 16    colposcopy for abnormal pap     C NONSPECIFIC PROCEDURE      surgery L thumb     C NONSPECIFIC PROCEDURE  1987    breast augmentation-silicone     C  NONSPECIFIC PROCEDURE      s/p  x 2     C NONSPECIFIC PROCEDURE      Bilateral tubal ligation     C REMOVAL OF KIDNEY STONE       COLONOSCOPY  2014    Procedure: COLONOSCOPY;  Colonoscopy/WW;  Surgeon: Pancho Olsen MD;  Location:  GI     COMBINED CYSTOSCOPY, RETROGRADES, URETEROSCOPY, LASER HOLMIUM LITHOTRIPSY URETER(S), INSERT STENT Right 2016    Procedure: COMBINED CYSTOSCOPY, RETROGRADES, URETEROSCOPY, LASER HOLMIUM LITHOTRIPSY URETER(S), INSERT STENT;  Surgeon: Kushal Noriega MD;  Location: [facility]     CYSTOSCOPY       CYSTOSCOPY, RETROGRADES, EXTRACT STONE, COMBINED  2013    Procedure: COMBINED CYSTOSCOPY, RETROGRADES, EXTRACT STONE;  Video Cystoscopy,  Right Ureteroscopy, ureteral dilatation,  Stone extraction;  Surgeon: Subhash Vann MD;  Location: [facility]     EXTRACORPOREAL SHOCK WAVE LITHOTRIPSY (ESWL) Bilateral 2016    Procedure: EXTRACORPOREAL SHOCK WAVE LITHOTRIPSY (ESWL);  Surgeon: Bassam Vu MD;  Location:  OR     EXTRACORPOREAL SHOCK WAVE LITHOTRIPSY, CYSTOSCOPY, INSERT STENT URETER(S), COMBINED Bilateral 2018    Procedure: COMBINED EXTRACORPOREAL SHOCK WAVE LITHOTRIPSY, CYSTOSCOPY, INSERT STENT URETER(S);  BILATERAL COMBINED EXTRACORPOREAL SHOCK WAVE LITHOTRIPSY, CYSTOSCOPY, LEFT STENT PLACEMENT;  Surgeon: Bassam Vu MD;  Location:  OR     GYN SURGERY      laparoscopy     LAPAROSCOPIC CHOLECYSTECTOMY WITH CHOLANGIOGRAMS  2012    Procedure: LAPAROSCOPIC CHOLECYSTECTOMY WITH CHOLANGIOGRAMS;  LAPAROSCOPIC CHOLECYSTECTOMY WITH CHOLANGIOGRAMS ;  Surgeon: Nataliya Gabriel MD;  Location: [facility] OR     TUBAL LIGATION       Review of Systems   Constitutional: Negative for chills and fever.   HENT: Negative for congestion, ear pain, hearing loss and sore throat.    Eyes: Negative for pain and visual disturbance.   Respiratory: Negative for cough and shortness of breath.    Cardiovascular: Negative for chest pain, palpitations and  "peripheral edema.   Gastrointestinal: Negative for abdominal pain, constipation, diarrhea, heartburn, hematochezia and nausea.   Breasts:  Negative for tenderness, breast mass and discharge.   Genitourinary: Positive for dysuria, urgency and vaginal discharge. Negative for frequency, genital sores, hematuria, pelvic pain and vaginal bleeding.   Musculoskeletal: Negative for arthralgias, joint swelling and myalgias.   Skin: Negative for rash.   Neurological: Negative for dizziness, weakness, headaches and paresthesias.   Psychiatric/Behavioral: Negative for mood changes. The patient is not nervous/anxious.      This document serves as a record of the services and decisions personally performed and made by Susan Haase, CNP. It was created on her behalf by Chioma Shaw, a trained medical scribe. The creation of this document is based on the provider's statements to the medical scribe.  Chioma Shaw 9:27 AM May 6, 2019   OBJECTIVE:   /66 (BP Location: Right arm, Patient Position: Chair, Cuff Size: Adult Regular)   Pulse 80   Temp 97.9  F (36.6  C) (Oral)   Resp 12   Ht 1.626 m (5' 4\")   Wt 75.3 kg (166 lb)   LMP 02/25/2014   SpO2 98%   BMI 28.49 kg/m    Physical Exam  GENERAL APPEARANCE: healthy, alert and no distress  EYES: Eyes grossly normal to inspection, PERRL and conjunctivae and sclerae normal  HENT: ear canals and TM's normal, nose and mouth without ulcers or lesions, oropharynx clear and oral mucous membranes moist  NECK: no adenopathy, no asymmetry, masses, or scars and thyroid normal to palpation  RESP: lungs clear to auscultation - no rales, rhonchi or wheezes  BREAST: normal without masses, tenderness or nipple discharge and no palpable axillary masses or adenopathy  CV: regular rate and rhythm, normal S1 S2, no S3 or S4, no murmur, click or rub, no peripheral edema and peripheral pulses strong  ABDOMEN: soft, nontender, no hepatosplenomegaly, no masses and bowel sounds normal   (female): " "normal female external genitalia, normal urethral meatus, vaginal mucosal atrophy noted, normal cervix, adnexae, and uterus without masses or abnormal discharge  MS: no musculoskeletal defects are noted and gait is age appropriate without ataxia  SKIN: no suspicious lesions or rashes  NEURO: Normal strength and tone, sensory exam grossly normal, mentation intact and speech normal  PSYCH: mentation appears normal and affect normal/bright    ASSESSMENT/PLAN:   Laney was seen today for physical.    Diagnoses and all orders for this visit:    Routine general medical examination at a health care facility  -     CBC with platelets differential  -     Comprehensive metabolic panel  -     Lipid panel reflex to direct LDL Fasting  -     TSH with free T4 reflex  -     Vitamin D Deficiency  -     Pap imaged thin layer screen with HPV - recommended age 30 - 65 years (select HPV order below)  -     Wet prep  -     Neisseria gonorrhoeae PCR  -     Chlamydia trachomatis PCR    Urinary problem:  Results pending  -     UA reflex to Microscopic and Culture  -     Urine Microscopic  -     Urine Culture Aerobic Bacterial    Stress incontinence  -     solifenacin (VESICARE) 5 MG tablet; Take 1 tablet (5 mg) by mouth every morning  -     Urine Culture Aerobic Bacterial    Nonspecific finding on examination of urine  -     Urine Culture Aerobic Bacterial    COUNSELING:  Reviewed preventive health counseling, as reflected in patient instructions       Regular exercise       Healthy diet/nutrition       Colon cancer screening    BP Readings from Last 1 Encounters:   04/08/19 120/80     Estimated body mass index is 28.49 kg/m  as calculated from the following:    Height as of this encounter: 1.626 m (5' 4\").    Weight as of this encounter: 75.3 kg (166 lb).    Weight management plan: Discussed healthy diet and exercise guidelines     reports that she has never smoked. She has never used smokeless tobacco.    Counseling Resources:  ATP IV " Guidelines  Pooled Cohorts Equation Calculator  Breast Cancer Risk Calculator  FRAX Risk Assessment  ICSI Preventive Guidelines  Dietary Guidelines for Americans, 2010  USDA's MyPlate  ASA Prophylaxis  Lung CA Screening  Follow up in 3 months, sooner as needed.  The information in this document, created by the medical scribe for me, accurately reflects the services I personally performed and the decisions made by me. I have reviewed and approved this document for accuracy prior to leaving the patient care area.  May 6, 2019 9:48 AM  Susan Haase, APRN [facility]

## 2019-05-07 LAB
BACTERIA SPEC CULT: NORMAL
C TRACH DNA SPEC QL NAA+PROBE: NEGATIVE
N GONORRHOEA DNA SPEC QL NAA+PROBE: NEGATIVE
SPECIMEN SOURCE: NORMAL

## 2019-05-08 ENCOUNTER — TELEPHONE (OUTPATIENT)
Dept: FAMILY MEDICINE | Facility: CLINIC | Age: 54
End: 2019-05-08

## 2019-05-08 LAB
COPATH REPORT: NORMAL
PAP: NORMAL

## 2019-05-08 NOTE — TELEPHONE ENCOUNTER
Patient's insurance only covers #90 per 75 days.  Prescription is written for taking 2 per day, so she needs a prior authorization for regular twice daily dosing.    Please do not close this encounter until this has been addressed.  (prior auth approved/denied, prescriber refusal to complete prior auth or medication changed/discontinued)    Prior Authorization needed on: Adderall 30mg  Drug NDC: [address]-84     Insurance: [employer]:  [id number], Pcn:  [id number], Group:  [id number]  Member ID: [id number]   Insurance phone #: [phone]    Pharmacy NPI: [id number]  Pharmacy Phone #: [phone]  Pharmacy Fax #: [phone]    Please let us know if the PA gets approved or denied or if medication is changed    Please change medication or provide reasoning/documentation and forward to PA Team at [phone].    Thanks,  Mary Anne Desai CPhT  [facility]  [phone]  \

## 2019-05-09 LAB
FINAL DIAGNOSIS: NORMAL
HPV HR 12 DNA CVX QL NAA+PROBE: NEGATIVE
HPV16 DNA SPEC QL NAA+PROBE: NEGATIVE
HPV18 DNA SPEC QL NAA+PROBE: NEGATIVE
SPECIMEN DESCRIPTION: NORMAL
SPECIMEN SOURCE CVX/VAG CYTO: NORMAL

## 2019-05-09 NOTE — RESULT ENCOUNTER NOTE
Pj Davison,  Your lab results are as below:  1)  TSH (thyroid level) 0.92 which is normal (range 0.4-4)  2)  Cholesterol is elevated (but improved) at 225,  your LDL (bad cholesterol) is elevated and your HDL (good cholesterol) is normal.  Continue to follow a low cholesterol diet and we will recheck this in 1 year.  3)  Glucose is normal at 93 (normal fasting is <100).  4)  Vitamin D level is normal at 51.    5)  Urine culture is negative for UTI.  6)  Tests for gonorrhea and chlamydia are negative.     If you have any questions do not hesitate to call the clinic to discuss the results with me further.     Sincerely,    Susan Haase, CNP

## 2019-05-13 NOTE — TELEPHONE ENCOUNTER
Prior Authorization Approval    Authorization Effective Date: 5/13/2019  Authorization Expiration Date: 5/13/2020  Medication: pa needed on adderall for regular use  Approved Dose/Quantity:    Reference #: [id number]   Insurance Company: [employer] - Phone [phone] Fax [phone]  Expected CoPay: $12.00     CoPay Card Available:      Foundation Assistance Needed:    Which Pharmacy is filling the prescription (Not needed for infusion/clinic administered): [facility] [place], [place] - [address] [address]  Pharmacy Notified: Yes  Patient Notified: Yes **Instructed pharmacy to notify patient when script is ready to /ship.**

## 2019-05-13 NOTE — TELEPHONE ENCOUNTER
Central Prior Authorization Team   Phone: [phone]      PA Initiation    Medication: pa needed on adderall for regular use  Insurance Company: [employer] - Phone [phone] Fax [phone]  Pharmacy Filling the Rx: [place], [place] - [address]  Filling Pharmacy Phone: [phone]  Filling Pharmacy Fax:    Start Date: 5/13/2019

## 2019-05-28 DIAGNOSIS — G43.809 OTHER MIGRAINE WITHOUT STATUS MIGRAINOSUS, NOT INTRACTABLE: Primary | ICD-10-CM

## 2019-05-28 NOTE — TELEPHONE ENCOUNTER
"Last Written Prescription Date:  -  Last Fill Quantity: -,  # refills: -   Last office visit: 5/6/2019 with prescribing provider:  Haase   Future Office Visit:      Routing refill request to provider for review/approval because:  Medication is reported/historical    Requested Prescriptions   Pending Prescriptions Disp Refills     naproxen (NAPROSYN) 500 MG tablet         NSAID Medications Passed - 5/28/2019 10:35 AM        Passed - Blood pressure under 140/90 in past 12 months     BP Readings from Last 3 Encounters:   05/06/19 110/66   04/08/19 120/80   12/12/18 118/80                 Passed - Normal ALT on file in past 12 months     Recent Labs   Lab Test 05/06/19  0947   ALT 31             Passed - Normal AST on file in past 12 months     Recent Labs   Lab Test 05/06/19  0947   AST 27             Passed - Recent (12 mo) or future (30 days) visit within the authorizing provider's specialty     Patient had office visit in the last 12 months or has a visit in the next 30 days with authorizing provider or within the authorizing provider's specialty.  See \"Patient Info\" tab in inbasket, or \"Choose Columns\" in Meds & Orders section of the refill encounter.              Passed - Patient is age 6-64 years        Passed - Normal CBC on file in past 12 months     Recent Labs   Lab Test 05/06/19  0947   WBC 4.2   RBC 4.26   HGB 14.4   HCT 41.8                    Passed - Medication is active on med list        Passed - No active pregnancy on record        Passed - Normal serum creatinine on file in past 12 months     Recent Labs   Lab Test 05/06/19  0947   CR 0.98             Passed - No positive pregnancy test in past 12 months          "

## 2019-05-28 NOTE — TELEPHONE ENCOUNTER
Pt is requesting a refill of naproxen 500mg    Last written 4/13/18  Qty 180  Refills 1    Not refilled at all in last year    Edelmira Escobar, Pharmacy [facility]  [phone]

## 2019-05-30 RX ORDER — NAPROXEN 500 MG/1
TABLET ORAL
Status: CANCELLED | OUTPATIENT
Start: 2019-05-30

## 2019-05-30 RX ORDER — NAPROXEN 500 MG/1
500 TABLET ORAL 2 TIMES DAILY WITH MEALS
Qty: 60 TABLET | Refills: 3 | Status: SHIPPED | OUTPATIENT
Start: 2019-05-30 | End: 2019-10-28

## 2019-06-06 ENCOUNTER — TELEPHONE (OUTPATIENT)
Dept: FAMILY MEDICINE | Facility: CLINIC | Age: 54
End: 2019-06-06

## 2019-06-06 DIAGNOSIS — F90.2 ATTENTION DEFICIT HYPERACTIVITY DISORDER (ADHD), COMBINED TYPE: ICD-10-CM

## 2019-06-06 NOTE — TELEPHONE ENCOUNTER
Pt called and is due to have her Adderall refilled 06/10/19 but would like to change the way she is taking it from 30mg 2x/day to 20mg 3x/day-please call her at [phone].    Yasmeen Motta/MEME

## 2019-06-06 NOTE — TELEPHONE ENCOUNTER
Pt last in on 5.6.19.  Pt requesting to change her short acting adderall to 20mg t.i.d vs 30 mg b.i.d.   Please review.  Concetta Swain RN

## 2019-06-07 RX ORDER — DEXTROAMPHETAMINE SACCHARATE, AMPHETAMINE ASPARTATE, DEXTROAMPHETAMINE SULFATE AND AMPHETAMINE SULFATE 5; 5; 5; 5 MG/1; MG/1; MG/1; MG/1
20 TABLET ORAL 3 TIMES DAILY
Qty: 90 TABLET | Refills: 0 | Status: SHIPPED | OUTPATIENT
Start: 2019-07-10 | End: 2019-08-09

## 2019-06-07 RX ORDER — DEXTROAMPHETAMINE SACCHARATE, AMPHETAMINE ASPARTATE, DEXTROAMPHETAMINE SULFATE AND AMPHETAMINE SULFATE 5; 5; 5; 5 MG/1; MG/1; MG/1; MG/1
20 TABLET ORAL 3 TIMES DAILY
Qty: 90 TABLET | Refills: 0 | Status: SHIPPED | OUTPATIENT
Start: 2019-06-10 | End: 2019-10-28

## 2019-06-08 NOTE — TELEPHONE ENCOUNTER
Pt informed, recommend confirm with pharmacy if another PA needed due to change, pt agrees and will see [facility] August for f/u  Genet Wolfe RN, BSN  Message handled by Nurse Triage.

## 2019-07-10 ENCOUNTER — OFFICE VISIT (OUTPATIENT)
Dept: FAMILY MEDICINE | Facility: CLINIC | Age: 54
End: 2019-07-10
Payer: COMMERCIAL

## 2019-07-10 VITALS
HEART RATE: 103 BPM | TEMPERATURE: 98 F | SYSTOLIC BLOOD PRESSURE: 121 MMHG | DIASTOLIC BLOOD PRESSURE: 76 MMHG | RESPIRATION RATE: 16 BRPM | OXYGEN SATURATION: 96 % | BODY MASS INDEX: 27.98 KG/M2 | WEIGHT: 163 LBS

## 2019-07-10 DIAGNOSIS — Z23 NEED FOR TUBERCULOSIS VACCINATION: ICD-10-CM

## 2019-07-10 DIAGNOSIS — M25.562 CHRONIC PAIN OF LEFT KNEE: Primary | ICD-10-CM

## 2019-07-10 DIAGNOSIS — G89.29 CHRONIC PAIN OF LEFT KNEE: Primary | ICD-10-CM

## 2019-07-10 PROCEDURE — 99213 OFFICE O/P EST LOW 20 MIN: CPT | Performed by: PHYSICIAN ASSISTANT

## 2019-07-10 PROCEDURE — 86580 TB INTRADERMAL TEST: CPT | Performed by: PHYSICIAN ASSISTANT

## 2019-07-10 NOTE — NURSING NOTE

## 2019-07-10 NOTE — PATIENT INSTRUCTIONS
I will send results to your MyChart.     Continue to take naproxen twice a day and apply ice 3-4 times a day.     Follow-up with orthopedics.

## 2019-07-10 NOTE — PROGRESS NOTES
Subjective     Laney Maynard is a 54 year old female who presents to clinic today for the following health issues:    HPI   Joint Pain    Onset: worse for 6 days but ongoing     Description:   Location: left knee  Character: Sharp    Intensity: moderate, 7/10    Progression of Symptoms: worse    Accompanying Signs & Symptoms:  Other symptoms: radiation of pain to around the knee     History:   Previous similar pain: YES- had surgery right knee       Precipitating factors:   Trauma or overuse: Maybe overuse     Alleviating factors:  Improved by: Naproxen     Therapies Tried and outcome: Naproxen helps a little    Has a known meniscus injury. She wants a referral to see Dr. Lindsay in ortho as she was already told that needs surgery. He did surgery on her right knee. She would also like to do an MRI prior to seeing Dr. Lindsay.     She also needs a mantoux test for work. She is available Friday afternoon to have it read.         Patient Active Problem List   Diagnosis     Alopecia     Seasonal allergic rhinitis     GERD (gastroesophageal reflux disease)     CARDIOVASCULAR SCREENING; LDL GOAL LESS THAN 160     Migraine headache     Lymphocytopenia     Sleep disorder     Family history of rheumatoid arthritis     Family history of sarcoidosis (mother)     Urinary frequency     Herpes simplex virus infection     Attention deficit hyperactivity disorder (ADHD)     Plantar fasciitis, bilateral     Complete rupture of rotator cuff     Mixed incontinence urge and stress (male)(female)     Ureterolithiasis     Past Surgical History:   Procedure Laterality Date     ARTHROSCOPY KNEE Right 04/26/2017    Procedure: Right knee arthroscopy and anterior fat pad resection with medial plica resection. Partial lateral menisectomy. Surgeon:  Fredi Lindsay MD  Location: [facility]     ARTHROSCOPY SHOULDER, OPEN ROTATOR CUFF REPAIR, COMBINED  7/31/2013    Procedure: COMBINED ARTHROSCOPY SHOULDER, OPEN ROTATOR CUFF  REPAIR;  Left Shoulder Arthroscopy, Distal Clavicale Resection, Decompression, Mini Open Rotator Cuff Repair    ;  Surgeon: Fredi Lindsay MD;  Location: [facility] OR     C NONSPECIFIC PROCEDURE  age 16    colposcopy for abnormal pap     C NONSPECIFIC PROCEDURE      surgery L thumb     C NONSPECIFIC PROCEDURE      breast augmentation-silicone     C NONSPECIFIC PROCEDURE      s/p  x 2     C NONSPECIFIC PROCEDURE      Bilateral tubal ligation     C REMOVAL OF KIDNEY STONE       COLONOSCOPY  2014    Procedure: COLONOSCOPY;  Colonoscopy/WW;  Surgeon: Pancho Olsen MD;  Location:  GI     COMBINED CYSTOSCOPY, RETROGRADES, URETEROSCOPY, LASER HOLMIUM LITHOTRIPSY URETER(S), INSERT STENT Right 2016    Procedure: COMBINED CYSTOSCOPY, RETROGRADES, URETEROSCOPY, LASER HOLMIUM LITHOTRIPSY URETER(S), INSERT STENT;  Surgeon: Kushal Noriega MD;  Location: [facility] OR     CYSTOSCOPY       CYSTOSCOPY, RETROGRADES, EXTRACT STONE, COMBINED  2013    Procedure: COMBINED CYSTOSCOPY, RETROGRADES, EXTRACT STONE;  Video Cystoscopy,  Right Ureteroscopy, ureteral dilatation,  Stone extraction;  Surgeon: Subhash Vann MD;  Location:  OR     EXTRACORPOREAL SHOCK WAVE LITHOTRIPSY (ESWL) Bilateral 2016    Procedure: EXTRACORPOREAL SHOCK WAVE LITHOTRIPSY (ESWL);  Surgeon: Bassam Vu MD;  Location:  OR     EXTRACORPOREAL SHOCK WAVE LITHOTRIPSY, CYSTOSCOPY, INSERT STENT URETER(S), COMBINED Bilateral 2018    Procedure: COMBINED EXTRACORPOREAL SHOCK WAVE LITHOTRIPSY, CYSTOSCOPY, INSERT STENT URETER(S);  BILATERAL COMBINED EXTRACORPOREAL SHOCK WAVE LITHOTRIPSY, CYSTOSCOPY, LEFT STENT PLACEMENT;  Surgeon: Bassam Vu MD;  Location:  OR     GYN SURGERY      laparoscopy     LAPAROSCOPIC CHOLECYSTECTOMY WITH CHOLANGIOGRAMS  2012    Procedure: LAPAROSCOPIC CHOLECYSTECTOMY WITH CHOLANGIOGRAMS;  LAPAROSCOPIC CHOLECYSTECTOMY WITH CHOLANGIOGRAMS ;  Surgeon: Nataliya Gabriel MD;   Location: [facility] OR     TUBAL LIGATION         Social History     Tobacco Use     Smoking status: Never Smoker     Smokeless tobacco: Never Used   Substance Use Topics     Alcohol use: No     Alcohol/week: 0.0 oz     Family History   Problem Relation Age of Onset     Diabetes Mother         type 2     Alcohol/Drug Mother         alcohol         Current Outpatient Medications   Medication Sig Dispense Refill     acyclovir (ZOVIRAX) 5 % ointment Apply topically 5 times daily (Patient taking differently: Apply topically 2 times daily as needed (outbreak) ) 30 g 5     amphetamine-dextroamphetamine (ADDERALL) 20 MG tablet Take 1 tablet (20 mg) by mouth 3 times daily 90 tablet 0     amphetamine-dextroamphetamine (ADDERALL) 20 MG tablet Take 1 tablet (20 mg) by mouth 3 times daily 90 tablet 0     fexofenadine (ALLEGRA) 180 MG tablet Take 1 tablet (180 mg) by mouth daily as needed for allergies 30 tablet 6     fluocinolone acetonide 0.01 % OIL Use on scalp once a week, as needed. 1 Bottle 11     Fluocinolone Acetonide Scalp 0.01 % OIL oil        fluticasone (FLONASE) 50 MCG/ACT nasal spray Spray 2 sprays into both nostrils daily as needed for allergies 16 g 11     ketoconazole (NIZORAL) 2 % external shampoo Apply to the affected area and wash off after 5 minutes, as needed. 120 mL 11     ketorolac (TORADOL) 10 MG tablet Take 1 tablet (10 mg) by mouth every 6 hours as needed for moderate pain 10 tablet 0     meclizine (ANTIVERT) 25 MG tablet Take 1 tablet (25 mg) by mouth 3 times daily as needed for nausea 30 tablet 3     Menthol, Topical Analgesic, (BIOFREEZE COLORLESS) 4 % GEL Externally apply topically 3 times daily as needed (Patient taking differently: Externally apply topically daily as needed (pain) ) 118 mL 1     naproxen (NAPROSYN) 500 MG tablet Take 1 tablet (500 mg) by mouth 2 times daily (with meals) 60 tablet 3     naproxen (NAPROSYN) 500 MG tablet        ondansetron (ZOFRAN ODT) 4 MG ODT tab Take 1-2 tablets (4-8  mg) by mouth daily as needed for nausea 15 tablet 5     order for DME Equipment being ordered: tall aircast boot 1 Device 0     order for DME Equipment being ordered:heel pads 1 Package 0     phenazopyridine (PYRIDIUM) 200 MG tablet Take 1 tablet (200 mg) by mouth 3 times daily as needed for irritation 30 tablet 3     potassium citrate (UROCIT-K) 10 MEQ (1080 MG) CR tablet        solifenacin (VESICARE) 5 MG tablet Take 1 tablet (5 mg) by mouth every morning 90 tablet 3     tobramycin-dexamethasone (TOBRADEX) ophthalmic suspension Place 2 drops into both eyes daily (Patient taking differently: Place 2 drops into both eyes daily as needed ) 1 Bottle 11     Topiramate (TOPAMAX PO) Take 150 mg by mouth every morning (3 X 50MG = 150MG)       valACYclovir (VALTREX) 1000 mg tablet Take 1 tablet (1,000 mg) by mouth daily 90 tablet 3     zolpidem (AMBIEN) 5 MG tablet Take 1 tablet (5 mg) by mouth nightly as needed for sleep 30 tablet 2     Allergies   Allergen Reactions     Aloe Itching and Rash     Codeine      GI upset     Compazine Nausea and Itching     Darvocet [Acetaminophen] Nausea and Vomiting     Percocet [Oxycodone-Acetaminophen] Nausea and Vomiting     Sulphadimidine [Sulfamethazine] Rash         Reviewed and updated as needed this visit by Provider         Review of Systems   ROS COMP: Constitutional, HEENT, cardiovascular, pulmonary, gi and gu systems are negative, except as otherwise noted.      Objective    /76 (BP Location: Right arm, Patient Position: Chair, Cuff Size: Adult Regular)   Pulse 103   Temp 98  F (36.7  C) (Oral)   Resp 16   Wt 73.9 kg (163 lb)   LMP 02/25/2014   SpO2 96%   BMI 27.98 kg/m    Body mass index is 27.98 kg/m .       Physical Exam   GENERAL: healthy, alert and no distress  EYES: Eyes grossly normal to inspection, PERRL and conjunctivae and sclerae normal  MS: no gross musculoskeletal defects noted, no edema  SKIN: no suspicious lesions or rashes  NEURO: Normal strength  and tone, mentation intact and speech normal  PSYCH: mentation appears normal, affect normal/bright  Left knee: There is no erythema, edema, or ecchymosis. Tender to palpation over lateral joint line. Mildly tender diffusely. ROM is intact but with pain. Pain diffusely with modified Xavier test.    Diagnostic Test Results:  none         Assessment & Plan     (M25.562,  G89.29) Chronic pain of left knee  (primary encounter diagnosis)    Comment: Ordered MRI to look for meniscus tears. She will follow-up with Dr. Lindsay regarding results.    Plan: MR Knee Left w/o Contrast, ORTHO          REFERRAL            (Z23) Need for tuberculosis vaccination  Comment:   Plan: TB INTRADERMAL TEST                Patient Instructions   I will send results to your MyChart.     Continue to take naproxen twice a day and apply ice 3-4 times a day.     Follow-up with orthopedics.       No follow-ups on file.    Asif Gray PA-C  [facility]

## 2019-07-11 ENCOUNTER — HOSPITAL ENCOUNTER (OUTPATIENT)
Dept: MRI IMAGING | Facility: CLINIC | Age: 54
Discharge: HOME | End: 2019-07-11
Attending: PHYSICIAN ASSISTANT | Admitting: PHYSICIAN ASSISTANT
Payer: COMMERCIAL

## 2019-07-11 DIAGNOSIS — M25.562 CHRONIC PAIN OF LEFT KNEE: ICD-10-CM

## 2019-07-11 DIAGNOSIS — G89.29 CHRONIC PAIN OF LEFT KNEE: ICD-10-CM

## 2019-07-11 PROCEDURE — 73721 MRI JNT OF LWR EXTRE W/O DYE: CPT | Mod: LT

## 2019-07-12 ENCOUNTER — ALLIED HEALTH/NURSE VISIT (OUTPATIENT)
Dept: NURSING | Facility: CLINIC | Age: 54
End: 2019-07-12
Payer: COMMERCIAL

## 2019-07-12 DIAGNOSIS — Z11.1 SCREENING EXAMINATION FOR PULMONARY TUBERCULOSIS: Primary | ICD-10-CM

## 2019-07-12 LAB
PPDINDURATION: 0 MM (ref 0–5)
PPDREDNESS: 0 MM

## 2019-07-12 PROCEDURE — 99207 ZZC NO CHARGE NURSE ONLY: CPT

## 2019-07-12 NOTE — NURSING NOTE
Mantoux result:  Lab Results   Component Value Date    PPDREDNESS 0.0 07/12/2019    PPDINDURATIO 0.0 07/12/2019     Is induration greater than 5mm?  No     Mantoux results: No induration.  No swelling.  No redness.    Miriam Vann RN, BS  Clinical Nurse Triage.

## 2019-07-15 ENCOUNTER — OFFICE VISIT (OUTPATIENT)
Dept: ORTHOPEDICS | Facility: CLINIC | Age: 54
End: 2019-07-15
Payer: COMMERCIAL

## 2019-07-15 VITALS
WEIGHT: 163 LBS | BODY MASS INDEX: 28.88 KG/M2 | SYSTOLIC BLOOD PRESSURE: 114 MMHG | DIASTOLIC BLOOD PRESSURE: 78 MMHG | HEIGHT: 63 IN

## 2019-07-15 DIAGNOSIS — M25.562 CHRONIC PAIN OF LEFT KNEE: Primary | ICD-10-CM

## 2019-07-15 DIAGNOSIS — G89.29 CHRONIC PAIN OF LEFT KNEE: Primary | ICD-10-CM

## 2019-07-15 PROCEDURE — 99213 OFFICE O/P EST LOW 20 MIN: CPT | Performed by: ORTHOPAEDIC SURGERY

## 2019-07-15 ASSESSMENT — MIFFLIN-ST. JEOR: SCORE: 1303.49

## 2019-07-15 NOTE — PATIENT INSTRUCTIONS
Further observation with over-the-counter anti-inflammatory medication  If pain gets worse return to clinic for further evaluation and treatment

## 2019-07-15 NOTE — PROGRESS NOTES
HISTORY OF PRESENT ILLNESS:    Laney Maynard is a 54 year old female who is seen in consultation at the request of Dr. Gray for left knee pain. Patient states pain started in 2015, and pain comes and goes. Increased pain over the last month. She states she had a wedding rehearsal about 10 days ago and has had a flare up of left knee pain.     Present symptoms: Pain is located anteriorly, she notes increased pain along the lateral aspect.  Patient states about 3 weeks ago she was descending stairs and her knee gave out causing her to fall down the last 4 steps.  Patient reports weakness of the left knee, and decreased activity tolerance. Patient states she has stiffness and tenderness in the knee, swelling is not obvious.  Patient notes grinding of the knee, no catching or locking.  Patient notes increased pain with standing for extended periods of time, at nighttime, squatting and kneeling. Patient walks with antalgic gate, and has been using shopping cart to help ambulate through the store.  Current pain level: 7/10, Worst pain level: 10/10    Treatments tried to this point: naproxen, ice  Orthopedic PMH: right knee arthroscopy, fat pad resection, and partial lateral meniscectomy, DOS 4/26/17. Dr. Lindsay    Past Medical History:   Diagnosis Date     Abnormal glandular Papanicolaou smear of cervix      Allergic rhinitis, cause unspecified      Constipation      Gastro-oesophageal reflux disease      Heart murmur     murmur     Hematuria      Herpes simplex virus infection      Hydronephrosis, right      Migraine, unspecified, without mention of intractable migraine without mention of status migrainosus      Mumps      Numbness and tingling     LEFT ARM     Palpitations      Renal disease     hx stones x 2 episodes     Ureterolithiasis      Urinary frequency     burning     Vertigo        Past Surgical History:   Procedure Laterality Date     ARTHROSCOPY KNEE Right 04/26/2017    Procedure: Right knee  arthroscopy and anterior fat pad resection with medial plica resection. Partial lateral menisectomy. Surgeon:  Fredi Lindsay MD  Location: [facility]     ARTHROSCOPY SHOULDER, OPEN ROTATOR CUFF REPAIR, COMBINED  2013    Procedure: COMBINED ARTHROSCOPY SHOULDER, OPEN ROTATOR CUFF REPAIR;  Left Shoulder Arthroscopy, Distal Clavicale Resection, Decompression, Mini Open Rotator Cuff Repair    ;  Surgeon: Fredi Lindsay MD;  Location: [facility] OR     C NONSPECIFIC PROCEDURE  age 16    colposcopy for abnormal pap     C NONSPECIFIC PROCEDURE      surgery L thumb     C NONSPECIFIC PROCEDURE      breast augmentation-silicone     C NONSPECIFIC PROCEDURE      s/p  x 2     C NONSPECIFIC PROCEDURE      Bilateral tubal ligation     C REMOVAL OF KIDNEY STONE       COLONOSCOPY  2014    Procedure: COLONOSCOPY;  Colonoscopy/WW;  Surgeon: Pancho Olsen MD;  Location:  GI     COMBINED CYSTOSCOPY, RETROGRADES, URETEROSCOPY, LASER HOLMIUM LITHOTRIPSY URETER(S), INSERT STENT Right 2016    Procedure: COMBINED CYSTOSCOPY, RETROGRADES, URETEROSCOPY, LASER HOLMIUM LITHOTRIPSY URETER(S), INSERT STENT;  Surgeon: Kushal Noriega MD;  Location: [facility] OR     CYSTOSCOPY       CYSTOSCOPY, RETROGRADES, EXTRACT STONE, COMBINED  2013    Procedure: COMBINED CYSTOSCOPY, RETROGRADES, EXTRACT STONE;  Video Cystoscopy,  Right Ureteroscopy, ureteral dilatation,  Stone extraction;  Surgeon: Subhash Vann MD;  Location: [facility] OR     EXTRACORPOREAL SHOCK WAVE LITHOTRIPSY (ESWL) Bilateral 2016    Procedure: EXTRACORPOREAL SHOCK WAVE LITHOTRIPSY (ESWL);  Surgeon: Bassam Vu MD;  Location: [facility] OR     EXTRACORPOREAL SHOCK WAVE LITHOTRIPSY, CYSTOSCOPY, INSERT STENT URETER(S), COMBINED Bilateral 2018    Procedure: COMBINED EXTRACORPOREAL SHOCK WAVE LITHOTRIPSY, CYSTOSCOPY, INSERT STENT URETER(S);  BILATERAL COMBINED EXTRACORPOREAL SHOCK WAVE LITHOTRIPSY, CYSTOSCOPY, LEFT STENT PLACEMENT;  Surgeon:  Bassam Vu MD;  Location: [facility] OR     GYN SURGERY      laparoscopy     LAPAROSCOPIC CHOLECYSTECTOMY WITH CHOLANGIOGRAMS  11/21/2012    Procedure: LAPAROSCOPIC CHOLECYSTECTOMY WITH CHOLANGIOGRAMS;  LAPAROSCOPIC CHOLECYSTECTOMY WITH CHOLANGIOGRAMS ;  Surgeon: Nataliya Gabriel MD;  Location: [facility] OR     TUBAL LIGATION         Family History   Problem Relation Age of Onset     Diabetes Mother         type 2     Alcohol/Drug Mother         alcohol       Social History     Socioeconomic History     Marital status:      Spouse name: Not on file     Number of children: 2     Years of education: Not on file     Highest education level: Not on file   Occupational History     Occupation: LPN     Employer: [employer]     Comment: self employed, lifting, wheeling, walking     Employer: [employer]   Social Needs     Financial resource strain: Not on file     Food insecurity:     Worry: Not on file     Inability: Not on file     Transportation needs:     Medical: Not on file     Non-medical: Not on file   Tobacco Use     Smoking status: Never Smoker     Smokeless tobacco: Never Used   Substance and Sexual Activity     Alcohol use: No     Alcohol/week: 0.0 oz     Drug use: No     Sexual activity: Yes     Partners: Male     Birth control/protection: Surgical     Comment: tubal   Lifestyle     Physical activity:     Days per week: Not on file     Minutes per session: Not on file     Stress: Not on file   Relationships     Social connections:     Talks on phone: Not on file     Gets together: Not on file     Attends Spiritism service: Not on file     Active member of club or organization: Not on file     Attends meetings of clubs or organizations: Not on file     Relationship status: Not on file     Intimate partner violence:     Fear of current or ex partner: Not on file     Emotionally abused: Not on file     Physically abused: Not on file     Forced sexual activity: Not on file   Other  Topics Concern      Service No     Blood Transfusions No     Caffeine Concern No     Occupational Exposure No     Hobby Hazards Not Asked     Sleep Concern Yes     Stress Concern No     Weight Concern No     Special Diet No     Back Care Not Asked     Exercise Yes     Comment: 2 days/week     Bike Helmet Not Asked     Seat Belt No     Self-Exams Yes     Parent/sibling w/ CABG, MI or angioplasty before 65F 55M? No   Social History Narrative     Not on file       Current Outpatient Medications   Medication Sig Dispense Refill     acyclovir (ZOVIRAX) 5 % ointment Apply topically 5 times daily (Patient taking differently: Apply topically 2 times daily as needed (outbreak) ) 30 g 5     amphetamine-dextroamphetamine (ADDERALL) 20 MG tablet Take 1 tablet (20 mg) by mouth 3 times daily 90 tablet 0     amphetamine-dextroamphetamine (ADDERALL) 20 MG tablet Take 1 tablet (20 mg) by mouth 3 times daily 90 tablet 0     fexofenadine (ALLEGRA) 180 MG tablet Take 1 tablet (180 mg) by mouth daily as needed for allergies 30 tablet 6     fluocinolone acetonide 0.01 % OIL Use on scalp once a week, as needed. 1 Bottle 11     Fluocinolone Acetonide Scalp 0.01 % OIL oil        fluticasone (FLONASE) 50 MCG/ACT nasal spray Spray 2 sprays into both nostrils daily as needed for allergies 16 g 11     ketoconazole (NIZORAL) 2 % external shampoo Apply to the affected area and wash off after 5 minutes, as needed. 120 mL 11     ketorolac (TORADOL) 10 MG tablet Take 1 tablet (10 mg) by mouth every 6 hours as needed for moderate pain 10 tablet 0     meclizine (ANTIVERT) 25 MG tablet Take 1 tablet (25 mg) by mouth 3 times daily as needed for nausea 30 tablet 3     Menthol, Topical Analgesic, (BIOFREEZE COLORLESS) 4 % GEL Externally apply topically 3 times daily as needed (Patient taking differently: Externally apply topically daily as needed (pain) ) 118 mL 1     naproxen (NAPROSYN) 500 MG tablet Take 1 tablet (500 mg) by mouth 2 times daily  (with meals) 60 tablet 3     naproxen (NAPROSYN) 500 MG tablet        ondansetron (ZOFRAN ODT) 4 MG ODT tab Take 1-2 tablets (4-8 mg) by mouth daily as needed for nausea 15 tablet 5     order for DME Equipment being ordered: tall aircast boot 1 Device 0     order for DME Equipment being ordered:heel pads 1 Package 0     phenazopyridine (PYRIDIUM) 200 MG tablet Take 1 tablet (200 mg) by mouth 3 times daily as needed for irritation 30 tablet 3     potassium citrate (UROCIT-K) 10 MEQ (1080 MG) CR tablet        solifenacin (VESICARE) 5 MG tablet Take 1 tablet (5 mg) by mouth every morning 90 tablet 3     tobramycin-dexamethasone (TOBRADEX) ophthalmic suspension Place 2 drops into both eyes daily (Patient taking differently: Place 2 drops into both eyes daily as needed ) 1 Bottle 11     Topiramate (TOPAMAX PO) Take 150 mg by mouth every morning (3 X 50MG = 150MG)       valACYclovir (VALTREX) 1000 mg tablet Take 1 tablet (1,000 mg) by mouth daily 90 tablet 3     zolpidem (AMBIEN) 5 MG tablet Take 1 tablet (5 mg) by mouth nightly as needed for sleep 30 tablet 2       Allergies   Allergen Reactions     Aloe Itching and Rash     Codeine      GI upset     Compazine Nausea and Itching     Darvocet [Acetaminophen] Nausea and Vomiting     Percocet [Oxycodone-Acetaminophen] Nausea and Vomiting     Sulphadimidine [Sulfamethazine] Rash       REVIEW OF SYSTEMS:  CONSTITUTIONAL:  NEGATIVE for fever, chills, change in weight  INTEGUMENTARY/SKIN:  NEGATIVE for worrisome rashes, moles or lesions  EYES:  NEGATIVE for vision changes or irritation  ENT/MOUTH:  NEGATIVE for ear, mouth and throat problems  RESP:  NEGATIVE for significant cough or SOB  BREAST:  NEGATIVE for masses, tenderness or discharge  CV:  NEGATIVE for chest pain, palpitations or peripheral edema  GI:  NEGATIVE for nausea, abdominal pain, heartburn, or change in bowel habits  :  Negative   MUSCULOSKELETAL:  See HPI above  NEURO:  NEGATIVE for weakness, dizziness or  "paresthesias  ENDOCRINE:  NEGATIVE for temperature intolerance, skin/hair changes  HEME/ALLERGY/IMMUNE:  NEGATIVE for bleeding problems  PSYCHIATRIC:  NEGATIVE for changes in mood or affect      PHYSICAL EXAM:  /78 (BP Location: Right arm, Patient Position: Chair, Cuff Size: Adult Regular)   Ht 1.6 m (5' 3\")   Wt 73.9 kg (163 lb)   LMP 02/25/2014   BMI 28.87 kg/m    Body mass index is 28.87 kg/m .   GENERAL APPEARANCE: healthy, alert and no distress   HEENT: No apparent thyroid megaly. Clear sclera with normal ocular movement  RESPIRATORY: No labored breathing  SKIN: no suspicious lesions or rashes  NEURO: Normal strength and tone, mentation intact and speech normal  VASCULAR: Good pulses, and capillary refill   LYMPH: no lymphadenopathy   PSYCH:  mentation appears normal and affect normal/bright    MUSCULOSKELETAL:  Normal-appearing knees without deformities, swelling or redness  Full range of motion, bilateral  No significant patellofemoral crepitus  Pain with a palpation of the anterolateral aspect the left knee  Some pain with a lateral to medial patellar translation, left knee  Lateral joint line itself is not tender  The LCL is not tender to touch  Other ligaments are stable  Extensor mechanism is intact  No popliteal mass     ASSESSMENT:  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  Resection of plica along with a fat pad was done.  Following the operation she noted some improvement.  The images of MRI scan from July 1, 2019 for the left knee were visualized.  Findings are thoroughly explained.  Slight thinning of the articular cartilage in the central portion of the patella was felt to be present along with a minimal to small Baker's cyst.  Presence of Baker's cyst was not felt to be responsible for her " symptoms.  The treatment options of further observation versus possible knee arthroscopy were discussed.  After our discussion, she decided to proceed with further observation at this time.  We will discuss the possibility of a knee arthroscopy in the future if the situation gets worse.  Follow-up as needed.    Imaging Interpretation:   Recent Results (from the past 744 hour(s))   MR Knee Left w/o Contrast    Narrative    MR KNEE LEFT WITHOUT CONTRAST   7/11/2019 4:32 PM    HISTORY:  Left knee pain since 7/4/2019.    COMPARISON: None.    TECHNIQUE: Multiplanar MR imaging was performed without contrast.    FINDINGS:     Medial Meniscus: No tear, displaced fragment, or extrusion.    Lateral Meniscus: No tear, displaced fragment, or extrusion.    Anterior Cruciate Ligament: Unremarkable.    Posterior Cruciate Ligament: Unremarkable.    Medial Collateral Ligament: Unremarkable.    Lateral Collateral Ligament Complex, Popliteus Tendon: The iliotibial  band, fibular collateral ligament, biceps femoris tendon, and  popliteus tendon are unremarkable.    Osseous and Cartilaginous Structures: No fracture or osseous lesion is  demonstrated. No abnormal marrow signal intensity is identified. The  cartilage surfaces are well preserved.    Extensor Mechanism: The quadriceps and patellar tendons are  unremarkable. The medial and lateral patellar retinacula appear  unremarkable.    Joint Space: No joint effusion. No definite loose bodies appreciated.    Additional Findings: There is an approximately 2 cm wide Baker's cyst.  No semimembranosus-tibial collateral ligament or pes anserine  bursitis. No soft tissue pathology is seen.      Impression    IMPRESSION: Small Baker's cyst. Otherwise unremarkable left knee.    MD Fredi MENDOZA MD  Department of Orthopedic Surgery        Disclaimer: This note consists of symbols derived from keyboarding, dictation and/or voice recognition software. As a result, there may be  errors in the script that have gone undetected. Please consider this when interpreting information found in this chart.

## 2019-07-15 NOTE — LETTER
7/15/2019         RE: Laney Maynard  [address] [facility] [address]        Dear Colleague,    Thank you for referring your patient, Laney Maynard, to the [facility] ORTHOPEDIC SURGERY. Please see a copy of my visit note below.    HISTORY OF PRESENT ILLNESS:    Laney Maynard is a 54 year old female who is seen in consultation at the request of Dr. Gray for left knee pain. Patient states pain started in 2015, and pain comes and goes. Increased pain over the last month. She states she had a wedding rehearsal about 10 days ago and has had a flare up of left knee pain.     Present symptoms: Pain is located anteriorly, she notes increased pain along the lateral aspect.  Patient states about 3 weeks ago she was descending stairs and her knee gave out causing her to fall down the last 4 steps.  Patient reports weakness of the left knee, and decreased activity tolerance. Patient states she has stiffness and tenderness in the knee, swelling is not obvious.  Patient notes grinding of the knee, no catching or locking.  Patient notes increased pain with standing for extended periods of time, at nighttime, squatting and kneeling. Patient walks with antalgic gate, and has been using shopping cart to help ambulate through the store.  Current pain level: 7/10, Worst pain level: 10/10    Treatments tried to this point: naproxen, ice  Orthopedic PMH: right knee arthroscopy, fat pad resection, and partial lateral meniscectomy, DOS 4/26/17. Dr. Lindsay    Past Medical History:   Diagnosis Date     Abnormal glandular Papanicolaou smear of cervix      Allergic rhinitis, cause unspecified      Constipation      Gastro-oesophageal reflux disease      Heart murmur     murmur     Hematuria      Herpes simplex virus infection      Hydronephrosis, right      Migraine, unspecified, without mention of intractable migraine without mention of status migrainosus      Mumps      Numbness and  tingling     LEFT ARM     Palpitations      Renal disease     hx stones x 2 episodes     Ureterolithiasis      Urinary frequency     burning     Vertigo        Past Surgical History:   Procedure Laterality Date     ARTHROSCOPY KNEE Right 2017    Procedure: Right knee arthroscopy and anterior fat pad resection with medial plica resection. Partial lateral menisectomy. Surgeon:  Fredi Lindsay MD  Location: [facility]     ARTHROSCOPY SHOULDER, OPEN ROTATOR CUFF REPAIR, COMBINED  2013    Procedure: COMBINED ARTHROSCOPY SHOULDER, OPEN ROTATOR CUFF REPAIR;  Left Shoulder Arthroscopy, Distal Clavicale Resection, Decompression, Mini Open Rotator Cuff Repair    ;  Surgeon: Fredi Lindsay MD;  Location: [facility] OR     C NONSPECIFIC PROCEDURE  age 16    colposcopy for abnormal pap     C NONSPECIFIC PROCEDURE      surgery L thumb     C NONSPECIFIC PROCEDURE      breast augmentation-silicone     C NONSPECIFIC PROCEDURE      s/p  x 2     C NONSPECIFIC PROCEDURE      Bilateral tubal ligation     C REMOVAL OF KIDNEY STONE       COLONOSCOPY  2014    Procedure: COLONOSCOPY;  Colonoscopy/WW;  Surgeon: Pancho Olsen MD;  Location:  GI     COMBINED CYSTOSCOPY, RETROGRADES, URETEROSCOPY, LASER HOLMIUM LITHOTRIPSY URETER(S), INSERT STENT Right 2016    Procedure: COMBINED CYSTOSCOPY, RETROGRADES, URETEROSCOPY, LASER HOLMIUM LITHOTRIPSY URETER(S), INSERT STENT;  Surgeon: Kushal Noriega MD;  Location: [facility] OR     CYSTOSCOPY       CYSTOSCOPY, RETROGRADES, EXTRACT STONE, COMBINED  2013    Procedure: COMBINED CYSTOSCOPY, RETROGRADES, EXTRACT STONE;  Video Cystoscopy,  Right Ureteroscopy, ureteral dilatation,  Stone extraction;  Surgeon: Subhash Vann MD;  Location: [facility] OR     EXTRACORPOREAL SHOCK WAVE LITHOTRIPSY (ESWL) Bilateral 2016    Procedure: EXTRACORPOREAL SHOCK WAVE LITHOTRIPSY (ESWL);  Surgeon: Bassam Vu MD;  Location: [facility] OR     EXTRACORPOREAL SHOCK WAVE  LITHOTRIPSY, CYSTOSCOPY, INSERT STENT URETER(S), COMBINED Bilateral 4/26/2018    Procedure: COMBINED EXTRACORPOREAL SHOCK WAVE LITHOTRIPSY, CYSTOSCOPY, INSERT STENT URETER(S);  BILATERAL COMBINED EXTRACORPOREAL SHOCK WAVE LITHOTRIPSY, CYSTOSCOPY, LEFT STENT PLACEMENT;  Surgeon: Bassam Vu MD;  Location: [facility] OR     GYN SURGERY      laparoscopy     LAPAROSCOPIC CHOLECYSTECTOMY WITH CHOLANGIOGRAMS  11/21/2012    Procedure: LAPAROSCOPIC CHOLECYSTECTOMY WITH CHOLANGIOGRAMS;  LAPAROSCOPIC CHOLECYSTECTOMY WITH CHOLANGIOGRAMS ;  Surgeon: Nataliya Gabriel MD;  Location: [facility] OR     TUBAL LIGATION         Family History   Problem Relation Age of Onset     Diabetes Mother         type 2     Alcohol/Drug Mother         alcohol       Social History     Socioeconomic History     Marital status:      Spouse name: Not on file     Number of children: 2     Years of education: Not on file     Highest education level: Not on file   Occupational History     Occupation: LPN     Employer: [employer]     Comment: self employed, lifting, wheeling, walking     Employer: [employer]   Social Needs     Financial resource strain: Not on file     Food insecurity:     Worry: Not on file     Inability: Not on file     Transportation needs:     Medical: Not on file     Non-medical: Not on file   Tobacco Use     Smoking status: Never Smoker     Smokeless tobacco: Never Used   Substance and Sexual Activity     Alcohol use: No     Alcohol/week: 0.0 oz     Drug use: No     Sexual activity: Yes     Partners: Male     Birth control/protection: Surgical     Comment: tubal   Lifestyle     Physical activity:     Days per week: Not on file     Minutes per session: Not on file     Stress: Not on file   Relationships     Social connections:     Talks on phone: Not on file     Gets together: Not on file     Attends Evangelical service: Not on file     Active member of club or organization: Not on file     Attends  meetings of clubs or organizations: Not on file     Relationship status: Not on file     Intimate partner violence:     Fear of current or ex partner: Not on file     Emotionally abused: Not on file     Physically abused: Not on file     Forced sexual activity: Not on file   Other Topics Concern      Service No     Blood Transfusions No     Caffeine Concern No     Occupational Exposure No     Hobby Hazards Not Asked     Sleep Concern Yes     Stress Concern No     Weight Concern No     Special Diet No     Back Care Not Asked     Exercise Yes     Comment: 2 days/week     Bike Helmet Not Asked     Seat Belt No     Self-Exams Yes     Parent/sibling w/ CABG, MI or angioplasty before 65F 55M? No   Social History Narrative     Not on file       Current Outpatient Medications   Medication Sig Dispense Refill     acyclovir (ZOVIRAX) 5 % ointment Apply topically 5 times daily (Patient taking differently: Apply topically 2 times daily as needed (outbreak) ) 30 g 5     amphetamine-dextroamphetamine (ADDERALL) 20 MG tablet Take 1 tablet (20 mg) by mouth 3 times daily 90 tablet 0     amphetamine-dextroamphetamine (ADDERALL) 20 MG tablet Take 1 tablet (20 mg) by mouth 3 times daily 90 tablet 0     fexofenadine (ALLEGRA) 180 MG tablet Take 1 tablet (180 mg) by mouth daily as needed for allergies 30 tablet 6     fluocinolone acetonide 0.01 % OIL Use on scalp once a week, as needed. 1 Bottle 11     Fluocinolone Acetonide Scalp 0.01 % OIL oil        fluticasone (FLONASE) 50 MCG/ACT nasal spray Spray 2 sprays into both nostrils daily as needed for allergies 16 g 11     ketoconazole (NIZORAL) 2 % external shampoo Apply to the affected area and wash off after 5 minutes, as needed. 120 mL 11     ketorolac (TORADOL) 10 MG tablet Take 1 tablet (10 mg) by mouth every 6 hours as needed for moderate pain 10 tablet 0     meclizine (ANTIVERT) 25 MG tablet Take 1 tablet (25 mg) by mouth 3 times daily as needed for nausea 30 tablet 3      Menthol, Topical Analgesic, (BIOFREEZE COLORLESS) 4 % GEL Externally apply topically 3 times daily as needed (Patient taking differently: Externally apply topically daily as needed (pain) ) 118 mL 1     naproxen (NAPROSYN) 500 MG tablet Take 1 tablet (500 mg) by mouth 2 times daily (with meals) 60 tablet 3     naproxen (NAPROSYN) 500 MG tablet        ondansetron (ZOFRAN ODT) 4 MG ODT tab Take 1-2 tablets (4-8 mg) by mouth daily as needed for nausea 15 tablet 5     order for DME Equipment being ordered: tall aircast boot 1 Device 0     order for DME Equipment being ordered:heel pads 1 Package 0     phenazopyridine (PYRIDIUM) 200 MG tablet Take 1 tablet (200 mg) by mouth 3 times daily as needed for irritation 30 tablet 3     potassium citrate (UROCIT-K) 10 MEQ (1080 MG) CR tablet        solifenacin (VESICARE) 5 MG tablet Take 1 tablet (5 mg) by mouth every morning 90 tablet 3     tobramycin-dexamethasone (TOBRADEX) ophthalmic suspension Place 2 drops into both eyes daily (Patient taking differently: Place 2 drops into both eyes daily as needed ) 1 Bottle 11     Topiramate (TOPAMAX PO) Take 150 mg by mouth every morning (3 X 50MG = 150MG)       valACYclovir (VALTREX) 1000 mg tablet Take 1 tablet (1,000 mg) by mouth daily 90 tablet 3     zolpidem (AMBIEN) 5 MG tablet Take 1 tablet (5 mg) by mouth nightly as needed for sleep 30 tablet 2       Allergies   Allergen Reactions     Aloe Itching and Rash     Codeine      GI upset     Compazine Nausea and Itching     Darvocet [Acetaminophen] Nausea and Vomiting     Percocet [Oxycodone-Acetaminophen] Nausea and Vomiting     Sulphadimidine [Sulfamethazine] Rash       REVIEW OF SYSTEMS:  CONSTITUTIONAL:  NEGATIVE for fever, chills, change in weight  INTEGUMENTARY/SKIN:  NEGATIVE for worrisome rashes, moles or lesions  EYES:  NEGATIVE for vision changes or irritation  ENT/MOUTH:  NEGATIVE for ear, mouth and throat problems  RESP:  NEGATIVE for significant cough or SOB  BREAST:   "NEGATIVE for masses, tenderness or discharge  CV:  NEGATIVE for chest pain, palpitations or peripheral edema  GI:  NEGATIVE for nausea, abdominal pain, heartburn, or change in bowel habits  :  Negative   MUSCULOSKELETAL:  See HPI above  NEURO:  NEGATIVE for weakness, dizziness or paresthesias  ENDOCRINE:  NEGATIVE for temperature intolerance, skin/hair changes  HEME/ALLERGY/IMMUNE:  NEGATIVE for bleeding problems  PSYCHIATRIC:  NEGATIVE for changes in mood or affect      PHYSICAL EXAM:  /78 (BP Location: Right arm, Patient Position: Chair, Cuff Size: Adult Regular)   Ht 1.6 m (5' 3\")   Wt 73.9 kg (163 lb)   LMP 02/25/2014   BMI 28.87 kg/m     Body mass index is 28.87 kg/m .   GENERAL APPEARANCE: healthy, alert and no distress   HEENT: No apparent thyroid megaly. Clear sclera with normal ocular movement  RESPIRATORY: No labored breathing  SKIN: no suspicious lesions or rashes  NEURO: Normal strength and tone, mentation intact and speech normal  VASCULAR: Good pulses, and capillary refill   LYMPH: no lymphadenopathy   PSYCH:  mentation appears normal and affect normal/bright    MUSCULOSKELETAL:  Normal-appearing knees without deformities, swelling or redness  Full range of motion, bilateral  No significant patellofemoral crepitus  Pain with a palpation of the anterolateral aspect the left knee  Some pain with a lateral to medial patellar translation, left knee  Lateral joint line itself is not tender  The LCL is not tender to touch  Other ligaments are stable  Extensor mechanism is intact  No popliteal mass     ASSESSMENT:  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  Resection of plica along with a fat pad was done.  Following the operation she noted some improvement.  The images of MRI scan from July 1, 2019 for " the left knee were visualized.  Findings are thoroughly explained.  Slight thinning of the articular cartilage in the central portion of the patella was felt to be present along with a minimal to small Baker's cyst.  Presence of Baker's cyst was not felt to be responsible for her symptoms.  The treatment options of further observation versus possible knee arthroscopy were discussed.  After our discussion, she decided to proceed with further observation at this time.  We will discuss the possibility of a knee arthroscopy in the future if the situation gets worse.  Follow-up as needed.    Imaging Interpretation:   Recent Results (from the past 744 hour(s))   MR Knee Left w/o Contrast    Narrative    MR KNEE LEFT WITHOUT CONTRAST   7/11/2019 4:32 PM    HISTORY:  Left knee pain since 7/4/2019.    COMPARISON: None.    TECHNIQUE: Multiplanar MR imaging was performed without contrast.    FINDINGS:     Medial Meniscus: No tear, displaced fragment, or extrusion.    Lateral Meniscus: No tear, displaced fragment, or extrusion.    Anterior Cruciate Ligament: Unremarkable.    Posterior Cruciate Ligament: Unremarkable.    Medial Collateral Ligament: Unremarkable.    Lateral Collateral Ligament Complex, Popliteus Tendon: The iliotibial  band, fibular collateral ligament, biceps femoris tendon, and  popliteus tendon are unremarkable.    Osseous and Cartilaginous Structures: No fracture or osseous lesion is  demonstrated. No abnormal marrow signal intensity is identified. The  cartilage surfaces are well preserved.    Extensor Mechanism: The quadriceps and patellar tendons are  unremarkable. The medial and lateral patellar retinacula appear  unremarkable.    Joint Space: No joint effusion. No definite loose bodies appreciated.    Additional Findings: There is an approximately 2 cm wide Baker's cyst.  No semimembranosus-tibial collateral ligament or pes anserine  bursitis. No soft tissue pathology is seen.      Impression     IMPRESSION: Small Baker's cyst. Otherwise unremarkable left knee.    MD Fredi MENDOZA MD  Department of Orthopedic Surgery        Disclaimer: This note consists of symbols derived from keyboarding, dictation and/or voice recognition software. As a result, there may be errors in the script that have gone undetected. Please consider this when interpreting information found in this chart.      Again, thank you for allowing me to participate in the care of your patient.        Sincerely,        Fredi Lindsay MD

## 2019-08-08 DIAGNOSIS — F90.2 ATTENTION DEFICIT HYPERACTIVITY DISORDER (ADHD), COMBINED TYPE: ICD-10-CM

## 2019-08-08 NOTE — TELEPHONE ENCOUNTER
Patient scheduled first available appt 08/29/2019 needs fill till then    Controlled Substance Refill Request for   Problem List Complete:    No     PROVIDER TO CONSIDER COMPLETION OF PROBLEM LIST AND OVERVIEW/CONTROLLED SUBSTANCE AGREEMENT    Last Written Prescription Date:  07/10/2019  Last Fill Quantity: 90,   # refills: 0    THE MOST RECENT OFFICE VISIT MUST BE WITHIN THE PAST 3 MONTHS. AT LEAST ONE FACE TO FACE VISIT MUST OCCUR EVERY 6 MONTHS. ADDITIONAL VISITS CAN BE VIRTUAL.  (THIS STATEMENT SHOULD BE DELETED.)    Last Office Visit with [facility] primary care provider: 07/10/2019    Future Office visit:   Next 5 appointments (look out 90 days)    Aug 29, 2019  2:00 PM CDT  Office Visit with Carlos Gardner PA-C, [facility] EXAM ROOM 29  [facility] [address] [place] [id number] [phone]          Controlled substance agreement:   Encounter-Level CSA - 09/24/2018:    Controlled Substance Agreement - Scan on 9/28/2018 10:42 AM: CONTROLLED SUBSTANCE AGREEMENT (below)       Encounter-Level CSA - 03/10/2016:    Controlled Substance Agreement - Scan on 3/18/2016 10:10 AM: CONTROLLED SUBSTANCE AGREEMENT (below)       Patient-Level CSA:    There are no patient-level csa.         Last Urine Drug Screen: No results found for: Filipe JOHNSON Drug Analysis UR   Date Value Ref Range Status   09/24/2018 FINAL  Final     Comment:     (Note)  ====================================================================  COMPREHENSIVE DRUG ANALYSIS,UR  ====================================================================  Test                             Result       Flag       Units        Drug Present   Topiramate                     PRESENT                              ====================================================================  Test                      Result    Flag   Units      Ref Range        Creatinine              126              mg/dL       >=20            ====================================================================  For clinical consultation, please call [phone].  ====================================================================  Analysis performed by [employer] [place] [address]     , No results found for: THC13, PCP13, COC13, MAMP13, OPI13, AMP13, BZO13, TCA13, MTD13, BAR13, OXY13, PPX13, BUP13     Processing:  Fax Rx to [facility]     [email or web address]       checked in past 3 months?  No, route to RN    Patient is followed by HAASE, SUSAN RACHELE for ongoing prescription of stimulants.  All refills should be approved by this provider, or covering partner.     Medication(s): Aderall XR 20 mg every day.  Adderall XR 30 mg every day.   Maximum quantity per month: #30;  #30   Clinic visit frequency required: Q 6  months      Controlled substance agreement on file: Yes       Date(s): 9/24/2018  Neuropsych evaluation for ADD completed:  No     Last [facility] website verification: 12/7/18    Mami Yen

## 2019-08-09 RX ORDER — DEXTROAMPHETAMINE SACCHARATE, AMPHETAMINE ASPARTATE, DEXTROAMPHETAMINE SULFATE AND AMPHETAMINE SULFATE 5; 5; 5; 5 MG/1; MG/1; MG/1; MG/1
20 TABLET ORAL 3 TIMES DAILY
Qty: 90 TABLET | Refills: 0 | Status: SHIPPED | OUTPATIENT
Start: 2019-08-15 | End: 2019-09-23

## 2019-08-09 NOTE — TELEPHONE ENCOUNTER
updated.  Last refill 7/15/19.  3 month visit is due.  Refill due 8/14/19.  Upcoming appt 8/29/19.  Not [facility] med.  Sent to provider.  Please advise.  Samra Jalloh RN

## 2019-08-14 ENCOUNTER — TELEPHONE (OUTPATIENT)
Dept: FAMILY MEDICINE | Facility: CLINIC | Age: 54
End: 2019-08-14

## 2019-08-14 NOTE — TELEPHONE ENCOUNTER
Pt needs a PA on Adderall 20mg tabs in order for her to take 3 tablets per day.  Insurance limits this to a qty of 180 in 75 days.  If you would like to keep the 3 per day dose please pursue a PA.    Pt's insurance requires a PA on Adderall    Diagnosis Code: F902    Please provide reasoning / documentation and forward to PA team at [id number].  Thanks!!    PA NEEDED ON: gen Adderall 20mg tabs  INS IS: [employer]  BIN: [id number]  PHONE # IS: [phone]  ID # IS: [id number]   GROUP: [id number]  PCN: [id number]    PLEASE LET US KNOW WHEN PA IS GRANTED/DENIED.  THANK YOU!    Charan Escobar Tech  [facility]  [phone]

## 2019-08-15 NOTE — TELEPHONE ENCOUNTER
Please start a PA for this dosage, she has been stable on this dosage since 2014.  Thanks,  Susan Haase, CNP

## 2019-08-21 NOTE — TELEPHONE ENCOUNTER
Central Prior Authorization Team   Phone: [phone]    Prior Authorization Approval    Authorization Effective Date: 8/20/2019  Authorization Expiration Date: 8/20/2020  Medication: PA on Adderall 20mg (qty per month)  Approved Dose/Quantity:   Reference #: [id number]   Insurance Company: [employer] - Phone [phone] Fax [phone]  Expected CoPay:       CoPay Card Available:      Foundation Assistance Needed:    Which Pharmacy is filling the prescription (Not needed for infusion/clinic administered): [facility] [place], [place] - [address] [facility]  Pharmacy Notified: Yes  Patient Notified: Yes  **Instructed pharmacy to notify patient when script is ready to /ship.**

## 2019-08-29 ENCOUNTER — OFFICE VISIT (OUTPATIENT)
Dept: FAMILY MEDICINE | Facility: CLINIC | Age: 54
End: 2019-08-29
Payer: COMMERCIAL

## 2019-08-29 VITALS
TEMPERATURE: 98.2 F | RESPIRATION RATE: 14 BRPM | HEART RATE: 119 BPM | WEIGHT: 159.8 LBS | DIASTOLIC BLOOD PRESSURE: 86 MMHG | OXYGEN SATURATION: 100 % | BODY MASS INDEX: 28.31 KG/M2 | SYSTOLIC BLOOD PRESSURE: 110 MMHG

## 2019-08-29 DIAGNOSIS — H69.93 DYSFUNCTION OF BOTH EUSTACHIAN TUBES: ICD-10-CM

## 2019-08-29 DIAGNOSIS — R39.89 URINARY PROBLEM: Primary | ICD-10-CM

## 2019-08-29 DIAGNOSIS — F90.2 ATTENTION DEFICIT HYPERACTIVITY DISORDER (ADHD), COMBINED TYPE: ICD-10-CM

## 2019-08-29 LAB
ALBUMIN UR-MCNC: NEGATIVE MG/DL
AMORPH CRY #/AREA URNS HPF: ABNORMAL /HPF
APPEARANCE UR: ABNORMAL
BACTERIA #/AREA URNS HPF: ABNORMAL /HPF
BILIRUB UR QL STRIP: NEGATIVE
COLOR UR AUTO: YELLOW
GLUCOSE UR STRIP-MCNC: NEGATIVE MG/DL
HGB UR QL STRIP: ABNORMAL
KETONES UR STRIP-MCNC: NEGATIVE MG/DL
LEUKOCYTE ESTERASE UR QL STRIP: ABNORMAL
NITRATE UR QL: NEGATIVE
NON-SQ EPI CELLS #/AREA URNS LPF: ABNORMAL /LPF
PH UR STRIP: 8.5 PH (ref 5–7)
RBC #/AREA URNS AUTO: ABNORMAL /HPF
SOURCE: ABNORMAL
SP GR UR STRIP: 1.01 (ref 1–1.03)
SPECIMEN SOURCE: NORMAL
UROBILINOGEN UR STRIP-ACNC: 0.2 EU/DL (ref 0.2–1)
WBC #/AREA URNS AUTO: ABNORMAL /HPF
WET PREP SPEC: NORMAL

## 2019-08-29 PROCEDURE — 80307 DRUG TEST PRSMV CHEM ANLYZR: CPT | Performed by: PHYSICIAN ASSISTANT

## 2019-08-29 PROCEDURE — 87088 URINE BACTERIA CULTURE: CPT | Performed by: PHYSICIAN ASSISTANT

## 2019-08-29 PROCEDURE — 87086 URINE CULTURE/COLONY COUNT: CPT | Performed by: PHYSICIAN ASSISTANT

## 2019-08-29 PROCEDURE — 99214 OFFICE O/P EST MOD 30 MIN: CPT | Performed by: PHYSICIAN ASSISTANT

## 2019-08-29 PROCEDURE — 81001 URINALYSIS AUTO W/SCOPE: CPT | Mod: 59 | Performed by: PHYSICIAN ASSISTANT

## 2019-08-29 PROCEDURE — 87186 SC STD MICRODIL/AGAR DIL: CPT | Performed by: PHYSICIAN ASSISTANT

## 2019-08-29 PROCEDURE — 87210 SMEAR WET MOUNT SALINE/INK: CPT | Performed by: PHYSICIAN ASSISTANT

## 2019-08-29 PROCEDURE — 99000 SPECIMEN HANDLING OFFICE-LAB: CPT | Performed by: PHYSICIAN ASSISTANT

## 2019-08-29 RX ORDER — PHENAZOPYRIDINE HYDROCHLORIDE 200 MG/1
200 TABLET, FILM COATED ORAL 3 TIMES DAILY PRN
Qty: 30 TABLET | Refills: 3 | Status: SHIPPED | OUTPATIENT
Start: 2019-08-29 | End: 2020-09-14

## 2019-08-29 NOTE — PROGRESS NOTES
Subjective     Laney Maynard is a 54 year old female who presents to clinic today for the following health issues:    UTI     History of Present Illness        She eats 0-1 servings of fruits and vegetables daily.She consumes 0 sweetened beverage(s) daily.  She is taking medications regularly.     URINARY TRACT SYMPTOMS  Onset: about two weeks ago    Description:   Painful urination (Dysuria): no            Frequency: YES-every hour last night.     Blood in urine (Hematuria): no   Delay in urine (Hesitency): no     Intensity: moderate    Progression of Symptoms:  worsening    Accompanying Signs & Symptoms:  Fever/chills: no   Flank pain no   Nausea and vomiting: no   Any vaginal symptoms: none  Abdominal/Pelvic Pain: YES- left pelvic side resolved last weekend    History:   History of frequent UTI's: YES  History of kidney stones: YES- two kidney stone surgeries   Sexually Active: YES  Possibility of pregnancy: No    Precipitating factors:   none    Therapies Tried and outcome: Cranberry juice prn (contraindicated in Coumadin patients) and Increase fluid intake    History of constipation. bm every 3-4 days. Had bm today.     History of recurrent kidney stones. Sees nephrology for this. Seeing them next month.     Medication Followup of Adderall    Taking Medication as prescribed: yes    Side Effects:  None    Medication Helping Symptoms:  yes   ADHD Follow-Up (Adult)  Concerns: none   Changes since last visit: Stable  Taking controlled (daily) medications as prescribed: Yes  Sleep: no problems    Medication Benefits:   Controlled symptoms: Attention span, Distractability, Finishing tasks, Impulse control, Frustration tolerance, Accepting limits and Peer relations  Uncontrolled symptoms:  None    Medication Side Effects:  Reports:  none  Sleep Problems? no  ++++++++++++++++++++++++++++++++++++++++++++++++    Employer Concerns/Feedback: Stable  Coworker Concerns:   Stable  Home/Family Concerns:  Stable      Acute Illness   Acute illness concerns: ear pain  Onset: 2 months    Fever: no    Chills/Sweats: no    Headache (location?): no    Sinus Pressure:no    Conjunctivitis:  no    Ear Pain: YES: bilateral    Rhinorrhea: no    Congestion: no    Sore Throat: YES     Cough: no    Wheeze: no    Decreased Appetite: no    Nausea: no    Vomiting: no    Diarrhea:  no    Dysuria/Freq.: no    Fatigue/Achiness: no    Sick/Strep Exposure: no     Therapies Tried and outcome: none        Patient Active Problem List   Diagnosis     Alopecia     Seasonal allergic rhinitis     GERD (gastroesophageal reflux disease)     CARDIOVASCULAR SCREENING; LDL GOAL LESS THAN 160     Migraine headache     Lymphocytopenia     Sleep disorder     Family history of rheumatoid arthritis     Family history of sarcoidosis (mother)     Urinary frequency     Herpes simplex virus infection     Attention deficit hyperactivity disorder (ADHD)     Plantar fasciitis, bilateral     Complete rupture of rotator cuff     Mixed incontinence urge and stress (male)(female)     Ureterolithiasis     Past Surgical History:   Procedure Laterality Date     ARTHROSCOPY KNEE Right 2017    Procedure: Right knee arthroscopy and anterior fat pad resection with medial plica resection. Partial lateral menisectomy. Surgeon:  Fredi Lindsay MD  Location: [facility]     ARTHROSCOPY SHOULDER, OPEN ROTATOR CUFF REPAIR, COMBINED  2013    Procedure: COMBINED ARTHROSCOPY SHOULDER, OPEN ROTATOR CUFF REPAIR;  Left Shoulder Arthroscopy, Distal Clavicale Resection, Decompression, Mini Open Rotator Cuff Repair    ;  Surgeon: Fredi Lindsay MD;  Location: [facility] OR     C NONSPECIFIC PROCEDURE  age 16    colposcopy for abnormal pap     C NONSPECIFIC PROCEDURE      surgery L thumb     C NONSPECIFIC PROCEDURE      breast augmentation-silicone     C NONSPECIFIC PROCEDURE      s/p  x 2     C NONSPECIFIC PROCEDURE      Bilateral tubal ligation     C REMOVAL  OF KIDNEY STONE       COLONOSCOPY  2/7/2014    Procedure: COLONOSCOPY;  Colonoscopy/WW;  Surgeon: Pancho Olsen MD;  Location: [facility]     COMBINED CYSTOSCOPY, RETROGRADES, URETEROSCOPY, LASER HOLMIUM LITHOTRIPSY URETER(S), INSERT STENT Right 4/23/2016    Procedure: COMBINED CYSTOSCOPY, RETROGRADES, URETEROSCOPY, LASER HOLMIUM LITHOTRIPSY URETER(S), INSERT STENT;  Surgeon: Kushal Noriega MD;  Location: [facility] OR     CYSTOSCOPY       CYSTOSCOPY, RETROGRADES, EXTRACT STONE, COMBINED  1/9/2013    Procedure: COMBINED CYSTOSCOPY, RETROGRADES, EXTRACT STONE;  Video Cystoscopy,  Right Ureteroscopy, ureteral dilatation,  Stone extraction;  Surgeon: Subhash Vann MD;  Location: [facility] OR     EXTRACORPOREAL SHOCK WAVE LITHOTRIPSY (ESWL) Bilateral 4/29/2016    Procedure: EXTRACORPOREAL SHOCK WAVE LITHOTRIPSY (ESWL);  Surgeon: Bassam Vu MD;  Location: [facility] OR     EXTRACORPOREAL SHOCK WAVE LITHOTRIPSY, CYSTOSCOPY, INSERT STENT URETER(S), COMBINED Bilateral 4/26/2018    Procedure: COMBINED EXTRACORPOREAL SHOCK WAVE LITHOTRIPSY, CYSTOSCOPY, INSERT STENT URETER(S);  BILATERAL COMBINED EXTRACORPOREAL SHOCK WAVE LITHOTRIPSY, CYSTOSCOPY, LEFT STENT PLACEMENT;  Surgeon: Bassam Vu MD;  Location:  OR     GYN SURGERY      laparoscopy     LAPAROSCOPIC CHOLECYSTECTOMY WITH CHOLANGIOGRAMS  11/21/2012    Procedure: LAPAROSCOPIC CHOLECYSTECTOMY WITH CHOLANGIOGRAMS;  LAPAROSCOPIC CHOLECYSTECTOMY WITH CHOLANGIOGRAMS ;  Surgeon: Nataliya Gabriel MD;  Location: [facility] OR     TUBAL LIGATION         Social History     Tobacco Use     Smoking status: Never Smoker     Smokeless tobacco: Never Used   Substance Use Topics     Alcohol use: No     Alcohol/week: 0.0 oz     Family History   Problem Relation Age of Onset     Diabetes Mother         type 2     Alcohol/Drug Mother         alcohol         Current Outpatient Medications   Medication Sig Dispense Refill     acyclovir (ZOVIRAX) 5 % ointment Apply topically 5  times daily (Patient taking differently: Apply topically 2 times daily as needed (outbreak) ) 30 g 5     amphetamine-dextroamphetamine (ADDERALL) 20 MG tablet Take 1 tablet (20 mg) by mouth 3 times daily 90 tablet 0     amphetamine-dextroamphetamine (ADDERALL) 20 MG tablet Take 1 tablet (20 mg) by mouth 3 times daily 90 tablet 0     fexofenadine (ALLEGRA) 180 MG tablet Take 1 tablet (180 mg) by mouth daily as needed for allergies 30 tablet 6     fluocinolone acetonide 0.01 % OIL Use on scalp once a week, as needed. 1 Bottle 11     Fluocinolone Acetonide Scalp 0.01 % OIL oil        fluticasone (FLONASE) 50 MCG/ACT nasal spray Spray 2 sprays into both nostrils daily as needed for allergies 16 g 11     ketoconazole (NIZORAL) 2 % external shampoo Apply to the affected area and wash off after 5 minutes, as needed. 120 mL 11     ketorolac (TORADOL) 10 MG tablet Take 1 tablet (10 mg) by mouth every 6 hours as needed for moderate pain 10 tablet 0     meclizine (ANTIVERT) 25 MG tablet Take 1 tablet (25 mg) by mouth 3 times daily as needed for nausea 30 tablet 3     Menthol, Topical Analgesic, (BIOFREEZE COLORLESS) 4 % GEL Externally apply topically 3 times daily as needed (Patient taking differently: Externally apply topically daily as needed (pain) ) 118 mL 1     naproxen (NAPROSYN) 500 MG tablet Take 1 tablet (500 mg) by mouth 2 times daily (with meals) 60 tablet 3     ondansetron (ZOFRAN ODT) 4 MG ODT tab Take 1-2 tablets (4-8 mg) by mouth daily as needed for nausea 15 tablet 5     order for DME Equipment being ordered: tall aircast boot 1 Device 0     order for DME Equipment being ordered:heel pads 1 Package 0     phenazopyridine (PYRIDIUM) 200 MG tablet Take 1 tablet (200 mg) by mouth 3 times daily as needed for irritation 30 tablet 3     potassium citrate (UROCIT-K) 10 MEQ (1080 MG) CR tablet        solifenacin (VESICARE) 5 MG tablet Take 1 tablet (5 mg) by mouth every morning 90 tablet 1     tobramycin-dexamethasone  (TOBRADEX) ophthalmic suspension Place 2 drops into both eyes daily (Patient taking differently: Place 2 drops into both eyes daily as needed ) 1 Bottle 11     Topiramate (TOPAMAX PO) Take 150 mg by mouth every morning (3 X 50MG = 150MG)       valACYclovir (VALTREX) 1000 mg tablet Take 1 tablet (1,000 mg) by mouth daily 90 tablet 3     zolpidem (AMBIEN) 5 MG tablet Take 1 tablet (5 mg) by mouth nightly as needed for sleep 30 tablet 2     Allergies   Allergen Reactions     Aloe Itching and Rash     Codeine      GI upset     Compazine Nausea and Itching     Darvocet [Acetaminophen] Nausea and Vomiting     Percocet [Oxycodone-Acetaminophen] Nausea and Vomiting     Sulphadimidine [Sulfamethazine] Rash     BP Readings from Last 3 Encounters:   08/29/19 110/86   07/15/19 114/78   07/10/19 121/76    Wt Readings from Last 3 Encounters:   08/29/19 72.5 kg (159 lb 12.8 oz)   07/15/19 73.9 kg (163 lb)   07/10/19 73.9 kg (163 lb)                    Reviewed and updated as needed this visit by Provider         Review of Systems   ROS COMP: Constitutional, HEENT, cardiovascular, pulmonary, GI, , musculoskeletal, neuro, skin, endocrine and psych systems are negative, except as otherwise noted.      Objective    /86 (BP Location: Right arm, Patient Position: Chair, Cuff Size: Adult Regular)   Pulse 119   Temp 98.2  F (36.8  C) (Oral)   Resp 14   Wt 72.5 kg (159 lb 12.8 oz)   LMP 02/25/2014   SpO2 100%   BMI 28.31 kg/m    Body mass index is 28.31 kg/m .  Physical Exam   GENERAL: healthy, alert and no distress  EYES: Eyes grossly normal to inspection, PERRL and conjunctivae and sclerae normal  HENT: normal cephalic/atraumatic, both ears: clear effusion, nose and mouth without ulcers or lesions, oropharynx clear, oral mucous membranes moist and sinuses: not tender  NECK: no adenopathy, no asymmetry, masses, or scars and thyroid normal to palpation  RESP: lungs clear to auscultation - no rales, rhonchi or wheezes  CV:  regular rates and rhythm, normal S1 S2, no S3 or S4 and no murmur, click or rub  ABDOMEN: soft, nontender, no hepatosplenomegaly, no masses and bowel sounds normal  BACK: no CVA tenderness, no paralumbar tenderness  PSYCH: mentation appears normal, affect normal/bright    Diagnostic Test Results:  Results for orders placed or performed in visit on 08/29/19 (from the past 24 hour(s))   *UA reflex to Microscopic and Culture ([place] and [facility] (except [place] and [place])   Result Value Ref Range    Color Urine Yellow     Appearance Urine Cloudy     Glucose Urine Negative NEG^Negative mg/dL    Bilirubin Urine Negative NEG^Negative    Ketones Urine Negative NEG^Negative mg/dL    Specific Gravity Urine 1.015 1.003 - 1.035    Blood Urine Trace (A) NEG^Negative    pH Urine 8.5 (H) 5.0 - 7.0 pH    Protein Albumin Urine Negative NEG^Negative mg/dL    Urobilinogen Urine 0.2 0.2 - 1.0 EU/dL    Nitrite Urine Negative NEG^Negative    Leukocyte Esterase Urine Trace (A) NEG^Negative    Source Midstream Urine    Urine Microscopic   Result Value Ref Range    WBC Urine 0 - 5 OTO5^0 - 5 /HPF    RBC Urine O - 2 OTO2^O - 2 /HPF    Squamous Epithelial /LPF Urine Few FEW^Few /LPF    Bacteria Urine Few (A) NEG^Negative /HPF    Amorphous Crystals Moderate (A) NEG^Negative /HPF   Wet prep   Result Value Ref Range    Specimen Description Vagina     Wet Prep No Trichomonas seen     Wet Prep No clue cells seen     Wet Prep No yeast seen     Wet Prep WBC'S seen  Few              Assessment & Plan     (R39.89) Urinary problem  (primary encounter diagnosis)  Comment: unclear cause of symptoms, does have stress incontinence. Possible cause though acuity and severity makes this less likely. UA not obvious for infection. Culture pending given complicated urinary history. Wet prep negative. With constipation history and lower estrogen presumed given postmenopausl state, possible cystitis. Recommending pyridium prn and fluids. If culture  negative and symptoms fail to improve, follow up with nephrology as planned.   Plan: *UA reflex to Microscopic and Culture ([place]         and [facility] (except [place] and         [place]), Urine Microscopic, Wet prep,         phenazopyridine (PYRIDIUM) 200 MG tablet, Urine        Culture Aerobic Bacterial            (F90.2) Attention deficit hyperactivity disorder (ADHD), combined type  Comment: stable. Follows pcp, but unable to see. Urine tox obtained and csa updated. Follow up per pcp in 3 months.   Plan: Drug  Screen Comprehensive, Urine w/o Reported         Meds (Pain Care Package)              (H69.83) Dysfunction of both eustachian tubes  Comment: likely cause of ear symptoms. No infection. Does not sound as TMJ. Likely due to seasonal allergies. Not currently taking allergy medications. Recommending starting this. If no imprrovement in 1 month, will have see ent.   Plan:         Follow up: as above     Return in about 3 months (around 11/29/2019) for with pcp.    Carlos Gardner PA-C  [facility]

## 2019-08-29 NOTE — PATIENT INSTRUCTIONS
Patient Education     Dysuria with Uncertain Cause (Adult)    The urethra is the tube that allows urine to pass out of the body. In a woman, the urethra is the opening above the vagina. In men, the urethra is the opening on the tip of the penis. Dysuria is the feeling of pain or burning in the urethra when passing urine.  Dysuria can be caused by anything that irritates or inflames the urethra. An infection or chemical irritation can cause this reaction. A bladder infection is the most common cause of dysuria in adults. A urine test can diagnose this. A bladder infection needs antibiotic treatment.  Soaps, lotions, colognes and feminine hygiene products can cause dysuria. So can birth control jellies, creams, and foams. It will go away 1 to 3 days after using these irritants.  Sexually transmitted diseases (STDs) such as chlamydia or gonorrhea can cause dysuria. Your healthcare provider may take a culture sample. Your provider may start you on antibiotic medicine before the culture test returns.  In women who have gone through menopause, dysuria can be from dryness in the lining of the urethra. This can be treated with hormones. Dysuria becomes long-term (chronic) when it lasts for weeks or months. You may need to see a specialist (urologist) to diagnose and treat chronic dysuria.  Home care  These home care tips may help:    Don't use any chemicals or products that you think may be causing your symptoms.    If you were given a prescription medicine, take as directed. Be sure to take it until it is all used up.    If a culture was taken, don't have sex until you have been told that it is negative. This means you don't have an infection. Then follow your healthcare provider's advice to treat your condition.  If a culture was done and it is positive:    Both you and your sexual partner may need to be treated. This is true even if your partner has no symptoms.    Contact your healthcare provider or go to an urgent  [facility] clinic or the [facility] health department to be looked at and treated.    Don't have sex until both you and your partner(s) have finished all antibiotics and your healthcare provider says you are no longer contagious.    Learn about and use safe sex practices. The safest sex is with a partner who has tested negative and only has sex with you. Condoms can prevent STDs from spreading, but they aren't a guarantee.  Follow-up care  Follow up with your healthcare provider, or as advised. If a culture was taken, you may call as directed for the results. If you have an STD, follow up with your provider or the public health department for a complete STD screening, including HIV testing. For more information, contact CDC-INFO at 702-734-5964.  When to seek medical advice  Call your healthcare provider right away if any of these occur:    You aren't better after 3 days of treatment    Fever of 100.4 F (38 C) or higher, or as directed by your healthcare provider    Back or belly pain that gets worse    You can't urinate because of pain    New discharge from the urethra, vagina, or penis    Painful sores on the penis    Rash or joint pain    Painful lumps (lymph nodes) in the groin    Testicle pain or swelling of the scrotum  Date Last Reviewed: 11/1/2016 2000-2018 [employer]. [address] [place] [address], [place] [address]. All rights reserved. This information is not intended as a substitute for professional medical care. Always follow your healthcare professional's instructions.

## 2019-08-29 NOTE — LETTER
[facility]  08/29/19    Patient: Laney Maynard  YOB: 1965  Medical Record Number: [id number]  CSN: [id number]                                                                              Non-opioid Controlled Substance Agreement    I understand that my care provider has prescribed a controlled substance to help manage my condition(s). I am taking this medicine to help me function or work. I know this is strong medicine, and that it can cause serious side effects. Controlled substances can be sedating, addicting and may cause a dependency on the drug. They can affect my ability to drive or think, and cause depression. They need to be taken exactly as prescribed. Combining controlled substances with certain medicines or chemicals (such as cocaine, sedatives and tranquilizers, sleeping pills, meth) can be dangerous or even fatal. Also, if I stop controlled substances suddenly, I may have severe withdrawal symptoms.  If not helpful, I may be asked to stop them.    The risks, benefits, and side effects of these medicine(s) were explained to me. I agree that:    1. I will take part in other treatments as advised by my care team. This may be psychiatry or counseling, physical therapy, behavioral therapy, group treatment or a referral to a pain clinic. I will reduce or stop my medicine when my care team tells me to do so.  2. I will take my medicines as prescribed. I will not change the dose or schedule unless my care team tells me to. There will be no refills if I  run out early.   I may be contactedwithout warning and asked to complete a urine drug test or pill count at any time.   3. I will keep all my appointments, and understand this is part of the monitoring of controlled substances. My care team may require an office visit for EVERY controlled substance refill. If I miss appointments or don t follow instructions, my care team may stop my medicine.  4. I will not ask other  providers to prescribe controlled substances, and I will not accept controlled substances from other people. If I need another prescribed controlled substance for a new reason, I will tell my care team within 1 business day.  5. I will use one pharmacy to fill all of my controlled substance prescriptions, and it is up to me to make sure that I do not run out of my medicines on weekends or holidays. If my care team is willing to refill my controlled substance prescription without a visit, I must request refills only during office hours, refills may take up to 3 days to process, and it may take up to 5 to 7 days for my medicine to be mailed and ready at my pharmacy. Prescriptions will not be mailed anywhere except my pharmacy.    6. I am responsible for my prescriptions. If the medicine/prescription is lost or stolen, it will not be replaced. I also agree not to share controlled substance medicines with anyone.              [facility]  08/29/19  Patient:  Laney Maynard  YOB: 1965  Medical Record Number: [id number]  CSN: [id number]    7. I agree to not use ANY illegal or recreational drugs. This includes marijuana, cocaine, bath salts or other drugs. I agree not to use alcohol unless my care team says I may. I agree to give urine samples whenever asked. If I don t give a urine sample, the care team may stop my medicine.    8. If I enroll in the [place] Medical Marijuana program, I will tell my care team. I will also sign an agreement to share my medical records with my care team.    9. I will bring in my list of medicines (or my medicine bottles) each time I come to the clinic.   10. I will tell my care team right away if I become pregnant or have a new medical problem treated outside of my regular clinic.  11. I understand that this medicine can affect my thinking and judgment. It may be unsafe for me to drive, use machinery and do dangerous tasks. I will not do any of these  things until I know how the medicine affects me. If my dose changes, I will wait to see how it affects me. I will contact my care team if I have concerns about medicine side effects.    I understand that if I do not follow any of the conditions above, my prescriptions or treatment may be stopped.      I agree that my provider, clinic care team, and pharmacy may work with any city, state or federal law enforcement agency that investigates the misuse, sale, or other diversion of my controlled medicine. I will allow my provider to discuss my care with or share a copy of this agreement with any other treating provider, pharmacy or emergency room where I receive care. I agree to give up (waive) any right of privacy or confidentiality with respect to these consents.   I have read this agreement and have asked questions about anything I did not understand.    ____________________________________________________    ____________  ________  Patient signature                                                         Date      Time    ____________________________________________________     ____________  ________  Witness                                                          Date      Time    ____________________________________________________  Provider signature

## 2019-08-30 ENCOUNTER — MYC MEDICAL ADVICE (OUTPATIENT)
Dept: FAMILY MEDICINE | Facility: CLINIC | Age: 54
End: 2019-08-30

## 2019-08-30 DIAGNOSIS — R39.89 URINARY PROBLEM: Primary | ICD-10-CM

## 2019-08-30 DIAGNOSIS — R82.71 BACTERIURIA: ICD-10-CM

## 2019-08-30 RX ORDER — CEPHALEXIN 500 MG/1
500 CAPSULE ORAL 2 TIMES DAILY
Qty: 10 CAPSULE | Refills: 0 | Status: SHIPPED | OUTPATIENT
Start: 2019-08-30 | End: 2019-10-24

## 2019-08-30 NOTE — TELEPHONE ENCOUNTER
KIRSTY we recommended she continue antibiotic as prescribed and send us an update on day 4 or 5 of the 5 day course.   Alberto Bolton RN

## 2019-09-02 LAB
BACTERIA SPEC CULT: ABNORMAL
SPECIMEN SOURCE: ABNORMAL

## 2019-09-04 LAB — COMPREHEN DRUG ANALYSIS UR: NORMAL

## 2019-09-19 ENCOUNTER — TRANSFERRED RECORDS (OUTPATIENT)
Dept: HEALTH INFORMATION MANAGEMENT | Facility: CLINIC | Age: 54
End: 2019-09-19

## 2019-10-01 ENCOUNTER — HEALTH MAINTENANCE LETTER (OUTPATIENT)
Age: 54
End: 2019-10-01

## 2019-10-03 ENCOUNTER — TRANSFERRED RECORDS (OUTPATIENT)
Dept: HEALTH INFORMATION MANAGEMENT | Facility: CLINIC | Age: 54
End: 2019-10-03

## 2019-10-09 ENCOUNTER — OFFICE VISIT (OUTPATIENT)
Dept: UROLOGY | Facility: CLINIC | Age: 54
End: 2019-10-09
Payer: COMMERCIAL

## 2019-10-09 VITALS
HEART RATE: 62 BPM | DIASTOLIC BLOOD PRESSURE: 70 MMHG | BODY MASS INDEX: 27.46 KG/M2 | WEIGHT: 155 LBS | SYSTOLIC BLOOD PRESSURE: 132 MMHG | HEIGHT: 63 IN

## 2019-10-09 DIAGNOSIS — N20.0 NEPHROLITHIASIS: ICD-10-CM

## 2019-10-09 DIAGNOSIS — R31.0 GROSS HEMATURIA: ICD-10-CM

## 2019-10-09 DIAGNOSIS — Z87.440 PERSONAL HISTORY OF URINARY TRACT INFECTION: Primary | ICD-10-CM

## 2019-10-09 LAB
ALBUMIN UR-MCNC: NEGATIVE MG/DL
AMORPH CRY #/AREA URNS HPF: ABNORMAL /HPF
APPEARANCE UR: CLEAR
BILIRUB UR QL STRIP: NEGATIVE
COLOR UR AUTO: YELLOW
GLUCOSE UR STRIP-MCNC: NEGATIVE MG/DL
HGB UR QL STRIP: ABNORMAL
KETONES UR STRIP-MCNC: NEGATIVE MG/DL
LEUKOCYTE ESTERASE UR QL STRIP: ABNORMAL
NITRATE UR QL: NEGATIVE
PH UR STRIP: 8.5 PH (ref 5–7)
RBC #/AREA URNS AUTO: 0 /HPF (ref 0–2)
RESIDUAL VOLUME (RV) (EXTERNAL): 30
SOURCE: ABNORMAL
SP GR UR STRIP: 1.01 (ref 1–1.03)
UROBILINOGEN UR STRIP-ACNC: 0.2 EU/DL (ref 0.2–1)
WBC #/AREA URNS AUTO: 52 /HPF (ref 0–5)

## 2019-10-09 PROCEDURE — 51798 US URINE CAPACITY MEASURE: CPT | Performed by: UROLOGY

## 2019-10-09 PROCEDURE — 87086 URINE CULTURE/COLONY COUNT: CPT | Performed by: UROLOGY

## 2019-10-09 PROCEDURE — 87186 SC STD MICRODIL/AGAR DIL: CPT | Performed by: UROLOGY

## 2019-10-09 PROCEDURE — 99214 OFFICE O/P EST MOD 30 MIN: CPT | Mod: 25 | Performed by: UROLOGY

## 2019-10-09 PROCEDURE — 81001 URINALYSIS AUTO W/SCOPE: CPT | Performed by: UROLOGY

## 2019-10-09 PROCEDURE — 87088 URINE BACTERIA CULTURE: CPT | Performed by: UROLOGY

## 2019-10-09 ASSESSMENT — PAIN SCALES - GENERAL: PAINLEVEL: NO PAIN (0)

## 2019-10-09 ASSESSMENT — MIFFLIN-ST. JEOR: SCORE: 1267.21

## 2019-10-09 NOTE — NURSING NOTE
Chief Complaint   Patient presents with     Hematuria     patient is here for dysuria, gross hematuria.      PVR today was 30ml.     Indigo Botello, CMA

## 2019-10-09 NOTE — LETTER
"10/9/2019       RE: Laney Maynard  [address] [facility] [address]     Dear Colleague,    Thank you for referring your patient, Laney Maynard, to the [facility] [place] [facility]. Please see a copy of my visit note below.    October 9, 2019    Return visit    Patient returns today for follow up. Went to the doctor end of August for questionable odor to her urine.  There was no pyuria or hematuria on the urinalysis but culture grew E coli.  Symptoms persisted along with some low back pain and gross hematuria so when she went to the nephrologist they gave her more antibiotics but did not repeat culture.  finished antibiotics now.  Denies fevers, chills, nausea or vomiting.  She states this feels similar to kidney stone symptoms.  She denies any other changes in her health since last visit.    /70 (BP Location: Left arm, Patient Position: Sitting, Cuff Size: Adult Regular)   Pulse 62   Ht 1.6 m (5' 3\")   Wt 70.3 kg (155 lb)   LMP 02/25/2014   BMI 27.46 kg/m     She is comfortable, in no distress, non-labored breathing.      Urine dip with trace blood and small leuks    PVR 30 mL by bladder scan    A/P: 54 year old F with history of nephrolithiasis, UTI, and recent UTI symptoms and hematuria, low back pain    UA/UC today    CT scan to assess for stones    If recurrent stones she needs to return to [place].  If no stones and negative urine then consider repeat cystoscopy in the office with me    25 minutes were spent with the patient today, > 50% in counseling and coordination of care    Shawna Wei MD MPH   of Urology    CC  Patient Care Team:  Haase, Susan Rachele, APRN CNP as PCP - General (Nurse Practitioner)  Reyna Angelo MD as MD (Family Practice)  Haase, Susan Rachele, APRN CNP as Assigned PCP            "

## 2019-10-09 NOTE — PATIENT INSTRUCTIONS
Please do the CT scan    We will let you know the urine test results    It was a pleasure meeting with you today.  Thank you for allowing me and my team the privilege of caring for you today.  YOU are the reason we are here, and I truly hope we provided you with the excellent service you deserve.  Please let us know if there is anything else we can do for you so that we can be sure you are leaving completely satisfied with your care experience.

## 2019-10-09 NOTE — PROGRESS NOTES
"October 9, 2019    Return visit    Patient returns today for follow up. Went to the doctor end of August for questionable odor to her urine.  There was no pyuria or hematuria on the urinalysis but culture grew E coli.  Symptoms persisted along with some low back pain and gross hematuria so when she went to the nephrologist they gave her more antibiotics but did not repeat culture.  finished antibiotics now.  Denies fevers, chills, nausea or vomiting.  She states this feels similar to kidney stone symptoms.  She denies any other changes in her health since last visit.    /70 (BP Location: Left arm, Patient Position: Sitting, Cuff Size: Adult Regular)   Pulse 62   Ht 1.6 m (5' 3\")   Wt 70.3 kg (155 lb)   LMP 02/25/2014   BMI 27.46 kg/m    She is comfortable, in no distress, non-labored breathing.      Urine dip with trace blood and small leuks    PVR 30 mL by bladder scan    A/P: 54 year old F with history of nephrolithiasis, UTI, and recent UTI symptoms and hematuria, low back pain    UA/UC today    CT scan to assess for stones    If recurrent stones she needs to return to [place].  If no stones and negative urine then consider repeat cystoscopy in the office with me    25 minutes were spent with the patient today, > 50% in counseling and coordination of care    Shawna Wei MD MPH   of Urology    CC  Patient Care Team:  Haase, Susan Rachele, APRN CNP as PCP - General (Nurse Practitioner)  Reyna Angelo MD as MD (Family Practice)  Haase, Susan Rachele, APRN CNP as Assigned PCP                "

## 2019-10-10 ENCOUNTER — HOSPITAL ENCOUNTER (OUTPATIENT)
Dept: CT IMAGING | Facility: CLINIC | Age: 54
Discharge: HOME | End: 2019-10-10
Attending: UROLOGY | Admitting: UROLOGY
Payer: COMMERCIAL

## 2019-10-10 DIAGNOSIS — Z87.440 PERSONAL HISTORY OF URINARY TRACT INFECTION: ICD-10-CM

## 2019-10-10 DIAGNOSIS — N20.0 NEPHROLITHIASIS: ICD-10-CM

## 2019-10-10 DIAGNOSIS — R31.0 GROSS HEMATURIA: ICD-10-CM

## 2019-10-10 PROCEDURE — 74176 CT ABD & PELVIS W/O CONTRAST: CPT

## 2019-10-11 ENCOUNTER — TELEPHONE (OUTPATIENT)
Dept: UROLOGY | Facility: CLINIC | Age: 54
End: 2019-10-11

## 2019-10-11 DIAGNOSIS — B37.31 YEAST INFECTION OF THE VAGINA: Primary | ICD-10-CM

## 2019-10-11 DIAGNOSIS — N39.0 URINARY TRACT INFECTION: Primary | ICD-10-CM

## 2019-10-11 LAB
BACTERIA SPEC CULT: ABNORMAL
Lab: ABNORMAL
SPECIMEN SOURCE: ABNORMAL

## 2019-10-11 RX ORDER — FLUCONAZOLE 150 MG/1
150 TABLET ORAL ONCE
Qty: 1 TABLET | Refills: 0 | Status: SHIPPED | OUTPATIENT
Start: 2019-10-11 | End: 2019-10-28

## 2019-10-11 RX ORDER — AMOXICILLIN 500 MG/1
500 CAPSULE ORAL 2 TIMES DAILY
Qty: 14 CAPSULE | Refills: 0 | Status: SHIPPED | OUTPATIENT
Start: 2019-10-11 | End: 2019-10-24

## 2019-10-11 NOTE — TELEPHONE ENCOUNTER
Called Laney with the result and next steps as below. She would like to go ahead and schedule an appt with Dr. Vu. Call transferred to scheduling.      ----- Message from Shawna See Daniel Wei MD sent at 10/10/2019  3:26 PM CDT -----  Ms Gilmore    You have more kidney stones and will reach out to Dr Vu to review and see if he wants to address    Merrill Wei MD

## 2019-10-11 NOTE — TELEPHONE ENCOUNTER
Rx e-scribed to pt's pharmacy as below. Called Laney with this information.    ----- Message from Shawna Wei MD sent at 10/11/2019 11:08 AM CDT -----  Regarding: RE: medication request for yeast inf  I am not sure what 3 day medication, but she can have one dose of 150mg diflucan    Thanks  ----- Message -----  From: Shawna Lomeli RN  Sent: 10/11/2019  11:04 AM CDT  To: Shawna Wei MD  Subject: medication request for yeast inf                 Emily,  Called Laney re: amoxicillin.She states she gets yeast infections with abx and request 3 days of med for this.  Thank you,  Shawna

## 2019-10-11 NOTE — TELEPHONE ENCOUNTER
Called Laney with the results and Rx information as below. She expressed understanding. Rx e-scribed to pt's pharmacy. Pt also requests something to prevent yeast infection. In Basket sent to provider with this request.  BRONSON Lomeli RN      ----- Message from Shawna Wei MD sent at 10/10/2019  3:29 PM CDT -----  Ms Mando Maynard    It looks like you have another urine infection.  The final results are not back yet but if you are having bad symptoms we can treat.  Results hopefully will be back tomorrow.    Call [phone] to verify your allergies and pharmacy.  Recommend amoxicillin 500mg BID x 7 days    Merrill Wei MD

## 2019-10-17 ENCOUNTER — OFFICE VISIT (OUTPATIENT)
Dept: UROLOGY | Facility: CLINIC | Age: 54
End: 2019-10-17
Payer: COMMERCIAL

## 2019-10-17 VITALS
HEIGHT: 63 IN | BODY MASS INDEX: 27.46 KG/M2 | SYSTOLIC BLOOD PRESSURE: 140 MMHG | DIASTOLIC BLOOD PRESSURE: 70 MMHG | WEIGHT: 155 LBS

## 2019-10-17 DIAGNOSIS — Z87.442 PERSONAL HISTORY OF URINARY CALCULI: Primary | ICD-10-CM

## 2019-10-17 LAB
ALBUMIN UR-MCNC: NEGATIVE MG/DL
APPEARANCE UR: CLEAR
BILIRUB UR QL STRIP: NEGATIVE
COLOR UR AUTO: YELLOW
GLUCOSE UR STRIP-MCNC: NEGATIVE MG/DL
HGB UR QL STRIP: NEGATIVE
KETONES UR STRIP-MCNC: NEGATIVE MG/DL
LEUKOCYTE ESTERASE UR QL STRIP: NEGATIVE
NITRATE UR QL: NEGATIVE
PH UR STRIP: 8.5 PH (ref 5–7)
SOURCE: ABNORMAL
SP GR UR STRIP: 1.01 (ref 1–1.03)
UROBILINOGEN UR STRIP-ACNC: 0.2 EU/DL (ref 0.2–1)

## 2019-10-17 PROCEDURE — 99214 OFFICE O/P EST MOD 30 MIN: CPT | Performed by: UROLOGY

## 2019-10-17 PROCEDURE — 81003 URINALYSIS AUTO W/O SCOPE: CPT | Performed by: UROLOGY

## 2019-10-17 ASSESSMENT — MIFFLIN-ST. JEOR: SCORE: 1267.21

## 2019-10-17 ASSESSMENT — PAIN SCALES - GENERAL: PAINLEVEL: NO PAIN (0)

## 2019-10-24 ENCOUNTER — HOSPITAL ENCOUNTER (OUTPATIENT)
Dept: GENERAL RADIOLOGY | Facility: CLINIC | Age: 54
Discharge: HOME | End: 2019-10-24
Attending: UROLOGY | Admitting: UROLOGY
Payer: COMMERCIAL

## 2019-10-24 ENCOUNTER — OFFICE VISIT (OUTPATIENT)
Dept: UROLOGY | Facility: CLINIC | Age: 54
End: 2019-10-24
Payer: COMMERCIAL

## 2019-10-24 VITALS
HEART RATE: 84 BPM | DIASTOLIC BLOOD PRESSURE: 66 MMHG | SYSTOLIC BLOOD PRESSURE: 124 MMHG | WEIGHT: 155 LBS | HEIGHT: 63 IN | BODY MASS INDEX: 27.46 KG/M2

## 2019-10-24 DIAGNOSIS — Z87.442 PERSONAL HISTORY OF URINARY CALCULI: ICD-10-CM

## 2019-10-24 DIAGNOSIS — N20.0 CALCULUS OF KIDNEY: Primary | ICD-10-CM

## 2019-10-24 PROCEDURE — 99214 OFFICE O/P EST MOD 30 MIN: CPT | Performed by: UROLOGY

## 2019-10-24 PROCEDURE — 74019 RADEX ABDOMEN 2 VIEWS: CPT

## 2019-10-24 RX ORDER — CEFAZOLIN SODIUM 1 G
1 VIAL (EA) INJECTION SEE ADMIN INSTRUCTIONS
Status: CANCELLED | OUTPATIENT
Start: 2019-10-24

## 2019-10-24 ASSESSMENT — MIFFLIN-ST. JEOR: SCORE: 1267.21

## 2019-10-24 ASSESSMENT — PAIN SCALES - GENERAL: PAINLEVEL: NO PAIN (0)

## 2019-10-24 NOTE — NURSING NOTE
Chief Complaint   Patient presents with     Clinic Care Coordination - Follow-up     Kidney Stone      Rebekah Reed LPN

## 2019-10-24 NOTE — PROGRESS NOTES
It is a great pleasure to see this very pleasant 54-year-old lady in follow-up consultation today.  She does have a history of recurrent urinary stone disease.  She had ESWL in 2016 and 2018.  Currently she has some fairly mild discomfort in the left flank which is probably ranted about a 2 or 3 out of 10.  There is been no evidence of nausea.  She has seen blood in the urine but recently has had a urinary tract infection which is recently been treated this was Enterococcus faecalis was sensitive to amoxicillin and the urinalysis today is quite negative.  There are no other major complaints.     She had a CT scan recently.  IMPRESSION: No hydronephrosis seen. Multiple bilateral intrarenal,  nonobstructive calculi measuring up to 4 mm in the inferior pole of  the right kidney.     She is also being followed by our nephrologist's for investigation of recurrent urinary stone disease.  We know that the stone analysis is 10% calcium oxalate and 90% calcium phosphate.  She is taking potassium citrate at the present time.  We have identified hypocitraturia is a significant factor.    After I had reviewed the CT scan noting the fragments in the kidney which are not currently symptomatic, I arrange to do a KUB to see if these were visible so we can consider therapeutic options.    I have reviewed the KUB today and indeed you can see the fragments in both the lower and upper calyces of the left kidney under the lower calyx of the right kidney.    We therefore had discussions about 3 options  1.  Continuing surveillance  2.  ESWL.  3.  Ureteroscopy with holmium laser treatment and possible removal of fragments.    She has had ESWL twice in the last 3 years and these fragments remain small but have the potential given her previous history of getting larger again.  After we carefully discussed all the different options we have concluded that we should arrange for flexible ureteroscopy to see if these fragments can be removed  "intermittent try and make the left kidney stone free as possible.  I have explained that this will require the placement of a stent which she understands, that not all fragments may be removed or fragmented.  Furthermore I do not wish to do anything to the right kidney at the present time.  Went over this procedure with the patient in detail today.  I also have questions.    Plan.  Left flexible ureteroscopy with holmium laser lithotripsy of renal stones and stent placement.    Time.  25 minutes.  We did have an extended discussion today to go over the situation in detail.  There are a number of different options we did consider and then I had to explain carefully to her the issues related to the procedure that we did select, the longer-term issues in trying to prevent further stones forming.    \"This dictation was performed with voice recognition software and may contain errors,  omissions and inadvertent word substitution.\"    "

## 2019-10-24 NOTE — LETTER
10/24/2019       RE: Laney Maynard  [id number] [address] [facility] [id number]     Dear Colleague,    Thank you for referring your patient, Laney Maynard, to the [facility] [place] at [facility]. Please see a copy of my visit note below.    It is a great pleasure to see this very pleasant 54-year-old lady in follow-up consultation today.  She does have a history of recurrent urinary stone disease.  She had ESWL in 2016 and 2018.  Currently she has some fairly mild discomfort in the left flank which is probably ranted about a 2 or 3 out of 10.  There is been no evidence of nausea.  She has seen blood in the urine but recently has had a urinary tract infection which is recently been treated this was Enterococcus faecalis was sensitive to amoxicillin and the urinalysis today is quite negative.  There are no other major complaints.     She had a CT scan recently.  IMPRESSION: No hydronephrosis seen. Multiple bilateral intrarenal,  nonobstructive calculi measuring up to 4 mm in the inferior pole of  the right kidney.     She is also being followed by our nephrologist's for investigation of recurrent urinary stone disease.  We know that the stone analysis is 10% calcium oxalate and 90% calcium phosphate.  She is taking potassium citrate at the present time.  We have identified hypocitraturia is a significant factor.    After I had reviewed the CT scan noting the fragments in the kidney which are not currently symptomatic, I arrange to do a KUB to see if these were visible so we can consider therapeutic options.    I have reviewed the KUB today and indeed you can see the fragments in both the lower and upper calyces of the left kidney under the lower calyx of the right kidney.    We therefore had discussions about 3 options  1.  Continuing surveillance  2.  ESWL.  3.  Ureteroscopy with holmium laser treatment and possible  "removal of fragments.    She has had ESWL twice in the last 3 years and these fragments remain small but have the potential given her previous history of getting larger again.  After we carefully discussed all the different options we have concluded that we should arrange for flexible ureteroscopy to see if these fragments can be removed intermittent try and make the left kidney stone free as possible.  I have explained that this will require the placement of a stent which she understands, that not all fragments may be removed or fragmented.  Furthermore I do not wish to do anything to the right kidney at the present time.  Went over this procedure with the patient in detail today.  I also have questions.    Plan.  Left flexible ureteroscopy with holmium laser lithotripsy of renal stones and stent placement.    Time.  25 minutes.  We did have an extended discussion today to go over the situation in detail.  There are a number of different options we did consider and then I had to explain carefully to her the issues related to the procedure that we did select, the longer-term issues in trying to prevent further stones forming.    \"This dictation was performed with voice recognition software and may contain errors,  omissions and inadvertent word substitution.\"      Again, thank you for allowing me to participate in the care of your patient.      Sincerely,    Bassam Vu MD      "

## 2019-10-28 ENCOUNTER — OFFICE VISIT (OUTPATIENT)
Dept: FAMILY MEDICINE | Facility: CLINIC | Age: 54
End: 2019-10-28
Payer: COMMERCIAL

## 2019-10-28 VITALS
SYSTOLIC BLOOD PRESSURE: 132 MMHG | HEIGHT: 64 IN | WEIGHT: 159.9 LBS | BODY MASS INDEX: 27.3 KG/M2 | DIASTOLIC BLOOD PRESSURE: 80 MMHG | HEART RATE: 79 BPM | TEMPERATURE: 98.3 F | OXYGEN SATURATION: 99 %

## 2019-10-28 DIAGNOSIS — R30.0 DYSURIA: ICD-10-CM

## 2019-10-28 DIAGNOSIS — Z01.818 PREOP GENERAL PHYSICAL EXAM: Primary | ICD-10-CM

## 2019-10-28 DIAGNOSIS — L29.9 GENERALIZED PRURITUS: ICD-10-CM

## 2019-10-28 DIAGNOSIS — N20.0 CALCULUS OF KIDNEY: ICD-10-CM

## 2019-10-28 LAB
ALBUMIN UR-MCNC: NEGATIVE MG/DL
AMORPH CRY #/AREA URNS HPF: ABNORMAL /HPF
ANION GAP SERPL CALCULATED.3IONS-SCNC: 8 MMOL/L (ref 3–14)
APPEARANCE UR: CLEAR
BACTERIA #/AREA URNS HPF: ABNORMAL /HPF
BILIRUB UR QL STRIP: NEGATIVE
BUN SERPL-MCNC: 10 MG/DL (ref 7–30)
CALCIUM SERPL-MCNC: 9.1 MG/DL (ref 8.5–10.1)
CHLORIDE SERPL-SCNC: 110 MMOL/L (ref 94–109)
CO2 SERPL-SCNC: 24 MMOL/L (ref 20–32)
COLOR UR AUTO: YELLOW
CREAT SERPL-MCNC: 0.91 MG/DL (ref 0.52–1.04)
GFR SERPL CREATININE-BSD FRML MDRD: 71 ML/MIN/{1.73_M2}
GLUCOSE SERPL-MCNC: 93 MG/DL (ref 70–99)
GLUCOSE UR STRIP-MCNC: NEGATIVE MG/DL
HGB UR QL STRIP: NEGATIVE
KETONES UR STRIP-MCNC: NEGATIVE MG/DL
LEUKOCYTE ESTERASE UR QL STRIP: NEGATIVE
MUCOUS THREADS #/AREA URNS LPF: PRESENT /LPF
NITRATE UR QL: NEGATIVE
NON-SQ EPI CELLS #/AREA URNS LPF: ABNORMAL /LPF
PH UR STRIP: 7.5 PH (ref 5–7)
POTASSIUM SERPL-SCNC: 4 MMOL/L (ref 3.4–5.3)
RBC #/AREA URNS AUTO: ABNORMAL /HPF
SODIUM SERPL-SCNC: 142 MMOL/L (ref 133–144)
SOURCE: ABNORMAL
SP GR UR STRIP: 1.01 (ref 1–1.03)
UROBILINOGEN UR STRIP-ACNC: 0.2 EU/DL (ref 0.2–1)
WBC #/AREA URNS AUTO: ABNORMAL /HPF

## 2019-10-28 PROCEDURE — 99214 OFFICE O/P EST MOD 30 MIN: CPT | Performed by: FAMILY MEDICINE

## 2019-10-28 PROCEDURE — 80048 BASIC METABOLIC PNL TOTAL CA: CPT | Performed by: FAMILY MEDICINE

## 2019-10-28 PROCEDURE — 81001 URINALYSIS AUTO W/SCOPE: CPT | Performed by: FAMILY MEDICINE

## 2019-10-28 PROCEDURE — 36415 COLL VENOUS BLD VENIPUNCTURE: CPT | Performed by: FAMILY MEDICINE

## 2019-10-28 RX ORDER — POTASSIUM CITRATE 10 MEQ/1
20 TABLET, EXTENDED RELEASE ORAL
COMMUNITY
Start: 2019-10-28 | End: 2020-09-14

## 2019-10-28 RX ORDER — ACYCLOVIR 50 MG/G
OINTMENT TOPICAL 2 TIMES DAILY PRN
COMMUNITY
Start: 2019-10-28 | End: 2023-04-04

## 2019-10-28 RX ORDER — HYDROXYZINE HYDROCHLORIDE 25 MG/1
25 TABLET, FILM COATED ORAL 3 TIMES DAILY PRN
Qty: 15 TABLET | Refills: 0 | Status: SHIPPED | OUTPATIENT
Start: 2019-10-28 | End: 2019-11-20

## 2019-10-28 RX ORDER — NAPROXEN 500 MG/1
500 TABLET ORAL 2 TIMES DAILY PRN
COMMUNITY
Start: 2019-10-28 | End: 2020-06-30

## 2019-10-28 SDOH — SOCIAL STABILITY: SOCIAL NETWORK: ARE YOU MARRIED, WIDOWED, DIVORCED, SEPARATED, NEVER MARRIED, OR LIVING WITH A PARTNER?: MARRIED

## 2019-10-28 SDOH — HEALTH STABILITY: MENTAL HEALTH
STRESS IS WHEN SOMEONE FEELS TENSE, NERVOUS, ANXIOUS, OR CAN'T SLEEP AT NIGHT BECAUSE THEIR MIND IS TROUBLED. HOW STRESSED ARE YOU?: ONLY A LITTLE

## 2019-10-28 SDOH — SOCIAL STABILITY: SOCIAL NETWORK: HOW OFTEN DO YOU ATTENT MEETINGS OF THE CLUB OR ORGANIZATION YOU BELONG TO?: NEVER

## 2019-10-28 SDOH — ECONOMIC STABILITY: INCOME INSECURITY: HOW HARD IS IT FOR YOU TO PAY FOR THE VERY BASICS LIKE FOOD, HOUSING, MEDICAL CARE, AND HEATING?: NOT HARD AT ALL

## 2019-10-28 SDOH — HEALTH STABILITY: MENTAL HEALTH: HOW OFTEN DO YOU HAVE 6 OR MORE DRINKS ON ONE OCCASION?: NEVER

## 2019-10-28 SDOH — ECONOMIC STABILITY: TRANSPORTATION INSECURITY
IN THE PAST 12 MONTHS, HAS THE LACK OF TRANSPORTATION KEPT YOU FROM MEDICAL APPOINTMENTS OR FROM GETTING MEDICATIONS?: NO

## 2019-10-28 SDOH — HEALTH STABILITY: PHYSICAL HEALTH: ON AVERAGE, HOW MANY MINUTES DO YOU ENGAGE IN EXERCISE AT THIS LEVEL?: 20 MIN

## 2019-10-28 SDOH — SOCIAL STABILITY: SOCIAL NETWORK: HOW OFTEN DO YOU GET TOGETHER WITH FRIENDS OR RELATIVES?: ONCE A WEEK

## 2019-10-28 SDOH — SOCIAL STABILITY: SOCIAL NETWORK: HOW OFTEN DO YOU ATTEND CHURCH OR RELIGIOUS SERVICES?: NEVER

## 2019-10-28 SDOH — ECONOMIC STABILITY: FOOD INSECURITY: WITHIN THE PAST 12 MONTHS, THE FOOD YOU BOUGHT JUST DIDN'T LAST AND YOU DIDN'T HAVE MONEY TO GET MORE.: NEVER TRUE

## 2019-10-28 SDOH — SOCIAL STABILITY: SOCIAL NETWORK: IN A TYPICAL WEEK, HOW MANY TIMES DO YOU TALK ON THE PHONE WITH FAMILY, FRIENDS, OR NEIGHBORS?: ONCE A WEEK

## 2019-10-28 SDOH — ECONOMIC STABILITY: FOOD INSECURITY: WITHIN THE PAST 12 MONTHS, YOU WORRIED THAT YOUR FOOD WOULD RUN OUT BEFORE YOU GOT MONEY TO BUY MORE.: NEVER TRUE

## 2019-10-28 SDOH — ECONOMIC STABILITY: TRANSPORTATION INSECURITY
IN THE PAST 12 MONTHS, HAS LACK OF TRANSPORTATION KEPT YOU FROM MEETINGS, WORK, OR FROM GETTING THINGS NEEDED FOR DAILY LIVING?: NO

## 2019-10-28 SDOH — HEALTH STABILITY: PHYSICAL HEALTH: ON AVERAGE, HOW MANY DAYS PER WEEK DO YOU ENGAGE IN MODERATE TO STRENUOUS EXERCISE (LIKE A BRISK WALK)?: 3 DAYS

## 2019-10-28 SDOH — SOCIAL STABILITY: SOCIAL NETWORK
DO YOU BELONG TO ANY CLUBS OR ORGANIZATIONS SUCH AS CHURCH GROUPS UNIONS, FRATERNAL OR ATHLETIC GROUPS, OR SCHOOL GROUPS?: NO

## 2019-10-28 SDOH — HEALTH STABILITY: MENTAL HEALTH: HOW OFTEN DO YOU HAVE A DRINK CONTAINING ALCOHOL?: NEVER

## 2019-10-28 SDOH — HEALTH STABILITY: MENTAL HEALTH: HOW MANY STANDARD DRINKS CONTAINING ALCOHOL DO YOU HAVE ON A TYPICAL DAY?: 1 OR 2

## 2019-10-28 ASSESSMENT — MIFFLIN-ST. JEOR: SCORE: 1305.3

## 2019-10-28 NOTE — PATIENT INSTRUCTIONS
Before Your Surgery      Call your surgeon if there is any change in your health. This includes signs of a cold or flu (such as a sore throat, runny nose, cough, rash or fever).    Do not smoke, drink alcohol or take over the counter medicine (unless your surgeon or primary care doctor tells you to) for the 24 hours before and after surgery.    If you take prescribed drugs: Follow your doctor s orders about which medicines to take and which to stop until after surgery.    Avoid NSAID's (e.g. Ibuprofen, Aleve), Aspirin, and Fish Oil prior to surgery, as discussed.    Use Atarax as needed for itching, only as directed.      Avoid driving within 8 hours of taking Atarax, as discussed.    Eating and drinking prior to surgery: follow the instructions from your surgeon    Take a shower or bath the night before surgery. Use the soap your surgeon gave you to gently clean your skin. If you do not have soap from your surgeon, use your regular soap. Do not shave or scrub the surgery site.  Wear clean pajamas and have clean sheets on your bed.

## 2019-10-29 NOTE — PROGRESS NOTES
[facility]  [address] [facility] [address]  [phone]  Dept: [phone]    PRE-OP EVALUATION:  Today's date: 10/28/2019    Laney Maynard (: 1965) presents for pre-operative evaluation assessment, as requested by Dr. Vu.  She requires evaluation and anesthesia risk assessment prior to undergoing surgery/procedure for the treatment of kidney stones.    Fax number for surgical facility: [phone]  Primary Physician: Haase, Susan Rachele  Type of Anesthesia Anticipated: To be determined    Patient has a Health Care Directive or Living Will:  NO    Preop Questions 10/28/2019   Who is doing your surgery? Dr. Bassam Vu   What are you having done? Cystoscopy, Left Ureteroscopy, Holmium Laser Lithotripsy, and Stone Extraction     Date of Surgery/Procedure: 2019   Facility or Hospital where procedure/surgery will be performed: [facility]   1.  Do you have a history of Heart attack, stroke, stent, coronary bypass surgery, or other heart surgery? No   2.  Do you ever have any pain or discomfort in your chest? No   3.  Do you have a history of  Heart Failure? No   4.   Are you troubled by shortness of breath when:  walking on a level surface, or up a slight hill, or at night? No   5.  Do you currently have a cold, bronchitis or other respiratory infection? No   6.  Do you have a cough, shortness of breath, or wheezing? No   7.  Do you sometimes get pains in the calves of your legs when you walk? No   8. Do you or anyone in your family have previous history of blood clots? No   9.  Do you or does anyone in your family have a serious bleeding problem such as prolonged bleeding following surgeries or cuts? No   10. Have you ever had problems with anemia or been told to take iron pills? No   11. Have you had any abnormal blood loss such as black, tarry or bloody stools, or abnormal vaginal bleeding? No   12. Have you ever had a blood  transfusion? No   13. Have you or any of your relatives ever had problems with anesthesia? No   14. Do you have sleep apnea, excessive snoring or daytime drowsiness? No   15. Do you have any prosthetic heart valves? No   16. Do you have prosthetic joints? No   17. Is there any chance that you may be pregnant? No         HPI:     HPI related to upcoming procedure: The patient is a 54-year-old female, who presents for a preoperative physical prior to cystoscopy, left ureteroscopy, Holmium laser lithotrispy, and stone extraction, currently scheduled 10/30/2019.    Patient has a history of chronic urinary frequency.  She has continued dysuria, post treatment with Amoxicillin and a Cephalosporin this past month.  Patient denies urinary hesitancy, abdominal pain, back pain, or hematuria currently.  Patient has multiple medication allergies (including bladder spasm related to Macrobid treatment), requesting to be treated with Augmentin if her Urinalysis is positive today.  Urology note from 10/24/2019 was reviewed in Epic.    Patient reports generalized pruritus since being treated with a Cephalosporin earlier this month.  Patient requests a prescription for Atarax, which has worked well for her previously.  No recent exposures.  No changes in soaps, detergents, or household products reported.    Patient denies having a history of hypertension, diabetes, or CAD.  Patient is able to walk several times per week without chest pain, shortness of breath, or exertional symptoms.    MEDICAL HISTORY:     Patient Active Problem List    Diagnosis Date Noted     Ureterolithiasis 04/22/2016     Priority: Medium     Complete rupture of rotator cuff 04/02/2015     Priority: Medium     Mixed incontinence urge and stress (male)(female) 04/02/2015     Priority: Medium     Plantar fasciitis, bilateral 11/05/2014     Priority: Medium     Attention deficit hyperactivity disorder (ADHD) 01/12/2014     Priority: Medium     Patient is followed  by HAASE, SUSAN RACHELE for ongoing prescription of stimulants.  All refills should be approved by this provider, or covering partner.    Medication(s): Aderall XR 20 mg every day.  Adderall XR 30 mg every day.   Maximum quantity per month: #30;  #30   Clinic visit frequency required: Q 3  months     Controlled substance agreement on file: Yes       Date(s): 8/29/2019  Neuropsych evaluation for ADD completed:  No    Last [facility] website verification: 8/9/19   [email or web address]         Herpes simplex virus infection 08/02/2013     Priority: Medium     Urinary frequency      Priority: Medium     Family history of rheumatoid arthritis 03/08/2013     Priority: Medium     Family history of sarcoidosis (mother) 03/08/2013     Priority: Medium     Sleep disorder 10/05/2012     Priority: Medium     Migraine headache 03/29/2011     Priority: Medium     Topamax       CARDIOVASCULAR SCREENING; LDL GOAL LESS THAN 160 10/31/2010     Priority: Medium     The 10-year ASCVD risk score (Suziebakari ALLEN Jr, et al., 2013) is: 2.1%    Values used to calculate the score:      Age: 53 years      Sex: Female      Is Non- : Yes      Diabetic: No      Tobacco smoker: No      Systolic Blood Pressure: 110 mmHg      Is BP treated: No      HDL Cholesterol: 54 mg/dL      Total Cholesterol: 242 mg/dL         GERD (gastroesophageal reflux disease) 07/05/2010     Priority: Medium     Seasonal allergic rhinitis 05/07/2010     Priority: Medium     Alopecia 05/18/2004     Priority: Medium      Past Medical History:   Diagnosis Date     Abnormal glandular Papanicolaou smear of cervix      Allergic rhinitis, cause unspecified      Chronic pain of left knee      Constipation      Gastro-oesophageal reflux disease      Heart murmur     murmur     Hematuria      Herpes simplex virus infection      Hydronephrosis, right      Lymphocytopenia 4/4/2011     Migraine, unspecified, without mention of intractable migraine without mention  of status migrainosus      Mumps      Numbness and tingling     LEFT ARM     Palpitations      Renal disease     hx stones x 2 episodes     Ureterolithiasis      Urinary frequency     burning     Vertigo      Past Surgical History:   Procedure Laterality Date     ARTHROSCOPY KNEE Right 2017    Procedure: Right knee arthroscopy and anterior fat pad resection with medial plica resection. Partial lateral menisectomy. Surgeon:  Fredi Lindsay MD  Location: [facility]     ARTHROSCOPY SHOULDER, OPEN ROTATOR CUFF REPAIR, COMBINED  2013    Procedure: COMBINED ARTHROSCOPY SHOULDER, OPEN ROTATOR CUFF REPAIR;  Left Shoulder Arthroscopy, Distal Clavicale Resection, Decompression, Mini Open Rotator Cuff Repair    ;  Surgeon: Fredi Lindsay MD;  Location: [facility] OR     C NONSPECIFIC PROCEDURE  age 16    colposcopy for abnormal pap     C NONSPECIFIC PROCEDURE      surgery L thumb     C NONSPECIFIC PROCEDURE      breast augmentation-silicone     C NONSPECIFIC PROCEDURE      s/p  x 2     C NONSPECIFIC PROCEDURE      Bilateral tubal ligation     C REMOVAL OF KIDNEY STONE       COLONOSCOPY  2014    Procedure: COLONOSCOPY;  Colonoscopy/WW;  Surgeon: Pancho Olsen MD;  Location:  GI     COMBINED CYSTOSCOPY, RETROGRADES, URETEROSCOPY, LASER HOLMIUM LITHOTRIPSY URETER(S), INSERT STENT Right 2016    Procedure: COMBINED CYSTOSCOPY, RETROGRADES, URETEROSCOPY, LASER HOLMIUM LITHOTRIPSY URETER(S), INSERT STENT;  Surgeon: Kushal Noriega MD;  Location: [facility] OR     CYSTOSCOPY       CYSTOSCOPY, RETROGRADES, EXTRACT STONE, COMBINED  2013    Procedure: COMBINED CYSTOSCOPY, RETROGRADES, EXTRACT STONE;  Video Cystoscopy,  Right Ureteroscopy, ureteral dilatation,  Stone extraction;  Surgeon: Subhash Vann MD;  Location: [facility] OR     EXTRACORPOREAL SHOCK WAVE LITHOTRIPSY (ESWL) Bilateral 2016    Procedure: EXTRACORPOREAL SHOCK WAVE LITHOTRIPSY (ESWL);  Surgeon: Bassam Vu MD;   Location: [facility] OR     EXTRACORPOREAL SHOCK WAVE LITHOTRIPSY, CYSTOSCOPY, INSERT STENT URETER(S), COMBINED Bilateral 4/26/2018    Procedure: COMBINED EXTRACORPOREAL SHOCK WAVE LITHOTRIPSY, CYSTOSCOPY, INSERT STENT URETER(S);  BILATERAL COMBINED EXTRACORPOREAL SHOCK WAVE LITHOTRIPSY, CYSTOSCOPY, LEFT STENT PLACEMENT;  Surgeon: Bassam Vu MD;  Location: [facility] OR     GYN SURGERY      laparoscopy     LAPAROSCOPIC CHOLECYSTECTOMY WITH CHOLANGIOGRAMS  11/21/2012    Procedure: LAPAROSCOPIC CHOLECYSTECTOMY WITH CHOLANGIOGRAMS;  LAPAROSCOPIC CHOLECYSTECTOMY WITH CHOLANGIOGRAMS ;  Surgeon: Nataliya Gabriel MD;  Location: [facility] OR     TUBAL LIGATION       Current Outpatient Medications   Medication Sig Dispense Refill     acyclovir (ZOVIRAX) 5 % external ointment Apply topically 2 times daily as needed (Outbreak)              naproxen (NAPROSYN) 500 MG tablet Take 1 tablet (500 mg) by mouth 2 times daily as needed for moderate pain       potassium citrate (UROCIT-K) 10 MEQ (1080 MG) CR tablet Take 2 tablets (20 mEq) by mouth 3 times daily (with meals)       amphetamine-dextroamphetamine (ADDERALL) 20 MG tablet TAKE ONE TABLET BY MOUTH THREE TIMES A DAY 90 tablet 0     fexofenadine (ALLEGRA) 180 MG tablet Take 1 tablet (180 mg) by mouth daily as needed for allergies 30 tablet 6     fluocinolone acetonide 0.01 % OIL Use on scalp once a week, as needed. 1 Bottle 11     fluticasone (FLONASE) 50 MCG/ACT nasal spray Spray 2 sprays into both nostrils daily as needed for allergies 16 g 11     ketoconazole (NIZORAL) 2 % external shampoo Apply to the affected area and wash off after 5 minutes, as needed. 120 mL 11     ketorolac (TORADOL) 10 MG tablet Take 1 tablet (10 mg) by mouth every 6 hours as needed for moderate pain 10 tablet 0     meclizine (ANTIVERT) 25 MG tablet Take 1 tablet (25 mg) by mouth 3 times daily as needed for nausea 30 tablet 3     Menthol, Topical Analgesic, (BIOFREEZE COLORLESS) 4 % GEL Externally apply  topically 3 times daily as needed 118 mL 1     phenazopyridine (PYRIDIUM) 200 MG tablet Take 1 tablet (200 mg) by mouth 3 times daily as needed for irritation 30 tablet 3     solifenacin (VESICARE) 5 MG tablet Take 1 tablet (5 mg) by mouth every morning 90 tablet 1     Topiramate (TOPAMAX PO) Take 150 mg by mouth every morning (3 X 50MG = 150MG)       valACYclovir (VALTREX) 1000 mg tablet Take 1 tablet (1,000 mg) by mouth daily 90 tablet 3     zolpidem (AMBIEN) 5 MG tablet Take 1 tablet (5 mg) by mouth nightly as needed for sleep 30 tablet 2     OTC products:  Stopped taking Aspirin 2 weeks ago.  No NSAID's for 2 weeks.    Allergies   Allergen Reactions     Cefatrizine Itching     Aloe Itching and Rash     Codeine      GI upset     Compazine Nausea and Itching     Darvocet [Acetaminophen] Nausea and Vomiting     Percocet [Oxycodone-Acetaminophen] Nausea and Vomiting     Sulphadimidine [Sulfamethazine] Rash      Latex Allergy: NO    Social History     Tobacco Use     Smoking status: Never Smoker     Smokeless tobacco: Never Used   Substance Use Topics     Alcohol use: No     Alcohol/week: 0.0 standard drinks     Frequency: Never     Drinks per session: 1 or 2     Binge frequency: Never     History   Drug Use No       REVIEW OF SYSTEMS:   CONSTITUTIONAL: NEGATIVE for fever, chills, change in weight  INTEGUMENTARY/SKIN: Generalized itching since taking an antibiotic a few weeks ago.  EYES: NEGATIVE for vision changes or irritation  ENT/MOUTH: NEGATIVE for ear, mouth and throat problems  RESP: NEGATIVE for significant cough or shortness of breath   BREAST: NEGATIVE for masses, tenderness or discharge  CV: NEGATIVE for chest pain, palpitations or peripheral edema  GI: NEGATIVE for nausea, abdominal pain, heartburn, or change in bowel habits  : Chronic urinary frequency.  Some dysuria.  NEGATIVE for hematuria  MUSCULOSKELETAL: NEGATIVE for significant arthralgias or myalgia  NEURO: NEGATIVE for weakness, dizziness or  "paresthesias  ENDOCRINE: NEGATIVE for temperature intolerance, skin/hair changes  HEME: NEGATIVE for bleeding problems  PSYCHIATRIC: NEGATIVE for changes in mood or affect    EXAM:   /80 (BP Location: Right arm, Patient Position: Chair, Cuff Size: Adult Regular)   Pulse 79   Temp 98.3  F (36.8  C) (Oral)   Ht 1.626 m (5' 4\")   Wt 72.5 kg (159 lb 14.4 oz)   LMP 02/25/2014   SpO2 99%   BMI 27.45 kg/m      GENERAL APPEARANCE: healthy, alert and no distress     EYES: Wears glasses.  EOMI, PERRL     HENT: ear canals and TM's normal and nose and mouth without ulcers or lesions     NECK: no adenopathy, no asymmetry, masses, or scars and thyroid normal to palpation     RESP: lungs clear to auscultation - no rales, rhonchi or wheezes     CV: regular rates and rhythm, normal S1 S2, no S3 or S4 and no murmur, click or rub     ABDOMEN/BACK:  Soft, nontender, no HSM or masses and bowel sounds normal.  No CVA tenderness.     MS: extremities normal- no gross deformities noted, no evidence of inflammation in joints, FROM in all extremities.     SKIN: no suspicious lesions or rashes     NEURO: Normal strength and tone, sensory exam grossly normal, mentation intact and speech normal     PSYCH: Mentation appears normal.  Affect normal/bright.  Talkative, pleasant.     LYMPHATICS: No cervical adenopathy    DIAGNOSTICS:     EKG: Not indicated due to non-vascular surgery and low risk of event (age <65 and without cardiac risk factors).  Previous EKG 4/13/2018 showed Sinus Rhythm without ST/T wave changes.    Recent Labs   Lab Test 05/06/19  0947 12/13/18 06/27/18  0918 04/13/18  1339  12/14/16  1430   HGB 14.4  --  14.2 14.8   < > 14.4     --  236 220   < > 257   INR  --   --   --   --   --  1.06     --  142 145*   < > 143   POTASSIUM 3.9 3.7 4.1 3.7   < > 3.7   CR 0.98 0.99 1.03 0.87   < > 1.02   A1C  --   --   --  4.7  --   --     < > = values in this interval not displayed.      Hemoglobin discussed with and " declined by patient.    LABORATORY:  Office Visit on 10/28/2019   Component Date Value Ref Range Status     Color Urine 10/28/2019 Yellow   Final     Appearance Urine 10/28/2019 Clear   Final     Glucose Urine 10/28/2019 Negative  NEG^Negative mg/dL Final     Bilirubin Urine 10/28/2019 Negative  NEG^Negative Final     Ketones Urine 10/28/2019 Negative  NEG^Negative mg/dL Final     Specific Gravity Urine 10/28/2019 1.015  1.003 - 1.035 Final     pH Urine 10/28/2019 7.5* 5.0 - 7.0 pH Final     Protein Albumin Urine 10/28/2019 Negative  NEG^Negative mg/dL Final     Urobilinogen Urine 10/28/2019 0.2  0.2 - 1.0 EU/dL Final     Nitrite Urine 10/28/2019 Negative  NEG^Negative Final     Blood Urine 10/28/2019 Negative  NEG^Negative Final     Leukocyte Esterase Urine 10/28/2019 Negative  NEG^Negative Final     Source 10/28/2019 Midstream Urine   Final     WBC Urine 10/28/2019 0 - 5  OTO5^0 - 5 /HPF Final     RBC Urine 10/28/2019 O - 2  OTO2^O - 2 /HPF Final     Squamous Epithelial /LPF Urine 10/28/2019 Few  FEW^Few /LPF Final     Bacteria Urine 10/28/2019 Few* NEG^Negative /HPF Final     Amorphous Crystals 10/28/2019 Few* NEG^Negative /HPF Final     Mucous Urine 10/28/2019 Present* NEG^Negative /LPF Final     Sodium 10/28/2019 142  133 - 144 mmol/L Final     Potassium 10/28/2019 4.0  3.4 - 5.3 mmol/L Final     Chloride 10/28/2019 110* 94 - 109 mmol/L Final     Carbon Dioxide 10/28/2019 24  20 - 32 mmol/L Final     Anion Gap 10/28/2019 8  3 - 14 mmol/L Final     Glucose 10/28/2019 93  70 - 99 mg/dL Final     Urea Nitrogen 10/28/2019 10  7 - 30 mg/dL Final     Creatinine 10/28/2019 0.91  0.52 - 1.04 mg/dL Final     GFR Estimate 10/28/2019 71  >60 mL/min/[1.73_m2] Final    Comment: Non  GFR Calc  Starting 12/18/2018, serum creatinine based estimated GFR (eGFR) will be   calculated using the Chronic Kidney Disease Epidemiology Collaboration   (CKD-EPI) equation.       GFR Estimate If Black 10/28/2019 82  >60  mL/min/[1.73_m2] Final    Comment:  GFR Calc  Starting 12/18/2018, serum creatinine based estimated GFR (eGFR) will be   calculated using the Chronic Kidney Disease Epidemiology Collaboration   (CKD-EPI) equation.       Calcium 10/28/2019 9.1  8.5 - 10.1 mg/dL Final     TSH and LFT's were within normal limits/acceptable 5/6/2019.     IMPRESSION:   Reason for surgery/procedure: Cystoscopy, Left Ureteroscopy, Holmium Laser Lithotripsy, and Stone Extraction  Diagnosis/reason for consult: Preoperative Physical    The proposed surgical procedure is considered LOW TO INTERMEDIATE risk.    REVISED CARDIAC RISK INDEX  The patient has the following serious cardiovascular risks for perioperative complications such as (MI, PE, VFib and 3  AV Block):  No serious cardiac risks  INTERPRETATION: 0 risks: Class I (very low risk - 0.4% complication rate)    The patient has the following additional risks for perioperative complications:  No identified additional risks      ICD-10-CM    1. Preop general physical exam Z01.818    2. Calculus of kidney N20.0 Basic metabolic panel   3. Dysuria, likely secondary to #2.  Doubt UTI, based on today's Urinalysis. R30.0 UA with Microscopic reflex to Culture   4. Generalized pruritus, starting after her recent Cephalosporin treatment. L29.9 hydrOXYzine (ATARAX) 25 MG tablet       Performs 4 METs exercise without symptoms (Walk on level ground at 15 minutes per mile (4 miles/hour)) .     RECOMMENDATIONS:       --Patient is in agreement with the following perioperative medication adjustments:      Patient plans to hold her medications the morning of surgery, as discussed.    Avoid NSAID's (e.g. Ibuprofen, Aleve), Aspirin, and Fish Oil prior to surgery, as discussed.    Use Atarax as needed for itching, only as directed.      Avoid driving within 8 hours of taking Atarax, as discussed.    IF CONTINUED PRURITIS, CONSIDER A CBC AND HEPATIC PANEL PRIOR TO SURGERY, ONLY AS  APPROPRIATE.    Patient is likely of acceptable risk to proceed with the recommended procedure, but consider additional labs prior to surgery, as noted above.     Signed Electronically by: Katharina Sanchez MD    Copy of this evaluation report is provided to requesting physician.    [place] Preop Guidelines    Revised Cardiac Risk Index

## 2019-10-30 ENCOUNTER — ANESTHESIA (OUTPATIENT)
Dept: SURGERY | Facility: CLINIC | Age: 54
End: 2019-10-30
Payer: COMMERCIAL

## 2019-10-30 ENCOUNTER — HOSPITAL ENCOUNTER (OUTPATIENT)
Facility: CLINIC | Age: 54
Discharge: HOME | End: 2019-10-30
Attending: UROLOGY | Admitting: UROLOGY
Payer: COMMERCIAL

## 2019-10-30 ENCOUNTER — APPOINTMENT (OUTPATIENT)
Dept: GENERAL RADIOLOGY | Facility: CLINIC | Age: 54
End: 2019-10-30
Attending: UROLOGY
Payer: COMMERCIAL

## 2019-10-30 ENCOUNTER — ANESTHESIA EVENT (OUTPATIENT)
Dept: SURGERY | Facility: CLINIC | Age: 54
End: 2019-10-30
Payer: COMMERCIAL

## 2019-10-30 VITALS
RESPIRATION RATE: 16 BRPM | OXYGEN SATURATION: 95 % | HEART RATE: 85 BPM | SYSTOLIC BLOOD PRESSURE: 132 MMHG | BODY MASS INDEX: 25.76 KG/M2 | TEMPERATURE: 98.2 F | HEIGHT: 64 IN | WEIGHT: 150.9 LBS | DIASTOLIC BLOOD PRESSURE: 62 MMHG

## 2019-10-30 DIAGNOSIS — N20.1 URETEROLITHIASIS: Primary | ICD-10-CM

## 2019-10-30 PROCEDURE — 52356 CYSTO/URETERO W/LITHOTRIPSY: CPT | Mod: LT | Performed by: UROLOGY

## 2019-10-30 PROCEDURE — 25000128 H RX IP 250 OP 636: Performed by: UROLOGY

## 2019-10-30 PROCEDURE — 25000128 H RX IP 250 OP 636: Performed by: ANESTHESIOLOGY

## 2019-10-30 PROCEDURE — 25500064 ZZH RX 255 OP 636: Performed by: UROLOGY

## 2019-10-30 PROCEDURE — C1758 CATHETER, URETERAL: HCPCS | Performed by: UROLOGY

## 2019-10-30 PROCEDURE — 25000125 ZZHC RX 250: Performed by: NURSE ANESTHETIST, CERTIFIED REGISTERED

## 2019-10-30 PROCEDURE — 40000277 XR SURGERY CARM FLUORO LESS THAN 5 MIN W STILLS

## 2019-10-30 PROCEDURE — 74420 UROGRAPHY RTRGR +-KUB: CPT | Mod: 26 | Performed by: UROLOGY

## 2019-10-30 PROCEDURE — C2617 STENT, NON-COR, TEM W/O DEL: HCPCS | Performed by: UROLOGY

## 2019-10-30 PROCEDURE — 25800030 ZZH RX IP 258 OP 636: Performed by: NURSE ANESTHETIST, CERTIFIED REGISTERED

## 2019-10-30 PROCEDURE — 25000128 H RX IP 250 OP 636: Performed by: NURSE ANESTHETIST, CERTIFIED REGISTERED

## 2019-10-30 PROCEDURE — C1769 GUIDE WIRE: HCPCS | Performed by: UROLOGY

## 2019-10-30 PROCEDURE — 37000008 ZZH ANESTHESIA TECHNICAL FEE, 1ST 30 MIN: Performed by: UROLOGY

## 2019-10-30 PROCEDURE — 37000009 ZZH ANESTHESIA TECHNICAL FEE, EACH ADDTL 15 MIN: Performed by: UROLOGY

## 2019-10-30 PROCEDURE — C1726 CATH, BAL DIL, NON-VASCULAR: HCPCS | Performed by: UROLOGY

## 2019-10-30 PROCEDURE — 36000058 ZZH SURGERY LEVEL 3 EA 15 ADDTL MIN: Performed by: UROLOGY

## 2019-10-30 PROCEDURE — 25000566 ZZH SEVOFLURANE, EA 15 MIN: Performed by: UROLOGY

## 2019-10-30 PROCEDURE — 40000170 ZZH STATISTIC PRE-PROCEDURE ASSESSMENT II: Performed by: UROLOGY

## 2019-10-30 PROCEDURE — 25000125 ZZHC RX 250: Performed by: UROLOGY

## 2019-10-30 PROCEDURE — C1894 INTRO/SHEATH, NON-LASER: HCPCS | Performed by: UROLOGY

## 2019-10-30 PROCEDURE — 27210794 ZZH OR GENERAL SUPPLY STERILE: Performed by: UROLOGY

## 2019-10-30 PROCEDURE — 71000012 ZZH RECOVERY PHASE 1 LEVEL 1 FIRST HR: Performed by: UROLOGY

## 2019-10-30 PROCEDURE — 36000056 ZZH SURGERY LEVEL 3 1ST 30 MIN: Performed by: UROLOGY

## 2019-10-30 PROCEDURE — 71000027 ZZH RECOVERY PHASE 2 EACH 15 MINS: Performed by: UROLOGY

## 2019-10-30 PROCEDURE — 71000013 ZZH RECOVERY PHASE 1 LEVEL 1 EA ADDTL HR: Performed by: UROLOGY

## 2019-10-30 PROCEDURE — 74019 RADEX ABDOMEN 2 VIEWS: CPT

## 2019-10-30 DEVICE — STENT URETERAL CONTOUR SOFT PERCUFLEX 7FRX26CM: Type: IMPLANTABLE DEVICE | Status: FUNCTIONAL

## 2019-10-30 RX ORDER — CEFAZOLIN SODIUM 1 G/3ML
1 INJECTION, POWDER, FOR SOLUTION INTRAMUSCULAR; INTRAVENOUS SEE ADMIN INSTRUCTIONS
Status: DISCONTINUED | OUTPATIENT
Start: 2019-10-30 | End: 2019-10-30 | Stop reason: HOSPADM

## 2019-10-30 RX ORDER — FENTANYL CITRATE 50 UG/ML
INJECTION, SOLUTION INTRAMUSCULAR; INTRAVENOUS PRN
Status: DISCONTINUED | OUTPATIENT
Start: 2019-10-30 | End: 2019-10-30

## 2019-10-30 RX ORDER — LABETALOL HYDROCHLORIDE 5 MG/ML
10 INJECTION, SOLUTION INTRAVENOUS
Status: DISCONTINUED | OUTPATIENT
Start: 2019-10-30 | End: 2019-10-30 | Stop reason: HOSPADM

## 2019-10-30 RX ORDER — CIPROFLOXACIN 250 MG/1
250 TABLET, FILM COATED ORAL 2 TIMES DAILY
Qty: 2 TABLET | Refills: 0 | Status: SHIPPED | OUTPATIENT
Start: 2019-10-30 | End: 2019-11-06

## 2019-10-30 RX ORDER — LIDOCAINE HYDROCHLORIDE 20 MG/ML
INJECTION, SOLUTION INFILTRATION; PERINEURAL PRN
Status: DISCONTINUED | OUTPATIENT
Start: 2019-10-30 | End: 2019-10-30

## 2019-10-30 RX ORDER — ATROPA BELLADONNA AND OPIUM 16.2; 3 MG/1; MG/1
SUPPOSITORY RECTAL PRN
Status: DISCONTINUED | OUTPATIENT
Start: 2019-10-30 | End: 2019-10-30 | Stop reason: HOSPADM

## 2019-10-30 RX ORDER — ONDANSETRON 4 MG/1
4 TABLET, ORALLY DISINTEGRATING ORAL EVERY 30 MIN PRN
Status: DISCONTINUED | OUTPATIENT
Start: 2019-10-30 | End: 2019-10-30 | Stop reason: HOSPADM

## 2019-10-30 RX ORDER — MEPERIDINE HYDROCHLORIDE 25 MG/ML
12.5 INJECTION INTRAMUSCULAR; INTRAVENOUS; SUBCUTANEOUS
Status: DISCONTINUED | OUTPATIENT
Start: 2019-10-30 | End: 2019-10-30 | Stop reason: HOSPADM

## 2019-10-30 RX ORDER — ONDANSETRON 2 MG/ML
INJECTION INTRAMUSCULAR; INTRAVENOUS PRN
Status: DISCONTINUED | OUTPATIENT
Start: 2019-10-30 | End: 2019-10-30

## 2019-10-30 RX ORDER — DEXAMETHASONE SODIUM PHOSPHATE 4 MG/ML
4 INJECTION, SOLUTION INTRA-ARTICULAR; INTRALESIONAL; INTRAMUSCULAR; INTRAVENOUS; SOFT TISSUE EVERY 10 MIN PRN
Status: DISCONTINUED | OUTPATIENT
Start: 2019-10-30 | End: 2019-10-30 | Stop reason: HOSPADM

## 2019-10-30 RX ORDER — SODIUM CHLORIDE, SODIUM LACTATE, POTASSIUM CHLORIDE, CALCIUM CHLORIDE 600; 310; 30; 20 MG/100ML; MG/100ML; MG/100ML; MG/100ML
INJECTION, SOLUTION INTRAVENOUS CONTINUOUS PRN
Status: DISCONTINUED | OUTPATIENT
Start: 2019-10-30 | End: 2019-10-30

## 2019-10-30 RX ORDER — DEXAMETHASONE SODIUM PHOSPHATE 4 MG/ML
INJECTION, SOLUTION INTRA-ARTICULAR; INTRALESIONAL; INTRAMUSCULAR; INTRAVENOUS; SOFT TISSUE PRN
Status: DISCONTINUED | OUTPATIENT
Start: 2019-10-30 | End: 2019-10-30

## 2019-10-30 RX ORDER — HYDROMORPHONE HYDROCHLORIDE 1 MG/ML
.3-.5 INJECTION, SOLUTION INTRAMUSCULAR; INTRAVENOUS; SUBCUTANEOUS EVERY 10 MIN PRN
Status: DISCONTINUED | OUTPATIENT
Start: 2019-10-30 | End: 2019-10-30 | Stop reason: HOSPADM

## 2019-10-30 RX ORDER — FENTANYL CITRATE 50 UG/ML
25-50 INJECTION, SOLUTION INTRAMUSCULAR; INTRAVENOUS
Status: DISCONTINUED | OUTPATIENT
Start: 2019-10-30 | End: 2019-10-30 | Stop reason: HOSPADM

## 2019-10-30 RX ORDER — ONDANSETRON 2 MG/ML
4 INJECTION INTRAMUSCULAR; INTRAVENOUS EVERY 30 MIN PRN
Status: DISCONTINUED | OUTPATIENT
Start: 2019-10-30 | End: 2019-10-30 | Stop reason: HOSPADM

## 2019-10-30 RX ORDER — PROPOFOL 10 MG/ML
INJECTION, EMULSION INTRAVENOUS CONTINUOUS PRN
Status: DISCONTINUED | OUTPATIENT
Start: 2019-10-30 | End: 2019-10-30

## 2019-10-30 RX ORDER — SODIUM CHLORIDE, SODIUM LACTATE, POTASSIUM CHLORIDE, CALCIUM CHLORIDE 600; 310; 30; 20 MG/100ML; MG/100ML; MG/100ML; MG/100ML
INJECTION, SOLUTION INTRAVENOUS CONTINUOUS
Status: DISCONTINUED | OUTPATIENT
Start: 2019-10-30 | End: 2019-10-30 | Stop reason: HOSPADM

## 2019-10-30 RX ORDER — CEFAZOLIN SODIUM 2 G/100ML
2 INJECTION, SOLUTION INTRAVENOUS
Status: COMPLETED | OUTPATIENT
Start: 2019-10-30 | End: 2019-10-30

## 2019-10-30 RX ORDER — KETOROLAC TROMETHAMINE 30 MG/ML
30 INJECTION, SOLUTION INTRAMUSCULAR; INTRAVENOUS EVERY 6 HOURS PRN
Status: DISCONTINUED | OUTPATIENT
Start: 2019-10-30 | End: 2019-10-30 | Stop reason: HOSPADM

## 2019-10-30 RX ORDER — IOPAMIDOL 612 MG/ML
INJECTION, SOLUTION INTRAVASCULAR PRN
Status: DISCONTINUED | OUTPATIENT
Start: 2019-10-30 | End: 2019-10-30 | Stop reason: HOSPADM

## 2019-10-30 RX ORDER — PROPOFOL 10 MG/ML
INJECTION, EMULSION INTRAVENOUS PRN
Status: DISCONTINUED | OUTPATIENT
Start: 2019-10-30 | End: 2019-10-30

## 2019-10-30 RX ORDER — KETOROLAC TROMETHAMINE 30 MG/ML
15 INJECTION, SOLUTION INTRAMUSCULAR; INTRAVENOUS ONCE
Status: COMPLETED | OUTPATIENT
Start: 2019-10-30 | End: 2019-10-30

## 2019-10-30 RX ORDER — HYDRALAZINE HYDROCHLORIDE 20 MG/ML
2.5-5 INJECTION INTRAMUSCULAR; INTRAVENOUS EVERY 10 MIN PRN
Status: DISCONTINUED | OUTPATIENT
Start: 2019-10-30 | End: 2019-10-30 | Stop reason: HOSPADM

## 2019-10-30 RX ORDER — NALOXONE HYDROCHLORIDE 0.4 MG/ML
.1-.4 INJECTION, SOLUTION INTRAMUSCULAR; INTRAVENOUS; SUBCUTANEOUS
Status: DISCONTINUED | OUTPATIENT
Start: 2019-10-30 | End: 2019-10-30 | Stop reason: HOSPADM

## 2019-10-30 RX ORDER — KETOROLAC TROMETHAMINE 10 MG/1
10 TABLET, FILM COATED ORAL EVERY 6 HOURS PRN
Qty: 10 TABLET | Refills: 0 | Status: SHIPPED | OUTPATIENT
Start: 2019-10-30 | End: 2019-11-20

## 2019-10-30 RX ADMIN — PHENYLEPHRINE HYDROCHLORIDE 100 MCG: 10 INJECTION INTRAVENOUS at 15:59

## 2019-10-30 RX ADMIN — KETOROLAC TROMETHAMINE 15 MG: 30 INJECTION, SOLUTION INTRAMUSCULAR at 17:59

## 2019-10-30 RX ADMIN — PHENYLEPHRINE HYDROCHLORIDE 100 MCG: 10 INJECTION INTRAVENOUS at 15:55

## 2019-10-30 RX ADMIN — SODIUM CHLORIDE, POTASSIUM CHLORIDE, SODIUM LACTATE AND CALCIUM CHLORIDE: 600; 310; 30; 20 INJECTION, SOLUTION INTRAVENOUS at 15:39

## 2019-10-30 RX ADMIN — PROPOFOL 50 MCG/KG/MIN: 10 INJECTION, EMULSION INTRAVENOUS at 15:40

## 2019-10-30 RX ADMIN — PROPOFOL 170 MG: 10 INJECTION, EMULSION INTRAVENOUS at 15:40

## 2019-10-30 RX ADMIN — FENTANYL CITRATE 25 MCG: 50 INJECTION, SOLUTION INTRAMUSCULAR; INTRAVENOUS at 15:51

## 2019-10-30 RX ADMIN — FENTANYL CITRATE 25 MCG: 50 INJECTION, SOLUTION INTRAMUSCULAR; INTRAVENOUS at 16:07

## 2019-10-30 RX ADMIN — ONDANSETRON 4 MG: 2 INJECTION INTRAMUSCULAR; INTRAVENOUS at 15:45

## 2019-10-30 RX ADMIN — DEXMEDETOMIDINE HYDROCHLORIDE 8 MCG: 100 INJECTION, SOLUTION INTRAVENOUS at 16:23

## 2019-10-30 RX ADMIN — MIDAZOLAM 2 MG: 1 INJECTION INTRAMUSCULAR; INTRAVENOUS at 15:39

## 2019-10-30 RX ADMIN — PHENYLEPHRINE HYDROCHLORIDE 100 MCG: 10 INJECTION INTRAVENOUS at 15:49

## 2019-10-30 RX ADMIN — DEXAMETHASONE SODIUM PHOSPHATE 4 MG: 4 INJECTION, SOLUTION INTRA-ARTICULAR; INTRALESIONAL; INTRAMUSCULAR; INTRAVENOUS; SOFT TISSUE at 15:43

## 2019-10-30 RX ADMIN — FENTANYL CITRATE 50 MCG: 0.05 INJECTION, SOLUTION INTRAMUSCULAR; INTRAVENOUS at 17:58

## 2019-10-30 RX ADMIN — FENTANYL CITRATE 25 MCG: 50 INJECTION, SOLUTION INTRAMUSCULAR; INTRAVENOUS at 15:40

## 2019-10-30 RX ADMIN — FENTANYL CITRATE 25 MCG: 50 INJECTION, SOLUTION INTRAMUSCULAR; INTRAVENOUS at 15:59

## 2019-10-30 RX ADMIN — CEFAZOLIN SODIUM 2 G: 2 INJECTION, SOLUTION INTRAVENOUS at 15:43

## 2019-10-30 RX ADMIN — SODIUM CHLORIDE, POTASSIUM CHLORIDE, SODIUM LACTATE AND CALCIUM CHLORIDE: 600; 310; 30; 20 INJECTION, SOLUTION INTRAVENOUS at 16:22

## 2019-10-30 RX ADMIN — LIDOCAINE HYDROCHLORIDE 100 MG: 20 INJECTION, SOLUTION INFILTRATION; PERINEURAL at 15:40

## 2019-10-30 ASSESSMENT — MIFFLIN-ST. JEOR: SCORE: 1264.48

## 2019-10-30 NOTE — ANESTHESIA POSTPROCEDURE EVALUATION
Patient: Laney Maynard    Procedure(s):  CYSTOSCOPY,LEFT URETEROSCOPY, HOLMIUM LASER, STONE EXTRACTION, LEFT STENT PLACEMENT    Diagnosis:Kidney stone on left side [N20.0]  Diagnosis Additional Information: No value filed.    Anesthesia Type:  General, LMA    Note:  Anesthesia Post Evaluation    Patient location during evaluation: PACU  Patient participation: Able to fully participate in evaluation  Level of consciousness: awake and alert  Pain management: adequate  Airway patency: patent  Cardiovascular status: acceptable, hemodynamically stable and blood pressure returned to baseline  Respiratory status: acceptable  Hydration status: acceptable  PONV: none     Anesthetic complications: None          Last vitals:  Vitals:    10/30/19 1324 10/30/19 1710 10/30/19 1715   BP: 138/77 139/82 136/84   Pulse: 93 94 100   Resp:  14 14   Temp: 36.1  C (97  F)  36.8  C (98.2  F)   SpO2:  99% 98%         Electronically Signed By: Mary Gutierrez MD  October 30, 2019  5:23 PM

## 2019-10-30 NOTE — ANESTHESIA PREPROCEDURE EVALUATION
Anesthesia Pre-Procedure Evaluation    Patient: Laney Maynard   MRN: [id number] : 1965          Preoperative Diagnosis: Kidney stone on left side [N20.0]    Procedure(s):  CYSTOSCOPY,LEFT URETEROSCOPY, HOLMIUM LASER, STONE EXTRACTION    Past Medical History:   Diagnosis Date     Abnormal glandular Papanicolaou smear of cervix      Allergic rhinitis, cause unspecified      Alopecia      Attention deficit hyperactivity disorder (ADHD)      Chronic pain of left knee      Complete rupture of rotator cuff      Constipation      Gastro-oesophageal reflux disease      Heart murmur     murmur     Hematuria      Herpes simplex virus infection      Hydronephrosis, right      Lymphocytopenia 2011     Migraine headache      Migraine, unspecified, without mention of intractable migraine without mention of status migrainosus      Mixed incontinence urge and stress      Mumps      Numbness and tingling     LEFT ARM     Palpitations      Plantar fasciitis, bilateral      Renal disease     hx stones x 2 episodes     Seasonal allergic rhinitis      Sleep disorder      Ureterolithiasis      Urinary frequency     burning     Vertigo      Past Surgical History:   Procedure Laterality Date     ARTHROSCOPY KNEE Right 2017    Procedure: Right knee arthroscopy and anterior fat pad resection with medial plica resection. Partial lateral menisectomy. Surgeon:  Fredi Lindsay MD  Location: [facility]     ARTHROSCOPY SHOULDER, OPEN ROTATOR CUFF REPAIR, COMBINED  2013    Procedure: COMBINED ARTHROSCOPY SHOULDER, OPEN ROTATOR CUFF REPAIR;  Left Shoulder Arthroscopy, Distal Clavicale Resection, Decompression, Mini Open Rotator Cuff Repair    ;  Surgeon: Fredi Lindsay MD;  Location: [facility] OR     C NONSPECIFIC PROCEDURE  age 16    colposcopy for abnormal pap     C NONSPECIFIC PROCEDURE      surgery L thumb     C NONSPECIFIC PROCEDURE      breast augmentation-silicone     C NONSPECIFIC PROCEDURE       s/p  x 2     C NONSPECIFIC PROCEDURE      Bilateral tubal ligation     C REMOVAL OF KIDNEY STONE       COLONOSCOPY  2014    Procedure: COLONOSCOPY;  Colonoscopy/WW;  Surgeon: Pancho Olsen MD;  Location: [facility]     COMBINED CYSTOSCOPY, RETROGRADES, URETEROSCOPY, LASER HOLMIUM LITHOTRIPSY URETER(S), INSERT STENT Right 2016    Procedure: COMBINED CYSTOSCOPY, RETROGRADES, URETEROSCOPY, LASER HOLMIUM LITHOTRIPSY URETER(S), INSERT STENT;  Surgeon: Kushal Noriega MD;  Location: [facility] OR     CYSTOSCOPY       CYSTOSCOPY, RETROGRADES, EXTRACT STONE, COMBINED  2013    Procedure: COMBINED CYSTOSCOPY, RETROGRADES, EXTRACT STONE;  Video Cystoscopy,  Right Ureteroscopy, ureteral dilatation,  Stone extraction;  Surgeon: Subhash Vann MD;  Location: [facility] OR     EXTRACORPOREAL SHOCK WAVE LITHOTRIPSY (ESWL) Bilateral 2016    Procedure: EXTRACORPOREAL SHOCK WAVE LITHOTRIPSY (ESWL);  Surgeon: Bassam Vu MD;  Location: [facility]     EXTRACORPOREAL SHOCK WAVE LITHOTRIPSY, CYSTOSCOPY, INSERT STENT URETER(S), COMBINED Bilateral 2018    Procedure: COMBINED EXTRACORPOREAL SHOCK WAVE LITHOTRIPSY, CYSTOSCOPY, INSERT STENT URETER(S);  BILATERAL COMBINED EXTRACORPOREAL SHOCK WAVE LITHOTRIPSY, CYSTOSCOPY, LEFT STENT PLACEMENT;  Surgeon: Bassam Vu MD;  Location:  OR     GYN SURGERY      laparoscopy     LAPAROSCOPIC CHOLECYSTECTOMY WITH CHOLANGIOGRAMS  2012    Procedure: LAPAROSCOPIC CHOLECYSTECTOMY WITH CHOLANGIOGRAMS;  LAPAROSCOPIC CHOLECYSTECTOMY WITH CHOLANGIOGRAMS ;  Surgeon: Nataliya Gabriel MD;  Location: [facility] OR     TUBAL LIGATION         Anesthesia Evaluation     . Pt has had prior anesthetic. Type: General    No history of anesthetic complications          ROS/MED HX    ENT/Pulmonary:  - neg pulmonary ROS   (+)allergic rhinitis, , . .   (-) sleep apnea   Neurologic: Comment: vertigo    (+)migraines, other neuro vertigo    Cardiovascular:        (-) hypertension and  "CAD   METS/Exercise Tolerance:     Hematologic:  - neg hematologic  ROS       Musculoskeletal:         GI/Hepatic:     (+) GERD Asymptomatic on medication,      (-) liver disease   Renal/Genitourinary:     (+) Nephrolithiasis ,    (-) renal disease   Endo:  - neg endo ROS       Psychiatric: Comment: ADHD    (+) psychiatric history other (comment)      Infectious Disease:         Malignancy:         Other:                          Physical Exam  Normal systems: cardiovascular, pulmonary and dental    Airway   Mallampati: II  TM distance: >3 FB  Neck ROM: full    Dental     Cardiovascular       Pulmonary             Lab Results   Component Value Date    WBC 4.2 05/06/2019    HGB 14.4 05/06/2019    HCT 41.8 05/06/2019     05/06/2019    CRP 5.7 03/29/2011    SED 19 03/29/2011     10/28/2019    POTASSIUM 4.0 10/28/2019    CHLORIDE 110 (H) 10/28/2019    CO2 24 10/28/2019    BUN 10 10/28/2019    CR 0.91 10/28/2019    GLC 93 10/28/2019    DIXON 9.1 10/28/2019    MAG 1.8 06/06/2012    ALBUMIN 3.3 (L) 05/06/2019    PROTTOTAL 7.8 05/06/2019    ALT 31 05/06/2019    AST 27 05/06/2019    ALKPHOS 142 05/06/2019    BILITOTAL 0.2 05/06/2019    LIPASE 81 11/18/2012    INR 1.06 12/14/2016    TSH 0.92 05/06/2019    T4 1.16 06/27/2018    HCG Negative 06/13/2016    HCGS Negative 04/22/2016       Preop Vitals  BP Readings from Last 3 Encounters:   10/30/19 138/77   10/28/19 132/80   10/24/19 124/66    Pulse Readings from Last 3 Encounters:   10/30/19 93   10/28/19 79   10/24/19 84      Resp Readings from Last 3 Encounters:   08/29/19 14   07/10/19 16   05/06/19 12    SpO2 Readings from Last 3 Encounters:   10/28/19 99%   08/29/19 100%   07/10/19 96%      Temp Readings from Last 1 Encounters:   10/30/19 36.1  C (97  F) (Temporal)    Ht Readings from Last 1 Encounters:   10/30/19 1.626 m (5' 4\")      Wt Readings from Last 1 Encounters:   10/30/19 68.4 kg (150 lb 14.4 oz)    Estimated body mass index is 25.9 kg/m  as calculated " "from the following:    Height as of this encounter: 1.626 m (5' 4\").    Weight as of this encounter: 68.4 kg (150 lb 14.4 oz).       Anesthesia Plan      History & Physical Review  History and physical reviewed and following examination; no interval change.    ASA Status:  2 .    NPO Status:  > 8 hours    Plan for General and LMA with Intravenous and Propofol induction. Maintenance will be Balanced.    PONV prophylaxis:  Ondansetron (or other 5HT-3) and Dexamethasone or Solumedrol       Postoperative Care  Postoperative pain management:  Multi-modal analgesia.      Consents  Anesthetic plan, risks, benefits and alternatives discussed with:  Patient..                 Mason Ferrara MD  "

## 2019-10-30 NOTE — OR NURSING
Pt discharged with medications, supplies for straining urine. Pt and  voiced understanding of discharge instructions. All belongings taken with pt. Escorted to car with wheelchair.

## 2019-10-30 NOTE — DISCHARGE INSTRUCTIONS
Same Day Surgery Discharge Instructions for  Sedation and General Anesthesia       It's not unusual to feel dizzy, light-headed or faint for up to 24 hours after surgery or while taking pain medication.  If you have these symptoms: sit for a few minutes before standing and have someone assist you when you get up to walk or use the bathroom.      You should rest and relax for the next 24 hours. We recommend you make arrangements to have an adult stay with you for at least 24 hours after your discharge.  Avoid hazardous and strenuous activity.      DO NOT DRIVE any vehicle or operate mechanical equipment for 24 hours following the end of your surgery.  Even though you may feel normal, your reactions may be affected by the medication you have received.      Do not drink alcoholic beverages for 24 hours following surgery.       Slowly progress to your regular diet as you feel able. It's not unusual to feel nauseated and/or vomit after receiving anesthesia.  If you develop these symptoms, drink clear liquids (apple juice, ginger ale, broth, 7-up, etc. ) until you feel better.  If your nausea and vomiting persists for 24 hours, please notify your surgeon.        All narcotic pain medications, along with inactivity and anesthesia, can cause constipation. Drinking plenty of liquids and increasing fiber intake will help.      For any questions of a medical nature, call your surgeon.      Do not make important decisions for 24 hours.      If you had general anesthesia, you may have a sore throat for a couple of days related to the breathing tube used during surgery.  You may use Cepacol lozenges to help with this discomfort.  If it worsens or if you develop a fever, contact your surgeon.       If you feel your pain is not well managed with the pain medications prescribed by your surgeon, please contact your surgeon's office to let them know so they can address your concerns.             Cystoscopy and  Holmium Laser Discharge  Instructions    Holmium laser lithotripsy was used to break up your kidney stone(s). It is normal to have visible blood in the urine, burning, urgency and frequency following this procedure. These symptoms may last for a few days to weeks.     Diet:    To help pass stone fragments and clear the blood out of the urine, it is important to drink 6-8 glasses of fluids per day at home - at least 3-4 glasses should be water.       Return to the diet that you were on before the procedure, unless you are given specific diet instructions.    Activity:    Walk short distances and increase as your strength allows.    You may climb stairs.    Do not do strenuous exercise or heavy lifting until approved by surgeon.    Do not drive while taking narcotic pain medications.    Bathing:    You may take a shower.           Call your physician if these signs/symptoms are present:    Pain that is not relieved by a short rest or ordered pain medications.    Temperature at or above 101.0 F or chills.    Inability or difficulty urinating.     Excessive blood in urine.    Any questions or concerns.          STENT INFORMATION SHEET      During surgery, a stent may be place in the ureter.  The ureter is the tube that drains urine from the kidney to the bladder.  The stent is placed to dilate (open) the ureter so the stone fragments can pass easily through the ureter or to decrease ureteral swelling after surgery, or to relieve an obstruction.    The stent is made of rubber.  The upper end of the stent curls in the kidney while the lower end rests in the bladder.    While the stent is in place you may experience the following symptoms:    Blood and/or small blood clots in urine.    Bladder spasm (frequency and urgency of urination).    Discomfort or aching in the back or side where the stent is.    Burning or discomfort at the end of urine stream.    To decrease these symptoms you should:    Take pain medication as prescribed.    Drink plenty  of fluids.    If you experience pain at the end of urination try not emptying your bladder completely.    If having discomfort in back or side, decrease activity.    Please call your physician or the physician on call if you experience:    Fever greater than 101 .    Severe pain not relieved by pain medication or rest.      Please make an appointment for removal of the stent according to your physician s instructions.

## 2019-10-30 NOTE — ANESTHESIA CARE TRANSFER NOTE
Patient: Laney Maynard    Procedure(s):  CYSTOSCOPY,LEFT URETEROSCOPY, HOLMIUM LASER, STONE EXTRACTION, LEFT STENT PLACEMENT    Diagnosis: Kidney stone on left side [N20.0]  Diagnosis Additional Information: No value filed.    Anesthesia Type:   General, LMA     Note:  Airway :Face Mask  Patient transferred to:PACU  Comments: Level of Conscious:Asleep, responds to stimuli  Vital Signs   BP:139/82   HR:100   RR:12   O2 Saturation:99   Oxygen LPM:8   Temp:96.9  Dentition:Unchanged from preop  Patient Status:Stable  Report to PACU RN.Handoff Report: Identifed the Patient, Identified the Reponsible Provider, Reviewed the pertinent medical history, Discussed the surgical course, Reviewed Intra-OP anesthesia mangement and issues during anesthesia, Set expectations for post-procedure period and Allowed opportunity for questions and acknowledgement of understanding      Vitals: (Last set prior to Anesthesia Care Transfer)    CRNA VITALS  10/30/2019 1637 - 10/30/2019 1714      10/30/2019             Pulse:  101    SpO2:  100 %    Resp Rate (set):  10                Electronically Signed By: Christine Marie Volp Hodgkins, CRNA, APRN CRNA  October 30, 2019  5:14 PM

## 2019-10-31 DIAGNOSIS — N20.0 CALCULUS OF KIDNEY: Primary | ICD-10-CM

## 2019-10-31 NOTE — OP NOTE
Procedure Date: 10/30/2019      PREOPERATIVE DIAGNOSIS:  Multiple renal caliceal stones.      POSTOPERATIVE DIAGNOSIS:  Multiple renal caliceal stones.      PROCEDURE:  Cystoscopy, left retrograde pyelogram, dilatation of left ureter, left flexible ureteroscopy, holmium laser lithotripsy of renal stones and stent insertion with fluoroscopic interpretation of images.      SURGEON::  Bassam Vu MD.      ANESTHESIA:  General anesthesia.      INDICATIONS:  This is a challenging situation in a very pleasant 54-year-old lady with a history of urinary stone disease.  She has had extracorporeal shock wave treatment to both in 2016 in 2018 to renal stones and has been able to pass a lot of fragments in the past.  She has recently seen blood in the urine and had a urinary tract infection which has recently been treated.  She has had some vague discomfort also in the left side.  A CT had been performed, which did show multiple bilateral intrarenal nonobstructing stones up to 4 mm in diameter in the inferior pole of the left kidney, and a number of small stones in the upper pole reid.  There was also stones seen in the right kidney, but were much smaller volume of stones seen.  We had careful discussions about how we should manage these, either just continue observation or whether we should try to treat the stones and a previous shock wave treatment had been quite troubling for her, so we decided to perform flexible ureteroscopy to see if we could fragment these stones smaller.  They are independent calices and it may still be necessary for her to pass the fragments, but the fragmentation may make this more easy for the  procedure.      DESCRIPTION OF PROCEDURE:  The patient was brought to the operative suite and after induction of anesthesia, was placed in the dorsal lithotomy position with the genitalia prepped in sterile fashion.      A timeout was then called.      A 24-Mozambican cystoscope was then passed into the  urethra.  The urethra was normal.  The interior of the bladder was carefully inspected.  No neoplasm or stone was seen in the bladder.  The left ureteric orifice was identified.  Using a 6-Romanian open-ended ureteral catheter, I then passed an 0.035 Sensor wire into the left ureter and all the way up into the upper pole reid.  It was coiled.      I then withdrew the open-ended catheter and the cystoscope and then passed the double-lumen catheter over the wire up to the upper ureter and then passed the second wire also into the upper pole reid and also an 0.035 Sensor wire.  I then withdrew the double-lumen catheter and secured one of the wires as a safety wire and over the working wire, I then passed an 11/13 Romanian ureteral access sheath with the dilator in place, which was 36 cm long over the working wire.  I was able to advance this into approximately the early the upper part of the ureter and then I could not advance it further at that point, so I did withdraw the sheath about 5 cm, removed the dilator and then performed a retrograde pyelogram through the dilator after withdrawing the wire that was within it, and this showed some moderate narrowing in the upper part of the ureter and a delicate calices in the kidney.  I reinserted the wire through the dilator until once again it was coiled in the upper reid.  At this time,  I used the Super Stiff wire withdrew the dilator and passed and then a 4 cm 6 mm ureteral dilating balloon until it straddled first of all the ureteropelvic junction where it was dilated to 10 atmospheres, withdrawn gently and then dilated to a point right up to where the sheath was located.  I then was able to deflate the balloon, withdrew it.  I passed the ureteral access sheath dilator through the sheath once again over this wire and then attempted once again to advance it into the kidney, but it still would not go.  So at this point, I decided to withdraw the working wire and the  ureteral access dilator, leaving the sheath in place and passed the high definition flexible ureteroscope through the sheath up the ureter into the renal pelvis.  There was some reaction from the attempts at dilatation was some blood in the kidney, but I was able wash this out away gently by irrigation, I then inspected each of the calices in turn.  The upper pole reid was free of any stones as far as I could see.  The middle reid had some small stones in, some of which were freely mobile and washed out of the reid.  Then using the 200 micron holmium laser fiber, I was able to fragment some very small stones a little further at a setting of 0.2 joules at 50 per second.  I then withdrew the flexible ureteroscope to examine the lower pole calices.  The most anterior of these was empty and clear.  F the second one on the posterior reid; however, did have significant stone fragments in it.  These I was able to access with some difficulty.  Using the 200 micron holmium laser fiber, I was able to break them up to some degree and inserted the small ones were broken easily and some stones also that was situated sitting on top of the renal papilla were broken up at the same setting and then subsequently at a setting of 1 joule at 10 per second.  I did irrigate out the reid and a number of fragments were then flushed out.  It was difficult to tell how well they were all broken given the reaction in the reid and the use of the laser.  At this point; therefore, I withdrew the flexible ureteroscope gently down the ureter observing the entire length of the ureter as I drew it out.  No further stones were identified.  The ureteroscope and the ureteral access sheath were then withdrawn completely.  Finally, over the safety wire, I then passed a 7-Grenadian 26 cm double-J stent until one end was coiled in the upper reid, the other was released in the bladder.  The bladder was then drained with the cystoscope.  An 18-Grenadian Crowe  catheter was placed, a 30 mg suppository was placed in the rectum.  I, inflated the balloon to 10 mL.  I did gently wash out the bladder.  There were a few small clots there, and then there was clear irrigation and the patient was then taken to the recovery room, having tolerated the procedure well.      CONCLUSION:  The patient will go home this evening with the stent in place.  She may require medication for pain and I would recommend we use ketorolac in view of her reactions to anything containing CODEINE.  She will take Cipro for 24 hours.  She will continue with her own regular medications.  We will see her back in the office for removal of the stent and a KUB in about 1 week's time.         NAVEEN TRENT MD             D: 10/30/2019   T: 10/31/2019   MT: MURIEL      Name:     JESSY MONDRAGON   MRN:      [id number]        Account:        [id number]   :      1965           Procedure Date: 10/30/2019      Document: [id number]

## 2019-11-06 ENCOUNTER — OFFICE VISIT (OUTPATIENT)
Dept: URGENT CARE | Facility: URGENT CARE | Age: 54
End: 2019-11-06
Payer: COMMERCIAL

## 2019-11-06 VITALS
TEMPERATURE: 98.4 F | DIASTOLIC BLOOD PRESSURE: 77 MMHG | BODY MASS INDEX: 25.75 KG/M2 | WEIGHT: 150 LBS | OXYGEN SATURATION: 97 % | SYSTOLIC BLOOD PRESSURE: 134 MMHG | HEART RATE: 113 BPM

## 2019-11-06 DIAGNOSIS — R30.0 DYSURIA: ICD-10-CM

## 2019-11-06 DIAGNOSIS — N30.01 ACUTE CYSTITIS WITH HEMATURIA: Primary | ICD-10-CM

## 2019-11-06 DIAGNOSIS — R82.90 NONSPECIFIC FINDING ON EXAMINATION OF URINE: ICD-10-CM

## 2019-11-06 LAB
ALBUMIN UR-MCNC: 100 MG/DL
APPEARANCE UR: CLEAR
BACTERIA #/AREA URNS HPF: ABNORMAL /HPF
BILIRUB UR QL STRIP: NEGATIVE
COLOR UR AUTO: YELLOW
GLUCOSE UR STRIP-MCNC: NEGATIVE MG/DL
HGB UR QL STRIP: ABNORMAL
KETONES UR STRIP-MCNC: NEGATIVE MG/DL
LEUKOCYTE ESTERASE UR QL STRIP: ABNORMAL
NITRATE UR QL: NEGATIVE
NON-SQ EPI CELLS #/AREA URNS LPF: ABNORMAL /LPF
PH UR STRIP: 7 PH (ref 5–7)
RBC #/AREA URNS AUTO: ABNORMAL /HPF
SOURCE: ABNORMAL
SP GR UR STRIP: 1.02 (ref 1–1.03)
UROBILINOGEN UR STRIP-ACNC: 0.2 EU/DL (ref 0.2–1)
WBC #/AREA URNS AUTO: ABNORMAL /HPF

## 2019-11-06 PROCEDURE — 99213 OFFICE O/P EST LOW 20 MIN: CPT | Performed by: PHYSICIAN ASSISTANT

## 2019-11-06 PROCEDURE — 81001 URINALYSIS AUTO W/SCOPE: CPT | Performed by: FAMILY MEDICINE

## 2019-11-06 PROCEDURE — 87086 URINE CULTURE/COLONY COUNT: CPT | Performed by: PHYSICIAN ASSISTANT

## 2019-11-06 NOTE — PATIENT INSTRUCTIONS
"Follow up if worsening, or not improved in 3 days       Patient Education     Bladder Infection, Female (Adult)    Urine is normally doesn't have any bacteria in it. But bacteria can get into the urinary tract from the skin around the rectum. Or they can travel in the blood from elsewhere in the body. Once they are in your urinary tract, they can cause infection in the urethra (urethritis), the bladder (cystitis), or the kidneys (pyelonephritis).  The most common place for an infection is in the bladder. This is called a bladder infection. This is one of the most common infections in women. Most bladder infections are easily treated. They are not serious unless the infection spreads to the kidney.  The phrases \"bladder infection,\" \"UTI,\" and \"cystitis\" are often used to describe the same thing. But they are not always the same. Cystitis is an inflammation of the bladder. The most common cause of cystitis is an infection.  Symptoms  The infection causes inflammation in the urethra and bladder. This causes many of the symptoms. The most common symptoms of a bladder infection are:    Pain or burning when urinating    Having to urinate more often than usual    Urgent need to urinate    Only a small amount of urine comes out    Blood in urine    Abdominal discomfort. This is usually in the lower abdomen above the pubic bone.    Cloudy urine    Strong- or bad-smelling urine    Unable to urinate (urinary retention)    Unable to hold urine in (urinary incontinence)    Fever    Loss of appetite    Confusion (in older adults)  Causes  Bladder infections are not contagious. You can't get one from someone else, from a toilet seat, or from sharing a bath.  The most common cause of bladder infections is bacteria from the bowels. The bacteria get onto the skin around the opening of the urethra. From there, they can get into the urine and travel up to the bladder, causing inflammation and infection. This usually happens because " of:    Wiping improperly after urinating. Always wipe from front to back.    Bowel incontinence    Pregnancy    Procedures such as having a catheter inserted    Older age    Not emptying your bladder. This can allow bacteria a chance to grow in your urine.    Dehydration    Constipation    Sex    Use of a diaphragm for birth control   Treatment  Bladder infections are diagnosed by a urine test. They are treated with antibiotics and usually clear up quickly without complications. Treatment helps prevent a more serious kidney infection.  Medicines  Medicines can help in the treatment of a bladder infection:    Take antibiotics until they are used up, even if you feel better. It is important to finish them to make sure the infection has cleared.    You can use acetaminophen or ibuprofen for pain, fever, or discomfort, unless another medicine was prescribed. If you have chronic liver or kidney disease, talk with your healthcare provider before using these medicines. Also talk with your provider if you've ever had a stomach ulcer or gastrointestinal bleeding, or are taking blood-thinner medicines.    If you are given phenazopydridine to reduce burning with urination, it will cause your urine to become a bright orange color. This can stain clothing.  Care and prevention  These self-care steps can help prevent future infections:    Drink plenty of fluids to prevent dehydration and flush out your bladder. Do this unless you must restrict fluids for other health reasons, or your doctor told you not to.    Proper cleaning after going to the bathroom is important. Wipe from front to back after using the toilet to prevent the spread of bacteria.    Urinate more often. Don't try to hold urine in for a long time.    Wear loose-fitting clothes and cotton underwear. Avoid tight-fitting pants.    Improve your diet and prevent constipation. Eat more fresh fruit and vegetables, and fiber, and less junk and fatty foods.    Avoid sex  until your symptoms are gone.    Avoid caffeine, alcohol, and spicy foods. These can irritate your bladder.    Urinate right after intercourse to flush out your bladder.    If you use birth control pills and have frequent bladder infections, discuss it with your doctor.  Follow-up care  Call your healthcare provider if all symptoms are not gone after 3 days of treatment. This is especially important if you have repeat infections.  If a culture was done, you will be told if your treatment needs to be changed. If directed, you can call to find out the results.  If X-rays were done, you will be told if the results will affect your treatment.  Call 911  Call 911 if any of the following occur:    Trouble breathing    Hard to wake up or confusion    Fainting or loss of consciousness    Rapid heart rate  When to seek medical advice  Call your healthcare provider right away if any of these occur:    Fever of 100.4 F (38.0 C) or higher, or as directed by your healthcare provider    Symptoms are not better by the third day of treatment    Back or belly (abdominal) pain that gets worse    Repeated vomiting, or unable to keep medicine down    Weakness or dizziness    Vaginal discharge    Pain, redness, or swelling in the outer vaginal area (labia)  Date Last Reviewed: 10/1/2016    1045-9650 [employer]. [address] [place] [address], [place] [address]. All rights reserved. This information is not intended as a substitute for professional medical care. Always follow your healthcare professional's instructions.

## 2019-11-06 NOTE — PROGRESS NOTES
SUBJECTIVE:   Laney Maynard is a 54 year old female who  presents today for a possible UTI. Symptoms of dysuria, urgency, frequency and burning have been going on for 1week(s).  Hematuria yes recent stone ablation.  sudden onsetand moderate.  There is no history of fever, chills, nausea or vomiting.  No history of vaginal or penile discharge. This patient does have a history of urinary tract infections. Patient denies long duration, rigors, flank pain, temperature > 101 degrees F., Vomiting, significant nausea or diarrhea, taking Coumadin and GFR less than 30 within the last year or vaginal discharge, vaginal odor, vaginal itching and dyspareunia (pain in labia/pelvis)     Past Medical History:   Diagnosis Date     Abnormal glandular Papanicolaou smear of cervix      Allergic rhinitis, cause unspecified      Alopecia      Attention deficit hyperactivity disorder (ADHD)      Chronic pain of left knee      Complete rupture of rotator cuff      Constipation      Gastro-oesophageal reflux disease      Heart murmur     murmur     Hematuria      Herpes simplex virus infection      Hydronephrosis, right      Lymphocytopenia 4/4/2011     Migraine headache      Migraine, unspecified, without mention of intractable migraine without mention of status migrainosus      Mixed incontinence urge and stress      Mumps      Numbness and tingling     LEFT ARM     Palpitations      Plantar fasciitis, bilateral      Renal disease     hx stones x 2 episodes     Seasonal allergic rhinitis      Sleep disorder      Ureterolithiasis      Urinary frequency     burning     Vertigo      Current Outpatient Medications   Medication Sig Dispense Refill     amphetamine-dextroamphetamine (ADDERALL) 20 MG tablet TAKE ONE TABLET BY MOUTH THREE TIMES A DAY 90 tablet 0     fluocinolone acetonide 0.01 % OIL Use on scalp once a week, as needed. 1 Bottle 11     fluticasone (FLONASE) 50 MCG/ACT nasal spray Spray 2 sprays into both nostrils daily  as needed for allergies 16 g 11     hydrOXYzine (ATARAX) 25 MG tablet Take 1 tablet (25 mg) by mouth 3 times daily as needed for itching 15 tablet 0     ketoconazole (NIZORAL) 2 % external shampoo Apply to the affected area and wash off after 5 minutes, as needed. 120 mL 11     ketorolac (TORADOL) 10 MG tablet Take 1 tablet (10 mg) by mouth every 6 hours as needed 10 tablet 0     ketorolac (TORADOL) 10 MG tablet Take 1 tablet (10 mg) by mouth every 6 hours as needed for moderate pain 10 tablet 0     meclizine (ANTIVERT) 25 MG tablet Take 1 tablet (25 mg) by mouth 3 times daily as needed for nausea 30 tablet 3     Menthol, Topical Analgesic, (BIOFREEZE COLORLESS) 4 % GEL Externally apply topically 3 times daily as needed 118 mL 1     naproxen (NAPROSYN) 500 MG tablet Take 1 tablet (500 mg) by mouth 2 times daily as needed for moderate pain       phenazopyridine (PYRIDIUM) 200 MG tablet Take 1 tablet (200 mg) by mouth 3 times daily as needed for irritation 30 tablet 3     potassium citrate (UROCIT-K) 10 MEQ (1080 MG) CR tablet Take 2 tablets (20 mEq) by mouth 3 times daily (with meals)       solifenacin (VESICARE) 5 MG tablet Take 1 tablet (5 mg) by mouth every morning 90 tablet 1     Topiramate (TOPAMAX PO) Take 150 mg by mouth every morning (3 X 50MG = 150MG)       zolpidem (AMBIEN) 5 MG tablet Take 1 tablet (5 mg) by mouth nightly as needed for sleep 30 tablet 2     acyclovir (ZOVIRAX) 5 % external ointment Apply topically 2 times daily as needed (Outbreak)       fexofenadine (ALLEGRA) 180 MG tablet Take 1 tablet (180 mg) by mouth daily as needed for allergies (Patient not taking: Reported on 11/6/2019) 30 tablet 6     valACYclovir (VALTREX) 1000 mg tablet Take 1 tablet (1,000 mg) by mouth daily (Patient not taking: Reported on 11/6/2019) 90 tablet 3     Social History     Tobacco Use     Smoking status: Never Smoker     Smokeless tobacco: Never Used   Substance Use Topics     Alcohol use: No     Alcohol/week: 0.0  standard drinks     Frequency: Never     Drinks per session: 1 or 2     Binge frequency: Never       ROS:   10 point ROS negative except as listed above        OBJECTIVE:  /77   Pulse 113   Temp 98.4  F (36.9  C) (Tympanic)   Wt 68 kg (150 lb)   LMP 02/25/2014   SpO2 97%   BMI 25.75 kg/m    GENERAL APPEARANCE: healthy, alert and no distress  RESP: lungs clear to auscultation - no rales, rhonchi or wheezes  CV: regular rates and rhythm, normal S1 S2, no murmur noted  BACK: No CVA tenderness  SKIN: no suspicious lesions or rashes      Results for orders placed or performed in visit on 11/06/19   UA reflex to Microscopic and Culture     Status: Abnormal   Result Value Ref Range    Color Urine Yellow     Appearance Urine Clear     Glucose Urine Negative NEG^Negative mg/dL    Bilirubin Urine Negative NEG^Negative    Ketones Urine Negative NEG^Negative mg/dL    Specific Gravity Urine 1.020 1.003 - 1.035    Blood Urine Large (A) NEG^Negative    pH Urine 7.0 5.0 - 7.0 pH    Protein Albumin Urine 100 (A) NEG^Negative mg/dL    Urobilinogen Urine 0.2 0.2 - 1.0 EU/dL    Nitrite Urine Negative NEG^Negative    Leukocyte Esterase Urine Large (A) NEG^Negative    Source Midstream Urine    Urine Microscopic     Status: Abnormal   Result Value Ref Range    WBC Urine 25-50 (A) OTO5^0 - 5 /HPF    RBC Urine 25-50 (A) OTO2^O - 2 /HPF    Squamous Epithelial /LPF Urine Few FEW^Few /LPF    Bacteria Urine Few (A) NEG^Negative /HPF   Urine Culture Aerobic Bacterial     Status: None   Result Value Ref Range    Specimen Description Midstream Urine     Culture Micro No growth          ASSESSMENT:   (N30.01) Acute cystitis with hematuria  (primary encounter diagnosis)  Plan: amoxicillin-clavulanate (AUGMENTIN) 875-125 MG         tablet, DISCONTINUED: amoxicillin-clavulanate         (AUGMENTIN) 875-125 MG tablet      (R30.0) Dysuria  Plan: UA reflex to Microscopic and Culture, Urine         Microscopic      (R82.90) Nonspecific finding  "on examination of urine  Plan: Urine Culture Aerobic Bacterial      Red flags and emergent follow up discussed, and understood by patient  Follow up with PCP if symptoms worsen or fail to improve      Patient Instructions   Follow up if worsening, or not improved in 3 days       Patient Education     Bladder Infection, Female (Adult)    Urine is normally doesn't have any bacteria in it. But bacteria can get into the urinary tract from the skin around the rectum. Or they can travel in the blood from elsewhere in the body. Once they are in your urinary tract, they can cause infection in the urethra (urethritis), the bladder (cystitis), or the kidneys (pyelonephritis).  The most common place for an infection is in the bladder. This is called a bladder infection. This is one of the most common infections in women. Most bladder infections are easily treated. They are not serious unless the infection spreads to the kidney.  The phrases \"bladder infection,\" \"UTI,\" and \"cystitis\" are often used to describe the same thing. But they are not always the same. Cystitis is an inflammation of the bladder. The most common cause of cystitis is an infection.  Symptoms  The infection causes inflammation in the urethra and bladder. This causes many of the symptoms. The most common symptoms of a bladder infection are:    Pain or burning when urinating    Having to urinate more often than usual    Urgent need to urinate    Only a small amount of urine comes out    Blood in urine    Abdominal discomfort. This is usually in the lower abdomen above the pubic bone.    Cloudy urine    Strong- or bad-smelling urine    Unable to urinate (urinary retention)    Unable to hold urine in (urinary incontinence)    Fever    Loss of appetite    Confusion (in older adults)  Causes  Bladder infections are not contagious. You can't get one from someone else, from a toilet seat, or from sharing a bath.  The most common cause of bladder infections is " bacteria from the bowels. The bacteria get onto the skin around the opening of the urethra. From there, they can get into the urine and travel up to the bladder, causing inflammation and infection. This usually happens because of:    Wiping improperly after urinating. Always wipe from front to back.    Bowel incontinence    Pregnancy    Procedures such as having a catheter inserted    Older age    Not emptying your bladder. This can allow bacteria a chance to grow in your urine.    Dehydration    Constipation    Sex    Use of a diaphragm for birth control   Treatment  Bladder infections are diagnosed by a urine test. They are treated with antibiotics and usually clear up quickly without complications. Treatment helps prevent a more serious kidney infection.  Medicines  Medicines can help in the treatment of a bladder infection:    Take antibiotics until they are used up, even if you feel better. It is important to finish them to make sure the infection has cleared.    You can use acetaminophen or ibuprofen for pain, fever, or discomfort, unless another medicine was prescribed. If you have chronic liver or kidney disease, talk with your healthcare provider before using these medicines. Also talk with your provider if you've ever had a stomach ulcer or gastrointestinal bleeding, or are taking blood-thinner medicines.    If you are given phenazopydridine to reduce burning with urination, it will cause your urine to become a bright orange color. This can stain clothing.  Care and prevention  These self-care steps can help prevent future infections:    Drink plenty of fluids to prevent dehydration and flush out your bladder. Do this unless you must restrict fluids for other health reasons, or your doctor told you not to.    Proper cleaning after going to the bathroom is important. Wipe from front to back after using the toilet to prevent the spread of bacteria.    Urinate more often. Don't try to hold urine in for a long  time.    Wear loose-fitting clothes and cotton underwear. Avoid tight-fitting pants.    Improve your diet and prevent constipation. Eat more fresh fruit and vegetables, and fiber, and less junk and fatty foods.    Avoid sex until your symptoms are gone.    Avoid caffeine, alcohol, and spicy foods. These can irritate your bladder.    Urinate right after intercourse to flush out your bladder.    If you use birth control pills and have frequent bladder infections, discuss it with your doctor.  Follow-up care  Call your healthcare provider if all symptoms are not gone after 3 days of treatment. This is especially important if you have repeat infections.  If a culture was done, you will be told if your treatment needs to be changed. If directed, you can call to find out the results.  If X-rays were done, you will be told if the results will affect your treatment.  Call 911  Call 911 if any of the following occur:    Trouble breathing    Hard to wake up or confusion    Fainting or loss of consciousness    Rapid heart rate  When to seek medical advice  Call your healthcare provider right away if any of these occur:    Fever of 100.4 F (38.0 C) or higher, or as directed by your healthcare provider    Symptoms are not better by the third day of treatment    Back or belly (abdominal) pain that gets worse    Repeated vomiting, or unable to keep medicine down    Weakness or dizziness    Vaginal discharge    Pain, redness, or swelling in the outer vaginal area (labia)  Date Last Reviewed: 10/1/2016    5071-8492 [employer]. [address] [place] [address], [place] [address]. All rights reserved. This information is not intended as a substitute for professional medical care. Always follow your healthcare professional's instructions.

## 2019-11-07 LAB
BACTERIA SPEC CULT: NO GROWTH
SPECIMEN SOURCE: NORMAL

## 2019-11-11 ENCOUNTER — HOSPITAL ENCOUNTER (OUTPATIENT)
Dept: GENERAL RADIOLOGY | Facility: CLINIC | Age: 54
Discharge: HOME | End: 2019-11-11
Attending: UROLOGY | Admitting: UROLOGY
Payer: COMMERCIAL

## 2019-11-11 ENCOUNTER — OFFICE VISIT (OUTPATIENT)
Dept: UROLOGY | Facility: CLINIC | Age: 54
End: 2019-11-11
Payer: COMMERCIAL

## 2019-11-11 VITALS
HEIGHT: 64 IN | DIASTOLIC BLOOD PRESSURE: 82 MMHG | SYSTOLIC BLOOD PRESSURE: 116 MMHG | WEIGHT: 156 LBS | BODY MASS INDEX: 26.63 KG/M2

## 2019-11-11 DIAGNOSIS — N20.0 CALCULUS OF KIDNEY: ICD-10-CM

## 2019-11-11 DIAGNOSIS — N20.0 KIDNEY STONE: Primary | ICD-10-CM

## 2019-11-11 DIAGNOSIS — R39.15 URGENCY OF URINATION: ICD-10-CM

## 2019-11-11 PROCEDURE — 99213 OFFICE O/P EST LOW 20 MIN: CPT | Mod: 25 | Performed by: UROLOGY

## 2019-11-11 PROCEDURE — 52310 CYSTOSCOPY AND TREATMENT: CPT | Performed by: UROLOGY

## 2019-11-11 PROCEDURE — 74019 RADEX ABDOMEN 2 VIEWS: CPT

## 2019-11-11 RX ORDER — SOLIFENACIN SUCCINATE 10 MG/1
10 TABLET, FILM COATED ORAL DAILY
Qty: 90 TABLET | Refills: 3 | Status: SHIPPED | OUTPATIENT
Start: 2019-11-11 | End: 2019-11-21 | Stop reason: ALTCHOICE

## 2019-11-11 ASSESSMENT — PAIN SCALES - GENERAL: PAINLEVEL: SEVERE PAIN (7)

## 2019-11-11 ASSESSMENT — MIFFLIN-ST. JEOR: SCORE: 1287.61

## 2019-11-11 NOTE — NURSING NOTE
Chief Complaint   Patient presents with     Cystoscopy     Pt here for stent removal     Prior to the start of the procedure and with procedural staff participation, I verbally confirmed the patient s identity using two indicators, relevant allergies, that the procedure was appropriate and matched the consent or emergent situation, and that the correct equipment/implants were available. Immediately prior to starting the procedure I conducted the Time Out with the procedural staff and re-confirmed the patient s name, procedure, and site/side. I have wiped the patient off with the povidone-Iodine solution, draped them,  used Lidocaine hydrochloride jelly, and instilled sterile water into the bladder. (The Joint Commission universal protocol was followed.)  Yes    Sedation (Moderate or Deep): None  Anum Souza CMA

## 2019-11-11 NOTE — PATIENT INSTRUCTIONS
"AFTER YOUR CYSTOSCOPY  ?  ?  You have just completed a cystoscopy, or \"cysto\", which allowed your physician to learn more about your bladder (or to remove a stent placed after surgery). We suggest that you continue to avoid caffeine, fruit juice, and alcohol for the next 24 hours, however, you are encouraged to return to your normal activities.  ?  ?  A few things that are considered normal after your cystoscopy:  ?  * small amount of bleeding (or spotting) that clears within the next 24 hours  ?  * slight burning sensation with urination  ?  * sensation of needing to void (urinate) more frequently  ?  * the feeling of \"air\" in your urine  ?  * mild discomfort that is relieved with Tylenol    * bladder spasms  ?  ?  ?  Please contact our office promptly if you:  ?  * develop a fever above 101 degrees  ?  * are unable to urinate  ?  * develop bright red blood that does not stop  ?  * experience severe pain or swelling  ?  ?  ?  And of course, please contact our office with any concerns or questions [phone]  ?    AFTER YOUR CYSTOSCOPY        You have just completed a cystoscopy, or \"cysto\", which allowed your physician to learn more about your bladder (or to remove a stent placed after surgery). We suggest that you continue to avoid caffeine, fruit juice, and alcohol for the next 24 hours, however, you are encouraged to return to your normal activities.         A few things that are considered normal after your cystoscopy:     * Small amount of bleeding (or spotting) that clears within the next 24 hours     * Slight burning sensation with urination     * Sensation to of needing to avoid more frequently     * The feeling of \"air\" in your urine     * Mild discomfort that is relieved with Tylenol        Please contact our office promptly if you:     * Develop a fever above 101 degrees     * Are unable to urinate     * Develop bright red blood that does not stop     * Severe pain or swelling         Please contact " our office with any concerns or questions @[facility].

## 2019-11-11 NOTE — PROGRESS NOTES
This very pleasant 54-year-old lady returns today for removal of a stent and a KUB.  We recall she has history of recurrent urinary stone disease with a stone analysis predominantly calcium phosphate with a small amount of calcium oxalate, who is being followed by our nephrologist at the present time.  She had been having had ESWL in 2016 and 2018 and had some residual calyceal stones on the left side causing some symptoms.  Recently will perform flexible ureteroscopy and holmium laser lithotripsy of the stones.  A stent was in place.    KUB was performed today.  I have carefully reviewed this film there is evidence of perhaps a few small fragments remaining in the kidney although the appearance is less than prior to the procedure.  I would anticipate these for fragments should pass with time.     Procedure.  Cystoscopy, with left sided stent removal   Surgeon.    [place]  Anesthesia.  Local anesthesia.  Description.  With the patient in the supine position, with the genital area prepped and draped in the customary fashion, with local anesthetic and the urethra, the flexible cystoscope was Inserted.\Urethra  The urethra is unremarkable.  The interior of  the bladder was carefully inspected.  Stent, is seen, grasped, and removed without difficulty.  There were no other remarkable features.    Impression.  The patient has a propensity to form stones.  I would like to see her again in 6 months for follow-up CT without contrast and examination to see if she is clear the residual stone fragments from the kidney.  She will continue to follow-up with her nephrologist regarding methods to try and prevent stones although I did reiterate some of the important issues include increasing hydration, increasing citrate and magnesium in the urine, reducing sodium in the urine and reducing emphasis on red meat in the diet.  The second issue he she raised today was 1 of ongoing problems with urgency which not now responding to her  "current dosage of Vesicare.  I carefully discussed this with her today.  She is also following up with Dr. Wei, but I decided to increase her Vesicare to 10 mg daily today.  I did advise her about potential side effects including dryness of mouth and constipation.  She should let us know if these become an important issue.  She should continue to advise us on her progress regarding this matter.  I carefully discussed the entire situation with the patient in detail today.  I answered all her questions.    Plan.  6 months with a CT scan abdomen without contrast and examination  Follow-up with nephrologist.  Follow-up with Dr. Shawna Wei    Time.  15 minutes spent in addition to the procedure noted to carefully discuss the ongoing plan regarding stone prevention, and in addition to having a discussion regarding other urinary symptoms with particular urgency and frequency and to discuss improving the dosage of current medication, its side effects and other issues related to this as noted above    \"This dictation was performed with voice recognition software and may contain errors,  omissions and inadvertent word substitution.\"    ,  "

## 2019-11-11 NOTE — LETTER
11/11/2019       RE: Laney Maynard  [id number] [address] [address]     Dear Colleague,    Thank you for referring your patient, Laney Maynard, to the [facility]OLOG[facility] [place] at [facility]. Please see a copy of my visit note below.    This very pleasant 54-year-old lady returns today for removal of a stent and a KUB.  We recall she has history of recurrent urinary stone disease with a stone analysis predominantly calcium phosphate with a small amount of calcium oxalate, who is being followed by our nephrologist at the present time.  She had been having had ESWL in 2016 and 2018 and had some residual calyceal stones on the left side causing some symptoms.  Recently will perform flexible ureteroscopy and holmium laser lithotripsy of the stones.  A stent was in place.    KUB was performed today.  I have carefully reviewed this film there is evidence of perhaps a few small fragments remaining in the kidney although the appearance is less than prior to the procedure.  I would anticipate these for fragments should pass with time.     Procedure.  Cystoscopy, with left sided stent removal   Surgeon.    Holman  Anesthesia.  Local anesthesia.  Description.  With the patient in the supine position, with the genital area prepped and draped in the customary fashion, with local anesthetic and the urethra, the flexible cystoscope was Inserted.\Urethra  The urethra is unremarkable.  The interior of  the bladder was carefully inspected.  Stent, is seen, grasped, and removed without difficulty.  There were no other remarkable features.    Impression.  The patient has a propensity to form stones.  I would like to see her again in 6 months for follow-up CT without contrast and examination to see if she is clear the residual stone fragments from the kidney.  She will continue to follow-up with her nephrologist regarding methods to try  "and prevent stones although I did reiterate some of the important issues include increasing hydration, increasing citrate and magnesium in the urine, reducing sodium in the urine and reducing emphasis on red meat in the diet.  The second issue he she raised today was 1 of ongoing problems with urgency which not now responding to her current dosage of Vesicare.  I carefully discussed this with her today.  She is also following up with Dr. Wei, but I decided to increase her Vesicare to 10 mg daily today.  I did advise her about potential side effects including dryness of mouth and constipation.  She should let us know if these become an important issue.  She should continue to advise us on her progress regarding this matter.  I carefully discussed the entire situation with the patient in detail today.  I answered all her questions.    Plan.  6 months with a CT scan abdomen without contrast and examination  Follow-up with nephrologist.  Follow-up with Dr. Shawna Wei    Time.  15 minutes spent in addition to the procedure noted to carefully discuss the ongoing plan regarding stone prevention, and in addition to having a discussion regarding other urinary symptoms with particular urgency and frequency and to discuss improving the dosage of current medication, its side effects and other issues related to this as noted above    \"This dictation was performed with voice recognition software and may contain errors,  omissions and inadvertent word substitution.\"    ,      Again, thank you for allowing me to participate in the care of your patient.      Sincerely,    Bassam Vu MD      "

## 2019-11-20 NOTE — PROGRESS NOTES
"Pre-Visit Planning     Future Appointments   Date Time Provider Department Center   11/21/2019  2:00 PM Haase, Susan Rachele, APRN CNP [facility]   5/18/2020 10:00 AM [facility]   5/18/2020 10:45 AM Bassam Vu MD [facility]     Arrival Time for this Appointment:  2:00 PM     Appointment Notes for this encounter:   Joshua f/u    RN PAL reviewed medications over the phone with patient, refills pended     Questionnaires Reviewed/Assigned  Additional questionnaires assigned        Patient preferred phone number: [phone]    Spoke to patient via phone. Patient does not have additional questions or concerns.        Visit is not preventive.    Health Maintenance Due   Topic Date Due     ADVANCE CARE PLANNING  1965     ZOSTER IMMUNIZATION (1 of 2) 01/04/2014     MAMMO SCREENING  04/18/2018     INFLUENZA VACCINE (1) 09/01/2019     Patient is due for:  mammogram  will assist with scheduling at office visit     ADPharDBL Acquisition  Patient is active on Debt [employer].    Questionnaire Review   no questionnaires needed     Call Summary  \"Thank you for your time today.  If anything comes up before your appointment, please feel free to contact us at [phone].\"    Celia Morris Nurse   [facility]       "

## 2019-11-21 ENCOUNTER — TELEPHONE (OUTPATIENT)
Dept: FAMILY MEDICINE | Facility: CLINIC | Age: 54
End: 2019-11-21

## 2019-11-21 ENCOUNTER — OFFICE VISIT (OUTPATIENT)
Dept: FAMILY MEDICINE | Facility: CLINIC | Age: 54
End: 2019-11-21
Payer: COMMERCIAL

## 2019-11-21 VITALS
HEIGHT: 64 IN | BODY MASS INDEX: 27.66 KG/M2 | DIASTOLIC BLOOD PRESSURE: 81 MMHG | RESPIRATION RATE: 20 BRPM | WEIGHT: 162 LBS | OXYGEN SATURATION: 98 % | HEART RATE: 112 BPM | SYSTOLIC BLOOD PRESSURE: 124 MMHG | TEMPERATURE: 98.7 F

## 2019-11-21 DIAGNOSIS — J30.2 ACUTE SEASONAL ALLERGIC RHINITIS: ICD-10-CM

## 2019-11-21 DIAGNOSIS — G47.9 SLEEP DISORDER: ICD-10-CM

## 2019-11-21 DIAGNOSIS — Z12.31 ENCOUNTER FOR SCREENING MAMMOGRAM FOR BREAST CANCER: ICD-10-CM

## 2019-11-21 DIAGNOSIS — L21.9 SEBORRHEIC DERMATITIS OF SCALP: ICD-10-CM

## 2019-11-21 DIAGNOSIS — N39.46 MIXED INCONTINENCE URGE AND STRESS (MALE)(FEMALE): ICD-10-CM

## 2019-11-21 DIAGNOSIS — Z23 NEED FOR PROPHYLACTIC VACCINATION AND INOCULATION AGAINST INFLUENZA: ICD-10-CM

## 2019-11-21 DIAGNOSIS — N20.1 URETEROLITHIASIS: ICD-10-CM

## 2019-11-21 DIAGNOSIS — F90.2 ATTENTION DEFICIT HYPERACTIVITY DISORDER (ADHD), COMBINED TYPE: Primary | ICD-10-CM

## 2019-11-21 DIAGNOSIS — L29.9 GENERALIZED PRURITUS: ICD-10-CM

## 2019-11-21 PROCEDURE — 90682 RIV4 VACC RECOMBINANT DNA IM: CPT | Performed by: NURSE PRACTITIONER

## 2019-11-21 PROCEDURE — 90471 IMMUNIZATION ADMIN: CPT | Performed by: NURSE PRACTITIONER

## 2019-11-21 PROCEDURE — 99214 OFFICE O/P EST MOD 30 MIN: CPT | Mod: 25 | Performed by: NURSE PRACTITIONER

## 2019-11-21 RX ORDER — ZOLPIDEM TARTRATE 5 MG/1
5 TABLET ORAL
Qty: 30 TABLET | Refills: 2 | Status: SHIPPED | OUTPATIENT
Start: 2019-11-21 | End: 2020-09-14

## 2019-11-21 RX ORDER — KETOCONAZOLE 20 MG/ML
SHAMPOO TOPICAL
Qty: 120 ML | Refills: 11 | Status: SHIPPED | OUTPATIENT
Start: 2019-11-21 | End: 2020-09-14

## 2019-11-21 RX ORDER — DEXTROAMPHETAMINE SACCHARATE, AMPHETAMINE ASPARTATE, DEXTROAMPHETAMINE SULFATE AND AMPHETAMINE SULFATE 5; 5; 5; 5 MG/1; MG/1; MG/1; MG/1
TABLET ORAL
Qty: 90 TABLET | Refills: 0 | Status: SHIPPED | OUTPATIENT
Start: 2019-12-24 | End: 2020-03-06

## 2019-11-21 RX ORDER — DEXTROAMPHETAMINE SACCHARATE, AMPHETAMINE ASPARTATE, DEXTROAMPHETAMINE SULFATE AND AMPHETAMINE SULFATE 5; 5; 5; 5 MG/1; MG/1; MG/1; MG/1
TABLET ORAL
Qty: 90 TABLET | Refills: 0 | Status: SHIPPED | OUTPATIENT
Start: 2019-11-24 | End: 2020-03-06

## 2019-11-21 RX ORDER — SOLIFENACIN SUCCINATE 10 MG/1
10 TABLET, FILM COATED ORAL DAILY
Qty: 90 TABLET | Refills: 3 | Status: SHIPPED | OUTPATIENT
Start: 2019-11-21 | End: 2020-05-13

## 2019-11-21 RX ORDER — KETOROLAC TROMETHAMINE 10 MG/1
10 TABLET, FILM COATED ORAL EVERY 6 HOURS PRN
Qty: 30 TABLET | Refills: 1 | Status: SHIPPED | OUTPATIENT
Start: 2019-11-21 | End: 2020-09-14

## 2019-11-21 RX ORDER — HYDROXYZINE HYDROCHLORIDE 25 MG/1
25 TABLET, FILM COATED ORAL 3 TIMES DAILY PRN
Qty: 30 TABLET | Refills: 1 | Status: SHIPPED | OUTPATIENT
Start: 2019-11-21 | End: 2020-09-14

## 2019-11-21 RX ORDER — FEXOFENADINE HCL 180 MG/1
180 TABLET ORAL DAILY PRN
Qty: 30 TABLET | Refills: 6 | Status: SHIPPED | OUTPATIENT
Start: 2019-11-21 | End: 2020-05-13

## 2019-11-21 RX ORDER — DEXTROAMPHETAMINE SACCHARATE, AMPHETAMINE ASPARTATE, DEXTROAMPHETAMINE SULFATE AND AMPHETAMINE SULFATE 5; 5; 5; 5 MG/1; MG/1; MG/1; MG/1
TABLET ORAL
Qty: 90 TABLET | Refills: 0 | Status: SHIPPED | OUTPATIENT
Start: 2020-01-24 | End: 2020-03-06

## 2019-11-21 ASSESSMENT — MIFFLIN-ST. JEOR: SCORE: 1314.83

## 2019-11-21 NOTE — PROGRESS NOTES
Laney Maynard is a 54 year old female who presents to clinic today for the following health issues:    HPI     ADHD Follow-Up (Adult)  Concerns: none  Changes since last visit: Improving  Taking controlled (daily) medications as prescribed: Yes  Sleep: no problems  Adult ADHD Self-Reporting form given to patient?:  No  Currently in counseling: No    Medication Benefits:   Controlled symptoms: Attention span, Distractability and Finishing tasks  Uncontrolled symptoms:  None    Medication Side Effects:  Reports:  none  Sleep Problems? no  ++++++++++++++++++++++++++++++++++++++++++++++++    Employer Concerns/Feedback: Stable  Coworker Concerns:   Stable  Home/Family Concerns: Stable    Recurrent nephrolithiasis:  Followed by urology and nephrology.  Last visit 11/2019, follow up CT in 6 months.     Patient Active Problem List   Diagnosis     Alopecia     Seasonal allergic rhinitis     GERD (gastroesophageal reflux disease)     CARDIOVASCULAR SCREENING; LDL GOAL LESS THAN 160     Migraine headache     Sleep disorder     Family history of rheumatoid arthritis     Family history of sarcoidosis (mother)     Urinary frequency     Herpes simplex virus infection     Attention deficit hyperactivity disorder (ADHD)     Plantar fasciitis, bilateral     Complete rupture of rotator cuff     Mixed incontinence urge and stress (male)(female)     Ureterolithiasis     Past Surgical History:   Procedure Laterality Date     ARTHROSCOPY KNEE Right 04/26/2017    Procedure: Right knee arthroscopy and anterior fat pad resection with medial plica resection. Partial lateral menisectomy. Surgeon:  Fredi Lindsay MD  Location: [facility]     ARTHROSCOPY SHOULDER, OPEN ROTATOR CUFF REPAIR, COMBINED  7/31/2013    Procedure: COMBINED ARTHROSCOPY SHOULDER, OPEN ROTATOR CUFF REPAIR;  Left Shoulder Arthroscopy, Distal Clavicale Resection, Decompression, Mini Open Rotator Cuff Repair    ;  Surgeon: Fredi Lindsay MD;   Location: [facility] OR     C NONSPECIFIC PROCEDURE  age 16    colposcopy for abnormal pap     C NONSPECIFIC PROCEDURE      surgery L thumb     C NONSPECIFIC PROCEDURE      breast augmentation-silicone     C NONSPECIFIC PROCEDURE      s/p  x 2     C NONSPECIFIC PROCEDURE      Bilateral tubal ligation     C REMOVAL OF KIDNEY STONE       COLONOSCOPY  2014    Procedure: COLONOSCOPY;  Colonoscopy/WW;  Surgeon: Pancho Olsen MD;  Location:  GI     COMBINED CYSTOSCOPY, RETROGRADES, URETEROSCOPY, LASER HOLMIUM LITHOTRIPSY URETER(S), INSERT STENT Right 2016    Procedure: COMBINED CYSTOSCOPY, RETROGRADES, URETEROSCOPY, LASER HOLMIUM LITHOTRIPSY URETER(S), INSERT STENT;  Surgeon: Kushal Noriega MD;  Location: [facility] OR     CYSTOSCOPY       CYSTOSCOPY, RETROGRADES, EXTRACT STONE, COMBINED  2013    Procedure: COMBINED CYSTOSCOPY, RETROGRADES, EXTRACT STONE;  Video Cystoscopy,  Right Ureteroscopy, ureteral dilatation,  Stone extraction;  Surgeon: Subhash Vann MD;  Location:  OR     EXTRACORPOREAL SHOCK WAVE LITHOTRIPSY (ESWL) Bilateral 2016    Procedure: EXTRACORPOREAL SHOCK WAVE LITHOTRIPSY (ESWL);  Surgeon: Bassam Vu MD;  Location:  OR     EXTRACORPOREAL SHOCK WAVE LITHOTRIPSY, CYSTOSCOPY, INSERT STENT URETER(S), COMBINED Bilateral 2018    Procedure: COMBINED EXTRACORPOREAL SHOCK WAVE LITHOTRIPSY, CYSTOSCOPY, INSERT STENT URETER(S);  BILATERAL COMBINED EXTRACORPOREAL SHOCK WAVE LITHOTRIPSY, CYSTOSCOPY, LEFT STENT PLACEMENT;  Surgeon: Bassam Vu MD;  Location:  OR     GYN SURGERY      laparoscopy     LAPAROSCOPIC CHOLECYSTECTOMY WITH CHOLANGIOGRAMS  2012    Procedure: LAPAROSCOPIC CHOLECYSTECTOMY WITH CHOLANGIOGRAMS;  LAPAROSCOPIC CHOLECYSTECTOMY WITH CHOLANGIOGRAMS ;  Surgeon: Nataliya Gabriel MD;  Location: [facility] OR     LASER HOLMIUM LITHOTRIPSY URETER(S), INSERT STENT, COMBINED Left 10/30/2019    Procedure: CYSTOSCOPY,LEFT URETEROSCOPY, HOLMIUM  LASER, STONE EXTRACTION, LEFT STENT PLACEMENT;  Surgeon: Bassam Vu MD;  Location: [facility] OR     TUBAL LIGATION         Social History     Tobacco Use     Smoking status: Never Smoker     Smokeless tobacco: Never Used   Substance Use Topics     Alcohol use: No     Alcohol/week: 0.0 standard drinks     Frequency: Never     Drinks per session: 1 or 2     Binge frequency: Never     Family History   Problem Relation Age of Onset     Diabetes Mother         type 2     Alcohol/Drug Mother         alcohol         Current Outpatient Medications   Medication Sig Dispense Refill     acyclovir (ZOVIRAX) 5 % external ointment Apply topically 2 times daily as needed (Outbreak)       amphetamine-dextroamphetamine (ADDERALL) 20 MG tablet TAKE ONE TABLET BY MOUTH THREE TIMES A DAY 90 tablet 0     fexofenadine (ALLEGRA) 180 MG tablet Take 1 tablet (180 mg) by mouth daily as needed for allergies 30 tablet 6     fluocinolone acetonide 0.01 % OIL Use on scalp once a week, as needed. 1 Bottle 11     fluticasone (FLONASE) 50 MCG/ACT nasal spray Spray 2 sprays into both nostrils daily as needed for allergies 16 g 11     hydrOXYzine (ATARAX) 25 MG tablet Take 1 tablet (25 mg) by mouth 3 times daily as needed for itching 30 tablet 1     ketoconazole (NIZORAL) 2 % external shampoo Apply to the affected area and wash off after 5 minutes, as needed. 120 mL 11     ketorolac (TORADOL) 10 MG tablet Take 1 tablet (10 mg) by mouth every 6 hours as needed for moderate pain 30 tablet 1     meclizine (ANTIVERT) 25 MG tablet Take 1 tablet (25 mg) by mouth 3 times daily as needed for nausea 30 tablet 3     Menthol, Topical Analgesic, (BIOFREEZE COLORLESS) 4 % GEL Externally apply topically 3 times daily as needed 118 mL 1     naproxen (NAPROSYN) 500 MG tablet Take 1 tablet (500 mg) by mouth 2 times daily as needed for moderate pain       phenazopyridine (PYRIDIUM) 200 MG tablet Take 1 tablet (200 mg) by mouth 3 times daily as needed for  "irritation 30 tablet 3     potassium citrate (UROCIT-K) 10 MEQ (1080 MG) CR tablet Take 2 tablets (20 mEq) by mouth 3 times daily (with meals)       solifenacin (VESICARE) 10 MG tablet Take 1 tablet (10 mg) by mouth daily 90 tablet 3     solifenacin (VESICARE) 10 MG tablet Take 1 tablet (10 mg) by mouth daily 90 tablet 3     Topiramate (TOPAMAX PO) Take 150 mg by mouth every morning (3 X 50MG = 150MG)       valACYclovir (VALTREX) 1000 mg tablet Take 1 tablet (1,000 mg) by mouth daily 90 tablet 3     zolpidem (AMBIEN) 5 MG tablet Take 1 tablet (5 mg) by mouth nightly as needed for sleep 30 tablet 2     Allergies   Allergen Reactions     Cefatrizine Itching     Aloe Itching and Rash     Codeine      GI upset     Compazine Nausea and Itching     Darvocet [Acetaminophen] Nausea and Vomiting     Percocet [Oxycodone-Acetaminophen] Nausea and Vomiting     Sulphadimidine [Sulfamethazine] Rash     BP Readings from Last 3 Encounters:   11/21/19 124/81   11/11/19 116/82   11/06/19 134/77    Wt Readings from Last 3 Encounters:   11/21/19 73.5 kg (162 lb)   11/11/19 70.8 kg (156 lb)   11/06/19 68 kg (150 lb)          Reviewed and updated as needed this visit by Provider         Review of Systems   ROS COMP: CONSTITUTIONAL: NEGATIVE for fever, chills, change in weight  ENT/MOUTH: NEGATIVE for ear, mouth and throat problems  RESP: NEGATIVE for significant cough or SOB  CV: NEGATIVE for chest pain, palpitations or peripheral edema  : see HPI  PSYCHIATRIC: NEGATIVE for changes in mood or affect      Objective    /81 (BP Location: Right arm, Patient Position: Chair, Cuff Size: Adult Large)   Pulse 112   Temp 98.7  F (37.1  C) (Oral)   Resp 20   Ht 1.626 m (5' 4\")   Wt 73.5 kg (162 lb)   LMP 02/25/2014   SpO2 98%   BMI 27.81 kg/m    Body mass index is 27.81 kg/m .  Physical Exam   GENERAL: healthy, alert and no distress  HENT: ear canals and TM's normal, nose and mouth without ulcers or lesions  NECK: no adenopathy, no " asymmetry, masses, or scars and thyroid normal to palpation  RESP: lungs clear to auscultation - no rales, rhonchi or wheezes  CV: regular rate and rhythm, normal S1 S2, no S3 or S4, no murmur, click or rub, no peripheral edema and peripheral pulses strong  ABDOMEN: soft, nontender, no hepatosplenomegaly, no masses and bowel sounds normal  PSYCH: mentation appears normal, affect normal/bright            Assessment & Plan   Assessment      Plan  1. Attention deficit hyperactivity disorder (ADHD), combined type:  Symptoms well controlled, refill below for 3 months.      - amphetamine-dextroamphetamine (ADDERALL) 20 MG tablet; TAKE ONE TABLET BY MOUTH THREE TIMES A DAY  Dispense: 90 tablet; Refill: 0  - amphetamine-dextroamphetamine (ADDERALL) 20 MG tablet; TAKE ONE TABLET BY MOUTH THREE TIMES A DAY  Dispense: 90 tablet; Refill: 0  - amphetamine-dextroamphetamine (ADDERALL) 20 MG tablet; TAKE ONE TABLET BY MOUTH THREE TIMES A DAY  Dispense: 90 tablet; Refill: 0    2. Mixed incontinence urge and stress (male)(female):  Will increase to 10 mg dose, brand name only.  - solifenacin (VESICARE) 10 MG tablet; Take 1 tablet (10 mg) by mouth daily  Dispense: 90 tablet; Refill: 3    3. Ureterolithiasis:  To take prn for pain  - ketorolac (TORADOL) 10 MG tablet; Take 1 tablet (10 mg) by mouth every 6 hours as needed for moderate pain  Dispense: 30 tablet; Refill: 1    4. Encounter for screening mammogram for breast cancer  - MA SCREENING DIGITAL BILAT - Future  (s+30); Future    5. Need for prophylactic vaccination and inoculation against influenza  - INFLUENZA QUAD, RECOMBINANT, P-FREE (RIV4) (FLUBLOCK) [25727]  - Vaccine Administration, Initial [35893]    6. Generalized pruritus:  Refill to take prn  - hydrOXYzine (ATARAX) 25 MG tablet; Take 1 tablet (25 mg) by mouth 3 times daily as needed for itching  Dispense: 30 tablet; Refill: 1    7. Seborrheic dermatitis of scalp  - ketoconazole (NIZORAL) 2 % external shampoo; Apply to  "the affected area and wash off after 5 minutes, as needed.  Dispense: 120 mL; Refill: 11    8. Sleep disorder:  Well controlled, refill below  - zolpidem (AMBIEN) 5 MG tablet; Take 1 tablet (5 mg) by mouth nightly as needed for sleep  Dispense: 30 tablet; Refill: 2    9. Acute seasonal allergic rhinitis  - fexofenadine (ALLEGRA) 180 MG tablet; Take 1 tablet (180 mg) by mouth daily as needed for allergies  Dispense: 30 tablet; Refill: 6    BMI:   Estimated body mass index is 27.81 kg/m  as calculated from the following:    Height as of this encounter: 1.626 m (5' 4\").    Weight as of this encounter: 73.5 kg (162 lb).     Follow up in 3 months, phone visit for ADHD check. Follow up in 6 months, clinic visit physical exam.    Susan Haase, APRN [facility]"

## 2019-11-22 NOTE — TELEPHONE ENCOUNTER
Panel Management Review      Patient has the following on her problem list: None      Composite cancer screening  Chart review shows that this patient is due/due soon for the following Mammogram  Summary:    Patient is due/failing the following:   MAMMOGRAM    Action needed:   Patient needs office visit for Mammogram.    Type of outreach:    Sent [employer] message.    Questions for provider review:    None                                                                                                                                    Krystal Hirsch MA         Chart routed to Care Team .

## 2019-12-04 ENCOUNTER — HOSPITAL ENCOUNTER (OUTPATIENT)
Dept: MAMMOGRAPHY | Facility: CLINIC | Age: 54
Discharge: HOME | End: 2019-12-04
Attending: NURSE PRACTITIONER | Admitting: NURSE PRACTITIONER
Payer: COMMERCIAL

## 2019-12-04 DIAGNOSIS — Z12.31 ENCOUNTER FOR SCREENING MAMMOGRAM FOR BREAST CANCER: ICD-10-CM

## 2019-12-04 PROCEDURE — 77067 SCR MAMMO BI INCL CAD: CPT

## 2019-12-13 ENCOUNTER — TELEPHONE (OUTPATIENT)
Dept: FAMILY MEDICINE | Facility: CLINIC | Age: 54
End: 2019-12-13

## 2019-12-13 NOTE — TELEPHONE ENCOUNTER
Called pharmacy and spoke to Mary Anne.  She did get generic.  11/14/19 RX from Bassam Vu did not state brand name.  That is RX picked up 11/18/19.  They do see the one from [place].  Advised next refill will need to speak to pharmacy and advise to fill [facility]'s RX or will be generic again.  States she stopped taking the generic and wants brand name.  Did advise I was not sure if insurance would cover since just got the generic.  Advised she can check with pharmacy.  Transferred to pharmacy.  Samra Jalloh RN

## 2019-12-13 NOTE — TELEPHONE ENCOUNTER
solifenacin (VESICARE) 10 MG tablet 90 tablet 3 11/21/2019  --   Sig - Route: Take 1 tablet (10 mg) by mouth daily - Oral   Sent to pharmacy as: solifenacin (VESICARE) 10 MG tablet   Class: E-Prescribe   Notes to Pharmacy: Please dispense brand name only     Patient states she didn't get a the brand name and wants us to verify with the pharmacy that they have the instructions to dispense brand name.  Call patient back at [phone].  Caroline Eddy

## 2019-12-20 ENCOUNTER — TELEPHONE (OUTPATIENT)
Dept: NEPHROLOGY | Facility: CLINIC | Age: 54
End: 2019-12-20

## 2020-02-02 NOTE — TELEPHONE ENCOUNTER
Central Prior Authorization Team   Phone: [phone]    PA Initiation    Medication: PA on Adderall 20mg (qty per month)  Insurance Company: [employer] - Phone [phone] Fax [phone]  Pharmacy Filling the Rx: [place], [place] - [address]  Filling Pharmacy Phone: [phone]  Filling Pharmacy Fax:    Start Date: 8/20/2019    JESSY MUNOZ Lopez: [id number]   Med Reconciliation

## 2020-03-04 DIAGNOSIS — F90.2 ATTENTION DEFICIT HYPERACTIVITY DISORDER (ADHD), COMBINED TYPE: ICD-10-CM

## 2020-03-04 RX ORDER — DEXTROAMPHETAMINE SACCHARATE, AMPHETAMINE ASPARTATE, DEXTROAMPHETAMINE SULFATE AND AMPHETAMINE SULFATE 5; 5; 5; 5 MG/1; MG/1; MG/1; MG/1
TABLET ORAL
Qty: 90 TABLET | Refills: 0 | OUTPATIENT
Start: 2020-03-04

## 2020-03-04 NOTE — TELEPHONE ENCOUNTER
Adderall  Last OV 11/21/19  Last RF 1/24/20 #90    Due for 3 month ADHD visit 2/21/20.  No upcoming appt.   updated.  No concerns noted.  Not [facility] med.  Please call to schedule telephone visit for 3 month ADHD visit.  Can address refill during telephone visit.  Samra Jalloh RN

## 2020-03-04 NOTE — TELEPHONE ENCOUNTER
Called pt-informed of message, phone visit scheduled for 03/06/2020 with Susan Haase.    Yasmeen Motta/MEME

## 2020-03-06 ENCOUNTER — VIRTUAL VISIT (OUTPATIENT)
Dept: FAMILY MEDICINE | Facility: CLINIC | Age: 55
End: 2020-03-06
Payer: COMMERCIAL

## 2020-03-06 DIAGNOSIS — F90.2 ATTENTION DEFICIT HYPERACTIVITY DISORDER (ADHD), COMBINED TYPE: Primary | ICD-10-CM

## 2020-03-06 DIAGNOSIS — N39.46 MIXED INCONTINENCE URGE AND STRESS (MALE)(FEMALE): ICD-10-CM

## 2020-03-06 PROCEDURE — 99441 ZZC PHYSICIAN TELEPHONE EVALUATION 5-10 MIN: CPT | Performed by: NURSE PRACTITIONER

## 2020-03-06 RX ORDER — SOLIFENACIN SUCCINATE 10 MG/1
10 TABLET, FILM COATED ORAL DAILY
Qty: 90 TABLET | Refills: 1 | Status: SHIPPED | OUTPATIENT
Start: 2020-03-06 | End: 2020-09-14

## 2020-03-06 RX ORDER — DEXTROAMPHETAMINE SACCHARATE, AMPHETAMINE ASPARTATE, DEXTROAMPHETAMINE SULFATE AND AMPHETAMINE SULFATE 5; 5; 5; 5 MG/1; MG/1; MG/1; MG/1
TABLET ORAL
Qty: 90 TABLET | Refills: 0 | Status: SHIPPED | OUTPATIENT
Start: 2020-05-05 | End: 2020-05-13

## 2020-03-06 RX ORDER — DEXTROAMPHETAMINE SACCHARATE, AMPHETAMINE ASPARTATE, DEXTROAMPHETAMINE SULFATE AND AMPHETAMINE SULFATE 5; 5; 5; 5 MG/1; MG/1; MG/1; MG/1
TABLET ORAL
Qty: 90 TABLET | Refills: 0 | Status: SHIPPED | OUTPATIENT
Start: 2020-03-06 | End: 2020-05-13

## 2020-03-06 RX ORDER — DEXTROAMPHETAMINE SACCHARATE, AMPHETAMINE ASPARTATE, DEXTROAMPHETAMINE SULFATE AND AMPHETAMINE SULFATE 5; 5; 5; 5 MG/1; MG/1; MG/1; MG/1
TABLET ORAL
Qty: 90 TABLET | Refills: 0 | Status: SHIPPED | OUTPATIENT
Start: 2020-04-05 | End: 2020-05-13

## 2020-03-06 NOTE — PROGRESS NOTES
"Laney Maynard is a 55 year old female who is being evaluated via a billable telephone visit.      The patient has been notified of following:     \"This telephone visit will be conducted via a call between you and your physician/provider. We have found that certain health care needs can be provided without the need for a physical exam.  This service lets us provide the care you need with a short phone conversation.  If a prescription is necessary we can send it directly to your pharmacy.  If lab work is needed we can place an order for that and you can then stop by our lab to have the test done at a later time.    If during the course of the call the physician/provider feels a telephone visit is not appropriate, you will not be charged for this service.\"     Consent has been obtained for this service by 1 care team member: yes. See the scanned image in the medical record.    Laney Maynard complains of    Chief Complaint   Patient presents with     Telephone       I have reviewed and updated the patient's Past Medical History, Social History, Family History and Medication List.    ALLERGIES  Cefatrizine; Aloe; Codeine; Compazine; Darvocet [acetaminophen]; Percocet [oxycodone-acetaminophen]; and Sulphadimidine [sulfamethazine]    Krystal Hirsch MA     (MA signature)      ADHD Follow-Up (Adult)  Concerns: None  Changes since last visit: Stable  Taking controlled (daily) medications as prescribed: Yes  Sleep: no problems  Adult ADHD Self-Reporting form given to patient?:  No  Currently in counseling: No    Medication Benefits:   Controlled symptoms: Attention span, Distractability and Finishing tasks  Uncontrolled symptoms:  None    Medication Side Effects:  Reports:  none  Sleep Problems? no  ++++++++++++++++++++++++++++++++++++++++++++++++    Employer Concerns/Feedback: None  Coworker Concerns:   None  Home/Family Concerns: None        Assessment/Plan:  Laney was seen today for " telephone.    Diagnoses and all orders for this visit:    Attention deficit hyperactivity disorder (ADHD), combined type: well controlled, refill below  -     amphetamine-dextroamphetamine (ADDERALL) 20 MG tablet; TAKE ONE TABLET BY MOUTH THREE TIMES A DAY  -     amphetamine-dextroamphetamine (ADDERALL) 20 MG tablet; TAKE ONE TABLET BY MOUTH THREE TIMES A DAY  -     amphetamine-dextroamphetamine (ADDERALL) 20 MG tablet; TAKE ONE TABLET BY MOUTH THREE TIMES A DAY    Mixed incontinence urge and stress (male)(female): generic refill  -     solifenacin (VESICARE) 10 MG tablet; Take 1 tablet (10 mg) by mouth daily      I have reviewed the note as documented above.  This accurately captures the substance of my conversation with the patient.  Laney Maynard    Total time of call between patient and provider was 8 minutes   Susan Haase, CNP

## 2020-04-02 ENCOUNTER — OFFICE VISIT (OUTPATIENT)
Dept: URGENT CARE | Facility: URGENT CARE | Age: 55
End: 2020-04-02
Payer: COMMERCIAL

## 2020-04-02 VITALS
OXYGEN SATURATION: 100 % | HEART RATE: 100 BPM | TEMPERATURE: 96.8 F | SYSTOLIC BLOOD PRESSURE: 114 MMHG | DIASTOLIC BLOOD PRESSURE: 84 MMHG | WEIGHT: 162 LBS | BODY MASS INDEX: 27.81 KG/M2

## 2020-04-02 DIAGNOSIS — H92.01 OTALGIA, RIGHT: ICD-10-CM

## 2020-04-02 DIAGNOSIS — R30.0 DYSURIA: ICD-10-CM

## 2020-04-02 DIAGNOSIS — N89.8 VAGINAL DISCHARGE: ICD-10-CM

## 2020-04-02 DIAGNOSIS — N39.0 URINARY TRACT INFECTION WITHOUT HEMATURIA, SITE UNSPECIFIED: Primary | ICD-10-CM

## 2020-04-02 LAB
ALBUMIN UR-MCNC: NEGATIVE MG/DL
APPEARANCE UR: CLEAR
BACTERIA #/AREA URNS HPF: ABNORMAL /HPF
BILIRUB UR QL STRIP: NEGATIVE
COLOR UR AUTO: YELLOW
GLUCOSE UR STRIP-MCNC: NEGATIVE MG/DL
HGB UR QL STRIP: ABNORMAL
KETONES UR STRIP-MCNC: NEGATIVE MG/DL
LEUKOCYTE ESTERASE UR QL STRIP: ABNORMAL
NITRATE UR QL: POSITIVE
NON-SQ EPI CELLS #/AREA URNS LPF: ABNORMAL /LPF
PH UR STRIP: 7 PH (ref 5–7)
RBC #/AREA URNS AUTO: ABNORMAL /HPF
SOURCE: ABNORMAL
SP GR UR STRIP: 1.02 (ref 1–1.03)
SPECIMEN SOURCE: NORMAL
UROBILINOGEN UR STRIP-ACNC: 0.2 EU/DL (ref 0.2–1)
WBC #/AREA URNS AUTO: ABNORMAL /HPF
WET PREP SPEC: NORMAL

## 2020-04-02 PROCEDURE — 81001 URINALYSIS AUTO W/SCOPE: CPT | Performed by: FAMILY MEDICINE

## 2020-04-02 PROCEDURE — 87088 URINE BACTERIA CULTURE: CPT | Performed by: FAMILY MEDICINE

## 2020-04-02 PROCEDURE — 87086 URINE CULTURE/COLONY COUNT: CPT | Performed by: FAMILY MEDICINE

## 2020-04-02 PROCEDURE — 87210 SMEAR WET MOUNT SALINE/INK: CPT | Performed by: FAMILY MEDICINE

## 2020-04-02 PROCEDURE — 99214 OFFICE O/P EST MOD 30 MIN: CPT | Performed by: FAMILY MEDICINE

## 2020-04-02 RX ORDER — NITROFURANTOIN 25; 75 MG/1; MG/1
100 CAPSULE ORAL 2 TIMES DAILY
Qty: 20 CAPSULE | Refills: 0 | Status: SHIPPED | OUTPATIENT
Start: 2020-04-02 | End: 2020-05-13

## 2020-04-02 RX ORDER — FLUCONAZOLE 150 MG/1
150 TABLET ORAL WEEKLY
Qty: 2 TABLET | Refills: 0 | Status: SHIPPED | OUTPATIENT
Start: 2020-04-02 | End: 2020-05-13

## 2020-04-02 NOTE — PROGRESS NOTES
SUBJECTIVE:   Laney Maynard is a 55 year old female who  presents today for a possible UTI and vaginal discharge.    Symptoms present for 1 week, thinks that has yeast infection.  Has more urgency, frequency and dysuria.  Endorsed recurrent UTI.  No concerns for STD.  Patient has had yeast infection, will occur more easily after taking amoxicillin.    Patient also complain of ear pain, right.  This has been present for about 1 1/2 months.  Patient complained of TMJ and wondered if this caused the initial problem.  .    Past Medical History:   Diagnosis Date     Abnormal glandular Papanicolaou smear of cervix      Allergic rhinitis, cause unspecified      Alopecia      Attention deficit hyperactivity disorder (ADHD)      Chronic pain of left knee      Complete rupture of rotator cuff      Constipation      Gastro-oesophageal reflux disease      Heart murmur     murmur     Hematuria      Herpes simplex virus infection      Hydronephrosis, right      Lymphocytopenia 4/4/2011     Migraine headache      Migraine, unspecified, without mention of intractable migraine without mention of status migrainosus      Mixed incontinence urge and stress      Mumps      Numbness and tingling     LEFT ARM     Palpitations      Plantar fasciitis, bilateral      Renal disease     hx stones x 2 episodes     Seasonal allergic rhinitis      Sleep disorder      Ureterolithiasis      Urinary frequency     burning     Vertigo      Current Outpatient Medications   Medication Sig Dispense Refill     acyclovir (ZOVIRAX) 5 % external ointment Apply topically 2 times daily as needed (Outbreak)       amphetamine-dextroamphetamine (ADDERALL) 20 MG tablet TAKE ONE TABLET BY MOUTH THREE TIMES A DAY 90 tablet 0     [START ON 4/5/2020] amphetamine-dextroamphetamine (ADDERALL) 20 MG tablet TAKE ONE TABLET BY MOUTH THREE TIMES A DAY 90 tablet 0     [START ON 5/5/2020] amphetamine-dextroamphetamine (ADDERALL) 20 MG tablet TAKE ONE  TABLET BY MOUTH THREE TIMES A DAY 90 tablet 0     fexofenadine (ALLEGRA) 180 MG tablet Take 1 tablet (180 mg) by mouth daily as needed for allergies 30 tablet 6     fluocinolone acetonide 0.01 % OIL Use on scalp once a week, as needed. 1 Bottle 11     fluticasone (FLONASE) 50 MCG/ACT nasal spray Spray 2 sprays into both nostrils daily as needed for allergies 16 g 11     hydrOXYzine (ATARAX) 25 MG tablet Take 1 tablet (25 mg) by mouth 3 times daily as needed for itching 30 tablet 1     ketoconazole (NIZORAL) 2 % external shampoo Apply to the affected area and wash off after 5 minutes, as needed. 120 mL 11     ketorolac (TORADOL) 10 MG tablet Take 1 tablet (10 mg) by mouth every 6 hours as needed for moderate pain 30 tablet 1     meclizine (ANTIVERT) 25 MG tablet Take 1 tablet (25 mg) by mouth 3 times daily as needed for nausea 30 tablet 3     Menthol, Topical Analgesic, (BIOFREEZE COLORLESS) 4 % GEL Externally apply topically 3 times daily as needed 118 mL 1     naproxen (NAPROSYN) 500 MG tablet Take 1 tablet (500 mg) by mouth 2 times daily as needed for moderate pain       phenazopyridine (PYRIDIUM) 200 MG tablet Take 1 tablet (200 mg) by mouth 3 times daily as needed for irritation 30 tablet 3     potassium citrate (UROCIT-K) 10 MEQ (1080 MG) CR tablet Take 2 tablets (20 mEq) by mouth 3 times daily (with meals)       solifenacin (VESICARE) 10 MG tablet Take 1 tablet (10 mg) by mouth daily 90 tablet 1     solifenacin (VESICARE) 10 MG tablet Take 1 tablet (10 mg) by mouth daily 90 tablet 3     Topiramate (TOPAMAX PO) Take 150 mg by mouth every morning (3 X 50MG = 150MG)       valACYclovir (VALTREX) 1000 mg tablet Take 1 tablet (1,000 mg) by mouth daily 90 tablet 3     zolpidem (AMBIEN) 5 MG tablet Take 1 tablet (5 mg) by mouth nightly as needed for sleep 30 tablet 2     Social History     Tobacco Use     Smoking status: Never Smoker     Smokeless tobacco: Never Used   Substance Use Topics     Alcohol use: No      Alcohol/week: 0.0 standard drinks     Frequency: Never     Drinks per session: 1 or 2     Binge frequency: Never       ROS:   Review of systems negative except as stated above.    OBJECTIVE:  /84   Pulse 100   Temp 96.8  F (36  C) (Tympanic)   Wt 73.5 kg (162 lb)   LMP 02/25/2014   SpO2 100%   BMI 27.81 kg/m    GENERAL APPEARANCE: healthy, alert and no distress  HEENT: normal ear canals and TM bilaterally, mouth moist, no ulcers, pharynx normal with no exudates  PSYCH: mentation appears normal and affect normal/bright    Results for orders placed or performed in visit on 04/02/20   UA reflex to Microscopic and Culture     Status: Abnormal    Specimen: Midstream Urine   Result Value Ref Range    Color Urine Yellow     Appearance Urine Clear     Glucose Urine Negative NEG^Negative mg/dL    Bilirubin Urine Negative NEG^Negative    Ketones Urine Negative NEG^Negative mg/dL    Specific Gravity Urine 1.020 1.003 - 1.035    Blood Urine Moderate (A) NEG^Negative    pH Urine 7.0 5.0 - 7.0 pH    Protein Albumin Urine Negative NEG^Negative mg/dL    Urobilinogen Urine 0.2 0.2 - 1.0 EU/dL    Nitrite Urine Positive (A) NEG^Negative    Leukocyte Esterase Urine Small (A) NEG^Negative    Source Midstream Urine    Urine Microscopic     Status: Abnormal   Result Value Ref Range    WBC Urine 5-10 (A) OTO5^0 - 5 /HPF    RBC Urine 10-25 (A) OTO2^O - 2 /HPF    Squamous Epithelial /LPF Urine Moderate (A) FEW^Few /LPF    Bacteria Urine Many (A) NEG^Negative /HPF   Wet prep     Status: None    Specimen: Vagina   Result Value Ref Range    Specimen Description Vagina     Wet Prep No Trichomonas seen     Wet Prep No clue cells seen     Wet Prep No yeast seen     Wet Prep Few  WBC'S seen          ASSESSMENT/PLAN:   (N39.0) Urinary tract infection without hematuria, site unspecified  (primary encounter diagnosis)  Plan: nitroFURantoin macrocrystal-monohydrate         (MACROBID) 100 MG capsule            (N89.8) Vaginal  discharge  Plan: Wet prep, Urine Microscopic, fluconazole         (DIFLUCAN) 150 MG tablet            (R30.0) Dysuria  Plan: UA reflex to Microscopic and Culture, Urine         Culture Aerobic Bacterial            (H92.01) Otalgia, right  Plan: eustachian tube dysfunction      Empiric treatment for presumptive UTI with RX macrobid for 10 days duration.  Will follow up on urine culture and adjust medication if needed.  Drink plenty of fluids.  Prevention and treatment of UTI's discussed.Signs and symptoms of pyelonephritis mentioned.    RX diflucan given for yeast vaginitis empiric treatment, reviewed that more likely due to antibiotic use, okay to repeat in 1 week.    Reassurance given in regards to ear pain, does not have acute otitis media and discussed that more likely due to eustachian tube dysfunction.  Okay for sudafed to help with discomfort.    Follow up with primary provider if no improvement of symptoms in 2 weeks    Kurt Larson MD, MD  April 2, 2020 9:49 AM

## 2020-04-03 LAB
BACTERIA SPEC CULT: ABNORMAL
SPECIMEN SOURCE: ABNORMAL

## 2020-05-13 ENCOUNTER — VIRTUAL VISIT (OUTPATIENT)
Dept: FAMILY MEDICINE | Facility: CLINIC | Age: 55
End: 2020-05-13
Payer: COMMERCIAL

## 2020-05-13 DIAGNOSIS — R30.0 DYSURIA: ICD-10-CM

## 2020-05-13 DIAGNOSIS — R82.90 ABNORMAL URINALYSIS: Primary | ICD-10-CM

## 2020-05-13 DIAGNOSIS — H53.8 BLURRED VISION: ICD-10-CM

## 2020-05-13 DIAGNOSIS — R82.90 ABNORMAL FINDING ON URINALYSIS: ICD-10-CM

## 2020-05-13 DIAGNOSIS — N39.46 MIXED INCONTINENCE URGE AND STRESS (MALE)(FEMALE): Primary | ICD-10-CM

## 2020-05-13 DIAGNOSIS — J30.2 ACUTE SEASONAL ALLERGIC RHINITIS: ICD-10-CM

## 2020-05-13 DIAGNOSIS — B00.9 HERPES SIMPLEX VIRUS INFECTION: ICD-10-CM

## 2020-05-13 DIAGNOSIS — F90.2 ATTENTION DEFICIT HYPERACTIVITY DISORDER (ADHD), COMBINED TYPE: ICD-10-CM

## 2020-05-13 DIAGNOSIS — L40.9 SCALP PSORIASIS: ICD-10-CM

## 2020-05-13 LAB
ALBUMIN UR-MCNC: NEGATIVE MG/DL
AMORPH CRY #/AREA URNS HPF: ABNORMAL /HPF
APPEARANCE UR: ABNORMAL
BACTERIA #/AREA URNS HPF: ABNORMAL /HPF
BILIRUB UR QL STRIP: NEGATIVE
COLOR UR AUTO: YELLOW
GLUCOSE UR STRIP-MCNC: NEGATIVE MG/DL
HGB UR QL STRIP: ABNORMAL
KETONES UR STRIP-MCNC: NEGATIVE MG/DL
LEUKOCYTE ESTERASE UR QL STRIP: NEGATIVE
NITRATE UR QL: NEGATIVE
NON-SQ EPI CELLS #/AREA URNS LPF: ABNORMAL /LPF
PH UR STRIP: 7.5 PH (ref 5–7)
RBC #/AREA URNS AUTO: ABNORMAL /HPF
SOURCE: ABNORMAL
SP GR UR STRIP: 1.02 (ref 1–1.03)
UROBILINOGEN UR STRIP-ACNC: 0.2 EU/DL (ref 0.2–1)
WBC #/AREA URNS AUTO: ABNORMAL /HPF

## 2020-05-13 PROCEDURE — 87088 URINE BACTERIA CULTURE: CPT | Performed by: NURSE PRACTITIONER

## 2020-05-13 PROCEDURE — 99213 OFFICE O/P EST LOW 20 MIN: CPT | Mod: TEL | Performed by: NURSE PRACTITIONER

## 2020-05-13 PROCEDURE — 81001 URINALYSIS AUTO W/SCOPE: CPT | Performed by: NURSE PRACTITIONER

## 2020-05-13 PROCEDURE — 87086 URINE CULTURE/COLONY COUNT: CPT | Performed by: NURSE PRACTITIONER

## 2020-05-13 PROCEDURE — 87186 SC STD MICRODIL/AGAR DIL: CPT | Performed by: NURSE PRACTITIONER

## 2020-05-13 RX ORDER — VALACYCLOVIR HYDROCHLORIDE 1 G/1
1000 TABLET, FILM COATED ORAL DAILY
Qty: 90 TABLET | Refills: 3 | Status: SHIPPED | OUTPATIENT
Start: 2020-05-13 | End: 2020-09-14

## 2020-05-13 RX ORDER — FLUTICASONE PROPIONATE 50 MCG
2 SPRAY, SUSPENSION (ML) NASAL DAILY PRN
Qty: 16 G | Refills: 11 | Status: SHIPPED | OUTPATIENT
Start: 2020-05-13 | End: 2021-12-20

## 2020-05-13 RX ORDER — FLUOCINOLONE ACETONIDE 0.11 MG/ML
OIL AURICULAR (OTIC)
Qty: 1 BOTTLE | Refills: 11 | Status: SHIPPED | OUTPATIENT
Start: 2020-05-13 | End: 2022-06-06

## 2020-05-13 RX ORDER — SOLIFENACIN SUCCINATE 10 MG/1
10 TABLET, FILM COATED ORAL DAILY
Qty: 90 TABLET | Refills: 3 | Status: SHIPPED | OUTPATIENT
Start: 2020-05-13 | End: 2020-09-14

## 2020-05-13 RX ORDER — DEXTROAMPHETAMINE SACCHARATE, AMPHETAMINE ASPARTATE, DEXTROAMPHETAMINE SULFATE AND AMPHETAMINE SULFATE 5; 5; 5; 5 MG/1; MG/1; MG/1; MG/1
TABLET ORAL
Qty: 90 TABLET | Refills: 0 | Status: SHIPPED | OUTPATIENT
Start: 2020-06-07 | End: 2020-09-14

## 2020-05-13 RX ORDER — DEXTROAMPHETAMINE SACCHARATE, AMPHETAMINE ASPARTATE, DEXTROAMPHETAMINE SULFATE AND AMPHETAMINE SULFATE 5; 5; 5; 5 MG/1; MG/1; MG/1; MG/1
TABLET ORAL
Qty: 90 TABLET | Refills: 0 | Status: SHIPPED | OUTPATIENT
Start: 2020-07-07 | End: 2020-09-14

## 2020-05-13 RX ORDER — FEXOFENADINE HCL 180 MG/1
180 TABLET ORAL DAILY PRN
Qty: 30 TABLET | Refills: 6 | Status: SHIPPED | OUTPATIENT
Start: 2020-05-13 | End: 2020-09-14

## 2020-05-13 NOTE — PROGRESS NOTES
"Laney Maynard is a 55 year old female who is being evaluated via a billable telephone visit.      The patient has been notified of following:     \"This telephone visit will be conducted via a call between you and your physician/provider. We have found that certain health care needs can be provided without the need for a physical exam.  This service lets us provide the care you need with a short phone conversation.  If a prescription is necessary we can send it directly to your pharmacy.  If lab work is needed we can place an order for that and you can then stop by our lab to have the test done at a later time.    Telephone visits are billed at different rates depending on your insurance coverage. During this emergency period, for some insurers they may be billed the same as an in-person visit.  Please reach out to your insurance provider with any questions.    If during the course of the call the physician/provider feels a telephone visit is not appropriate, you will not be charged for this service.\"    Patient has given verbal consent for Telephone visit?  Yes    What phone number would you like to be contacted at? [phone]    How would you like to obtain your AVS? Himahart    Subjective     Laney Maynard is a 55 year old female who presents for a phone visit  today for the following health issues:    HPI  Concern - overactive bladder -  Having leakage  Onset: follow up     Urinary incontinence:  vesicare works but the generic does not and would like to go back to brand name. Starting to have an increase in bladder leakage over the last 2 months, wearing a pad daily.        Blurry vision:  Last vision check was in 10/2019, at that time was having issues with blurry vision, was given a new prescription.  Mainly wears glasses when at work when using the computer.  Over the last 6 weeks has noticed some blurry vision with computer use again, is wondering if it is associated with the vesicare or " if she needs another prescriptions change.       Dysuria;  Last UTI was on 2020, was  Prescribed macrobid.  History of recurrent UTI's and nephrolithiasis.  Continues to have burning at the end of urination. Denies hematuria, flank pain, fever.  Always has urgency and frequency along with  Incontinence.  Has an appt with nephrology next week for CT scan.       Patient Active Problem List   Diagnosis     Alopecia     Seasonal allergic rhinitis     GERD (gastroesophageal reflux disease)     CARDIOVASCULAR SCREENING; LDL GOAL LESS THAN 160     Migraine headache     Sleep disorder     Family history of rheumatoid arthritis     Family history of sarcoidosis (mother)     Urinary frequency     Herpes simplex virus infection     Attention deficit hyperactivity disorder (ADHD)     Plantar fasciitis, bilateral     Complete rupture of rotator cuff     Mixed incontinence urge and stress (male)(female)     Ureterolithiasis     Past Surgical History:   Procedure Laterality Date     ARTHROSCOPY KNEE Right 2017    Procedure: Right knee arthroscopy and anterior fat pad resection with medial plica resection. Partial lateral menisectomy. Surgeon:  Fredi Lindsay MD  [facility]: [facility]     ARTHROSCOPY SHOULDER, OPEN ROTATOR CUFF REPAIR, COMBINED  2013    Procedure: COMBINED ARTHROSCOPY SHOULDER, OPEN ROTATOR CUFF REPAIR;  Left Shoulder Arthroscopy, Distal Clavicale Resection, Decompression, Mini Open Rotator Cuff Repair    ;  Surgeon: Fredi Lindsay MD;  Location:  OR      NONSPECIFIC PROCEDURE  age 16    colposcopy for abnormal pap     C NONSPECIFIC PROCEDURE      surgery L thumb     C NONSPECIFIC PROCEDURE      breast augmentation-silicone     C NONSPECIFIC PROCEDURE      s/p  x 2     C NONSPECIFIC PROCEDURE      Bilateral tubal ligation     C REMOVAL OF KIDNEY STONE       COLONOSCOPY  2014    Procedure: COLONOSCOPY;  Colonoscopy/WW;  Surgeon: Pancho Olsen MD;  Location:  GI      COMBINED CYSTOSCOPY, RETROGRADES, URETEROSCOPY, LASER HOLMIUM LITHOTRIPSY URETER(S), INSERT STENT Right 4/23/2016    Procedure: COMBINED CYSTOSCOPY, RETROGRADES, URETEROSCOPY, LASER HOLMIUM LITHOTRIPSY URETER(S), INSERT STENT;  Surgeon: Kushal Noriega MD;  Location: [facility] OR     CYSTOSCOPY       CYSTOSCOPY, RETROGRADES, EXTRACT STONE, COMBINED  1/9/2013    Procedure: COMBINED CYSTOSCOPY, RETROGRADES, EXTRACT STONE;  Video Cystoscopy,  Right Ureteroscopy, ureteral dilatation,  Stone extraction;  Surgeon: Subhash Vann MD;  Location:  OR     EXTRACORPOREAL SHOCK WAVE LITHOTRIPSY (ESWL) Bilateral 4/29/2016    Procedure: EXTRACORPOREAL SHOCK WAVE LITHOTRIPSY (ESWL);  Surgeon: Bassam Vu MD;  Location: [facility] OR     EXTRACORPOREAL SHOCK WAVE LITHOTRIPSY, CYSTOSCOPY, INSERT STENT URETER(S), COMBINED Bilateral 4/26/2018    Procedure: COMBINED EXTRACORPOREAL SHOCK WAVE LITHOTRIPSY, CYSTOSCOPY, INSERT STENT URETER(S);  BILATERAL COMBINED EXTRACORPOREAL SHOCK WAVE LITHOTRIPSY, CYSTOSCOPY, LEFT STENT PLACEMENT;  Surgeon: Bassam Vu MD;  Location:  OR     GYN SURGERY      laparoscopy     LAPAROSCOPIC CHOLECYSTECTOMY WITH CHOLANGIOGRAMS  11/21/2012    Procedure: LAPAROSCOPIC CHOLECYSTECTOMY WITH CHOLANGIOGRAMS;  LAPAROSCOPIC CHOLECYSTECTOMY WITH CHOLANGIOGRAMS ;  Surgeon: Nataliya Gabriel MD;  Location: [facility] OR     LASER HOLMIUM LITHOTRIPSY URETER(S), INSERT STENT, COMBINED Left 10/30/2019    Procedure: CYSTOSCOPY,LEFT URETEROSCOPY, HOLMIUM LASER, STONE EXTRACTION, LEFT STENT PLACEMENT;  Surgeon: Bassam Vu MD;  Location:  OR     TUBAL LIGATION         Social History     Tobacco Use     Smoking status: Never Smoker     Smokeless tobacco: Never Used   Substance Use Topics     Alcohol use: No     Alcohol/week: 0.0 standard drinks     Frequency: Never     Drinks per session: 1 or 2     Binge frequency: Never     Family History   Problem Relation Age of Onset     Diabetes  Mother         type 2     Alcohol/Drug Mother         alcohol         Current Outpatient Medications   Medication Sig Dispense Refill     acyclovir (ZOVIRAX) 5 % external ointment Apply topically 2 times daily as needed (Outbreak)       amphetamine-dextroamphetamine (ADDERALL) 20 MG tablet TAKE ONE TABLET BY MOUTH THREE TIMES A DAY 90 tablet 0     amphetamine-dextroamphetamine (ADDERALL) 20 MG tablet TAKE ONE TABLET BY MOUTH THREE TIMES A DAY 90 tablet 0     amphetamine-dextroamphetamine (ADDERALL) 20 MG tablet TAKE ONE TABLET BY MOUTH THREE TIMES A DAY 90 tablet 0     fexofenadine (ALLEGRA) 180 MG tablet Take 1 tablet (180 mg) by mouth daily as needed for allergies 30 tablet 6     fluocinolone acetonide 0.01 % OIL Use on scalp once a week, as needed. 1 Bottle 11     fluticasone (FLONASE) 50 MCG/ACT nasal spray Spray 2 sprays into both nostrils daily as needed for allergies 16 g 11     hydrOXYzine (ATARAX) 25 MG tablet Take 1 tablet (25 mg) by mouth 3 times daily as needed for itching 30 tablet 1     ketoconazole (NIZORAL) 2 % external shampoo Apply to the affected area and wash off after 5 minutes, as needed. 120 mL 11     ketorolac (TORADOL) 10 MG tablet Take 1 tablet (10 mg) by mouth every 6 hours as needed for moderate pain 30 tablet 1     meclizine (ANTIVERT) 25 MG tablet Take 1 tablet (25 mg) by mouth 3 times daily as needed for nausea 30 tablet 3     Menthol, Topical Analgesic, (BIOFREEZE COLORLESS) 4 % GEL Externally apply topically 3 times daily as needed 118 mL 1     naproxen (NAPROSYN) 500 MG tablet Take 1 tablet (500 mg) by mouth 2 times daily as needed for moderate pain       phenazopyridine (PYRIDIUM) 200 MG tablet Take 1 tablet (200 mg) by mouth 3 times daily as needed for irritation 30 tablet 3     potassium citrate (UROCIT-K) 10 MEQ (1080 MG) CR tablet Take 2 tablets (20 mEq) by mouth 3 times daily (with meals)       solifenacin (VESICARE) 10 MG tablet Take 1 tablet (10 mg) by mouth daily 90 tablet  1     solifenacin (VESICARE) 10 MG tablet Take 1 tablet (10 mg) by mouth daily 90 tablet 3     Topiramate (TOPAMAX PO) Take 150 mg by mouth every morning (3 X 50MG = 150MG)       valACYclovir (VALTREX) 1000 mg tablet Take 1 tablet (1,000 mg) by mouth daily 90 tablet 3     zolpidem (AMBIEN) 5 MG tablet Take 1 tablet (5 mg) by mouth nightly as needed for sleep 30 tablet 2     BP Readings from Last 3 Encounters:   04/02/20 114/84   11/21/19 124/81   11/11/19 116/82    Wt Readings from Last 3 Encounters:   04/02/20 73.5 kg (162 lb)   11/21/19 73.5 kg (162 lb)   11/11/19 70.8 kg (156 lb)      Reviewed and updated as needed this visit by Provider         Review of Systems   CONSTITUTIONAL: NEGATIVE for fever, chills, change in weight  EYE:  See HPI. Denies eye pain, redness or discharge.  RESP: NEGATIVE for significant cough or SOB  CV: NEGATIVE for chest pain, palpitations or peripheral edema  : see HPI  PSYCHIATRIC: NEGATIVE for changes in mood or affect       Objective   Reported vitals:  [facility] 02/25/2014    PSYCH: Alert and oriented times 3; coherent speech, normal   rate and volume, able to articulate logical thoughts, able   to abstract reason, no tangential thoughts, no hallucinations   or delusions    RESP: No cough, no audible wheezing, able to talk in full sentences  Remainder of exam unable to be completed due to telephone visits        Assessment/Plan:  Laney was seen today for medication problem.    Diagnoses and all orders for this visit:    Mixed incontinence urge and stress (male)(female):  Felt symptoms were better controlled when on the Brand name, will specify Brand name only to be filled.  -     solifenacin (VESICARE) 10 MG tablet; Take 1 tablet (10 mg) by mouth daily Dispense Brand Name only.    Blurred vision:  Occurs when working on the computer, discussed having vision checked, if normal will follow up, could be a side effect of vesicare would suggest stopping the medication.    Dysuria:  Repeat  UA, will come in later today to leave a UA  -     *UA reflex to Microscopic and Culture ([facility] (except [place] and [place]); Future    Acute seasonal allergic rhinitis  -     fluticasone (FLONASE) 50 MCG/ACT nasal spray; Spray 2 sprays into both nostrils daily as needed for allergies  -     fexofenadine (ALLEGRA) 180 MG tablet; Take 1 tablet (180 mg) by mouth daily as needed for allergies    Scalp psoriasis: refill only  -     fluocinolone acetonide 0.01 % OIL; Use on scalp once a week, as needed.    Herpes simplex virus infection: refill only  -     valACYclovir (VALTREX) 1000 mg tablet; Take 1 tablet (1,000 mg) by mouth daily    Attention deficit hyperactivity disorder (ADHD), combined type: refill only, next visit in 7/2020  -     amphetamine-dextroamphetamine (ADDERALL) 20 MG tablet; TAKE ONE TABLET BY MOUTH THREE TIMES A DAY  -     amphetamine-dextroamphetamine (ADDERALL) 20 MG tablet; TAKE ONE TABLET BY MOUTH THREE TIMES A DAY  This visit is being conducted as a virtual visit due to the emphasis on mitigation of the COVID-19 virus pandemic. The clinician has decided that the risk of an in-office visit outweighs the benefit for this patient  Follow up in 7/2020 for physical exam.    Phone call duration:  18  minutes  Susan Haase, CNP

## 2020-05-14 NOTE — RESULT ENCOUNTER NOTE
Pj Davison,  Your urine result shows few white blood cells, it does not look highly suspicious for a urinary tract infection.  A urine culture is in process, if it shows an infection I will let you know.  Take care,  Susan Haase, CNP

## 2020-05-15 LAB
BACTERIA SPEC CULT: ABNORMAL
SPECIMEN SOURCE: ABNORMAL

## 2020-05-18 ENCOUNTER — HOSPITAL ENCOUNTER (OUTPATIENT)
Dept: CT IMAGING | Facility: CLINIC | Age: 55
Discharge: HOME | End: 2020-05-18
Attending: UROLOGY | Admitting: UROLOGY
Payer: COMMERCIAL

## 2020-05-18 ENCOUNTER — OFFICE VISIT (OUTPATIENT)
Dept: UROLOGY | Facility: CLINIC | Age: 55
End: 2020-05-18
Payer: COMMERCIAL

## 2020-05-18 ENCOUNTER — TELEPHONE (OUTPATIENT)
Dept: FAMILY MEDICINE | Facility: CLINIC | Age: 55
End: 2020-05-18

## 2020-05-18 VITALS
WEIGHT: 150 LBS | OXYGEN SATURATION: 97 % | DIASTOLIC BLOOD PRESSURE: 78 MMHG | SYSTOLIC BLOOD PRESSURE: 110 MMHG | BODY MASS INDEX: 25.61 KG/M2 | HEIGHT: 64 IN | HEART RATE: 93 BPM

## 2020-05-18 DIAGNOSIS — N20.0 CALCULUS OF KIDNEY: Primary | ICD-10-CM

## 2020-05-18 DIAGNOSIS — N30.00 ACUTE CYSTITIS WITHOUT HEMATURIA: Primary | ICD-10-CM

## 2020-05-18 DIAGNOSIS — N20.0 KIDNEY STONE: ICD-10-CM

## 2020-05-18 PROCEDURE — 99214 OFFICE O/P EST MOD 30 MIN: CPT | Performed by: UROLOGY

## 2020-05-18 PROCEDURE — 74176 CT ABD & PELVIS W/O CONTRAST: CPT

## 2020-05-18 RX ORDER — CIPROFLOXACIN 500 MG/1
500 TABLET, FILM COATED ORAL 2 TIMES DAILY
Qty: 14 TABLET | Refills: 0 | Status: SHIPPED | OUTPATIENT
Start: 2020-05-18 | End: 2020-09-14

## 2020-05-18 ASSESSMENT — PAIN SCALES - GENERAL: PAINLEVEL: NO PAIN (0)

## 2020-05-18 ASSESSMENT — MIFFLIN-ST. JEOR: SCORE: 1255.4

## 2020-05-18 NOTE — TELEPHONE ENCOUNTER
Called patient and informed per Socorro Solis max is 7 days and to recheck lab in 2 weeks.  Patient agrees with plan.  Samra Jalloh RN

## 2020-05-18 NOTE — PROGRESS NOTES
History: There is a great pleasure to see this very pleasant 55-year-old lady in follow-up consultation today.  We recall she has history of recurrent urinary stone disease with a stone analysis predominantly calcium phosphate with a small amount of calcium oxalate, who is being followed by our nephrologist at the present time.  She had been having had ESWL in 2016 and 2018 and had some residual calyceal stones on the left side causing some symptoms.  In 2018 we did perform flexible ureteroscopy with laser lithotripsy on stones in the left renal calyces.  Subsequently a few small stones are seen in both kidneys.  Calculi composed primarily of:   10% calcium oxalate monohydrate, and   90% calcium phosphate (hydroxy- and carbonate- apatite).     The patient is being seen by a nephrologist at this time, for metabolic stone evaluation, and is currently on potassium citrate.    She is also being seen by Dr. Shawna COLLADO for management of frequency of micturition and is currently on treatment with Vesicare with some moderate benefit.    Past Medical History:   Diagnosis Date     Abnormal glandular Papanicolaou smear of cervix      Allergic rhinitis, cause unspecified      Alopecia      Attention deficit hyperactivity disorder (ADHD)      Chronic pain of left knee      Complete rupture of rotator cuff      Constipation      Gastro-oesophageal reflux disease      Heart murmur     murmur     Hematuria      Herpes simplex virus infection      Hydronephrosis, right      Lymphocytopenia 4/4/2011     Migraine headache      Migraine, unspecified, without mention of intractable migraine without mention of status migrainosus      Mixed incontinence urge and stress      Mumps      Numbness and tingling     LEFT ARM     Palpitations      Plantar fasciitis, bilateral      Renal disease     hx stones x 2 episodes     Seasonal allergic rhinitis      Sleep disorder      Ureterolithiasis      Urinary frequency     burning     Vertigo         Social History     Socioeconomic History     Marital status:      Spouse name: None     Number of children: 2     Years of education: None     Highest education level: Associate degree: occupational, technical, or vocational program   Occupational History     Occupation: LPN     Employer: [employer]     Comment: self employed, lifting, wheeling, walking     Employer: [employer]   Social Needs     Financial resource strain: Not hard at all     Food insecurity     Worry: Never true     Inability: Never true     Transportation needs     Medical: No     Non-medical: No   Tobacco Use     Smoking status: Never Smoker     Smokeless tobacco: Never Used   Substance and Sexual Activity     Alcohol use: No     Alcohol/week: 0.0 standard drinks     Frequency: Never     Drinks per session: 1 or 2     Binge frequency: Never     Drug use: No     Sexual activity: Yes     Partners: Male     Birth control/protection: Surgical     Comment: tubal   Lifestyle     Physical activity     Days per week: 3 days     Minutes per session: 20 min     Stress: Only a little   Relationships     Social connections     Talks on phone: Once a week     Gets together: Once a week     Attends Yarsanism service: Never     Active member of club or organization: No     Attends meetings of clubs or organizations: Never     Relationship status:      Intimate partner violence     Fear of current or ex partner: None     Emotionally abused: None     Physically abused: None     Forced sexual activity: None   Other Topics Concern      Service No     Blood Transfusions No     Caffeine Concern No     Occupational Exposure No     Hobby Hazards Not Asked     Sleep Concern Yes     Stress Concern No     Weight Concern No     Special Diet No     Back Care Not Asked     Exercise Yes     Comment: 2 days/week     Bike Helmet Not Asked     Seat Belt No     Self-Exams Yes     Parent/sibling w/ CABG, MI or angioplasty before  65F 55M? No   Social History Narrative     None       Past Surgical History:   Procedure Laterality Date     ARTHROSCOPY KNEE Right 2017    Procedure: Right knee arthroscopy and anterior fat pad resection with medial plica resection. Partial lateral menisectomy. Surgeon:  Fredi Lindsay MD  Location: [facility]     ARTHROSCOPY SHOULDER, OPEN ROTATOR CUFF REPAIR, COMBINED  2013    Procedure: COMBINED ARTHROSCOPY SHOULDER, OPEN ROTATOR CUFF REPAIR;  Left Shoulder Arthroscopy, Distal Clavicale Resection, Decompression, Mini Open Rotator Cuff Repair    ;  Surgeon: Fredi Lindsay MD;  Location: [facility] OR     C NONSPECIFIC PROCEDURE  age 16    colposcopy for abnormal pap     C NONSPECIFIC PROCEDURE      surgery L thumb     C NONSPECIFIC PROCEDURE      breast augmentation-silicone     C NONSPECIFIC PROCEDURE      s/p  x 2     C NONSPECIFIC PROCEDURE      Bilateral tubal ligation     C REMOVAL OF KIDNEY STONE       COLONOSCOPY  2014    Procedure: COLONOSCOPY;  Colonoscopy/WW;  Surgeon: Pancho Olsen MD;  Location:  GI     COMBINED CYSTOSCOPY, RETROGRADES, URETEROSCOPY, LASER HOLMIUM LITHOTRIPSY URETER(S), INSERT STENT Right 2016    Procedure: COMBINED CYSTOSCOPY, RETROGRADES, URETEROSCOPY, LASER HOLMIUM LITHOTRIPSY URETER(S), INSERT STENT;  Surgeon: Kushal Noriega MD;  Location: [facility] OR     CYSTOSCOPY       CYSTOSCOPY, RETROGRADES, EXTRACT STONE, COMBINED  2013    Procedure: COMBINED CYSTOSCOPY, RETROGRADES, EXTRACT STONE;  Video Cystoscopy,  Right Ureteroscopy, ureteral dilatation,  Stone extraction;  Surgeon: Subhash Vann MD;  Location: [facility] OR     EXTRACORPOREAL SHOCK WAVE LITHOTRIPSY (ESWL) Bilateral 2016    Procedure: EXTRACORPOREAL SHOCK WAVE LITHOTRIPSY (ESWL);  Surgeon: Bassam Vu MD;  Location:  OR     EXTRACORPOREAL SHOCK WAVE LITHOTRIPSY, CYSTOSCOPY, INSERT STENT URETER(S), COMBINED Bilateral 2018    Procedure: COMBINED  EXTRACORPOREAL SHOCK WAVE LITHOTRIPSY, CYSTOSCOPY, INSERT STENT URETER(S);  BILATERAL COMBINED EXTRACORPOREAL SHOCK WAVE LITHOTRIPSY, CYSTOSCOPY, LEFT STENT PLACEMENT;  Surgeon: Bassam Vu MD;  Location:  OR     GYN SURGERY      laparoscopy     LAPAROSCOPIC CHOLECYSTECTOMY WITH CHOLANGIOGRAMS  11/21/2012    Procedure: LAPAROSCOPIC CHOLECYSTECTOMY WITH CHOLANGIOGRAMS;  LAPAROSCOPIC CHOLECYSTECTOMY WITH CHOLANGIOGRAMS ;  Surgeon: Nataliya Gabriel MD;  Location: [facility] OR     LASER HOLMIUM LITHOTRIPSY URETER(S), INSERT STENT, COMBINED Left 10/30/2019    Procedure: CYSTOSCOPY,LEFT URETEROSCOPY, HOLMIUM LASER, STONE EXTRACTION, LEFT STENT PLACEMENT;  Surgeon: Bassam Vu MD;  Location:  OR     TUBAL LIGATION         Family History   Problem Relation Age of Onset     Diabetes Mother         type 2     Alcohol/Drug Mother         alcohol         Current Outpatient Medications:      acyclovir (ZOVIRAX) 5 % external ointment, Apply topically 2 times daily as needed (Outbreak), Disp: , Rfl:      [START ON 6/7/2020] amphetamine-dextroamphetamine (ADDERALL) 20 MG tablet, TAKE ONE TABLET BY MOUTH THREE TIMES A DAY, Disp: 90 tablet, Rfl: 0     [START ON 7/7/2020] amphetamine-dextroamphetamine (ADDERALL) 20 MG tablet, TAKE ONE TABLET BY MOUTH THREE TIMES A DAY, Disp: 90 tablet, Rfl: 0     ciprofloxacin (CIPRO) 500 MG tablet, Take 1 tablet (500 mg) by mouth 2 times daily, Disp: 14 tablet, Rfl: 0     fexofenadine (ALLEGRA) 180 MG tablet, Take 1 tablet (180 mg) by mouth daily as needed for allergies, Disp: 30 tablet, Rfl: 6     fluocinolone acetonide 0.01 % OIL, Use on scalp once a week, as needed., Disp: 1 Bottle, Rfl: 11     fluticasone (FLONASE) 50 MCG/ACT nasal spray, Spray 2 sprays into both nostrils daily as needed for allergies, Disp: 16 g, Rfl: 11     hydrOXYzine (ATARAX) 25 MG tablet, Take 1 tablet (25 mg) by mouth 3 times daily as needed for itching, Disp: 30 tablet, Rfl: 1     ketoconazole  "(NIZORAL) 2 % external shampoo, Apply to the affected area and wash off after 5 minutes, as needed., Disp: 120 mL, Rfl: 11     ketorolac (TORADOL) 10 MG tablet, Take 1 tablet (10 mg) by mouth every 6 hours as needed for moderate pain, Disp: 30 tablet, Rfl: 1     meclizine (ANTIVERT) 25 MG tablet, Take 1 tablet (25 mg) by mouth 3 times daily as needed for nausea, Disp: 30 tablet, Rfl: 3     Menthol, Topical Analgesic, (BIOFREEZE COLORLESS) 4 % GEL, Externally apply topically 3 times daily as needed, Disp: 118 mL, Rfl: 1     naproxen (NAPROSYN) 500 MG tablet, Take 1 tablet (500 mg) by mouth 2 times daily as needed for moderate pain, Disp: , Rfl:      phenazopyridine (PYRIDIUM) 200 MG tablet, Take 1 tablet (200 mg) by mouth 3 times daily as needed for irritation, Disp: 30 tablet, Rfl: 3     potassium citrate (UROCIT-K) 10 MEQ (1080 MG) CR tablet, Take 2 tablets (20 mEq) by mouth 3 times daily (with meals), Disp: , Rfl:      solifenacin (VESICARE) 10 MG tablet, Take 1 tablet (10 mg) by mouth daily Dispense Brand Name only., Disp: 90 tablet, Rfl: 3     solifenacin (VESICARE) 10 MG tablet, Take 1 tablet (10 mg) by mouth daily, Disp: 90 tablet, Rfl: 1     Topiramate (TOPAMAX PO), Take 150 mg by mouth every morning (3 X 50MG = 150MG), Disp: , Rfl:      valACYclovir (VALTREX) 1000 mg tablet, Take 1 tablet (1,000 mg) by mouth daily, Disp: 90 tablet, Rfl: 3     zolpidem (AMBIEN) 5 MG tablet, Take 1 tablet (5 mg) by mouth nightly as needed for sleep, Disp: 30 tablet, Rfl: 2    10 point ROS of systems including Constitutional, Eyes, Respiratory, Cardiovascular, Gastroenterology, Genitourinary, Integumentary, Muscularskeletal, Psychiatric and Neurologic were all negative except for pertinent positives noted in my HPI.    Examination:   /78   Pulse 93   Ht 1.626 m (5' 4\")   Wt 68 kg (150 lb)   LMP 02/25/2014   SpO2 97%   BMI 25.75 kg/m    General Impression: Very pleasant patient in no acute distress, well-oriented in " time place and person and quite conversational  Mental Status: Normal  HEENT: Extraocular movements intact.  No clinical evidence of jaundice on examination of eyes.  Mucous membranes are unremarkable  Skin: Warm.  No other abnormalities  Respiratory System: Unlabored on room air.  Respiratory cycle normal  Lymph Nodes: Not examined  Back/Flank Tenderness: There is no flank tenderness  Cardiovascular System: No significant peripheral pitting edema  Abdominal Examination: Mildly obese abdomen  Extremities: Extremities otherwise unremarkable  Genitial: Not examined  Rectal Examination: Not examined  Neurologic System: There are no significant acute abnormal neurological signs in the central or peripheral nervous systems    Impression: CT scan has been performed today.  I have carefully reviewed these films although they have not yet been reported by the radiologist.  This does show a number of the small stones in the calyces of both kidneys.  The largest of which is approximately 5 mm diameter.  There is no evidence of stones in the ureter or hydronephrosis.  Compared to the CT scan prior to ESWL in 2018 the stone burden is a great deal less.  The stones, can be seen faintly on the prior KUB.  We did have a careful discussion today about the situation.  I am going to recommend that she have a KUB on an annual basis.  I have also recommended that should she develop significant symptoms she should either contact us promptly or come straight to the emergency room.  The patient is very keen to avoid any further treatment if at all possible.  I have been over carefully with her that measures which have already been recommended by the nephrologists including the potassium citrate to increase levels of citrate in the urine, sodium reduction, and hydration therapy.  Overall the stone burden seems little change since 6 months ago and I am comfortable to continue observation at this time.  I did go over the entire situation  "with the patient in detail today.  I answered all her questions    Plan: ANDREY in 1 year with virtual video consult to follow    Time: 25 minutes.  Greater than 50% was spent in discussion and consultation to go over review and study all prior and current films that were pertinent, lab studies, reports from other physicians in addition to all issues outlined above    \"This dictation was performed with voice recognition software and may contain errors,  omissions and inadvertent word substitution.\"      "

## 2020-05-18 NOTE — TELEPHONE ENCOUNTER
Called patient and advised of below.  Patient states Cipro does usually not work for her.  Asked patient what she is wanting or works for her and stated can try Cipro again.  Patient then wanting Cipro for 10 days not 7 days.  Discussed with Socorro and per Socorro max is 7 days for Cipro.  Socorro advised 7 day RX and rechecking urine in 2 weeks.  Order entered.  Samra Jalloh RN

## 2020-05-18 NOTE — NURSING NOTE
Chief Complaint   Patient presents with     Clinic Care Coordination - Follow-up     Pt here to review CT     Anum Souza CMA

## 2020-05-18 NOTE — LETTER
5/18/2020       RE: Laney Maynard  [id number] [facility] [id number]     Dear Colleague,    Thank you for referring your patient, Laney Maynard, to the [facility] [place] at [facility]. Please see a copy of my visit note below.    History: There is a great pleasure to see this very pleasant 55-year-old lady in follow-up consultation today.  We recall she has history of recurrent urinary stone disease with a stone analysis predominantly calcium phosphate with a small amount of calcium oxalate, who is being followed by our nephrologist at the present time.  She had been having had ESWL in 2016 and 2018 and had some residual calyceal stones on the left side causing some symptoms.  In 2018 we did perform flexible ureteroscopy with laser lithotripsy on stones in the left renal calyces.  Subsequently a few small stones are seen in both kidneys.  Calculi composed primarily of:   10% calcium oxalate monohydrate, and   90% calcium phosphate (hydroxy- and carbonate- apatite).     The patient is being seen by a nephrologist at this time, for metabolic stone evaluation, and is currently on potassium citrate.    She is also being seen by Dr. Shawna COLLADO for management of frequency of micturition and is currently on treatment with Vesicare with some moderate benefit.    Past Medical History:   Diagnosis Date     Abnormal glandular Papanicolaou smear of cervix      Allergic rhinitis, cause unspecified      Alopecia      Attention deficit hyperactivity disorder (ADHD)      Chronic pain of left knee      Complete rupture of rotator cuff      Constipation      Gastro-oesophageal reflux disease      Heart murmur     murmur     Hematuria      Herpes simplex virus infection      Hydronephrosis, right      Lymphocytopenia 4/4/2011     Migraine headache      Migraine, unspecified, without mention of intractable migraine without mention  of status migrainosus      Mixed incontinence urge and stress      Mumps      Numbness and tingling     LEFT ARM     Palpitations      Plantar fasciitis, bilateral      Renal disease     hx stones x 2 episodes     Seasonal allergic rhinitis      Sleep disorder      Ureterolithiasis      Urinary frequency     burning     Vertigo        Social History     Socioeconomic History     Marital status:      Spouse name: None     Number of children: 2     Years of education: None     Highest education level: Associate degree: occupational, technical, or vocational program   Occupational History     Occupation: LPN     Employer: [employer]     Comment: self employed, lifting, wheeling, walking     Employer: [employer]   Social Needs     Financial resource strain: Not hard at all     Food insecurity     Worry: Never true     Inability: Never true     Transportation needs     Medical: No     Non-medical: No   Tobacco Use     Smoking status: Never Smoker     Smokeless tobacco: Never Used   Substance and Sexual Activity     Alcohol use: No     Alcohol/week: 0.0 standard drinks     Frequency: Never     Drinks per session: 1 or 2     Binge frequency: Never     Drug use: No     Sexual activity: Yes     Partners: Male     Birth control/protection: Surgical     Comment: tubal   Lifestyle     Physical activity     Days per week: 3 days     Minutes per session: 20 min     Stress: Only a little   Relationships     Social connections     Talks on phone: Once a week     Gets together: Once a week     Attends Hoahaoism service: Never     Active member of club or organization: No     Attends meetings of clubs or organizations: Never     Relationship status:      Intimate partner violence     Fear of current or ex partner: None     Emotionally abused: None     Physically abused: None     Forced sexual activity: None   Other Topics Concern      Service No     Blood Transfusions No     Caffeine  Concern No     Occupational Exposure No     Hobby Hazards Not Asked     Sleep Concern Yes     Stress Concern No     Weight Concern No     Special Diet No     Back Care Not Asked     Exercise Yes     Comment: 2 days/week     Bike Helmet Not Asked     Seat Belt No     Self-Exams Yes     Parent/sibling w/ CABG, MI or angioplasty before 65F 55M? No   Social History Narrative     None       Past Surgical History:   Procedure Laterality Date     ARTHROSCOPY KNEE Right 2017    Procedure: Right knee arthroscopy and anterior fat pad resection with medial plica resection. Partial lateral menisectomy. Surgeon:  Fredi Lindsay MD  Location: [facility]     ARTHROSCOPY SHOULDER, OPEN ROTATOR CUFF REPAIR, COMBINED  2013    Procedure: COMBINED ARTHROSCOPY SHOULDER, OPEN ROTATOR CUFF REPAIR;  Left Shoulder Arthroscopy, Distal Clavicale Resection, Decompression, Mini Open Rotator Cuff Repair    ;  Surgeon: Fredi Lindsay MD;  Location: [facility] OR     C NONSPECIFIC PROCEDURE  age 16    colposcopy for abnormal pap     C NONSPECIFIC PROCEDURE      surgery L thumb     C NONSPECIFIC PROCEDURE      breast augmentation-silicone     C NONSPECIFIC PROCEDURE      s/p  x 2     C NONSPECIFIC PROCEDURE      Bilateral tubal ligation     C REMOVAL OF KIDNEY STONE       COLONOSCOPY  2014    Procedure: COLONOSCOPY;  Colonoscopy/WW;  Surgeon: Pancho Olsen MD;  Location:  GI     COMBINED CYSTOSCOPY, RETROGRADES, URETEROSCOPY, LASER HOLMIUM LITHOTRIPSY URETER(S), INSERT STENT Right 2016    Procedure: COMBINED CYSTOSCOPY, RETROGRADES, URETEROSCOPY, LASER HOLMIUM LITHOTRIPSY URETER(S), INSERT STENT;  Surgeon: Kushal Noriega MD;  Location: [facility] OR     CYSTOSCOPY       CYSTOSCOPY, RETROGRADES, EXTRACT STONE, COMBINED  2013    Procedure: COMBINED CYSTOSCOPY, RETROGRADES, EXTRACT STONE;  Video Cystoscopy,  Right Ureteroscopy, ureteral dilatation,  Stone extraction;  Surgeon: Subhash Vann MD;   Location: [facility] OR     EXTRACORPOREAL SHOCK WAVE LITHOTRIPSY (ESWL) Bilateral 4/29/2016    Procedure: EXTRACORPOREAL SHOCK WAVE LITHOTRIPSY (ESWL);  Surgeon: Bassam Vu MD;  Location: [facility] OR     EXTRACORPOREAL SHOCK WAVE LITHOTRIPSY, CYSTOSCOPY, INSERT STENT URETER(S), COMBINED Bilateral 4/26/2018    Procedure: COMBINED EXTRACORPOREAL SHOCK WAVE LITHOTRIPSY, CYSTOSCOPY, INSERT STENT URETER(S);  BILATERAL COMBINED EXTRACORPOREAL SHOCK WAVE LITHOTRIPSY, CYSTOSCOPY, LEFT STENT PLACEMENT;  Surgeon: Bassam Vu MD;  Location: [facility] OR     GYN SURGERY      laparoscopy     LAPAROSCOPIC CHOLECYSTECTOMY WITH CHOLANGIOGRAMS  11/21/2012    Procedure: LAPAROSCOPIC CHOLECYSTECTOMY WITH CHOLANGIOGRAMS;  LAPAROSCOPIC CHOLECYSTECTOMY WITH CHOLANGIOGRAMS ;  Surgeon: Nataliya Gabriel MD;  Location: [facility] OR     LASER HOLMIUM LITHOTRIPSY URETER(S), INSERT STENT, COMBINED Left 10/30/2019    Procedure: CYSTOSCOPY,LEFT URETEROSCOPY, HOLMIUM LASER, STONE EXTRACTION, LEFT STENT PLACEMENT;  Surgeon: Bassam Vu MD;  Location: [facility] OR     TUBAL LIGATION         Family History   Problem Relation Age of Onset     Diabetes Mother         type 2     Alcohol/Drug Mother         alcohol         Current Outpatient Medications:      acyclovir (ZOVIRAX) 5 % external ointment, Apply topically 2 times daily as needed (Outbreak), Disp: , Rfl:      [START ON 6/7/2020] amphetamine-dextroamphetamine (ADDERALL) 20 MG tablet, TAKE ONE TABLET BY MOUTH THREE TIMES A DAY, Disp: 90 tablet, Rfl: 0     [START ON 7/7/2020] amphetamine-dextroamphetamine (ADDERALL) 20 MG tablet, TAKE ONE TABLET BY MOUTH THREE TIMES A DAY, Disp: 90 tablet, Rfl: 0     ciprofloxacin (CIPRO) 500 MG tablet, Take 1 tablet (500 mg) by mouth 2 times daily, Disp: 14 tablet, Rfl: 0     fexofenadine (ALLEGRA) 180 MG tablet, Take 1 tablet (180 mg) by mouth daily as needed for allergies, Disp: 30 tablet, Rfl: 6     fluocinolone acetonide 0.01 % OIL, Use on scalp once  a week, as needed., Disp: 1 Bottle, Rfl: 11     fluticasone (FLONASE) 50 MCG/ACT nasal spray, Spray 2 sprays into both nostrils daily as needed for allergies, Disp: 16 g, Rfl: 11     hydrOXYzine (ATARAX) 25 MG tablet, Take 1 tablet (25 mg) by mouth 3 times daily as needed for itching, Disp: 30 tablet, Rfl: 1     ketoconazole (NIZORAL) 2 % external shampoo, Apply to the affected area and wash off after 5 minutes, as needed., Disp: 120 mL, Rfl: 11     ketorolac (TORADOL) 10 MG tablet, Take 1 tablet (10 mg) by mouth every 6 hours as needed for moderate pain, Disp: 30 tablet, Rfl: 1     meclizine (ANTIVERT) 25 MG tablet, Take 1 tablet (25 mg) by mouth 3 times daily as needed for nausea, Disp: 30 tablet, Rfl: 3     Menthol, Topical Analgesic, (BIOFREEZE COLORLESS) 4 % GEL, Externally apply topically 3 times daily as needed, Disp: 118 mL, Rfl: 1     naproxen (NAPROSYN) 500 MG tablet, Take 1 tablet (500 mg) by mouth 2 times daily as needed for moderate pain, Disp: , Rfl:      phenazopyridine (PYRIDIUM) 200 MG tablet, Take 1 tablet (200 mg) by mouth 3 times daily as needed for irritation, Disp: 30 tablet, Rfl: 3     potassium citrate (UROCIT-K) 10 MEQ (1080 MG) CR tablet, Take 2 tablets (20 mEq) by mouth 3 times daily (with meals), Disp: , Rfl:      solifenacin (VESICARE) 10 MG tablet, Take 1 tablet (10 mg) by mouth daily Dispense Brand Name only., Disp: 90 tablet, Rfl: 3     solifenacin (VESICARE) 10 MG tablet, Take 1 tablet (10 mg) by mouth daily, Disp: 90 tablet, Rfl: 1     Topiramate (TOPAMAX PO), Take 150 mg by mouth every morning (3 X 50MG = 150MG), Disp: , Rfl:      valACYclovir (VALTREX) 1000 mg tablet, Take 1 tablet (1,000 mg) by mouth daily, Disp: 90 tablet, Rfl: 3     zolpidem (AMBIEN) 5 MG tablet, Take 1 tablet (5 mg) by mouth nightly as needed for sleep, Disp: 30 tablet, Rfl: 2    10 point ROS of systems including Constitutional, Eyes, Respiratory, Cardiovascular, Gastroenterology, Genitourinary, Integumentary,  "Muscularskeletal, Psychiatric and Neurologic were all negative except for pertinent positives noted in my HPI.    Examination:   /78   Pulse 93   Ht 1.626 m (5' 4\")   Wt 68 kg (150 lb)   LMP 02/25/2014   SpO2 97%   BMI 25.75 kg/m    General Impression: Very pleasant patient in no acute distress, well-oriented in time place and person and quite conversational  Mental Status: Normal  HEENT: Extraocular movements intact.  No clinical evidence of jaundice on examination of eyes.  Mucous membranes are unremarkable  Skin: Warm.  No other abnormalities  Respiratory System: Unlabored on room air.  Respiratory cycle normal  Lymph Nodes: Not examined  Back/Flank Tenderness: There is no flank tenderness  Cardiovascular System: No significant peripheral pitting edema  Abdominal Examination: Mildly obese abdomen  Extremities: Extremities otherwise unremarkable  Genitial: Not examined  Rectal Examination: Not examined  Neurologic System: There are no significant acute abnormal neurological signs in the central or peripheral nervous systems    Impression: CT scan has been performed today.  I have carefully reviewed these films although they have not yet been reported by the radiologist.  This does show a number of the small stones in the calyces of both kidneys.  The largest of which is approximately 5 mm diameter.  There is no evidence of stones in the ureter or hydronephrosis.  Compared to the CT scan prior to ESWL in 2018 the stone burden is a great deal less.  The stones, can be seen faintly on the prior KUB.  We did have a careful discussion today about the situation.  I am going to recommend that she have a KUB on an annual basis.  I have also recommended that should she develop significant symptoms she should either contact us promptly or come straight to the emergency room.  The patient is very keen to avoid any further treatment if at all possible.  I have been over carefully with her that measures which have " "already been recommended by the nephrologists including the potassium citrate to increase levels of citrate in the urine, sodium reduction, and hydration therapy.  Overall the stone burden seems little change since 6 months ago and I am comfortable to continue observation at this time.  I did go over the entire situation with the patient in detail today.  I answered all her questions    Plan: ANDREY in 1 year with virtual video consult to follow    Time: 25 minutes.  Greater than 50% was spent in discussion and consultation to go over review and study all prior and current films that were pertinent, lab studies, reports from other physicians in addition to all issues outlined above    \"This dictation was performed with voice recognition software and may contain errors,  omissions and inadvertent word substitution.\"        Again, thank you for allowing me to participate in the care of your patient.      Sincerely,    Bassam Vu MD      "

## 2020-05-18 NOTE — TELEPHONE ENCOUNTER
Please call Laney and let her know the following:  It does look like you have another UTI.  I would like you to take cipro 500 mg bid x 7 days.    Sincerely,     Susan Haase, CNP

## 2020-06-05 DIAGNOSIS — N30.00 ACUTE CYSTITIS WITHOUT HEMATURIA: ICD-10-CM

## 2020-06-05 DIAGNOSIS — R82.90 ABNORMAL URINE FINDINGS: Primary | ICD-10-CM

## 2020-06-05 LAB
ALBUMIN UR-MCNC: NEGATIVE MG/DL
APPEARANCE UR: CLEAR
BACTERIA #/AREA URNS HPF: ABNORMAL /HPF
BILIRUB UR QL STRIP: NEGATIVE
COLOR UR AUTO: YELLOW
GLUCOSE UR STRIP-MCNC: NEGATIVE MG/DL
HGB UR QL STRIP: ABNORMAL
KETONES UR STRIP-MCNC: NEGATIVE MG/DL
LEUKOCYTE ESTERASE UR QL STRIP: ABNORMAL
NITRATE UR QL: NEGATIVE
PH UR STRIP: 7.5 PH (ref 5–7)
RBC #/AREA URNS AUTO: ABNORMAL /HPF
SOURCE: ABNORMAL
SP GR UR STRIP: 1.02 (ref 1–1.03)
UROBILINOGEN UR STRIP-ACNC: 0.2 EU/DL (ref 0.2–1)
WBC #/AREA URNS AUTO: ABNORMAL /HPF

## 2020-06-05 PROCEDURE — 87088 URINE BACTERIA CULTURE: CPT | Performed by: NURSE PRACTITIONER

## 2020-06-05 PROCEDURE — 81001 URINALYSIS AUTO W/SCOPE: CPT | Performed by: NURSE PRACTITIONER

## 2020-06-05 PROCEDURE — 87086 URINE CULTURE/COLONY COUNT: CPT | Performed by: NURSE PRACTITIONER

## 2020-06-05 PROCEDURE — 87186 SC STD MICRODIL/AGAR DIL: CPT | Performed by: NURSE PRACTITIONER

## 2020-06-07 DIAGNOSIS — N30.00 ACUTE CYSTITIS WITHOUT HEMATURIA: Primary | ICD-10-CM

## 2020-06-07 LAB
BACTERIA SPEC CULT: ABNORMAL
SPECIMEN SOURCE: ABNORMAL

## 2020-06-07 RX ORDER — NITROFURANTOIN 25; 75 MG/1; MG/1
100 CAPSULE ORAL 2 TIMES DAILY
Qty: 24 CAPSULE | Refills: 0 | Status: SHIPPED | OUTPATIENT
Start: 2020-06-07 | End: 2020-09-14

## 2020-06-08 NOTE — RESULT ENCOUNTER NOTE
Pj Davison,  Your urine culture shows that you have an infection.  I have sent in a prescription for you to take nitrofurantoin twice a day for 14 days.  Sincerely,      Susan Haase, CNP

## 2020-06-29 DIAGNOSIS — M72.2 PLANTAR FASCIITIS, BILATERAL: Primary | ICD-10-CM

## 2020-06-30 RX ORDER — NAPROXEN 500 MG/1
TABLET ORAL
Qty: 60 TABLET | Refills: 3 | Status: SHIPPED | OUTPATIENT
Start: 2020-06-30 | End: 2020-09-14

## 2020-06-30 NOTE — TELEPHONE ENCOUNTER
Routing refill request to provider for review/approval because:  Labs not current:  Alt, ast, cbc  Marely Kuo RN, BSN

## 2020-07-10 ENCOUNTER — TELEPHONE (OUTPATIENT)
Dept: FAMILY MEDICINE | Facility: CLINIC | Age: 55
End: 2020-07-10

## 2020-07-10 NOTE — TELEPHONE ENCOUNTER
Called patient and discussed [facility] and E-Visit as options.  Prefers phone visit.  Discussed if not having symptoms would wait 14 days from exposure for test.  Samra Jalloh RN

## 2020-07-10 NOTE — TELEPHONE ENCOUNTER
Order/Referral Request:  Who is requesting: Patient   Orders being requested: Covid 19 test   Reason service is needed/diagnosis: Pt's daughter has tested positive. Pt stated she is hoping to get a test ordered for her and her  Eddie. She is also sending a fax of her daughters test results.     When are orders needed by: asap   Has this been discussed with Provider: No  Does patient have a preference on a Group/Provider/Facility? [facility]   Does patient have an appointment scheduled: No  Where to send Orders: N/A  Okay to leave detailed message?  Yes at Cell number on file:    Telephone Information:   Mobile [phone]         Routing

## 2020-07-10 NOTE — TELEPHONE ENCOUNTER
L/M to call.  She can send [facility] visit or schedule virtual visit with provider.  Samra Jalloh RN

## 2020-07-15 ENCOUNTER — VIRTUAL VISIT (OUTPATIENT)
Dept: FAMILY MEDICINE | Facility: CLINIC | Age: 55
End: 2020-07-15
Payer: COMMERCIAL

## 2020-07-15 DIAGNOSIS — Z20.822 EXPOSURE TO COVID-19 VIRUS: Primary | ICD-10-CM

## 2020-07-15 PROCEDURE — 99213 OFFICE O/P EST LOW 20 MIN: CPT | Mod: TEL | Performed by: FAMILY MEDICINE

## 2020-07-15 NOTE — PATIENT INSTRUCTIONS
Patient Education     Coronavirus Disease 2019 (COVID-19): Caring for Yourself or Others  If you or a household member have symptoms of COVID-19, follow the guidelines below. This will help you manage symptoms and keep the virus from spreading.  If you have symptoms of COVID-19    Stay home and contact your care team. They will tell you what to do.    Don t panic. Keep in mind that other illnesses can cause similar symptoms.    Stay away from work, school, and public places.    Limit physical contact with others in your home. Limit visitors. No kissing.    Clean surfaces you touch with disinfectant.    If you need to cough or sneeze, do it into a tissue. Then, throw the tissue into the trash. If you don't have tissues, cough or sneeze into the bend of your elbow    Don t share food or personal items with people in your household. This includes items like eating and drinking utensils, towels, and bedding.    Wear a cloth face mask around other people. It should cover your nose and mouth. You may need to make your own mask using a bandana, T-shirt, or other cloth. See the CDC s instructions on how to make a mask.    If you need to go to a hospital or clinic, call ahead to let them know. Expect the care team to wear masks, gowns, gloves, and eye protection. You may be put in a separate room.    Follow all instructions from your care team.  If you ve been diagnosed with COVID-19    Stay home and away from others, including other people in your home. (This is called self-isolation.) Don t leave home unless you need to get medical care. Don't go to work, school, or public places. Don't use buses, taxis, or other public transportation.    Follow all instructions from your care team.    If you need to go to a hospital or clinic, call ahead to let them know. Expect the care team to wear masks, gowns, gloves, and eye protection. You may be put in a separate room.    Wear a face mask over your nose and mouth. This is to  protect others from your germs. If you can t wear a mask, your caregivers should wear one. You may need to make your own mask using a bandana, T-shirt, or other cloth. See the CDC s instructions on how to make a mask.    Have no contact with pets and other animals.    Don t share food or personal items with people in your household. This includes items like eating and drinking utensils, towels, and bedding.    If you need to cough or sneeze, do it into a tissue. Then, throw the tissue into the trash. If you don't have tissues, cough or sneeze into the bend of your elbow.    Wash your hands often.  Self-care at home  At this time, there is no medicine approved to prevent or treat COVID-19. Experts are testing different medicines, trying to find one that works.  So far, the most proven treatment is to support your body while it fights the virus.    Get plenty of rest.    Drink extra fluids (6 to 8 glasses of liquids each day), unless a doctor has told you not to. Ask your care team which fluids are best for you. Avoid fluids that contain caffeine or alcohol.    Take over-the-counter (OTC) medicine to help reduce pain and fever. Your care team will tell you which OTC medicine to use.  If you ve been in the hospital for COVID-19, follow your care team s instructions. This may include changing positions to help your breathing (such as lying on your belly).  If a test showed that you have COVID-19, you may be asked to donate plasma after you ve recovered. (This is called COVID-19 convalescent plasma donation.) The plasma may contain antibodies to help fight the virus in others. Scientists are testing whether this might be a treatment in the future. For more information, talk to your care team.  Caring for a sick person    Follow all instructions from the care team.    Wash your hands often.    Wear protective clothing as advised.    Make sure the sick person wears a mask. If they can't wear a mask, don't stay in the same  room with them. If you must be in the same room, wear a face mask. Make sure the mask covers both the nose and mouth.    Keep track of the sick person s symptoms.    Clean surfaces often with disinfectant. This includes phones, kitchen counters, fridge door handles, bathroom surfaces, and others.    Don t let anyone share household items with the sick person. This includes eating and drinking tools, towels, sheets, and blankets.    Clean fabrics and laundry well.    Keep other people and pets away from the sick person.  When you can stop self-isolation  When you are sick with COVID-19, you should stay away from other people. This is called self-isolation. The rules for ending self-isolation depend on your health in general.  If you are normally healthy  You can stop self-isolation when all 3 of these are true:    You ve had no fever--and no medicine that reduces fever--for 3 full days (72 hours). And     Your symptoms are better, such as cough or trouble breathing. And     At least 10 days have passed since your symptoms first started.  Talk with your care team before you leave home. They may tell you it s okay to leave, or they may give you different advice.  If you have a weak immune system  If you re being treated for cancer, have an immune disorder (such as HIV), or have had a transplant (organ or bone marrow), follow your care team s instructions. You may be able to end self-isolation when all 3 of these are true:    You ve had no fever--and no medicine that reduces fever--for 3 full days (72 hours). And     Your symptoms are better, such as cough or trouble breathing. And     You ve had 2 tests for COVID-19. The tests happened at least 24 hours apart, and both show that you don t have the virus. (If no tests are available, your care team may tell you to follow the rules for normally healthy people above.)  When to call your care team  Call your care team right away if a sick person has any of these:    Trouble  "breathing    Pain or pressure in the chest  If a sick person has any of these, call 911:    Trouble breathing that gets worse    Pain or pressure in the chest that gets worse    Blue tint to lips or face    Fast or irregular heartbeat    Confusion or trouble waking    Fainting or loss of consciousness    Coughing up blood  Going home from the hospital  If you have COVID-19 and were recently in the hospital:    Follow the instructions above for self-care and isolation.    Follow the hospital care team s instructions.    Ask questions if anything is unclear to you. Write down answers so you remember them.  Last modified date: 5/8/2020  This information has been modified by your health care provider with permission from the publisher.      5973-5879 \[facility] [place] [facility]\"", [address] [place] [address], [place] [address]. All rights reserved. This information is not intended as a substitute for professional medical care. Always follow your healthcare professional's instructions. This information has been modified by your health care provider with permission from the publisher.           Patient Education     Coronavirus Disease 2019 (COVID-19): Prevention  The best way to prevent COVID-19 is to avoid contact with the virus. There is no vaccine yet.  Cancel travel and other outings  Stay informed about COVID-19 in your area. School, sporting events and other activities may be cancelled. Follow local instructions. For example, you may need to:     Stay at least 6 feet away from others. This is called \"social distancing.\"    Stay at home and isolate yourself. You may hear terms like \"self-isolate, \"quarantine,\"  stay at home,   shelter in place,  and  lockdown.   Don t travel to areas with a COVID-19 outbreak. Don t go on a cruise. It s best to cancel any non-essential travel right now.  For the most up-to-date travel advisories, visit the CDC website at www.cdc.gov/coronavirus/2019-ncov/travelers.  When you re at home    Wash your hands " "often. Use soap and clean, running water for at least 20 seconds. Or, use hand  that has at least 60% alcohol.    Don't touch your eyes, nose, or mouth unless you have clean hands.    Don t kiss someone who is sick.    If you need to cough or sneeze, do it into a tissue. Then, throw the tissue into the trash. If you don't have tissues, cough or sneeze into the bend of your elbow.    Try to avoid \"high-touch\" surfaces, like doorknobs, handles, light switches, desks, printers, phones, kitchen counters, tables and bathroom surfaces. Clean these often with disinfectant (read the label for instructions). For cleaning tips, go to www.cdc.gov/coronavirus/2019-ncov/prepare/cleaning-disinfection.html.    Check your home supplies. Try to keep a 2-week supply of medicine, food, and household items.    Make a plan for childcare, work, and ways to stay in touch with others. Know who will help you if you get sick.    Don't be around people who are sick.    Don t share eating or drinking utensils with sick people.    Wash your hands after touching animals. Don't touch animals that may be sick.  If you leave home    Stay at least 6 feet away from all people.    Try to avoid \"high-touch\" public surfaces, like doorknobs, handles, and light switches. If you need to touch these, clean them first with a disinfecting wipe. Or, touch them using a tissue or paper towel.    Use hand  often. Make sure it has at least 60% alcohol.    Don't touch your eyes, nose, or mouth unless you have clean hands.    If you need to cough or sneeze, do it into a tissue. Then throw the tissue into the trash. If you don't have tissues, cough or sneeze into the bend of your elbow.    Avoid public gatherings.    Wear a cloth face mask in public. It should cover both your nose and mouth. You may need to make your own mask using a bandana, T-shirt, or other cloth. See the CDC s instructions on how to make a mask.  If you re at a work " site    Follow all of the instructions above.    If you feel sick in any way, go home and stay home.    Tell your manager if you are well but live with someone who has COVID-19.    Wash your hands often with soap and clean, running water for at least 20 seconds. Or, use hand  with at least 60% alcohol.    Don't shake hands. Don t have in-person meetings. (Meet over phone or video.)    Don't use other people's desks, offices, phones or equipment (pens, keyboards, eating or drinking utensils, etc.), if possible.    Use office cassi one person at a time. Don t share coffee, tea or food in the office. Don t have meals in groups.    Wear a mask over your nose and mouth.    Clean work surfaces often with disinfectant. These include desks, photocopiers, printers, phones, kitchen counters, fridge door handles, bathroom surfaces, and others.  If you ve been around someone with COVID-19  In the past 14 days, if you've been around someone who has (or may have) COVID-19:    Contact your care team to ask for advice. Follow all instructions. You may need to stay home and limit your activities for up to 2 weeks.    Take your temperature every morning and evening for at least 14 days. This is to check for fever. Keep a record of the readings.    Watch for symptoms of the virus. (See the CDC's symptom .) If you have symptoms, contact your care team.  When to contact your care team  Call your care team if you think you have COVID-19 symptoms. These can include fever, cough, trouble breathing, body aches, headaches, chills or repeated shaking with chills, sore throat, loss of tasted or smell, or diarrhea (loose, watery poops).  Don t go to a clinic or hospital before speaking to your care team.  Last modified date: 5/8/2020  This information has been modified by your health care provider with permission from the publisher.      [id number] [employer], [address] [place] [address], [place] [address]. All rights reserved. This  information is not intended as a substitute for professional medical care. Always follow your healthcare professional's instructions. This information has been modified by your health care provider with permission from the publisher.

## 2020-07-15 NOTE — PROGRESS NOTES
"Laney Maynard is a 55 year old female who is being evaluated via a billable telephone visit.      The patient has been notified of following:     \"This telephone visit will be conducted via a call between you and your physician/provider. We have found that certain health care needs can be provided without the need for a physical exam.  This service lets us provide the care you need with a short phone conversation.  If a prescription is necessary we can send it directly to your pharmacy.  If lab work is needed we can place an order for that and you can then stop by our lab to have the test done at a later time.    Telephone visits are billed at different rates depending on your insurance coverage. During this emergency period, for some insurers they may be billed the same as an in-person visit.  Please reach out to your insurance provider with any questions.    If during the course of the call the physician/provider feels a telephone visit is not appropriate, you will not be charged for this service.\"    Patient has given verbal consent for Telephone visit?  Yes    What phone number would you like to be contacted at? [phone]    How would you like to obtain your AVS? Himahart    Subjective     Laney Maynard is a 55 year old female who presents via phone visit today for the following health issues:    HPI    -patient states no current COVID sx, would like to discuss if COVID testing is needed, daughter was tested positive on 7/6 started having sx 6/30  -patient has future px apt scheduled wondering if she should postpone    Would like to be evaluated for possible COVID.  Daughter had sinus congestion, cant taste, cant smell, didn't feel well.  [facility] did test and was positive.  Daughter has been primarily in basement,  has been in separate space as much as possible.  Have not been around daughter at all.  Neither self or  have symptoms.  No fevers, can still smell and " taste.  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   has been at home, not currently working.  has heart conditions, BP meds, on inhalers.  Both would like to be tested if able.        Current Outpatient Medications   Medication Sig Dispense Refill     acyclovir (ZOVIRAX) 5 % external ointment Apply topically 2 times daily as needed (Outbreak)       amphetamine-dextroamphetamine (ADDERALL) 20 MG tablet TAKE ONE TABLET BY MOUTH THREE TIMES A DAY 90 tablet 0     amphetamine-dextroamphetamine (ADDERALL) 20 MG tablet TAKE ONE TABLET BY MOUTH THREE TIMES A DAY 90 tablet 0     ciprofloxacin (CIPRO) 500 MG tablet Take 1 tablet (500 mg) by mouth 2 times daily 14 tablet 0     fexofenadine (ALLEGRA) 180 MG tablet Take 1 tablet (180 mg) by mouth daily as needed for allergies 30 tablet 6     fluocinolone acetonide 0.01 % OIL Use on scalp once a week, as needed. 1 Bottle 11     fluticasone (FLONASE) 50 MCG/ACT nasal spray Spray 2 sprays into both nostrils daily as needed for allergies 16 g 11     hydrOXYzine (ATARAX) 25 MG tablet Take 1 tablet (25 mg) by mouth 3 times daily as needed for itching 30 tablet 1     ketoconazole (NIZORAL) 2 % external shampoo Apply to the affected area and wash off after 5 minutes, as needed. 120 mL 11     ketorolac (TORADOL) 10 MG tablet Take 1 tablet (10 mg) by mouth every 6 hours as needed for moderate pain 30 tablet 1     meclizine (ANTIVERT) 25 MG tablet Take 1 tablet (25 mg) by mouth 3 times daily as needed for nausea 30 tablet 3     Menthol, Topical Analgesic, (BIOFREEZE COLORLESS) 4 % GEL Externally apply topically 3 times daily as needed 118 mL 1     naproxen (NAPROSYN) 500 MG tablet TAKE ONE TABLET BY MOUTH TWICE A DAY WITH MEALS 60 tablet 3     nitroFURantoin macrocrystal-monohydrate (MACROBID) 100 MG capsule Take 1 capsule (100 mg) by mouth 2 times daily 24 capsule 0     phenazopyridine (PYRIDIUM) 200 MG tablet Take 1 tablet (200 mg) by mouth 3 times daily as needed  for irritation 30 tablet 3     potassium citrate (UROCIT-K) 10 MEQ (1080 MG) CR tablet Take 2 tablets (20 mEq) by mouth 3 times daily (with meals)       solifenacin (VESICARE) 10 MG tablet Take 1 tablet (10 mg) by mouth daily Dispense Brand Name only. 90 tablet 3     solifenacin (VESICARE) 10 MG tablet Take 1 tablet (10 mg) by mouth daily 90 tablet 1     Topiramate (TOPAMAX PO) Take 150 mg by mouth every morning (3 X 50MG = 150MG)       valACYclovir (VALTREX) 1000 mg tablet Take 1 tablet (1,000 mg) by mouth daily 90 tablet 3     zolpidem (AMBIEN) 5 MG tablet Take 1 tablet (5 mg) by mouth nightly as needed for sleep 30 tablet 2     Allergies   Allergen Reactions     Cefatrizine Itching     Aloe Itching and Rash     Codeine      GI upset     Compazine Nausea and Itching     Darvocet [Acetaminophen] Nausea and Vomiting     Percocet [Oxycodone-Acetaminophen] Nausea and Vomiting     Sulphadimidine [Sulfamethazine] Rash       Reviewed and updated as needed this visit by Provider         Review of Systems   Constitutional, HEENT, cardiovascular, pulmonary, gi and gu systems are negative, except as otherwise noted.       Objective   Reported vitals:  LMP 02/25/2014    healthy, alert and no distress  PSYCH: Alert and oriented times 3; coherent speech, normal   rate and volume, able to articulate logical thoughts, able   to abstract reason, no tangential thoughts, no hallucinations   or delusions  Her affect is normal  RESP: No cough, no audible wheezing, able to talk in full sentences  Remainder of exam unable to be completed due to telephone visits    Diagnostic Test Results:  none         Assessment/Plan:    1. Exposure to COVID-19 virus  Orders placed for PCR testing.  Follow up as needed.  - Asymptomatic COVID-19 Virus (Coronavirus) by PCR; Future    Return if symptoms worsen or fail to improve.      Phone call duration:  5 minutes    April SAMINA Hutchins MD

## 2020-07-23 DIAGNOSIS — Z20.822 EXPOSURE TO COVID-19 VIRUS: ICD-10-CM

## 2020-07-23 PROCEDURE — U0003 INFECTIOUS AGENT DETECTION BY NUCLEIC ACID (DNA OR RNA); SEVERE ACUTE RESPIRATORY SYNDROME CORONAVIRUS 2 (SARS-COV-2) (CORONAVIRUS DISEASE [COVID-19]), AMPLIFIED PROBE TECHNIQUE, MAKING USE OF HIGH THROUGHPUT TECHNOLOGIES AS DESCRIBED BY CMS-2020-01-R: HCPCS | Performed by: FAMILY MEDICINE

## 2020-07-23 NOTE — LETTER
July 24, 2020        Laney Maynard  [id number] [facility] [id number]    COVID-19 Virus PCR to [facility] - Result   Date Value Ref Range Status   07/23/2020 Not Detected  Final     Comment:     Collection of multiple specimens from the same patient may be necessary to   detect the virus. The possibility of a false negative should be considered if   the patient's recent exposure or clinical presentation suggests 2019 nCOV   infection and diagnostic tests for other causes of illness are negative.   Repeat testing may be considered in this setting.  Viral RNA was extracted via a validated method and subsequently underwent   single step reverse transcriptase-real time polymerase chain reaction using   primers to the CDC specified N1,N2 gene targets of CoV2 and human RNP as an   internal control.  A negative result does not rule out the presence of real-time PCR inhibitors   in the specimen or COVID-19 RNA in concentrations below the limit of detection   of the assay. The possibility of a false negative should be considered if the   patients recent exposure or clinical presentation suggests COVID-19.   Additional testing or repeat testing requires consultation with the   laboratory.  Nasopharyngeal specimen is the preferred choice for swab-based SARS CoV2   testing. When collection of a nasopharyngeal swab is not possible the   following are acceptable alternatives:  an oropharyngeal (OP) specimen collected by a healthcare professional, or a   nasal mid-turbinate (NMT) swab collected by a healthcare professional or by   onsite self-collection (using a flocked tapered swab), or an anterior nares   specimen collected by a healthcare professional or by onsite self-collection   (using a round foam swab). (Centers for Disease Control)  Testing performed by [facility], Room [address], [address] [place] [address]. This test was developed and its   performance  characteristics determined by the [facility]. It has not been cleared or approved by the FDA.  The laboratory is regulated under the Clinical Laboratory Improvement   Amendments of 1988 (CLIA-88) as qualified to perform high-complexity testing.   This test is used for clinical purposes. It should not be regarded as   investigational or for research.         No results found for: SARSCOVRES    This letter provides a written record that you were tested for COVID-19.      Your result was negative. This means that we didn t find the virus that causes COVID-19 in your sample. A test may show negative when you do actually have the virus. This can happen when the virus is in the early stages of infection, before you feel illness symptoms.    If you have symptoms   Stay home and away from others (self-isolate) until you meet ALL of the guidelines below:    You ve had no fever--and no medicine that reduces fever--for 3 full days (72 hours). And      Your other symptoms have gotten better. For example, your cough or breathing has improved. And     At least 10 days have passed since your symptoms started.    During this time:    Stay home. Don t go to work, school or anywhere else.     Stay in your own room, including for meals. Use your own bathroom if you can.    Stay away from others in your home. No hugging, kissing or shaking hands. No visitors.    Clean  high touch  surfaces often (doorknobs, counters, handles, etc.). Use a household cleaning spray or wipes. You can find a full list on the EPA website at www.epa.gov/pesticide-registration/list-n-disinfectants-use-against-sars-cov-2.    Cover your mouth and nose with a mask, tissue or washcloth to avoid spreading germs.    Wash your hands and face often with soap and water.    Going back to work  Check with your employer for any guidelines to follow for going back to work.    Employers: This document serves as formal notice that your  employee tested negative for COVID-19, as of the testing date shown above.

## 2020-07-24 LAB
SARS-COV-2 RNA SPEC QL NAA+PROBE: NOT DETECTED
SPECIMEN SOURCE: NORMAL

## 2020-09-14 ENCOUNTER — OFFICE VISIT (OUTPATIENT)
Dept: FAMILY MEDICINE | Facility: CLINIC | Age: 55
End: 2020-09-14
Payer: COMMERCIAL

## 2020-09-14 DIAGNOSIS — N20.1 URETEROLITHIASIS: ICD-10-CM

## 2020-09-14 DIAGNOSIS — G47.9 SLEEP DISORDER: ICD-10-CM

## 2020-09-14 DIAGNOSIS — R39.89 URINARY PROBLEM: ICD-10-CM

## 2020-09-14 DIAGNOSIS — L21.9 SEBORRHEIC DERMATITIS OF SCALP: ICD-10-CM

## 2020-09-14 DIAGNOSIS — Z00.00 ROUTINE GENERAL MEDICAL EXAMINATION AT A HEALTH CARE FACILITY: Primary | ICD-10-CM

## 2020-09-14 DIAGNOSIS — J30.2 ACUTE SEASONAL ALLERGIC RHINITIS: ICD-10-CM

## 2020-09-14 DIAGNOSIS — L29.9 GENERALIZED PRURITUS: ICD-10-CM

## 2020-09-14 DIAGNOSIS — B00.9 HERPES SIMPLEX VIRUS INFECTION: ICD-10-CM

## 2020-09-14 DIAGNOSIS — Z78.0 MENOPAUSE PRESENT: ICD-10-CM

## 2020-09-14 DIAGNOSIS — Z23 NEED FOR PROPHYLACTIC VACCINATION AND INOCULATION AGAINST INFLUENZA: ICD-10-CM

## 2020-09-14 DIAGNOSIS — N39.46 MIXED INCONTINENCE URGE AND STRESS (MALE)(FEMALE): ICD-10-CM

## 2020-09-14 DIAGNOSIS — R42 VERTIGO: ICD-10-CM

## 2020-09-14 DIAGNOSIS — F90.2 ATTENTION DEFICIT HYPERACTIVITY DISORDER (ADHD), COMBINED TYPE: ICD-10-CM

## 2020-09-14 DIAGNOSIS — M72.2 PLANTAR FASCIITIS, BILATERAL: ICD-10-CM

## 2020-09-14 LAB
ALBUMIN SERPL-MCNC: 3.4 G/DL (ref 3.4–5)
ALP SERPL-CCNC: 139 U/L (ref 40–150)
ALT SERPL W P-5'-P-CCNC: 33 U/L (ref 0–50)
ANION GAP SERPL CALCULATED.3IONS-SCNC: 2 MMOL/L (ref 3–14)
AST SERPL W P-5'-P-CCNC: 28 U/L (ref 0–45)
BASOPHILS # BLD AUTO: 0 10E9/L (ref 0–0.2)
BASOPHILS NFR BLD AUTO: 0.8 %
BILIRUB SERPL-MCNC: 0.4 MG/DL (ref 0.2–1.3)
BUN SERPL-MCNC: 14 MG/DL (ref 7–30)
CALCIUM SERPL-MCNC: 9.3 MG/DL (ref 8.5–10.1)
CHLORIDE SERPL-SCNC: 107 MMOL/L (ref 94–109)
CHOLEST SERPL-MCNC: 239 MG/DL
CO2 SERPL-SCNC: 29 MMOL/L (ref 20–32)
CREAT SERPL-MCNC: 0.98 MG/DL (ref 0.52–1.04)
DEPRECATED CALCIDIOL+CALCIFEROL SERPL-MC: 65 UG/L (ref 20–75)
DIFFERENTIAL METHOD BLD: ABNORMAL
EOSINOPHIL # BLD AUTO: 0.1 10E9/L (ref 0–0.7)
EOSINOPHIL NFR BLD AUTO: 1.4 %
ERYTHROCYTE [DISTWIDTH] IN BLOOD BY AUTOMATED COUNT: 11.2 % (ref 10–15)
GFR SERPL CREATININE-BSD FRML MDRD: 64 ML/MIN/{1.73_M2}
GLUCOSE SERPL-MCNC: 96 MG/DL (ref 70–99)
HCT VFR BLD AUTO: 44.6 % (ref 35–47)
HDLC SERPL-MCNC: 64 MG/DL
HGB BLD-MCNC: 15.6 G/DL (ref 11.7–15.7)
LDLC SERPL CALC-MCNC: 154 MG/DL
LYMPHOCYTES # BLD AUTO: 1.6 10E9/L (ref 0.8–5.3)
LYMPHOCYTES NFR BLD AUTO: 32.9 %
MCH RBC QN AUTO: 34.1 PG (ref 26.5–33)
MCHC RBC AUTO-ENTMCNC: 35 G/DL (ref 31.5–36.5)
MCV RBC AUTO: 98 FL (ref 78–100)
MONOCYTES # BLD AUTO: 0.3 10E9/L (ref 0–1.3)
MONOCYTES NFR BLD AUTO: 6.3 %
NEUTROPHILS # BLD AUTO: 2.9 10E9/L (ref 1.6–8.3)
NEUTROPHILS NFR BLD AUTO: 58.6 %
NONHDLC SERPL-MCNC: 175 MG/DL
PLATELET # BLD AUTO: 220 10E9/L (ref 150–450)
POTASSIUM SERPL-SCNC: 4.2 MMOL/L (ref 3.4–5.3)
PROT SERPL-MCNC: 8.3 G/DL (ref 6.8–8.8)
RBC # BLD AUTO: 4.57 10E12/L (ref 3.8–5.2)
SODIUM SERPL-SCNC: 138 MMOL/L (ref 133–144)
TRIGL SERPL-MCNC: 104 MG/DL
TSH SERPL DL<=0.005 MIU/L-ACNC: 0.45 MU/L (ref 0.4–4)
WBC # BLD AUTO: 4.9 10E9/L (ref 4–11)

## 2020-09-14 PROCEDURE — 90682 RIV4 VACC RECOMBINANT DNA IM: CPT | Performed by: NURSE PRACTITIONER

## 2020-09-14 PROCEDURE — 90471 IMMUNIZATION ADMIN: CPT | Performed by: NURSE PRACTITIONER

## 2020-09-14 PROCEDURE — 99396 PREV VISIT EST AGE 40-64: CPT | Mod: 25 | Performed by: NURSE PRACTITIONER

## 2020-09-14 PROCEDURE — 82306 VITAMIN D 25 HYDROXY: CPT | Performed by: NURSE PRACTITIONER

## 2020-09-14 PROCEDURE — 80061 LIPID PANEL: CPT | Performed by: NURSE PRACTITIONER

## 2020-09-14 PROCEDURE — 36415 COLL VENOUS BLD VENIPUNCTURE: CPT | Performed by: NURSE PRACTITIONER

## 2020-09-14 PROCEDURE — 80050 GENERAL HEALTH PANEL: CPT | Performed by: NURSE PRACTITIONER

## 2020-09-14 RX ORDER — NARATRIPTAN 2.5 MG/1
TABLET ORAL
COMMUNITY
Start: 2020-06-09 | End: 2023-04-04

## 2020-09-14 RX ORDER — DEXTROAMPHETAMINE SACCHARATE, AMPHETAMINE ASPARTATE, DEXTROAMPHETAMINE SULFATE AND AMPHETAMINE SULFATE 5; 5; 5; 5 MG/1; MG/1; MG/1; MG/1
TABLET ORAL
Qty: 90 TABLET | Refills: 0 | Status: SHIPPED | OUTPATIENT
Start: 2020-09-14 | End: 2020-12-14

## 2020-09-14 RX ORDER — MECLIZINE HYDROCHLORIDE 25 MG/1
25 TABLET ORAL 3 TIMES DAILY PRN
Qty: 30 TABLET | Refills: 3 | Status: SHIPPED | OUTPATIENT
Start: 2020-09-14

## 2020-09-14 RX ORDER — FEXOFENADINE HCL 180 MG/1
180 TABLET ORAL DAILY PRN
Qty: 30 TABLET | Refills: 6 | Status: SHIPPED | OUTPATIENT
Start: 2020-09-14 | End: 2020-12-14

## 2020-09-14 RX ORDER — ZOLPIDEM TARTRATE 5 MG/1
5 TABLET ORAL
Qty: 30 TABLET | Refills: 2 | Status: SHIPPED | OUTPATIENT
Start: 2020-09-14 | End: 2021-03-22

## 2020-09-14 RX ORDER — PHENAZOPYRIDINE HYDROCHLORIDE 200 MG/1
200 TABLET, FILM COATED ORAL 3 TIMES DAILY PRN
Qty: 30 TABLET | Refills: 3 | Status: SHIPPED | OUTPATIENT
Start: 2020-09-14 | End: 2023-04-04

## 2020-09-14 RX ORDER — NAPROXEN 500 MG/1
500 TABLET ORAL 2 TIMES DAILY WITH MEALS
Qty: 60 TABLET | Refills: 3 | Status: SHIPPED | OUTPATIENT
Start: 2020-09-14 | End: 2021-03-18

## 2020-09-14 RX ORDER — VALACYCLOVIR HYDROCHLORIDE 1 G/1
1000 TABLET, FILM COATED ORAL DAILY
Qty: 90 TABLET | Refills: 3 | Status: SHIPPED | OUTPATIENT
Start: 2020-09-14 | End: 2020-12-14

## 2020-09-14 RX ORDER — KETOROLAC TROMETHAMINE 10 MG/1
10 TABLET, FILM COATED ORAL EVERY 6 HOURS PRN
Qty: 30 TABLET | Refills: 1 | Status: SHIPPED | OUTPATIENT
Start: 2020-09-14 | End: 2020-12-14

## 2020-09-14 RX ORDER — DEXTROAMPHETAMINE SACCHARATE, AMPHETAMINE ASPARTATE, DEXTROAMPHETAMINE SULFATE AND AMPHETAMINE SULFATE 5; 5; 5; 5 MG/1; MG/1; MG/1; MG/1
TABLET ORAL
Qty: 90 TABLET | Refills: 0 | Status: SHIPPED | OUTPATIENT
Start: 2020-10-14 | End: 2020-12-14

## 2020-09-14 RX ORDER — SOLIFENACIN SUCCINATE 10 MG/1
10 TABLET, FILM COATED ORAL DAILY
Qty: 90 TABLET | Refills: 3 | Status: SHIPPED | OUTPATIENT
Start: 2020-09-14 | End: 2020-11-03

## 2020-09-14 RX ORDER — DEXTROAMPHETAMINE SACCHARATE, AMPHETAMINE ASPARTATE, DEXTROAMPHETAMINE SULFATE AND AMPHETAMINE SULFATE 5; 5; 5; 5 MG/1; MG/1; MG/1; MG/1
TABLET ORAL
Qty: 90 TABLET | Refills: 0 | Status: SHIPPED | OUTPATIENT
Start: 2020-11-14 | End: 2020-12-14

## 2020-09-14 RX ORDER — HYDROXYZINE HYDROCHLORIDE 25 MG/1
25 TABLET, FILM COATED ORAL 3 TIMES DAILY PRN
Qty: 30 TABLET | Refills: 1 | Status: SHIPPED | OUTPATIENT
Start: 2020-09-14 | End: 2020-12-14

## 2020-09-14 RX ORDER — KETOCONAZOLE 20 MG/ML
SHAMPOO TOPICAL
Qty: 120 ML | Refills: 11 | Status: SHIPPED | OUTPATIENT
Start: 2020-09-14 | End: 2021-12-20

## 2020-09-14 ASSESSMENT — ENCOUNTER SYMPTOMS
PALPITATIONS: 0
WEAKNESS: 0
SORE THROAT: 0
PARESTHESIAS: 0
CONSTIPATION: 1
ABDOMINAL PAIN: 0
DIZZINESS: 0
BREAST MASS: 0
FREQUENCY: 0
DYSURIA: 0
CHILLS: 0
HEADACHES: 0
NERVOUS/ANXIOUS: 0
COUGH: 0
HEMATURIA: 0
JOINT SWELLING: 0
MYALGIAS: 0
EYE PAIN: 0
DIARRHEA: 0
SHORTNESS OF BREATH: 0
HEMATOCHEZIA: 0
HEARTBURN: 0
ARTHRALGIAS: 1
FEVER: 0
NAUSEA: 0

## 2020-09-14 ASSESSMENT — MIFFLIN-ST. JEOR: SCORE: 1322.3

## 2020-09-14 NOTE — PROGRESS NOTES
SUBJECTIVE:   CC: Laney Maynard is an 55 year old woman who presents for preventive health visit.     Healthy Habits:     Getting at least 3 servings of Calcium per day:  Yes    Bi-annual eye exam:  Yes    Dental care twice a year:  Yes    Sleep apnea or symptoms of sleep apnea:  None    Diet:  Low salt    Frequency of exercise:  2-3 days/week    Duration of exercise:  15-30 minutes    Taking medications regularly:  Yes    Medication side effects:  None    PHQ-2 Total Score: 0    Additional concerns today:  Yes    Cervical cancer screening:  Last pap 5/6/2019, NIL, due every 3 years.  Breast cancer screening:  Last mammogram 12/4/2019.  Colon cancer screening:  Last colonoscopy 2014, due every 10 years.   Bone density screening:  Has never had this done.  ADHD:  Refill of adderall due, feels that the current dosage works well.   Migraine:  Has stopped Topamax  Future Appointments   Date Time Provider Department Center   9/14/2020  8:20 AM Haase, Susan Rachele, RON CNP [facility]     Appointment Notes for this encounter:   CAMERON reviewedpt rs from 9/16/20 physical    Health Maintenance Due   Topic Date Due     ADVANCE CARE PLANNING  1965     PREVENTIVE CARE VISIT  05/06/2020     URINE DRUG SCREEN  08/29/2020     ANNUAL REVIEW OF HM ORDERS  08/29/2020     INFLUENZA VACCINE (1) 09/01/2020     Health Maintenance addressed:  Flu Vaccine    Flu Vaccine Given during rooming process    MyChart Status:  Active and Using        Today's PHQ-2 Score:   PHQ-2 ( 1999 Pfizer) 9/14/2020   Q1: Little interest or pleasure in doing things 0   Q2: Feeling down, depressed or hopeless 0   PHQ-2 Score 0   Q1: Little interest or pleasure in doing things Not at all   Q2: Feeling down, depressed or hopeless Not at all   PHQ-2 Score 0       Abuse: Current or Past (Physical, Sexual or Emotional) - No  Do you feel safe in your environment? Yes    Have you ever done Advance Care Planning? (For example, a Health Directive, POLST,  or a discussion with a medical provider or your loved ones about your wishes): No, advance care planning information given to patient to review.  Patient plans to discuss their wishes with loved ones or provider.      Social History     Tobacco Use     Smoking status: Never Smoker     Smokeless tobacco: Never Used   Substance Use Topics     Alcohol use: No     Alcohol/week: 0.0 standard drinks     Frequency: Never     Drinks per session: 1 or 2     Binge frequency: Never       Alcohol Use 9/14/2020   Prescreen: >3 drinks/day or >7 drinks/week? Not Applicable   Prescreen: >3 drinks/day or >7 drinks/week? -       Reviewed orders with patient.  Reviewed health maintenance and updated orders accordingly - Yes  Lab work is in process  BP Readings from Last 3 Encounters:   09/14/20 (P) 132/88   05/18/20 110/78   04/02/20 114/84    Wt Readings from Last 3 Encounters:   09/14/20 (P) 73.9 kg (163 lb)   05/18/20 68 kg (150 lb)   04/02/20 73.5 kg (162 lb)                  Patient Active Problem List   Diagnosis     Alopecia     Seasonal allergic rhinitis     GERD (gastroesophageal reflux disease)     CARDIOVASCULAR SCREENING; LDL GOAL LESS THAN 160     Migraine headache     Sleep disorder     Family history of rheumatoid arthritis     Family history of sarcoidosis (mother)     Urinary frequency     Herpes simplex virus infection     Attention deficit hyperactivity disorder (ADHD)     Plantar fasciitis, bilateral     Complete rupture of rotator cuff     Mixed incontinence urge and stress (male)(female)     Ureterolithiasis     Past Surgical History:   Procedure Laterality Date     ARTHROSCOPY KNEE Right 04/26/2017    Procedure: Right knee arthroscopy and anterior fat pad resection with medial plica resection. Partial lateral menisectomy. Surgeon:  Fredi Lindsay MD  Location: [facility]     ARTHROSCOPY SHOULDER, OPEN ROTATOR CUFF REPAIR, COMBINED  7/31/2013    Procedure: COMBINED ARTHROSCOPY SHOULDER, OPEN ROTATOR  CUFF REPAIR;  Left Shoulder Arthroscopy, Distal Clavicale Resection, Decompression, Mini Open Rotator Cuff Repair    ;  Surgeon: Fredi Lindsay MD;  Location:  OR     BREAST SURGERY      Augmentation     C NONSPECIFIC PROCEDURE  age 16    colposcopy for abnormal pap     C NONSPECIFIC PROCEDURE      surgery L thumb     C NONSPECIFIC PROCEDURE      breast augmentation-silicone     C NONSPECIFIC PROCEDURE      s/p  x 2     C NONSPECIFIC PROCEDURE      Bilateral tubal ligation     C REMOVAL OF KIDNEY STONE       COLONOSCOPY  2014    Procedure: COLONOSCOPY;  Colonoscopy/WW;  Surgeon: Pancho Olsen MD;  Location:  GI     COMBINED CYSTOSCOPY, RETROGRADES, URETEROSCOPY, LASER HOLMIUM LITHOTRIPSY URETER(S), INSERT STENT Right 2016    Procedure: COMBINED CYSTOSCOPY, RETROGRADES, URETEROSCOPY, LASER HOLMIUM LITHOTRIPSY URETER(S), INSERT STENT;  Surgeon: Kushal Noriega MD;  Location:  OR     CYSTOSCOPY       CYSTOSCOPY, RETROGRADES, EXTRACT STONE, COMBINED  2013    Procedure: COMBINED CYSTOSCOPY, RETROGRADES, EXTRACT STONE;  Video Cystoscopy,  Right Ureteroscopy, ureteral dilatation,  Stone extraction;  Surgeon: Subhash Vann MD;  Location:  OR     EXTRACORPOREAL SHOCK WAVE LITHOTRIPSY (ESWL) Bilateral 2016    Procedure: EXTRACORPOREAL SHOCK WAVE LITHOTRIPSY (ESWL);  Surgeon: Bassam Vu MD;  Location:  OR     EXTRACORPOREAL SHOCK WAVE LITHOTRIPSY, CYSTOSCOPY, INSERT STENT URETER(S), COMBINED Bilateral 2018    Procedure: COMBINED EXTRACORPOREAL SHOCK WAVE LITHOTRIPSY, CYSTOSCOPY, INSERT STENT URETER(S);  BILATERAL COMBINED EXTRACORPOREAL SHOCK WAVE LITHOTRIPSY, CYSTOSCOPY, LEFT STENT PLACEMENT;  Surgeon: Bassam Vu MD;  Location:  OR     EYE SURGERY      Lasik     GYN SURGERY      laparoscopy     LAPAROSCOPIC CHOLECYSTECTOMY WITH CHOLANGIOGRAMS  2012    Procedure: LAPAROSCOPIC CHOLECYSTECTOMY WITH CHOLANGIOGRAMS;  LAPAROSCOPIC  CHOLECYSTECTOMY WITH CHOLANGIOGRAMS ;  Surgeon: Nataliya Gabriel MD;  Location: [facility] OR     LASER HOLMIUM LITHOTRIPSY URETER(S), INSERT STENT, COMBINED Left 10/30/2019    Procedure: CYSTOSCOPY,LEFT URETEROSCOPY, HOLMIUM LASER, STONE EXTRACTION, LEFT STENT PLACEMENT;  Surgeon: Bassam Vu MD;  Location: [facility] OR     TUBAL LIGATION         Social History     Tobacco Use     Smoking status: Never Smoker     Smokeless tobacco: Never Used   Substance Use Topics     Alcohol use: No     Alcohol/week: 0.0 standard drinks     Frequency: Never     Drinks per session: 1 or 2     Binge frequency: Never     Family History   Problem Relation Age of Onset     Diabetes Mother         type 2     Alcohol/Drug Mother         alcohol         Current Outpatient Medications   Medication Sig Dispense Refill     amphetamine-dextroamphetamine (ADDERALL) 20 MG tablet TAKE ONE TABLET BY MOUTH THREE TIMES A DAY 90 tablet 0     [START ON 10/14/2020] amphetamine-dextroamphetamine (ADDERALL) 20 MG tablet TAKE ONE TABLET BY MOUTH THREE TIMES A DAY 90 tablet 0     [START ON 11/14/2020] amphetamine-dextroamphetamine (ADDERALL) 20 MG tablet TAKE ONE TABLET BY MOUTH THREE TIMES A DAY 90 tablet 0     fexofenadine (ALLEGRA) 180 MG tablet Take 1 tablet (180 mg) by mouth daily as needed for allergies 30 tablet 6     hydrOXYzine (ATARAX) 25 MG tablet Take 1 tablet (25 mg) by mouth 3 times daily as needed for itching 30 tablet 1     ketoconazole (NIZORAL) 2 % external shampoo Apply to the affected area and wash off after 5 minutes, as needed. 120 mL 11     ketorolac (TORADOL) 10 MG tablet Take 1 tablet (10 mg) by mouth every 6 hours as needed for moderate pain 30 tablet 1     meclizine (ANTIVERT) 25 MG tablet Take 1 tablet (25 mg) by mouth 3 times daily as needed for nausea 30 tablet 3     naproxen (NAPROSYN) 500 MG tablet Take 1 tablet (500 mg) by mouth 2 times daily (with meals) 60 tablet 3     phenazopyridine (PYRIDIUM) 200 MG tablet Take 1  tablet (200 mg) by mouth 3 times daily as needed for irritation 30 tablet 3     solifenacin (VESICARE) 10 MG tablet Take 1 tablet (10 mg) by mouth daily Dispense Brand Name only. 90 tablet 3     valACYclovir (VALTREX) 1000 mg tablet Take 1 tablet (1,000 mg) by mouth daily 90 tablet 3     zolpidem (AMBIEN) 5 MG tablet Take 1 tablet (5 mg) by mouth nightly as needed for sleep 30 tablet 2     acyclovir (ZOVIRAX) 5 % external ointment Apply topically 2 times daily as needed (Outbreak)       amphetamine-dextroamphetamine (ADDERALL) 20 MG tablet TAKE ONE TABLET BY MOUTH THREE TIMES A DAY 90 tablet 0     fluocinolone acetonide 0.01 % OIL Use on scalp once a week, as needed. 1 Bottle 11     fluticasone (FLONASE) 50 MCG/ACT nasal spray Spray 2 sprays into both nostrils daily as needed for allergies 16 g 11     Menthol, Topical Analgesic, (BIOFREEZE COLORLESS) 4 % GEL Externally apply topically 3 times daily as needed 118 mL 1     Topiramate (TOPAMAX PO) Take 150 mg by mouth every morning (3 X 50MG = 150MG)         Mammogram Screening: Patient over age 50, mutual decision to screen reflected in health maintenance.    Pertinent mammograms are reviewed under the imaging tab.  History of abnormal Pap smear: NO - age 30- 65 PAP every 3 years recommended  PAP / HPV Latest Ref Rng & Units 5/6/2019 9/28/2015 3/8/2013   PAP - NIL NIL NIL   HPV 16 DNA NEG:Negative Negative Negative -   HPV 18 DNA NEG:Negative Negative Negative -   OTHER HR HPV NEG:Negative Negative Negative -     Reviewed and updated as needed this visit by clinical staff  Tobacco  Allergies  Meds  Med Hx  Surg Hx  Fam Hx  Soc Hx        Reviewed and updated as needed this visit by Provider            Review of Systems   Constitutional: Negative for chills and fever.   HENT: Negative for congestion, ear pain, hearing loss and sore throat.    Eyes: Negative for pain and visual disturbance.   Respiratory: Negative for cough and shortness of breath.   "  Cardiovascular: Negative for chest pain, palpitations and peripheral edema.   Gastrointestinal: Positive for constipation. Negative for abdominal pain, diarrhea, heartburn, hematochezia and nausea.   Breasts:  Negative for tenderness, breast mass and discharge.   Genitourinary: Negative for dysuria, frequency, genital sores, hematuria, pelvic pain, urgency, vaginal bleeding and vaginal discharge.   Musculoskeletal: Positive for arthralgias. Negative for joint swelling and myalgias.   Skin: Negative for rash.   Neurological: Negative for dizziness, weakness, headaches and paresthesias.   Psychiatric/Behavioral: Negative for mood changes. The patient is not nervous/anxious.      CONSTITUTIONAL: NEGATIVE for fever, chills, change in weight  INTEGUMENTARY/SKIN: NEGATIVE for worrisome rashes, moles or lesions  EYES: NEGATIVE for vision changes or irritation  ENT: NEGATIVE for ear, mouth and throat problems  RESP: NEGATIVE for significant cough or SOB  BREAST: NEGATIVE for masses, tenderness or discharge  CV: NEGATIVE for chest pain, palpitations or peripheral edema  GI: NEGATIVE for nausea, abdominal pain, heartburn, or change in bowel habits  : NEGATIVE for unusual urinary or vaginal symptoms. No vaginal bleeding.  MUSCULOSKELETAL: NEGATIVE for significant arthralgias or myalgia  NEURO: NEGATIVE for weakness, dizziness or paresthesias  PSYCHIATRIC: NEGATIVE for changes in mood or affect      OBJECTIVE:   BP (P) 132/88 (BP Location: Right arm, Patient Position: Sitting, Cuff Size: Adult Regular)   Pulse (P) 84   Temp (P) 98.3  F (36.8  C) (Oral)   Ht (P) 1.638 m (5' 4.5\")   Wt (P) 73.9 kg (163 lb)   LMP 02/25/2014   SpO2 (P) 100%   BMI (P) 27.55 kg/m    Physical Exam  GENERAL APPEARANCE: healthy, alert and no distress  EYES: Eyes grossly normal to inspection, PERRL and conjunctivae and sclerae normal  HENT: ear canals and TM's normal, nose and mouth without ulcers or lesions, oropharynx clear and oral mucous " membranes moist  NECK: no adenopathy, no asymmetry, masses, or scars and thyroid normal to palpation  RESP: lungs clear to auscultation - no rales, rhonchi or wheezes  BREAST: normal without masses, tenderness or nipple discharge and no palpable axillary masses or adenopathy  CV: regular rate and rhythm, normal S1 S2, no S3 or S4, no murmur, click or rub, no peripheral edema and peripheral pulses strong  ABDOMEN: soft, nontender, no hepatosplenomegaly, no masses and bowel sounds normal  MS: no musculoskeletal defects are noted and gait is age appropriate without ataxia  SKIN: no suspicious lesions or rashes  NEURO: Normal strength and tone, sensory exam grossly normal, mentation intact and speech normal  PSYCH: mentation appears normal and affect normal/bright      ASSESSMENT/PLAN:   Laney was seen today for physical, flu shot and imm/inj.    Diagnoses and all orders for this visit:    Routine general medical examination at a health care facility  -     CBC with platelets differential  -     Comprehensive metabolic panel  -     Lipid panel reflex to direct LDL Fasting  -     Vitamin D Deficiency  -     TSH with free T4 reflex    Menopause present  -     DX Hip/Pelvis/Spine; Future    Attention deficit hyperactivity disorder (ADHD), combined type: refill, follow up in 3 mo.   -     amphetamine-dextroamphetamine (ADDERALL) 20 MG tablet; TAKE ONE TABLET BY MOUTH THREE TIMES A DAY  -     amphetamine-dextroamphetamine (ADDERALL) 20 MG tablet; TAKE ONE TABLET BY MOUTH THREE TIMES A DAY  -     amphetamine-dextroamphetamine (ADDERALL) 20 MG tablet; TAKE ONE TABLET BY MOUTH THREE TIMES A DAY    Acute seasonal allergic rhinitis: increased this year, refill given.   -     fexofenadine (ALLEGRA) 180 MG tablet; Take 1 tablet (180 mg) by mouth daily as needed for allergies    Generalized pruritus  -     hydrOXYzine (ATARAX) 25 MG tablet; Take 1 tablet (25 mg) by mouth 3 times daily as needed for itching    Seborrheic dermatitis  "of scalp  -     ketoconazole (NIZORAL) 2 % external shampoo; Apply to the affected area and wash off after 5 minutes, as needed.    Ureterolithiasis  -     ketorolac (TORADOL) 10 MG tablet; Take 1 tablet (10 mg) by mouth every 6 hours as needed for moderate pain    Vertigo: takes infrequently.   -     meclizine (ANTIVERT) 25 MG tablet; Take 1 tablet (25 mg) by mouth 3 times daily as needed for nausea    Plantar fasciitis, bilateral  -     naproxen (NAPROSYN) 500 MG tablet; Take 1 tablet (500 mg) by mouth 2 times daily (with meals)    Urinary problem  -     phenazopyridine (PYRIDIUM) 200 MG tablet; Take 1 tablet (200 mg) by mouth 3 times daily as needed for irritation    Mixed incontinence urge and stress (male)(female)  -     solifenacin (VESICARE) 10 MG tablet; Take 1 tablet (10 mg) by mouth daily Dispense Brand Name only.    Herpes simplex virus infection  -     valACYclovir (VALTREX) 1000 mg tablet; Take 1 tablet (1,000 mg) by mouth daily    Sleep disorder: takes on a prn basis, several times per week.   -     zolpidem (AMBIEN) 5 MG tablet; Take 1 tablet (5 mg) by mouth nightly as needed for sleep    Need for prophylactic vaccination and inoculation against influenza  -     INFLUENZA QUAD, RECOMBINANT, P-FREE (RIV4) (FLUBLOCK) [60262]        COUNSELING:  Reviewed preventive health counseling, as reflected in patient instructions       Regular exercise       Healthy diet/nutrition       (Mariana)menopause management    Estimated body mass index is 25.75 kg/m  as calculated from the following:    Height as of 5/18/20: 1.626 m (5' 4\").    Weight as of 5/18/20: 68 kg (150 lb).    Weight management plan: Discussed healthy diet and exercise guidelines    She reports that she has never smoked. She has never used smokeless tobacco.      Counseling Resources:  ATP IV Guidelines  Pooled Cohorts Equation Calculator  Breast Cancer Risk Calculator  BRCA-Related Cancer Risk Assessment: FHS-7 Tool  FRAX Risk Assessment  ICSI " Preventive Guidelines  Dietary Guidelines for Americans, 2010  USDA's MyPlate  ASA Prophylaxis  Lung CA Screening  Follow up in 3 months for ADHD visit.    Susan Haase, APRN CNP  [facility]

## 2020-09-15 NOTE — RESULT ENCOUNTER NOTE
Pj Davison,  Your lab results are as below:  1)  TSH (thyroid level) is normal at 0.45.   2)  Cholesterol is elevated at 239, your LDL (bad cholesterol) is slightly elevated and your HDL (good cholesterol) is within normal range. Continue to follow a low cholesterol diet and we will recheck this in 1 year.  3)  Glucose is normal at 96 (normal fasting is <100).  4)  Vitamin D level is normal at 65.  5)  CBC (checks for anemia and infection) is normal.    If you have any questions do not hesitate to call the clinic to discuss the results with me further.     Sincerely,    Susan Haase, CNP

## 2020-09-23 ENCOUNTER — OFFICE VISIT (OUTPATIENT)
Dept: URGENT CARE | Facility: URGENT CARE | Age: 55
End: 2020-09-23
Payer: COMMERCIAL

## 2020-09-23 VITALS
SYSTOLIC BLOOD PRESSURE: 135 MMHG | BODY MASS INDEX: 27.98 KG/M2 | DIASTOLIC BLOOD PRESSURE: 77 MMHG | OXYGEN SATURATION: 98 % | WEIGHT: 163 LBS | HEART RATE: 66 BPM | TEMPERATURE: 98.4 F

## 2020-09-23 DIAGNOSIS — N30.00 ACUTE CYSTITIS WITHOUT HEMATURIA: Primary | ICD-10-CM

## 2020-09-23 LAB
ALBUMIN UR-MCNC: 30 MG/DL
APPEARANCE UR: CLEAR
BACTERIA #/AREA URNS HPF: ABNORMAL /HPF
BILIRUB UR QL STRIP: NEGATIVE
COLOR UR AUTO: YELLOW
GLUCOSE UR STRIP-MCNC: NEGATIVE MG/DL
HGB UR QL STRIP: ABNORMAL
KETONES UR STRIP-MCNC: ABNORMAL MG/DL
LEUKOCYTE ESTERASE UR QL STRIP: ABNORMAL
NITRATE UR QL: POSITIVE
NON-SQ EPI CELLS #/AREA URNS LPF: ABNORMAL /LPF
PH UR STRIP: 5.5 PH (ref 5–7)
RBC #/AREA URNS AUTO: ABNORMAL /HPF
SOURCE: ABNORMAL
SP GR UR STRIP: 1.02 (ref 1–1.03)
UROBILINOGEN UR STRIP-ACNC: 0.2 EU/DL (ref 0.2–1)
WBC #/AREA URNS AUTO: ABNORMAL /HPF

## 2020-09-23 PROCEDURE — 99213 OFFICE O/P EST LOW 20 MIN: CPT | Performed by: PHYSICIAN ASSISTANT

## 2020-09-23 PROCEDURE — 87088 URINE BACTERIA CULTURE: CPT | Performed by: PHYSICIAN ASSISTANT

## 2020-09-23 PROCEDURE — 81001 URINALYSIS AUTO W/SCOPE: CPT | Performed by: FAMILY MEDICINE

## 2020-09-23 PROCEDURE — 87086 URINE CULTURE/COLONY COUNT: CPT | Performed by: PHYSICIAN ASSISTANT

## 2020-09-23 PROCEDURE — 87186 SC STD MICRODIL/AGAR DIL: CPT | Performed by: PHYSICIAN ASSISTANT

## 2020-09-23 RX ORDER — NITROFURANTOIN 25; 75 MG/1; MG/1
100 CAPSULE ORAL 2 TIMES DAILY
Qty: 20 CAPSULE | Refills: 0 | Status: SHIPPED | OUTPATIENT
Start: 2020-09-23 | End: 2020-10-03

## 2020-09-23 NOTE — PATIENT INSTRUCTIONS
"  Patient Education     Bladder Infection, Female (Adult)    Urine is normally doesn't have any bacteria in it. But bacteria can get into the urinary tract from the skin around the rectum. Or they can travel in the blood from elsewhere in the body. Once they are in your urinary tract, they can cause infection in the urethra (urethritis), the bladder (cystitis), or the kidneys (pyelonephritis).  The most common place for an infection is in the bladder. This is called a bladder infection. This is one of the most common infections in women. Most bladder infections are easily treated. They are not serious unless the infection spreads to the kidney.  The phrases \"bladder infection,\" \"UTI,\" and \"cystitis\" are often used to describe the same thing. But they are not always the same. Cystitis is an inflammation of the bladder. The most common cause of cystitis is an infection.  Symptoms  The infection causes inflammation in the urethra and bladder. This causes many of the symptoms. The most common symptoms of a bladder infection are:    Pain or burning when urinating    Having to urinate more often than usual    Urgent need to urinate    Only a small amount of urine comes out    Blood in urine    Abdominal discomfort. This is usually in the lower abdomen above the pubic bone.    Cloudy urine    Strong- or bad-smelling urine    Unable to urinate (urinary retention)    Unable to hold urine in (urinary incontinence)    Fever    Loss of appetite    Confusion (in older adults)  Causes  Bladder infections are not contagious. You can't get one from someone else, from a toilet seat, or from sharing a bath.  The most common cause of bladder infections is bacteria from the bowels. The bacteria get onto the skin around the opening of the urethra. From there, they can get into the urine and travel up to the bladder, causing inflammation and infection. This usually happens because of:    Wiping improperly after urinating. Always wipe " from front to back.    Bowel incontinence    Pregnancy    Procedures such as having a catheter inserted    Older age    Not emptying your bladder. This can allow bacteria a chance to grow in your urine.    Dehydration    Constipation    Sex    Use of a diaphragm for birth control   Treatment  Bladder infections are diagnosed by a urine test. They are treated with antibiotics and usually clear up quickly without complications. Treatment helps prevent a more serious kidney infection.  Medicines  Medicines can help in the treatment of a bladder infection:    Take antibiotics until they are used up, even if you feel better. It is important to finish them to make sure the infection has cleared.    You can use acetaminophen or ibuprofen for pain, fever, or discomfort, unless another medicine was prescribed. If you have chronic liver or kidney disease, talk with your healthcare provider before using these medicines. Also talk with your provider if you've ever had a stomach ulcer or gastrointestinal bleeding, or are taking blood-thinner medicines.    If you are given phenazopydridine to reduce burning with urination, it will cause your urine to become a bright orange color. This can stain clothing.  Care and prevention  These self-care steps can help prevent future infections:    Drink plenty of fluids to prevent dehydration and flush out your bladder. Do this unless you must restrict fluids for other health reasons, or your doctor told you not to.    Proper cleaning after going to the bathroom is important. Wipe from front to back after using the toilet to prevent the spread of bacteria.    Urinate more often. Don't try to hold urine in for a long time.    Wear loose-fitting clothes and cotton underwear. Avoid tight-fitting pants.    Improve your diet and prevent constipation. Eat more fresh fruit and vegetables, and fiber, and less junk and fatty foods.    Avoid sex until your symptoms are gone.    Avoid caffeine,  alcohol, and spicy foods. These can irritate your bladder.    Urinate right after intercourse to flush out your bladder.    If you use birth control pills and have frequent bladder infections, discuss it with your doctor.  Follow-up care  Call your healthcare provider if all symptoms are not gone after 3 days of treatment. This is especially important if you have repeat infections.  If a culture was done, you will be told if your treatment needs to be changed. If directed, you can call to find out the results.  If X-rays were done, you will be told if the results will affect your treatment.  Call 911  Call 911 if any of the following occur:    Trouble breathing    Hard to wake up or confusion    Fainting or loss of consciousness    Rapid heart rate  When to seek medical advice  Call your healthcare provider right away if any of these occur:    Fever of 100.4 F (38.0 C) or higher, or as directed by your healthcare provider    Symptoms are not better by the third day of treatment    Back or belly (abdominal) pain that gets worse    Repeated vomiting, or unable to keep medicine down    Weakness or dizziness    Vaginal discharge    Pain, redness, or swelling in the outer vaginal area (labia)  Date Last Reviewed: 10/1/2016    9467-6034 [employer]. [address] [place] [address], [place] [address]. All rights reserved. This information is not intended as a substitute for professional medical care. Always follow your healthcare professional's instructions.

## 2020-09-23 NOTE — PROGRESS NOTES
SUBJECTIVE:   Laney Maynard is a 55 year old female who  presents today for a possible UTI. Symptoms of dysuria, urgency, frequency, burning and hesitancy have been going on for 3day(s).  Hematuria no.  gradual onsetand moderate.  There is no history of fever, chills, nausea or vomiting.  No history of vaginal or penile discharge. This patient does have a history of urinary tract infections. Patient denies long duration, rigors, flank pain, temperature > 101 degrees F., Vomiting, significant nausea or diarrhea, taking Coumadin and GFR less than 30 within the last year or vaginal discharge, vaginal odor, vaginal itching and dyspareunia (pain in labia/pelvis)     Past Medical History:   Diagnosis Date     Abnormal glandular Papanicolaou smear of cervix      Allergic rhinitis, cause unspecified      Alopecia      Attention deficit hyperactivity disorder (ADHD)      Chronic pain of left knee      Complete rupture of rotator cuff      Constipation      Gastro-oesophageal reflux disease      Heart murmur     murmur     Hematuria      Herpes simplex virus infection      Hydronephrosis, right      Lymphocytopenia 4/4/2011     Migraine headache      Migraine, unspecified, without mention of intractable migraine without mention of status migrainosus      Mixed incontinence urge and stress      Mumps      Numbness and tingling     LEFT ARM     Palpitations      Plantar fasciitis, bilateral      Renal disease     hx stones x 2 episodes     Seasonal allergic rhinitis      Sleep disorder      Ureterolithiasis      Urinary frequency     burning     Vertigo      Current Outpatient Medications   Medication Sig Dispense Refill     acyclovir (ZOVIRAX) 5 % external ointment Apply topically 2 times daily as needed (Outbreak)       amphetamine-dextroamphetamine (ADDERALL) 20 MG tablet TAKE ONE TABLET BY MOUTH THREE TIMES A DAY 90 tablet 0     [START ON 10/14/2020] amphetamine-dextroamphetamine (ADDERALL) 20 MG tablet TAKE ONE  TABLET BY MOUTH THREE TIMES A DAY 90 tablet 0     [START ON 11/14/2020] amphetamine-dextroamphetamine (ADDERALL) 20 MG tablet TAKE ONE TABLET BY MOUTH THREE TIMES A DAY 90 tablet 0     fexofenadine (ALLEGRA) 180 MG tablet Take 1 tablet (180 mg) by mouth daily as needed for allergies 30 tablet 6     fluocinolone acetonide 0.01 % OIL Use on scalp once a week, as needed. 1 Bottle 11     fluticasone (FLONASE) 50 MCG/ACT nasal spray Spray 2 sprays into both nostrils daily as needed for allergies 16 g 11     ketoconazole (NIZORAL) 2 % external shampoo Apply to the affected area and wash off after 5 minutes, as needed. 120 mL 11     ketorolac (TORADOL) 10 MG tablet Take 1 tablet (10 mg) by mouth every 6 hours as needed for moderate pain 30 tablet 1     meclizine (ANTIVERT) 25 MG tablet Take 1 tablet (25 mg) by mouth 3 times daily as needed for nausea 30 tablet 3     Menthol, Topical Analgesic, (BIOFREEZE COLORLESS) 4 % GEL Externally apply topically 3 times daily as needed 118 mL 1     naproxen (NAPROSYN) 500 MG tablet Take 1 tablet (500 mg) by mouth 2 times daily (with meals) 60 tablet 3     naratriptan (AMERGE) 2.5 MG tablet        phenazopyridine (PYRIDIUM) 200 MG tablet Take 1 tablet (200 mg) by mouth 3 times daily as needed for irritation 30 tablet 3     solifenacin (VESICARE) 10 MG tablet Take 1 tablet (10 mg) by mouth daily Dispense Brand Name only. 90 tablet 3     Topiramate (TOPAMAX PO) Take 150 mg by mouth every morning (3 X 50MG = 150MG)       zolpidem (AMBIEN) 5 MG tablet Take 1 tablet (5 mg) by mouth nightly as needed for sleep 30 tablet 2     hydrOXYzine (ATARAX) 25 MG tablet Take 1 tablet (25 mg) by mouth 3 times daily as needed for itching (Patient not taking: Reported on 9/23/2020) 30 tablet 1     valACYclovir (VALTREX) 1000 mg tablet Take 1 tablet (1,000 mg) by mouth daily (Patient not taking: Reported on 9/23/2020) 90 tablet 3     Social History     Tobacco Use     Smoking status: Never Smoker      Smokeless tobacco: Never Used   Substance Use Topics     Alcohol use: No     Alcohol/week: 0.0 standard drinks     Frequency: Never     Drinks per session: 1 or 2     Binge frequency: Never       ROS:   10 point ROS negative except as listed above      OBJECTIVE:  /77   Pulse 66   Temp 98.4  F (36.9  C) (Temporal)   Wt 73.9 kg (163 lb)   LMP 02/25/2014   SpO2 98%   Breastfeeding No   BMI (P) 27.55 kg/m    GENERAL APPEARANCE: healthy, alert and no distress  RESP: lungs clear to auscultation - no rales, rhonchi or wheezes  CV: regular rates and rhythm, normal S1 S2, no murmur noted  BACK: No CVA tenderness  SKIN: no suspicious lesions or rashes    Results for orders placed or performed in visit on 09/23/20   UA reflex to Microscopic and Culture     Status: Abnormal    Specimen: Midstream Urine   Result Value Ref Range    Color Urine Yellow     Appearance Urine Clear     Glucose Urine Negative NEG^Negative mg/dL    Bilirubin Urine Negative NEG^Negative    Ketones Urine Trace (A) NEG^Negative mg/dL    Specific Gravity Urine 1.025 1.003 - 1.035    Blood Urine Trace (A) NEG^Negative    pH Urine 5.5 5.0 - 7.0 pH    Protein Albumin Urine 30 (A) NEG^Negative mg/dL    Urobilinogen Urine 0.2 0.2 - 1.0 EU/dL    Nitrite Urine Positive (A) NEG^Negative    Leukocyte Esterase Urine Small (A) NEG^Negative    Source Midstream Urine    Urine Microscopic     Status: Abnormal   Result Value Ref Range    WBC Urine 5-10 (A) OTO5^0 - 5 /HPF    RBC Urine O - 2 OTO2^O - 2 /HPF    Squamous Epithelial /LPF Urine Few FEW^Few /LPF    Bacteria Urine Many (A) NEG^Negative /HPF       ASSESSMENT: (N30.00) Acute cystitis without hematuria  (primary encounter diagnosis)  Comment: macrobid x10 per patient request  Plan: UA reflex to Microscopic and Culture, Urine         Microscopic, Urine Culture Aerobic Bacterial,         nitroFURantoin macrocrystal-monohydrate         (MACROBID) 100 MG capsule      Red flags and emergent follow up  "discussed, and understood by patient  Follow up with PCP if symptoms worsen or fail to improve    Patient Instructions     Patient Education     Bladder Infection, Female (Adult)    Urine is normally doesn't have any bacteria in it. But bacteria can get into the urinary tract from the skin around the rectum. Or they can travel in the blood from elsewhere in the body. Once they are in your urinary tract, they can cause infection in the urethra (urethritis), the bladder (cystitis), or the kidneys (pyelonephritis).  The most common place for an infection is in the bladder. This is called a bladder infection. This is one of the most common infections in women. Most bladder infections are easily treated. They are not serious unless the infection spreads to the kidney.  The phrases \"bladder infection,\" \"UTI,\" and \"cystitis\" are often used to describe the same thing. But they are not always the same. Cystitis is an inflammation of the bladder. The most common cause of cystitis is an infection.  Symptoms  The infection causes inflammation in the urethra and bladder. This causes many of the symptoms. The most common symptoms of a bladder infection are:    Pain or burning when urinating    Having to urinate more often than usual    Urgent need to urinate    Only a small amount of urine comes out    Blood in urine    Abdominal discomfort. This is usually in the lower abdomen above the pubic bone.    Cloudy urine    Strong- or bad-smelling urine    Unable to urinate (urinary retention)    Unable to hold urine in (urinary incontinence)    Fever    Loss of appetite    Confusion (in older adults)  Causes  Bladder infections are not contagious. You can't get one from someone else, from a toilet seat, or from sharing a bath.  The most common cause of bladder infections is bacteria from the bowels. The bacteria get onto the skin around the opening of the urethra. From there, they can get into the urine and travel up to the bladder, " causing inflammation and infection. This usually happens because of:    Wiping improperly after urinating. Always wipe from front to back.    Bowel incontinence    Pregnancy    Procedures such as having a catheter inserted    Older age    Not emptying your bladder. This can allow bacteria a chance to grow in your urine.    Dehydration    Constipation    Sex    Use of a diaphragm for birth control   Treatment  Bladder infections are diagnosed by a urine test. They are treated with antibiotics and usually clear up quickly without complications. Treatment helps prevent a more serious kidney infection.  Medicines  Medicines can help in the treatment of a bladder infection:    Take antibiotics until they are used up, even if you feel better. It is important to finish them to make sure the infection has cleared.    You can use acetaminophen or ibuprofen for pain, fever, or discomfort, unless another medicine was prescribed. If you have chronic liver or kidney disease, talk with your healthcare provider before using these medicines. Also talk with your provider if you've ever had a stomach ulcer or gastrointestinal bleeding, or are taking blood-thinner medicines.    If you are given phenazopydridine to reduce burning with urination, it will cause your urine to become a bright orange color. This can stain clothing.  Care and prevention  These self-care steps can help prevent future infections:    Drink plenty of fluids to prevent dehydration and flush out your bladder. Do this unless you must restrict fluids for other health reasons, or your doctor told you not to.    Proper cleaning after going to the bathroom is important. Wipe from front to back after using the toilet to prevent the spread of bacteria.    Urinate more often. Don't try to hold urine in for a long time.    Wear loose-fitting clothes and cotton underwear. Avoid tight-fitting pants.    Improve your diet and prevent constipation. Eat more fresh fruit and  vegetables, and fiber, and less junk and fatty foods.    Avoid sex until your symptoms are gone.    Avoid caffeine, alcohol, and spicy foods. These can irritate your bladder.    Urinate right after intercourse to flush out your bladder.    If you use birth control pills and have frequent bladder infections, discuss it with your doctor.  Follow-up care  Call your healthcare provider if all symptoms are not gone after 3 days of treatment. This is especially important if you have repeat infections.  If a culture was done, you will be told if your treatment needs to be changed. If directed, you can call to find out the results.  If X-rays were done, you will be told if the results will affect your treatment.  Call 911  Call 911 if any of the following occur:    Trouble breathing    Hard to wake up or confusion    Fainting or loss of consciousness    Rapid heart rate  When to seek medical advice  Call your healthcare provider right away if any of these occur:    Fever of 100.4 F (38.0 C) or higher, or as directed by your healthcare provider    Symptoms are not better by the third day of treatment    Back or belly (abdominal) pain that gets worse    Repeated vomiting, or unable to keep medicine down    Weakness or dizziness    Vaginal discharge    Pain, redness, or swelling in the outer vaginal area (labia)  Date Last Reviewed: 10/1/2016    8907-8270 [employer]. [address] [place] [address], [place] [address]. All rights reserved. This information is not intended as a substitute for professional medical care. Always follow your healthcare professional's instructions.               '

## 2020-09-25 ENCOUNTER — ANCILLARY PROCEDURE (OUTPATIENT)
Dept: BONE DENSITY | Facility: CLINIC | Age: 55
End: 2020-09-25
Attending: NURSE PRACTITIONER
Payer: COMMERCIAL

## 2020-09-25 DIAGNOSIS — Z78.0 MENOPAUSE PRESENT: ICD-10-CM

## 2020-09-25 LAB
BACTERIA SPEC CULT: ABNORMAL
SPECIMEN SOURCE: ABNORMAL

## 2020-09-28 NOTE — RESULT ENCOUNTER NOTE
Pj Davison,  Your DEXA scan shows that you have normal bone density.  Please follow up at age 64 for a repeat scan.  Sincerely,  Susan Haase, CNP

## 2020-12-14 ENCOUNTER — VIRTUAL VISIT (OUTPATIENT)
Dept: FAMILY MEDICINE | Facility: CLINIC | Age: 55
End: 2020-12-14
Payer: COMMERCIAL

## 2020-12-14 DIAGNOSIS — B00.9 HERPES SIMPLEX VIRUS INFECTION: ICD-10-CM

## 2020-12-14 DIAGNOSIS — F90.2 ATTENTION DEFICIT HYPERACTIVITY DISORDER (ADHD), COMBINED TYPE: Primary | ICD-10-CM

## 2020-12-14 DIAGNOSIS — L29.9 GENERALIZED PRURITUS: ICD-10-CM

## 2020-12-14 DIAGNOSIS — N20.1 URETEROLITHIASIS: ICD-10-CM

## 2020-12-14 DIAGNOSIS — J30.2 ACUTE SEASONAL ALLERGIC RHINITIS: ICD-10-CM

## 2020-12-14 PROCEDURE — 99213 OFFICE O/P EST LOW 20 MIN: CPT | Mod: 95 | Performed by: NURSE PRACTITIONER

## 2020-12-14 RX ORDER — DEXTROAMPHETAMINE SACCHARATE, AMPHETAMINE ASPARTATE, DEXTROAMPHETAMINE SULFATE AND AMPHETAMINE SULFATE 5; 5; 5; 5 MG/1; MG/1; MG/1; MG/1
TABLET ORAL
Qty: 90 TABLET | Refills: 0 | Status: SHIPPED | OUTPATIENT
Start: 2021-01-13 | End: 2021-03-22

## 2020-12-14 RX ORDER — DEXTROAMPHETAMINE SACCHARATE, AMPHETAMINE ASPARTATE, DEXTROAMPHETAMINE SULFATE AND AMPHETAMINE SULFATE 5; 5; 5; 5 MG/1; MG/1; MG/1; MG/1
TABLET ORAL
Qty: 90 TABLET | Refills: 0 | Status: SHIPPED | OUTPATIENT
Start: 2021-02-12 | End: 2021-03-22

## 2020-12-14 RX ORDER — FEXOFENADINE HCL 180 MG/1
180 TABLET ORAL DAILY PRN
Qty: 30 TABLET | Refills: 6 | Status: SHIPPED | OUTPATIENT
Start: 2020-12-14 | End: 2023-04-04

## 2020-12-14 RX ORDER — HYDROXYZINE HYDROCHLORIDE 25 MG/1
25 TABLET, FILM COATED ORAL 3 TIMES DAILY PRN
Qty: 30 TABLET | Refills: 1 | Status: SHIPPED | OUTPATIENT
Start: 2020-12-14 | End: 2021-07-01

## 2020-12-14 RX ORDER — DEXTROAMPHETAMINE SACCHARATE, AMPHETAMINE ASPARTATE, DEXTROAMPHETAMINE SULFATE AND AMPHETAMINE SULFATE 5; 5; 5; 5 MG/1; MG/1; MG/1; MG/1
TABLET ORAL
Qty: 90 TABLET | Refills: 0 | Status: SHIPPED | OUTPATIENT
Start: 2020-12-14 | End: 2021-03-22

## 2020-12-14 RX ORDER — VALACYCLOVIR HYDROCHLORIDE 1 G/1
1000 TABLET, FILM COATED ORAL DAILY
Qty: 90 TABLET | Refills: 3 | Status: SHIPPED | OUTPATIENT
Start: 2020-12-14 | End: 2021-12-20

## 2020-12-14 RX ORDER — KETOROLAC TROMETHAMINE 10 MG/1
10 TABLET, FILM COATED ORAL EVERY 6 HOURS PRN
Qty: 30 TABLET | Refills: 1 | Status: SHIPPED | OUTPATIENT
Start: 2020-12-14 | End: 2023-04-04

## 2020-12-14 NOTE — PROGRESS NOTES
"Laney Maynard is a 55 year old female who is being evaluated via a billable video visit.      The patient has been notified of following:     \"This video visit will be conducted via a call between you and your physician/provider. We have found that certain health care needs can be provided without the need for an in-person physical exam.  This service lets us provide the care you need with a video conversation.  If a prescription is necessary we can send it directly to your pharmacy.  If lab work is needed we can place an order for that and you can then stop by our lab to have the test done at a later time.    Video visits are billed at different rates depending on your insurance coverage.  Please reach out to your insurance provider with any questions.    If during the course of the call the physician/provider feels a video visit is not appropriate, you will not be charged for this service.\"    Patient has given verbal consent for Video visit? Yes  How would you like to obtain your AVS? MyChart  If you are dropped from the video visit, the video invite should be resent to: Text to cell phone: [phone]  Will anyone else be joining your video visit? NoSubjective     Laney Maynard is a 55 year old female who presents today via video visit for the following health issues:    HPI      ADHD Follow-Up (Adult)  Concerns: none   Changes since last visit: Stable  Taking controlled (daily) medications as prescribed: Yes  Sleep: no problems  Adult ADHD Self-Reporting form given to patient?:  No  Currently in counseling: No    Medication Benefits:   Controlled symptoms: Attention span  Uncontrolled symptoms:  None    Medication Side Effects:  Reports:  none  Sleep Problems? no  ++++++++++++++++++++++++++++++++++++++++++++++++    Employer Concerns/Feedback: Improving  Coworker Concerns:   Improving  Home/Family Concerns: Improving          Video Start Time: 1328    Review of Systems   CONSTITUTIONAL: " NEGATIVE for fever, chills, change in weight  RESP: NEGATIVE for significant cough or SOB  CV: NEGATIVE for chest pain, palpitations or peripheral edema  PSYCHIATRIC: NEGATIVE for changes in mood or affect      Objective           Vitals:  No vitals were obtained today due to virtual visit.    Physical Exam     GENERAL: Healthy, alert and no distress  RESP: No audible wheeze, cough, or visible cyanosis.  No visible retractions or increased work of breathing.    SKIN: Visible skin clear. No significant rash, abnormal pigmentation or lesions.  NEURO: Cranial nerves grossly intact.  Mentation and speech appropriate for age.  PSYCH: Mentation appears normal, affect normal/bright, judgement and insight intact, normal speech and appearance well-groomed.              Assessment & Plan     Attention deficit hyperactivity disorder (ADHD), combined type:  Well controlled, reviewed PDMP, no concerns.  - amphetamine-dextroamphetamine (ADDERALL) 20 MG tablet  Dispense: 90 tablet; Refill: 0  - amphetamine-dextroamphetamine (ADDERALL) 20 MG tablet  Dispense: 90 tablet; Refill: 0  - amphetamine-dextroamphetamine (ADDERALL) 20 MG tablet  Dispense: 90 tablet; Refill: 0    Acute seasonal allergic rhinitis: refill   - fexofenadine (ALLEGRA) 180 MG tablet  Dispense: 30 tablet; Refill: 6    Generalized pruritus: taking prn  - hydrOXYzine (ATARAX) 25 MG tablet  Dispense: 30 tablet; Refill: 1    Ureterolithiasis: has not had any recent stones, has follow up with urology in 1/2021  - ketorolac (TORADOL) 10 MG tablet  Dispense: 30 tablet; Refill: 1    Herpes simplex virus infection: no recent outbreaks  - valACYclovir (VALTREX) 1000 mg tablet  Dispense: 90 tablet; Refill: 3            Return in about 3 months (around 3/14/2021) for Routine Visit, ADHD video.    Susan Haase, APRN [facility]      Video-Visit Details    Type of service:  Video Visit    Video End Time:1345  Originating Location (pt. Location):  Home    Distant Location (provider location):  [facility]     Platform used for Video Visit: Channing

## 2021-02-11 ENCOUNTER — VIRTUAL VISIT (OUTPATIENT)
Dept: FAMILY MEDICINE | Facility: CLINIC | Age: 56
End: 2021-02-11
Payer: COMMERCIAL

## 2021-02-11 DIAGNOSIS — M72.2 PLANTAR FASCIITIS, BILATERAL: Primary | ICD-10-CM

## 2021-02-11 DIAGNOSIS — Z12.31 VISIT FOR SCREENING MAMMOGRAM: ICD-10-CM

## 2021-02-11 PROCEDURE — 99213 OFFICE O/P EST LOW 20 MIN: CPT | Mod: 95 | Performed by: FAMILY MEDICINE

## 2021-02-11 RX ORDER — METHYLPREDNISOLONE 4 MG
TABLET, DOSE PACK ORAL
Qty: 21 TABLET | Refills: 0 | Status: SHIPPED | OUTPATIENT
Start: 2021-02-11 | End: 2021-03-22

## 2021-02-11 NOTE — PATIENT INSTRUCTIONS
You may schedule your mammogram at [facility] by calling [phone] and asking to schedule your mammogram or you may schedule with [facility] in [place] by calling [phone].    Patient Education     Treating Plantar Fasciitis  First, your healthcare provider tries to find out the cause of your problem. He or she can then suggest ways to ease pain. If your pain is due to poor foot mechanics, occasionally custom-made shoe inserts (orthoses) may help.    Ease symptoms    To relieve mild symptoms, try aspirin, ibuprofen, or other medicines as directed. Rubbing ice on the area may also help.    To lessen severe pain and swelling, your healthcare provider may give you pills or injections. In some cases, you may need a walking cast. Physical therapy, such as ultrasound or a daily stretching program, may also be recommended. Surgery is rarely needed.    To ease symptoms caused by poor foot mechanics, your foot may be taped. This supports the arch and temporarily controls movement. Night splints may also help by stretching the fascia.    Control movement  If taping helps, your healthcare provider may prescribe orthoses. These are inserts built from plaster casts of your feet. They control the way your foot moves. As a result, your symptoms may go away.  Reduce overuse  Every time your foot strikes the ground, the plantar fascia is stretched. You can lessen the strain on the plantar fascia and the chance of overuse by:    Losing any excess weight    Not running on hard or uneven ground    Using orthoses, if recommended, in your shoes and house slippers  If surgery is needed  Your healthcare provider may consider surgery if other types of treatment don't control your pain. During surgery, the plantar fascia is partially cut to release tension. As you heal, fibrous tissue fills the space between the heel bone and the plantar fascia.  Charley last reviewed this educational content  on 1/1/2018 2000-2020 [employer]. [address] [place] [address], [place] [address]. All rights reserved. This information is not intended as a substitute for professional medical care. Always follow your healthcare professional's instructions.

## 2021-02-11 NOTE — PROGRESS NOTES
Laney is a 56 year old who is being evaluated via a billable video visit.      How would you like to obtain your AVS? MyChart  If the video visit is dropped, the invitation should be resent by: Text to cell phone: [phone]   Will anyone else be joining your video visit? No    Video Start Time: 9:15 AM    Assessment & Plan     Plantar fasciitis, bilateral  recurrent  - methylPREDNISolone (MEDROL DOSEPAK) 4 MG tablet therapy pack; Follow Package Directions  - REVIEW OF HEALTH MAINTENANCE PROTOCOL ORDERS  The potential side effects of the prescription medication were discussed with the patient.      Visit for screening mammogram    - *MA Screening Digital Bilateral; Future      15 minutes spent on the date of the encounter doing chart review, patient visit and documentation        FUTURE APPOINTMENTS:       - Follow-up for annual visit or as needed    No follow-ups on file.    Khadra Hu MD  [facility]   Laney is a 56 year old who presents for the following health issues she has plantar fascitis.  Both her feet are bothering her.   She has had this for 20 years.  She uses naproxen and has shoe orthotics which she wears all the time.   At times it flares and she needs medrol dose pack.    This works well.   It has been a few years since that has happened.   She got a shot in her foot once when it was really bad.       HPI       Musculoskeletal problem/pain  Onset/Duration: about 1 week  Description  Location: foot - bilateral  Joint Swelling: no  Redness: YES  Pain: YES  Warmth: no  Intensity:  moderate  Progression of Symptoms:  worsening  Accompanying signs and symptoms:   Fevers: no  Numbness/tingling/weakness: YES  History  Trauma to the area: no  Recent illness:  no  Previous similar problem: YES  Previous evaluation:  no  Precipitating or alleviating factors:  Aggravating factors include: standing and walking  Therapies tried and outcome: massage and  stretching    Past Medical History:   Diagnosis Date     Abnormal glandular Papanicolaou smear of cervix      Allergic rhinitis, cause unspecified      Alopecia      Attention deficit hyperactivity disorder (ADHD)      Chronic pain of left knee      Complete rupture of rotator cuff      Constipation      Gastro-oesophageal reflux disease      Heart murmur     murmur     Hematuria      Herpes simplex virus infection      Hydronephrosis, right      Lymphocytopenia 4/4/2011     Migraine headache      Migraine, unspecified, without mention of intractable migraine without mention of status migrainosus      Mixed incontinence urge and stress      Mumps      Numbness and tingling     LEFT ARM     Palpitations      Plantar fasciitis, bilateral      Renal disease     hx stones x 2 episodes     Seasonal allergic rhinitis      Sleep disorder      Ureterolithiasis      Urinary frequency     burning     Vertigo        Past Surgical History:   Procedure Laterality Date     ARTHROSCOPY KNEE Right 04/26/2017    Procedure: Right knee arthroscopy and anterior fat pad resection with medial plica resection. Partial lateral menisectomy. Surgeon:  Fredi Lindsay MD  Location: [facility]     ARTHROSCOPY SHOULDER, OPEN ROTATOR CUFF REPAIR, COMBINED  7/31/2013    Procedure: COMBINED ARTHROSCOPY SHOULDER, OPEN ROTATOR CUFF REPAIR;  Left Shoulder Arthroscopy, Distal Clavicale Resection, Decompression, Mini Open Rotator Cuff Repair    ;  Surgeon: Fredi Lindsay MD;  Location:  OR     BREAST SURGERY  2014    Augmentation     C REMOVAL OF KIDNEY STONE       COLONOSCOPY  2/7/2014    Procedure: COLONOSCOPY;  Colonoscopy/WW;  Surgeon: Pancho Olsen MD;  Location:  GI     COMBINED CYSTOSCOPY, RETROGRADES, URETEROSCOPY, LASER HOLMIUM LITHOTRIPSY URETER(S), INSERT STENT Right 4/23/2016    Procedure: COMBINED CYSTOSCOPY, RETROGRADES, URETEROSCOPY, LASER HOLMIUM LITHOTRIPSY URETER(S), INSERT STENT;  Surgeon: Kushal Noriega  MD Vishnu;  Location: [facility] OR     CYSTOSCOPY       CYSTOSCOPY, RETROGRADES, EXTRACT STONE, COMBINED  2013    Procedure: COMBINED CYSTOSCOPY, RETROGRADES, EXTRACT STONE;  Video Cystoscopy,  Right Ureteroscopy, ureteral dilatation,  Stone extraction;  Surgeon: Subhash Vann MD;  Location:  OR     EXTRACORPOREAL SHOCK WAVE LITHOTRIPSY (ESWL) Bilateral 2016    Procedure: EXTRACORPOREAL SHOCK WAVE LITHOTRIPSY (ESWL);  Surgeon: Bassam Vu MD;  Location:  OR     EXTRACORPOREAL SHOCK WAVE LITHOTRIPSY, CYSTOSCOPY, INSERT STENT URETER(S), COMBINED Bilateral 2018    Procedure: COMBINED EXTRACORPOREAL SHOCK WAVE LITHOTRIPSY, CYSTOSCOPY, INSERT STENT URETER(S);  BILATERAL COMBINED EXTRACORPOREAL SHOCK WAVE LITHOTRIPSY, CYSTOSCOPY, LEFT STENT PLACEMENT;  Surgeon: Bassam Vu MD;  Location:  OR     EYE SURGERY      Lasik     GYN SURGERY      laparoscopy     LAPAROSCOPIC CHOLECYSTECTOMY WITH CHOLANGIOGRAMS  2012    Procedure: LAPAROSCOPIC CHOLECYSTECTOMY WITH CHOLANGIOGRAMS;  LAPAROSCOPIC CHOLECYSTECTOMY WITH CHOLANGIOGRAMS ;  Surgeon: Nataliya Gabriel MD;  Location:  OR     LASER HOLMIUM LITHOTRIPSY URETER(S), INSERT STENT, COMBINED Left 10/30/2019    Procedure: CYSTOSCOPY,LEFT URETEROSCOPY, HOLMIUM LASER, STONE EXTRACTION, LEFT STENT PLACEMENT;  Surgeon: Bassam Vu MD;  Location:  OR     TUBAL LIGATION       ZZC NONSPECIFIC PROCEDURE  age 16    colposcopy for abnormal pap     ZZC NONSPECIFIC PROCEDURE      surgery L thumb     ZZC NONSPECIFIC PROCEDURE      breast augmentation-silicone     ZZC NONSPECIFIC PROCEDURE      s/p  x 2     ZZC NONSPECIFIC PROCEDURE      Bilateral tubal ligation       MEDICATIONS:  Current Outpatient Medications   Medication     methylPREDNISolone (MEDROL DOSEPAK) 4 MG tablet therapy pack     acyclovir (ZOVIRAX) 5 % external ointment     amphetamine-dextroamphetamine (ADDERALL) 20 MG tablet      "amphetamine-dextroamphetamine (ADDERALL) 20 MG tablet     [START ON 2/12/2021] amphetamine-dextroamphetamine (ADDERALL) 20 MG tablet     fexofenadine (ALLEGRA) 180 MG tablet     fluocinolone acetonide 0.01 % OIL     fluticasone (FLONASE) 50 MCG/ACT nasal spray     hydrOXYzine (ATARAX) 25 MG tablet     ketoconazole (NIZORAL) 2 % external shampoo     ketorolac (TORADOL) 10 MG tablet     meclizine (ANTIVERT) 25 MG tablet     Menthol, Topical Analgesic, (BIOFREEZE COLORLESS) 4 % GEL     naproxen (NAPROSYN) 500 MG tablet     naratriptan (AMERGE) 2.5 MG tablet     phenazopyridine (PYRIDIUM) 200 MG tablet     solifenacin (VESICARE) 10 MG tablet     valACYclovir (VALTREX) 1000 mg tablet     zolpidem (AMBIEN) 5 MG tablet     No current facility-administered medications for this visit.        SOCIAL HISTORY:  Social History     Tobacco Use     Smoking status: Never Smoker     Smokeless tobacco: Never Used   Substance Use Topics     Alcohol use: No     Alcohol/week: 0.0 standard drinks     Frequency: Never     Drinks per session: 1 or 2     Binge frequency: Never       Family History   Problem Relation Age of Onset     Diabetes Mother         type 2     Alcohol/Drug Mother         alcohol           Review of Systems   Constitutional, HEENT, cardiovascular, pulmonary, gi and gu systems are negative, except as otherwise noted.      Objective    Vitals - Patient Reported  Weight (Patient Reported): 70.3 kg (155 lb)  Height (Patient Reported): 162.6 cm (5' 4\")  BMI (Based on Pt Reported Ht/Wt): 26.61  Temperature (Patient Reported): 97.9  F (36.6  C)      Vitals:  No vitals were obtained today due to virtual visit.    Physical Exam   GENERAL: Healthy, alert and no distress  EYES: Eyes grossly normal to inspection.  No discharge or erythema, or obvious scleral/conjunctival abnormalities.  RESP: No audible wheeze, cough, or visible cyanosis.  No visible retractions or increased work of breathing.    SKIN: Visible skin clear. No " significant rash, abnormal pigmentation or lesions.  NEURO: Cranial nerves grossly intact.  Mentation and speech appropriate for age.  PSYCH: Mentation appears normal, affect normal/bright, judgement and insight intact, normal speech and appearance well-groomed.    Office Visit on 09/23/2020   Component Date Value Ref Range Status     Color Urine 09/23/2020 Yellow   Final     Appearance Urine 09/23/2020 Clear   Final     Glucose Urine 09/23/2020 Negative  NEG^Negative mg/dL Final     Bilirubin Urine 09/23/2020 Negative  NEG^Negative Final     Ketones Urine 09/23/2020 Trace* NEG^Negative mg/dL Final     Specific Gravity Urine 09/23/2020 1.025  1.003 - 1.035 Final     Blood Urine 09/23/2020 Trace* NEG^Negative Final     pH Urine 09/23/2020 5.5  5.0 - 7.0 pH Final     Protein Albumin Urine 09/23/2020 30* NEG^Negative mg/dL Final     Urobilinogen Urine 09/23/2020 0.2  0.2 - 1.0 EU/dL Final     Nitrite Urine 09/23/2020 Positive* NEG^Negative Final     Leukocyte Esterase Urine 09/23/2020 Small* NEG^Negative Final     Source 09/23/2020 Midstream Urine   Final     WBC Urine 09/23/2020 5-10* OTO5^0 - 5 /HPF Final     RBC Urine 09/23/2020 O - 2  OTO2^O - 2 /HPF Final     Squamous Epithelial /LPF Urine 09/23/2020 Few  FEW^Few /LPF Final     Bacteria Urine 09/23/2020 Many* NEG^Negative /HPF Final     Specimen Description 09/23/2020 Midstream Urine   Final     Culture Micro 09/23/2020 *  Final                    Value:>100,000 colonies/mL  Escherichia coli                 Video-Visit Details    Type of service:  Video Visit    Video End Time:9:30 AM    Originating Location (pt. Location): Home    Distant Location (provider location):  [facility]     Platform used for Video Visit: [employer]

## 2021-02-18 ENCOUNTER — VIRTUAL VISIT (OUTPATIENT)
Dept: FAMILY MEDICINE | Facility: CLINIC | Age: 56
End: 2021-02-18
Payer: COMMERCIAL

## 2021-02-18 DIAGNOSIS — R30.0 DYSURIA: Primary | ICD-10-CM

## 2021-02-18 PROCEDURE — 99214 OFFICE O/P EST MOD 30 MIN: CPT | Mod: 95 | Performed by: NURSE PRACTITIONER

## 2021-02-18 RX ORDER — NITROFURANTOIN 25; 75 MG/1; MG/1
100 CAPSULE ORAL 2 TIMES DAILY
Qty: 20 CAPSULE | Refills: 0 | Status: SHIPPED | OUTPATIENT
Start: 2021-02-18 | End: 2021-02-28

## 2021-02-18 NOTE — PROGRESS NOTES
Laney is a 56 year old who is being evaluated via a billable video visit.      How would you like to obtain your AVS? MyChart  If the video visit is dropped, the invitation should be resent by: Text to cell phone: [phone] pt would like text invitation   Will anyone else be joining your video visit? No      Video Start Time: 1:04 PM    Assessment & Plan     Dysuria  UA pending. Chronic history. Cephalosporin allergy. Last nitrofurantoin use 9/2020. Treatment as below, will update pending UA/UC results if needed.   - *UA reflex to Microscopic and Culture ([facility] (except [place] and [place])  - nitroFURantoin macrocrystal-monohydrate (MACROBID) 100 MG capsule  Dispense: 20 capsule; Refill: 0          Return in about 1 day (around 2/19/2021) for Mammogram, appointment already scheduled.    RON Donaldson CNP  [facility]    Subjective   Laney is a 56 year old who presents for the following health issues     HPI       Genitourinary - Female  Onset/Duration: 02/15/21  Description:   Painful urination (Dysuria): YES           Frequency: YES  Blood in urine (Hematuria): no  Delay in urine (Hesitency): no  Intensity: moderate  Progression of Symptoms:  worsening  Accompanying Signs & Symptoms:  Fever/chills: no  Flank pain: no  Nausea and vomiting: no  Vaginal symptoms: none  Abdominal/Pelvic Pain: YES  History:   History of frequent UTI s: YES  History of kidney stones: YES  Sexually Active: YES  Possibility of pregnancy: No  Precipitating or alleviating factors: None  Therapies tried and outcome: pyridium with no relief          Review of Systems   Constitutional, HEENT, cardiovascular, pulmonary, gi and gu systems are negative, except as otherwise noted.      Objective           Vitals:  No vitals were obtained today due to virtual visit.    Physical Exam   GENERAL: Healthy, alert and no distress  EYES: Eyes grossly normal to inspection.  No discharge or  erythema, or obvious scleral/conjunctival abnormalities.  RESP: No audible wheeze, cough, or visible cyanosis.  No visible retractions or increased work of breathing.    SKIN: Visible skin clear. No significant rash, abnormal pigmentation or lesions.  NEURO: Cranial nerves grossly intact.  Mentation and speech appropriate for age.  PSYCH: Mentation appears normal, affect normal/bright, judgement and insight intact, normal speech and appearance well-groomed.    UA/Microscope/UC pending.             Video-Visit Details    Type of service:  Video Visit    Video End Time:1:18 PM    Originating Location (pt. Location): Other work    Distant Location (provider location):  [facility] [place]     Platform used for Video Visit: [employer]

## 2021-02-19 DIAGNOSIS — R82.90 NONSPECIFIC FINDING ON EXAMINATION OF URINE: Primary | ICD-10-CM

## 2021-02-19 DIAGNOSIS — R30.0 DYSURIA: ICD-10-CM

## 2021-02-19 LAB
ALBUMIN UR-MCNC: NEGATIVE MG/DL
APPEARANCE UR: CLEAR
BACTERIA #/AREA URNS HPF: ABNORMAL /HPF
BILIRUB UR QL STRIP: NEGATIVE
COLOR UR AUTO: YELLOW
GLUCOSE UR STRIP-MCNC: NEGATIVE MG/DL
HGB UR QL STRIP: ABNORMAL
KETONES UR STRIP-MCNC: NEGATIVE MG/DL
LEUKOCYTE ESTERASE UR QL STRIP: ABNORMAL
NITRATE UR QL: POSITIVE
NON-SQ EPI CELLS #/AREA URNS LPF: ABNORMAL /LPF
PH UR STRIP: 5 PH (ref 5–7)
RBC #/AREA URNS AUTO: ABNORMAL /HPF
SOURCE: ABNORMAL
SP GR UR STRIP: 1.02 (ref 1–1.03)
UROBILINOGEN UR STRIP-ACNC: 0.2 EU/DL (ref 0.2–1)
WBC #/AREA URNS AUTO: ABNORMAL /HPF

## 2021-02-19 PROCEDURE — 87186 SC STD MICRODIL/AGAR DIL: CPT | Performed by: NURSE PRACTITIONER

## 2021-02-19 PROCEDURE — 87086 URINE CULTURE/COLONY COUNT: CPT | Performed by: NURSE PRACTITIONER

## 2021-02-19 PROCEDURE — 87088 URINE BACTERIA CULTURE: CPT | Performed by: NURSE PRACTITIONER

## 2021-02-19 PROCEDURE — 81001 URINALYSIS AUTO W/SCOPE: CPT | Performed by: NURSE PRACTITIONER

## 2021-02-20 LAB
BACTERIA SPEC CULT: ABNORMAL
Lab: ABNORMAL
SPECIMEN SOURCE: ABNORMAL

## 2021-02-22 ENCOUNTER — HOSPITAL ENCOUNTER (OUTPATIENT)
Dept: MAMMOGRAPHY | Facility: CLINIC | Age: 56
Discharge: HOME | End: 2021-02-22
Attending: FAMILY MEDICINE | Admitting: FAMILY MEDICINE
Payer: COMMERCIAL

## 2021-02-22 DIAGNOSIS — Z12.31 VISIT FOR SCREENING MAMMOGRAM: ICD-10-CM

## 2021-02-22 PROCEDURE — 77067 SCR MAMMO BI INCL CAD: CPT

## 2021-03-15 ENCOUNTER — TELEPHONE (OUTPATIENT)
Dept: FAMILY MEDICINE | Facility: CLINIC | Age: 56
End: 2021-03-15

## 2021-03-15 DIAGNOSIS — N30.90 RECURRENT BACTERIAL CYSTITIS: ICD-10-CM

## 2021-03-15 DIAGNOSIS — R30.0 DYSURIA: Primary | ICD-10-CM

## 2021-03-15 NOTE — TELEPHONE ENCOUNTER
Patient calling stating that she was on abx for UTI and was fine while on the medication but the burning has returned this am. Wondering if provider willing to put order in for follow up UA/UC to see if infection cleared or is returning.    Sohail HEADLEY RN, BSN

## 2021-03-16 DIAGNOSIS — R30.0 DYSURIA: ICD-10-CM

## 2021-03-16 LAB
ALBUMIN UR-MCNC: ABNORMAL MG/DL
APPEARANCE UR: CLEAR
BACTERIA #/AREA URNS HPF: ABNORMAL /HPF
BILIRUB UR QL STRIP: NEGATIVE
COLOR UR AUTO: YELLOW
GLUCOSE UR STRIP-MCNC: NEGATIVE MG/DL
HGB UR QL STRIP: ABNORMAL
KETONES UR STRIP-MCNC: NEGATIVE MG/DL
LEUKOCYTE ESTERASE UR QL STRIP: ABNORMAL
NITRATE UR QL: POSITIVE
NON-SQ EPI CELLS #/AREA URNS LPF: ABNORMAL /LPF
PH UR STRIP: 5.5 PH (ref 5–7)
RBC #/AREA URNS AUTO: ABNORMAL /HPF
SOURCE: ABNORMAL
SP GR UR STRIP: 1.02 (ref 1–1.03)
UROBILINOGEN UR STRIP-ACNC: 0.2 EU/DL (ref 0.2–1)
WBC #/AREA URNS AUTO: ABNORMAL /HPF

## 2021-03-16 PROCEDURE — 87186 SC STD MICRODIL/AGAR DIL: CPT | Performed by: NURSE PRACTITIONER

## 2021-03-16 PROCEDURE — 87088 URINE BACTERIA CULTURE: CPT | Performed by: NURSE PRACTITIONER

## 2021-03-16 PROCEDURE — 81001 URINALYSIS AUTO W/SCOPE: CPT | Performed by: NURSE PRACTITIONER

## 2021-03-16 PROCEDURE — 87086 URINE CULTURE/COLONY COUNT: CPT | Performed by: NURSE PRACTITIONER

## 2021-03-18 LAB
BACTERIA SPEC CULT: ABNORMAL
Lab: ABNORMAL
SPECIMEN SOURCE: ABNORMAL

## 2021-03-18 RX ORDER — CEPHALEXIN 500 MG/1
500 CAPSULE ORAL 2 TIMES DAILY
Qty: 20 CAPSULE | Refills: 0 | Status: SHIPPED | OUTPATIENT
Start: 2021-03-18 | End: 2021-03-28

## 2021-03-22 ENCOUNTER — VIRTUAL VISIT (OUTPATIENT)
Dept: FAMILY MEDICINE | Facility: CLINIC | Age: 56
End: 2021-03-22
Payer: COMMERCIAL

## 2021-03-22 DIAGNOSIS — R35.0 URINARY FREQUENCY: ICD-10-CM

## 2021-03-22 DIAGNOSIS — G47.9 SLEEP DISORDER: ICD-10-CM

## 2021-03-22 DIAGNOSIS — F90.2 ATTENTION DEFICIT HYPERACTIVITY DISORDER (ADHD), COMBINED TYPE: Primary | ICD-10-CM

## 2021-03-22 PROCEDURE — 99214 OFFICE O/P EST MOD 30 MIN: CPT | Mod: 95 | Performed by: NURSE PRACTITIONER

## 2021-03-22 RX ORDER — DEXTROAMPHETAMINE SACCHARATE, AMPHETAMINE ASPARTATE, DEXTROAMPHETAMINE SULFATE AND AMPHETAMINE SULFATE 7.5; 7.5; 7.5; 7.5 MG/1; MG/1; MG/1; MG/1
TABLET ORAL
Qty: 90 TABLET | Refills: 0 | Status: SHIPPED | OUTPATIENT
Start: 2021-03-22 | End: 2021-06-03

## 2021-03-22 RX ORDER — ZOLPIDEM TARTRATE 5 MG/1
5 TABLET ORAL
Qty: 30 TABLET | Refills: 2 | Status: SHIPPED | OUTPATIENT
Start: 2021-03-22 | End: 2021-11-12

## 2021-03-22 NOTE — PROGRESS NOTES
Laney is a 56 year old who is being evaluated via a billable video visit.      How would you like to obtain your AVS? MyChart  If the video visit is dropped, the invitation should be resent by: Text to cell phone: [phone]  Will anyone else be joining your video visit? No  Video Start Time: 1400    Assessment & Plan     Attention deficit hyperactivity disorder (ADHD), combined type: symptoms are not well controlled will increase dose to 30 mg tid.  [place]ed [place] monitoring, last prescription filled on 2/8/2020, no prescriptions filled by other providers.     - amphetamine-dextroamphetamine (ADDERALL) 30 MG tablet; TAKE ONE TABLET BY MOUTH THREE TIMES A DAY    Urinary frequency: placed order for future UA  - *UA reflex to Microscopic and Culture ([facility] (except [place] and [place]); Future    Sleep disorder: takes on rare basis refill placed  - zolpidem (AMBIEN) 5 MG tablet; Take 1 tablet (5 mg) by mouth nightly as needed for sleep    Return in about 3 months (around 6/22/2021) for Routine Visit, ADHD in clinic.    Susan Haase, APRN [facility]    Subjective   Laney is a 56 year old who presents for the following health issues     HPI   ADHD Follow-Up (Adult)  Concerns: Med recheck  Changes since last visit: Stable  Taking controlled (daily) medications as prescribed: Yes  Sleep: no problems  Adult ADHD Self-Reporting form given to patient?:  No  Currently in counseling: No    Medication Benefits:   Controlled symptoms: Hyperactivity - motor restlessness, Distractability, Finishing tasks, Impulse control, Frustration tolerance and Accepting limits  Uncontrolled symptoms:  Attention span    Medication Side Effects:  Reports:  none  Sleep Problems? no  ++++++++++++++++++++++++++++++++++++++++++++++++    Employer Concerns/Feedback: Stable  Coworker Concerns:   Stable  Home/Family Concerns: Stable  Does not feel that the 20 mg tid is controlling  her symptoms, is interested in increasing the dosage.     UTI:  Is on the second med for her UTI, taking keflex at this time.  Pelvic pain, cramping has improved.       Review of Systems   CONSTITUTIONAL: NEGATIVE for fever, chills, change in weight  RESP: NEGATIVE for significant cough or SOB  CV: NEGATIVE for chest pain, palpitations or peripheral edema  PSYCHIATRIC: see HPI      Objective         Vitals:  No vitals were obtained today due to virtual visit.    Physical Exam   GENERAL: Healthy, alert and no distress  RESP: No audible wheeze, cough, or visible cyanosis.  No visible retractions or increased work of breathing.    NEURO: Cranial nerves grossly intact.  Mentation and speech appropriate for age.  PSYCH: Mentation appears normal, affect normal/bright, judgement and insight intact, normal speech and appearance well-groomed.          Video-Visit Details    Type of service:  Video Visit    Video End Time:1415    Originating Location (pt. Location): Home    Distant Location (provider location):  [facility] [place]     Platform used for Video Visit: dough

## 2021-04-01 DIAGNOSIS — R35.0 URINARY FREQUENCY: ICD-10-CM

## 2021-04-01 DIAGNOSIS — R82.90 NONSPECIFIC FINDING ON EXAMINATION OF URINE: Primary | ICD-10-CM

## 2021-04-01 LAB
ALBUMIN UR-MCNC: NEGATIVE MG/DL
APPEARANCE UR: ABNORMAL
BACTERIA #/AREA URNS HPF: ABNORMAL /HPF
BILIRUB UR QL STRIP: NEGATIVE
COLOR UR AUTO: YELLOW
GLUCOSE UR STRIP-MCNC: NEGATIVE MG/DL
HGB UR QL STRIP: ABNORMAL
KETONES UR STRIP-MCNC: NEGATIVE MG/DL
LEUKOCYTE ESTERASE UR QL STRIP: ABNORMAL
NITRATE UR QL: POSITIVE
NON-SQ EPI CELLS #/AREA URNS LPF: ABNORMAL /LPF
PH UR STRIP: 5.5 PH (ref 5–7)
RBC #/AREA URNS AUTO: ABNORMAL /HPF
SOURCE: ABNORMAL
SP GR UR STRIP: 1.02 (ref 1–1.03)
UROBILINOGEN UR STRIP-ACNC: 0.2 EU/DL (ref 0.2–1)
WBC #/AREA URNS AUTO: ABNORMAL /HPF

## 2021-04-01 PROCEDURE — 87088 URINE BACTERIA CULTURE: CPT | Performed by: NURSE PRACTITIONER

## 2021-04-01 PROCEDURE — 87086 URINE CULTURE/COLONY COUNT: CPT | Performed by: NURSE PRACTITIONER

## 2021-04-01 PROCEDURE — 87186 SC STD MICRODIL/AGAR DIL: CPT | Performed by: NURSE PRACTITIONER

## 2021-04-01 PROCEDURE — 81001 URINALYSIS AUTO W/SCOPE: CPT | Performed by: NURSE PRACTITIONER

## 2021-04-02 DIAGNOSIS — N30.00 ACUTE CYSTITIS WITHOUT HEMATURIA: Primary | ICD-10-CM

## 2021-04-02 LAB
BACTERIA SPEC CULT: ABNORMAL
Lab: ABNORMAL
SPECIMEN SOURCE: ABNORMAL

## 2021-04-02 RX ORDER — NITROFURANTOIN 25; 75 MG/1; MG/1
100 CAPSULE ORAL 2 TIMES DAILY
Qty: 20 CAPSULE | Refills: 0 | Status: SHIPPED | OUTPATIENT
Start: 2021-04-02 | End: 2021-04-18

## 2021-04-03 NOTE — RESULT ENCOUNTER NOTE
Pj Tinajero,  I have sent in nitrofurantoin to the [facility] pharmacy, it is open Saturday morning for you to .   Sincerely,     Susan Haase, CNP

## 2021-04-13 ENCOUNTER — TELEPHONE (OUTPATIENT)
Dept: FAMILY MEDICINE | Facility: CLINIC | Age: 56
End: 2021-04-13

## 2021-04-13 DIAGNOSIS — R30.0 DYSURIA: Primary | ICD-10-CM

## 2021-04-13 NOTE — TELEPHONE ENCOUNTER
Patient calling and states she is on her 3rd antibiotic and each time has had recheck after and infection has not cleared up.  States she is still having bladder spasms and they have not improved at all.  Wants order for U/A and UC.  Please advise.  Samra Jalloh RN

## 2021-04-14 NOTE — TELEPHONE ENCOUNTER
Spoke to patient, scheduled lab only appt at [facility].    Blanca Putnam,

## 2021-04-14 NOTE — TELEPHONE ENCOUNTER
Please call Laney and let her know the order for the UA has been placed.  Thanks,  Susan Haase,CNP

## 2021-04-15 DIAGNOSIS — R82.90 NONSPECIFIC FINDING ON EXAMINATION OF URINE: Primary | ICD-10-CM

## 2021-04-15 DIAGNOSIS — R30.0 DYSURIA: ICD-10-CM

## 2021-04-15 LAB
ALBUMIN UR-MCNC: 30 MG/DL
APPEARANCE UR: ABNORMAL
BACTERIA #/AREA URNS HPF: ABNORMAL /HPF
BILIRUB UR QL STRIP: NEGATIVE
CAOX CRY #/AREA URNS HPF: ABNORMAL /HPF
COLOR UR AUTO: ABNORMAL
GLUCOSE UR STRIP-MCNC: NEGATIVE MG/DL
HGB UR QL STRIP: ABNORMAL
KETONES UR STRIP-MCNC: NEGATIVE MG/DL
LEUKOCYTE ESTERASE UR QL STRIP: ABNORMAL
NITRATE UR QL: NEGATIVE
NON-SQ EPI CELLS #/AREA URNS LPF: ABNORMAL /LPF
PH UR STRIP: 5.5 PH (ref 5–7)
RBC #/AREA URNS AUTO: ABNORMAL /HPF
SOURCE: ABNORMAL
SP GR UR STRIP: 1.02 (ref 1–1.03)
UROBILINOGEN UR STRIP-ACNC: 0.2 EU/DL (ref 0.2–1)
WBC #/AREA URNS AUTO: ABNORMAL /HPF

## 2021-04-15 PROCEDURE — 87086 URINE CULTURE/COLONY COUNT: CPT | Performed by: NURSE PRACTITIONER

## 2021-04-15 PROCEDURE — 87186 SC STD MICRODIL/AGAR DIL: CPT | Performed by: NURSE PRACTITIONER

## 2021-04-15 PROCEDURE — 87088 URINE BACTERIA CULTURE: CPT | Performed by: NURSE PRACTITIONER

## 2021-04-15 PROCEDURE — 81001 URINALYSIS AUTO W/SCOPE: CPT | Performed by: NURSE PRACTITIONER

## 2021-04-17 LAB
BACTERIA SPEC CULT: ABNORMAL
Lab: ABNORMAL
SPECIMEN SOURCE: ABNORMAL

## 2021-04-18 DIAGNOSIS — N30.00 ACUTE CYSTITIS WITHOUT HEMATURIA: Primary | ICD-10-CM

## 2021-04-18 RX ORDER — CEFPROZIL 500 MG/1
500 TABLET, FILM COATED ORAL 2 TIMES DAILY
Qty: 20 TABLET | Refills: 0 | Status: SHIPPED | OUTPATIENT
Start: 2021-04-18 | End: 2021-06-02

## 2021-04-18 NOTE — RESULT ENCOUNTER NOTE
Pj Davison,  Your urine culture again shows that your  UTI has not cleared up. I would like to change you to a different antibiotic that you will take twice a day for 10 days, it is a cephalosporin (in the same class as the rocephin that caused itching) so when you take it just watch for any itching, if you develop symptoms stop taking and let me know.  I have placed an order for a future urinalysis and culture to complete a couple weeks after you are done with your treatment to make sure the infection is gone this time.  Call me at the clinic on Monday morning with any questions.  Sincerely,  Susan Haase,  CNP

## 2021-04-19 ENCOUNTER — TELEPHONE (OUTPATIENT)
Dept: FAMILY MEDICINE | Facility: CLINIC | Age: 56
End: 2021-04-19

## 2021-04-20 NOTE — TELEPHONE ENCOUNTER
LMOM for pt to return call to silver.  Not sure who called her.  There is a message from Socorro from lab work dated 4/15/21.  Nan Hinojosa CMA

## 2021-04-22 NOTE — TELEPHONE ENCOUNTER
Checked with pharmacy and antibiotic was picked up on 4/19/21. Encounter closed.  Nan Hinojosa, ROWENA

## 2021-05-03 ENCOUNTER — TELEPHONE (OUTPATIENT)
Dept: FAMILY MEDICINE | Facility: CLINIC | Age: 56
End: 2021-05-03

## 2021-05-03 DIAGNOSIS — N39.0 RECURRENT UTI: Primary | ICD-10-CM

## 2021-05-03 NOTE — TELEPHONE ENCOUNTER
Ok for a lab only appt or do you want a visit? Pt has already scheduled lab only on 5/11/2021 but does not have an order for a UA.

## 2021-05-03 NOTE — TELEPHONE ENCOUNTER
Reason for Call: Request for an order or referral:  Per patient was to get UA/UC post-antibiotic for UTI  Order or referral being requested: UA/UC    Date needed: before my next appointment    Has the patient been seen by the PCP for this problem? YES    Additional comments: patient states she has completed last antibiotic given but having issues again    Phone number Patient can be reached at:  Cell number on file:    Telephone Information:   Mobile [phone]       Best Time:  any    Can we leave a detailed message on this number?  YES    Call taken on 5/3/2021 at 10:22 AM by Caro Uribe

## 2021-05-04 NOTE — TELEPHONE ENCOUNTER
Please call Laney and let her know that the order for a UA and culture are entered.  Thanks,  Susan Haase, CNP

## 2021-05-11 DIAGNOSIS — N30.01 ACUTE CYSTITIS WITH HEMATURIA: Primary | ICD-10-CM

## 2021-05-11 DIAGNOSIS — N39.0 RECURRENT UTI: ICD-10-CM

## 2021-05-11 LAB
ALBUMIN UR-MCNC: NEGATIVE MG/DL
APPEARANCE UR: CLEAR
BACTERIA #/AREA URNS HPF: ABNORMAL /HPF
BILIRUB UR QL STRIP: NEGATIVE
COLOR UR AUTO: YELLOW
GLUCOSE UR STRIP-MCNC: NEGATIVE MG/DL
HGB UR QL STRIP: ABNORMAL
KETONES UR STRIP-MCNC: NEGATIVE MG/DL
LEUKOCYTE ESTERASE UR QL STRIP: ABNORMAL
NITRATE UR QL: POSITIVE
NON-SQ EPI CELLS #/AREA URNS LPF: ABNORMAL /LPF
PH UR STRIP: 6 PH (ref 5–7)
RBC #/AREA URNS AUTO: ABNORMAL /HPF
SOURCE: ABNORMAL
SP GR UR STRIP: 1.02 (ref 1–1.03)
UROBILINOGEN UR STRIP-ACNC: 0.2 EU/DL (ref 0.2–1)
WBC #/AREA URNS AUTO: ABNORMAL /HPF

## 2021-05-11 PROCEDURE — 87088 URINE BACTERIA CULTURE: CPT | Performed by: NURSE PRACTITIONER

## 2021-05-11 PROCEDURE — 81001 URINALYSIS AUTO W/SCOPE: CPT | Performed by: NURSE PRACTITIONER

## 2021-05-11 PROCEDURE — 87086 URINE CULTURE/COLONY COUNT: CPT | Performed by: NURSE PRACTITIONER

## 2021-05-11 PROCEDURE — 87186 SC STD MICRODIL/AGAR DIL: CPT | Performed by: NURSE PRACTITIONER

## 2021-05-11 RX ORDER — CEPHALEXIN 500 MG/1
500 CAPSULE ORAL 2 TIMES DAILY
Qty: 20 CAPSULE | Refills: 0 | Status: SHIPPED | OUTPATIENT
Start: 2021-05-11 | End: 2021-06-17

## 2021-05-11 NOTE — RESULT ENCOUNTER NOTE
Pj Davison,  Your urine shows that you have a UTI, I have sent in a prescription for keflex, you will take this twice a day for 10 days.  Sincerely,  Susan Haase, CNP

## 2021-05-13 ENCOUNTER — MYC MEDICAL ADVICE (OUTPATIENT)
Dept: FAMILY MEDICINE | Facility: CLINIC | Age: 56
End: 2021-05-13

## 2021-05-13 DIAGNOSIS — N39.0 RECURRENT UTI: Primary | ICD-10-CM

## 2021-05-13 LAB
BACTERIA SPEC CULT: ABNORMAL
Lab: ABNORMAL
SPECIMEN SOURCE: ABNORMAL

## 2021-05-13 NOTE — RESULT ENCOUNTER NOTE
Pj Davison,  The antibiotic that you are taking covers the bacteria E coli.  Please let me know if you have any questions.  Sincerely,     Susan Haase, CNP

## 2021-05-18 ENCOUNTER — HOSPITAL ENCOUNTER (OUTPATIENT)
Dept: GENERAL RADIOLOGY | Facility: CLINIC | Age: 56
Discharge: HOME | End: 2021-05-18
Attending: UROLOGY | Admitting: UROLOGY
Payer: COMMERCIAL

## 2021-05-18 DIAGNOSIS — N20.0 CALCULUS OF KIDNEY: ICD-10-CM

## 2021-05-18 PROCEDURE — 74019 RADEX ABDOMEN 2 VIEWS: CPT

## 2021-05-21 ENCOUNTER — VIRTUAL VISIT (OUTPATIENT)
Dept: UROLOGY | Facility: CLINIC | Age: 56
End: 2021-05-21
Payer: COMMERCIAL

## 2021-05-21 VITALS — WEIGHT: 155 LBS | HEIGHT: 64 IN | BODY MASS INDEX: 26.46 KG/M2

## 2021-05-21 DIAGNOSIS — N20.0 CALCULUS OF KIDNEY: ICD-10-CM

## 2021-05-21 DIAGNOSIS — K59.00 CONSTIPATION, UNSPECIFIED CONSTIPATION TYPE: ICD-10-CM

## 2021-05-21 DIAGNOSIS — N39.0 RECURRENT UTI: Primary | ICD-10-CM

## 2021-05-21 PROCEDURE — 99213 OFFICE O/P EST LOW 20 MIN: CPT | Mod: 95 | Performed by: PHYSICIAN ASSISTANT

## 2021-05-21 ASSESSMENT — PAIN SCALES - GENERAL: PAINLEVEL: NO PAIN (0)

## 2021-05-21 ASSESSMENT — MIFFLIN-ST. JEOR: SCORE: 1273.08

## 2021-05-21 NOTE — LETTER
5/21/2021       RE: Laney Maynard  [id number] [facility] [id number]     Dear Colleague,    Thank you for referring your patient, Laney Maynard, to the [facility] [place] at [facility]. Please see a copy of my visit note below.    Laney is a 56 year old who is being evaluated via a billable video visit.      PT IS ON KEFLEX    How would you like to obtain your AVS? MyChart  If the video visit is dropped, the invitation should be resent by: Text to cell phone: [phone]  Will anyone else be joining your video visit? No      Video-Visit Details    Type of service:  Video Visit    Video Start Time: 1318    Video End Time:1332    Originating Location (pt. Location): Other Sittin in car; in parking lot.    Distant Location (provider location):  [facility] [place]     Platform [facility] for Video Visit: [facility]     CHIEF COMPLAINT/REASON FOR VISIT   Benedict    HISTORY OF PRESENT ILLNESS   Ms. Mando Maynard is a pleasant 56-year-old female, who presents today for further evaluation recommendations regarding recurrent urinary tract infections.  She follows with Dr. Guevara for her history of nephrolithiasis.  She is scheduled for video visit follow-up with him on 05/24/2021.  She underwent ESWL in 2016 and 2018.  Stone composition is calcium oxalate and calcium phosphate.  She was on potassium citrate to alkalinize the urine.  She has passed some small stones over the last several months.    Patient also has previously seen Dr. Rossi for urinary urgency and frequency.  She was placed on 10 mg of Vesicare.    Patient is postmenopausal.  She previously tried Pyridium and  Premarin.    Patient has had E. coli urinary tract infections.  In the last several months she has had 5 of these.  She notes a cystoscopy several years ago.  When she gets urinary tract infection, she endorses dysuria, worsening  urgency, and frequency.  She is currently on Keflex for urinary tract infection.    She would categorize her bowel movements as constipated.  She has been trying to work on her diet and increasing fiber.    The following portions of the patient's history were reviewed and updated as appropriate: allergies, current medications, past family history, past medical history, past social history, past surgical history, and problem list.     REVIEW OF SYSTEMS   Review of Systems   Constitutional: Negative for chills and fever.   Respiratory: Negative for shortness of breath.    Cardiovascular: Negative for chest pain.   Gastrointestinal: Positive for constipation. Negative for diarrhea.   Genitourinary: Positive for frequency. Negative for dysuria.      Per HPI.     Patient Active Problem List   Diagnosis     Alopecia     Seasonal allergic rhinitis     GERD (gastroesophageal reflux disease)     CARDIOVASCULAR SCREENING; LDL GOAL LESS THAN 160     Migraine headache     Sleep disorder     Family history of rheumatoid arthritis     Family history of sarcoidosis (mother)     Urinary frequency     Herpes simplex virus infection     Attention deficit hyperactivity disorder (ADHD)     Plantar fasciitis, bilateral     Complete rupture of rotator cuff     Mixed incontinence urge and stress (male)(female)     Ureterolithiasis      Past Medical History:   Diagnosis Date     Abnormal glandular Papanicolaou smear of cervix      Allergic rhinitis, cause unspecified      Alopecia      Attention deficit hyperactivity disorder (ADHD)      Chronic pain of left knee      Complete rupture of rotator cuff      Constipation      Gastro-oesophageal reflux disease      Heart murmur     murmur     Hematuria      Herpes simplex virus infection      Hydronephrosis, right      Lymphocytopenia 4/4/2011     Migraine headache      Migraine, unspecified, without mention of intractable migraine without mention of status migrainosus      Mixed incontinence  urge and stress      Mumps      Numbness and tingling     LEFT ARM     Palpitations      Plantar fasciitis, bilateral      Renal disease     hx stones x 2 episodes     Seasonal allergic rhinitis      Sleep disorder      Ureterolithiasis      Urinary frequency     burning     Vertigo         Objective      PHYSICAL EXAM   GENERAL: Healthy, alert and no distress  EYES: Eyes grossly normal to inspection.  No discharge or erythema, or obvious scleral/conjunctival abnormalities.  HENT: Normal cephalic/atraumatic.  External ears, nose and mouth without ulcers or lesions.  No nasal drainage visible.  NECK: No asymmetry, visible masses or scars  RESP: No audible wheeze, cough, or visible cyanosis.  No visible retractions or increased work of breathing.    MS: No gross musculoskeletal defects noted.  Normal range of motion.  No visible edema.  SKIN: Visible skin clear. No significant rash, abnormal pigmentation or lesions.  NEURO: Cranial nerves grossly intact.  Mentation and speech appropriate for age.  PSYCH: Mentation appears normal, affect normal/bright, judgement and insight intact, normal speech and appearance well-groomed.     LABORATORY     Recent Labs   Lab Test 05/11/21  1048   COLOR Yellow   APPEARANCE Clear   URINEGLC Negative   URINEBILI Negative   URINEKETONE Negative   SG 1.020   UBLD Moderate*   URINEPH 6.0   PROTEIN Negative   UROBILINOGEN 0.2   NITRITE Positive*   LEUKEST Small*   RBCU 2-5*   WBCU 10-25*     IMAGING     I personally reviewed the images.     Xr Kub [[id number]]    Result Date: 5/18/2021  XR KUB 5/18/2021 11:40 AM HISTORY: Calculus of kidney COMPARISON: 5/18/2020     IMPRESSION: Punctate calcifications projected over both kidneys probably represent renal calculi, better seen on prior CT. Stable phleboliths right hemipelvis. KAYCEE KENNEY MD     Assessment & Plan    1. Recurrent UTI    2. Calculus of kidney    3. Constipation, unspecified constipation type      I had the pleasure today meeting  with Ms. Mando Maynard to discuss her recurrent urinary tract infections.  Patient is due for follow-up for nephrolithiasis with Dr. Guevara next week.  I have recommended she discuss with him if there is any benefit in removing some of her stones.    She has previously followed with Dr. Rossi regarding her lower urinary tract symptomatology.  I have recommended that we complete cystoscopy to see if there are any concerning findings within the bladder contributing to her recurrent urinary tract infections.  She should also have a urinalysis and postvoid residual.    I discussed with her trying to improve her bowel movements as this will likely improve her lower urinary tract symptoms.  Additionally, this may be contributing some to her difficulties with urinary tract infections.  She can continue to increase her fiber in her diet.  I have recommended she try to incorporate MiraLAX regularly to aim for a soft well-formed bowel movement once daily.  This is available over-the-counter.    We also briefly discussed possible probiotic to help with her symptomatology.  We did briefly discussed align or Florastor.  This may or may not improve her symptomatology.    We also briefly discussed putting her back on a topical estrogen cream.  We also discussed Hip-Kobe and vitamin C as it does not appear that she has been on this.  Another option given the E. coli urinary tract infections may be placing her on d-mannose.    -Will start probiotic and Miralax.  -Follow up with Dr. Vu for nephrolithiasis.  -UA, PVR, and cystoscopy with Dr. Wei for Benedict and LUTS  -Consideration of restarting topical estrogen.  -Can consider Hiprex and Vitamin C, but some concern with Vitamin C given history of nephrolithiasis.    Signed by:       Cinthia Martins PA-C

## 2021-05-21 NOTE — PROGRESS NOTES
Laney is a 56 year old who is being evaluated via a billable video visit.      PT IS ON KEFLEX    How would you like to obtain your AVS? MyChart  If the video visit is dropped, the invitation should be resent by: Text to cell phone: [phone]  Will anyone else be joining your video visit? No      Video-Visit Details    Type of service:  Video Visit    Video Start Time: 1318    Video End Time:1332    Originating Location (pt. Location): Other Sittin in car; in parking lot.    Distant Location (provider location):  [facility] [place]     Platform used for Video Visit: [employer]     CHIEF COMPLAINT/REASON FOR VISIT   Benedict    HISTORY OF PRESENT ILLNESS   Ms. Mando Maynard is a pleasant 56-year-old female, who presents today for further evaluation recommendations regarding recurrent urinary tract infections.  She follows with Dr. Guevara for her history of nephrolithiasis.  She is scheduled for video visit follow-up with him on 05/24/2021.  She underwent ESWL in 2016 and 2018.  Stone composition is calcium oxalate and calcium phosphate.  She was on potassium citrate to alkalinize the urine.  She has passed some small stones over the last several months.    Patient also has previously seen Dr. Rossi for urinary urgency and frequency.  She was placed on 10 mg of Vesicare.    Patient is postmenopausal.  She previously tried Pyridium and  Premarin.    Patient has had E. coli urinary tract infections.  In the last several months she has had 5 of these.  She notes a cystoscopy several years ago.  When she gets urinary tract infection, she endorses dysuria, worsening urgency, and frequency.  She is currently on Keflex for urinary tract infection.    She would categorize her bowel movements as constipated.  She has been trying to work on her diet and increasing fiber.    The following portions of the patient's history were reviewed and updated as appropriate: allergies, current medications, past family  history, past medical history, past social history, past surgical history, and problem list.     REVIEW OF SYSTEMS   Review of Systems   Constitutional: Negative for chills and fever.   Respiratory: Negative for shortness of breath.    Cardiovascular: Negative for chest pain.   Gastrointestinal: Positive for constipation. Negative for diarrhea.   Genitourinary: Positive for frequency. Negative for dysuria.      Per HPI.     Patient Active Problem List   Diagnosis     Alopecia     Seasonal allergic rhinitis     GERD (gastroesophageal reflux disease)     CARDIOVASCULAR SCREENING; LDL GOAL LESS THAN 160     Migraine headache     Sleep disorder     Family history of rheumatoid arthritis     Family history of sarcoidosis (mother)     Urinary frequency     Herpes simplex virus infection     Attention deficit hyperactivity disorder (ADHD)     Plantar fasciitis, bilateral     Complete rupture of rotator cuff     Mixed incontinence urge and stress (male)(female)     Ureterolithiasis      Past Medical History:   Diagnosis Date     Abnormal glandular Papanicolaou smear of cervix      Allergic rhinitis, cause unspecified      Alopecia      Attention deficit hyperactivity disorder (ADHD)      Chronic pain of left knee      Complete rupture of rotator cuff      Constipation      Gastro-oesophageal reflux disease      Heart murmur     murmur     Hematuria      Herpes simplex virus infection      Hydronephrosis, right      Lymphocytopenia 4/4/2011     Migraine headache      Migraine, unspecified, without mention of intractable migraine without mention of status migrainosus      Mixed incontinence urge and stress      Mumps      Numbness and tingling     LEFT ARM     Palpitations      Plantar fasciitis, bilateral      Renal disease     hx stones x 2 episodes     Seasonal allergic rhinitis      Sleep disorder      Ureterolithiasis      Urinary frequency     burning     Vertigo         Objective      PHYSICAL EXAM   GENERAL: Healthy,  alert and no distress  EYES: Eyes grossly normal to inspection.  No discharge or erythema, or obvious scleral/conjunctival abnormalities.  HENT: Normal cephalic/atraumatic.  External ears, nose and mouth without ulcers or lesions.  No nasal drainage visible.  NECK: No asymmetry, visible masses or scars  RESP: No audible wheeze, cough, or visible cyanosis.  No visible retractions or increased work of breathing.    MS: No gross musculoskeletal defects noted.  Normal range of motion.  No visible edema.  SKIN: Visible skin clear. No significant rash, abnormal pigmentation or lesions.  NEURO: Cranial nerves grossly intact.  Mentation and speech appropriate for age.  PSYCH: Mentation appears normal, affect normal/bright, judgement and insight intact, normal speech and appearance well-groomed.     LABORATORY     Recent Labs   Lab Test 05/11/21  1048   COLOR Yellow   APPEARANCE Clear   URINEGLC Negative   URINEBILI Negative   URINEKETONE Negative   SG 1.020   UBLD Moderate*   URINEPH 6.0   PROTEIN Negative   UROBILINOGEN 0.2   NITRITE Positive*   LEUKEST Small*   RBCU 2-5*   WBCU 10-25*     IMAGING     I personally reviewed the images.     Xr Kub [[id number]]    Result Date: 5/18/2021  XR KUB 5/18/2021 11:40 AM HISTORY: Calculus of kidney COMPARISON: 5/18/2020     IMPRESSION: Punctate calcifications projected over both kidneys probably represent renal calculi, better seen on prior CT. Stable phleboliths right hemipelvis. KAYCEE KENNEY MD     Assessment & Plan    1. Recurrent UTI    2. Calculus of kidney    3. Constipation, unspecified constipation type      I had the pleasure today meeting with Ms. Mando Maynard to discuss her recurrent urinary tract infections.  Patient is due for follow-up for nephrolithiasis with Dr. Guevara next week.  I have recommended she discuss with him if there is any benefit in removing some of her stones.    She has previously followed with Dr. Rossi regarding her lower urinary tract symptomatology.   I have recommended that we complete cystoscopy to see if there are any concerning findings within the bladder contributing to her recurrent urinary tract infections.  She should also have a urinalysis and postvoid residual.    I discussed with her trying to improve her bowel movements as this will likely improve her lower urinary tract symptoms.  Additionally, this may be contributing some to her difficulties with urinary tract infections.  She can continue to increase her fiber in her diet.  I have recommended she try to incorporate MiraLAX regularly to aim for a soft well-formed bowel movement once daily.  This is available over-the-counter.    We also briefly discussed possible probiotic to help with her symptomatology.  We did briefly discussed align or Florastor.  This may or may not improve her symptomatology.    We also briefly discussed putting her back on a topical estrogen cream.  We also discussed Hip-Kobe and vitamin C as it does not appear that she has been on this.  Another option given the E. coli urinary tract infections may be placing her on d-mannose.    -Will start probiotic and Miralax.  -Follow up with Dr. Vu for nephrolithiasis.  -UA, PVR, and cystoscopy with Dr. Wei for Benedict and LUTS  -Consideration of restarting topical estrogen.  -Can consider Hiprex and Vitamin C, but some concern with Vitamin C given history of nephrolithiasis.    Signed by:       Cinthia Martins PA-C

## 2021-05-24 ENCOUNTER — VIRTUAL VISIT (OUTPATIENT)
Dept: UROLOGY | Facility: CLINIC | Age: 56
End: 2021-05-24
Payer: COMMERCIAL

## 2021-05-24 VITALS — WEIGHT: 155 LBS | BODY MASS INDEX: 26.46 KG/M2 | HEIGHT: 64 IN

## 2021-05-24 DIAGNOSIS — N20.0 CALCULUS OF KIDNEY: Primary | ICD-10-CM

## 2021-05-24 PROCEDURE — 99213 OFFICE O/P EST LOW 20 MIN: CPT | Mod: 95 | Performed by: UROLOGY

## 2021-05-24 ASSESSMENT — MIFFLIN-ST. JEOR: SCORE: 1273.08

## 2021-05-24 ASSESSMENT — PAIN SCALES - GENERAL: PAINLEVEL: NO PAIN (0)

## 2021-05-24 NOTE — LETTER
5/24/2021       RE: Laney Maynard  [address] [facility] [id number]     Dear Colleague,    Thank you for referring your patient, Laney Maynard, to the [facility] at [facility]. Please see a copy of my visit note below.     *SEND LINK TO CELL PHONE*    Laney is a 56 year old who is being evaluated via a billable video visit.      How would you like to obtain your AVS? MyChart  If the video visit is dropped, the invitation should be resent by: [phone]  Will anyone else be joining your video visit? No    It is a great pleasure to see to have the opportunity to have a video follow-up consultation with this very pleasant 56-year-old lady today.  We recall she has history of recurrent urinary stone disease with a stone analysis predominantly calcium phosphate with a small amount of calcium oxalate, who is being followed by our nephrologist at the present time.  She had been having had ESWL in 2016 and 2018 and had some residual calyceal stones on the left side causing some symptoms.  In 2018 we did perform flexible ureteroscopy with laser lithotripsy on stones in the left renal calyces.  Subsequently a few small stones are seen in both kidneys.  Calculi composed primarily of:   10% calcium oxalate monohydrate, and   90% calcium phosphate (hydroxy- and carbonate- apatite).      The patient is being seen by a nephrologist at this time, for metabolic stone evaluation, and is currently on potassium citrate.     She is also being seen by Dr. Shawna COLLADO for management of frequency of micturition and is currently on treatment with Vesicare with some moderate benefit.    She has passed some small stones over the last 12 months but with little discomfort and is currently very stable.         Past Medical History:   Diagnosis Date     Abnormal glandular Papanicolaou smear of cervix      Allergic rhinitis, cause  unspecified      Alopecia      Attention deficit hyperactivity disorder (ADHD)      Chronic pain of left knee      Complete rupture of rotator cuff      Constipation      Gastro-oesophageal reflux disease      Heart murmur     murmur     Hematuria      Herpes simplex virus infection      Hydronephrosis, right      Lymphocytopenia 4/4/2011     Migraine headache      Migraine, unspecified, without mention of intractable migraine without mention of status migrainosus      Mixed incontinence urge and stress      Mumps      Numbness and tingling     LEFT ARM     Palpitations      Plantar fasciitis, bilateral      Renal disease     hx stones x 2 episodes     Seasonal allergic rhinitis      Sleep disorder      Ureterolithiasis      Urinary frequency     burning     Vertigo        Social History     Socioeconomic History     Marital status:      Spouse name: None     Number of children: 2     Years of education: None     Highest education level: Associate degree: occupational, technical, or vocational program   Occupational History     Occupation: LPN     Employer: [employer]     Comment: self employed, lifting, wheeling, walking     Employer: [employer]   Social Needs     Financial resource strain: Not hard at all     Food insecurity     Worry: Never true     Inability: Never true     Transportation needs     Medical: No     Non-medical: No   Tobacco Use     Smoking status: Never Smoker     Smokeless tobacco: Never Used   Substance and Sexual Activity     Alcohol use: No     Alcohol/week: 0.0 standard drinks     Frequency: Never     Drinks per session: 1 or 2     Binge frequency: Never     Drug use: No     Sexual activity: Yes     Partners: Male     Birth control/protection: Post-menopausal, Female Surgical     Comment: Tubaligation   Lifestyle     Physical activity     Days per week: 3 days     Minutes per session: 20 min     Stress: Only a little   Relationships     Social connections      Talks on phone: Once a week     Gets together: Once a week     Attends Confucianism service: Never     Active member of club or organization: No     Attends meetings of clubs or organizations: Never     Relationship status:      Intimate partner violence     Fear of current or ex partner: None     Emotionally abused: None     Physically abused: None     Forced sexual activity: None   Other Topics Concern      Service No     Blood Transfusions No     Caffeine Concern No     Occupational Exposure No     Hobby Hazards Not Asked     Sleep Concern Yes     Stress Concern No     Weight Concern No     Special Diet No     Back Care Not Asked     Exercise Yes     Comment: 2 days/week     Bike Helmet Not Asked     Seat Belt No     Self-Exams Yes     Parent/sibling w/ CABG, MI or angioplasty before 65F 55M? No   Social History Narrative     None       Past Surgical History:   Procedure Laterality Date     ARTHROSCOPY KNEE Right 04/26/2017    Procedure: Right knee arthroscopy and anterior fat pad resection with medial plica resection. Partial lateral menisectomy. Surgeon:  Fredi Lindsay MD  Location: [facility]     ARTHROSCOPY SHOULDER, OPEN ROTATOR CUFF REPAIR, COMBINED  7/31/2013    Procedure: COMBINED ARTHROSCOPY SHOULDER, OPEN ROTATOR CUFF REPAIR;  Left Shoulder Arthroscopy, Distal Clavicale Resection, Decompression, Mini Open Rotator Cuff Repair    ;  Surgeon: Fredi Lindsay MD;  Location:  OR     BREAST SURGERY  2014    Augmentation     C REMOVAL OF KIDNEY STONE       COLONOSCOPY  2/7/2014    Procedure: COLONOSCOPY;  Colonoscopy/WW;  Surgeon: Pancho Olsen MD;  Location:  GI     COMBINED CYSTOSCOPY, RETROGRADES, URETEROSCOPY, LASER HOLMIUM LITHOTRIPSY URETER(S), INSERT STENT Right 4/23/2016    Procedure: COMBINED CYSTOSCOPY, RETROGRADES, URETEROSCOPY, LASER HOLMIUM LITHOTRIPSY URETER(S), INSERT STENT;  Surgeon: Kushal Noriega MD;  Location:  OR     CYSTOSCOPY        CYSTOSCOPY, RETROGRADES, EXTRACT STONE, COMBINED  2013    Procedure: COMBINED CYSTOSCOPY, RETROGRADES, EXTRACT STONE;  Video Cystoscopy,  Right Ureteroscopy, ureteral dilatation,  Stone extraction;  Surgeon: Subhash Vann MD;  Location:  OR     EXTRACORPOREAL SHOCK WAVE LITHOTRIPSY (ESWL) Bilateral 2016    Procedure: EXTRACORPOREAL SHOCK WAVE LITHOTRIPSY (ESWL);  Surgeon: Bassam Vu MD;  Location:  OR     EXTRACORPOREAL SHOCK WAVE LITHOTRIPSY, CYSTOSCOPY, INSERT STENT URETER(S), COMBINED Bilateral 2018    Procedure: COMBINED EXTRACORPOREAL SHOCK WAVE LITHOTRIPSY, CYSTOSCOPY, INSERT STENT URETER(S);  BILATERAL COMBINED EXTRACORPOREAL SHOCK WAVE LITHOTRIPSY, CYSTOSCOPY, LEFT STENT PLACEMENT;  Surgeon: Bassam Vu MD;  Location:  OR     EYE SURGERY      Lasik     GYN SURGERY      laparoscopy     LAPAROSCOPIC CHOLECYSTECTOMY WITH CHOLANGIOGRAMS  2012    Procedure: LAPAROSCOPIC CHOLECYSTECTOMY WITH CHOLANGIOGRAMS;  LAPAROSCOPIC CHOLECYSTECTOMY WITH CHOLANGIOGRAMS ;  Surgeon: Nataliya Gabriel MD;  Location:  OR     LASER HOLMIUM LITHOTRIPSY URETER(S), INSERT STENT, COMBINED Left 10/30/2019    Procedure: CYSTOSCOPY,LEFT URETEROSCOPY, HOLMIUM LASER, STONE EXTRACTION, LEFT STENT PLACEMENT;  Surgeon: Bassam Vu MD;  Location:  OR     TUBAL LIGATION       ZZC NONSPECIFIC PROCEDURE  age 16    colposcopy for abnormal pap     ZZC NONSPECIFIC PROCEDURE      surgery L thumb     ZZC NONSPECIFIC PROCEDURE      breast augmentation-silicone     ZZC NONSPECIFIC PROCEDURE      s/p  x 2     ZZC NONSPECIFIC PROCEDURE      Bilateral tubal ligation       Family History   Problem Relation Age of Onset     Diabetes Mother         type 2     Alcohol/Drug Mother         alcohol         Current Outpatient Medications:      acyclovir (ZOVIRAX) 5 % external ointment, Apply topically 2 times daily as needed (Outbreak), Disp: , Rfl:       amphetamine-dextroamphetamine (ADDERALL) 30 MG tablet, TAKE ONE TABLET BY MOUTH THREE TIMES A DAY, Disp: 90 tablet, Rfl: 0     cefPROZIL (CEFZIL) 500 MG tablet, Take 1 tablet (500 mg) by mouth 2 times daily (Patient not taking: Reported on 5/21/2021), Disp: 20 tablet, Rfl: 0     cephALEXin (KEFLEX) 500 MG capsule, Take 1 capsule (500 mg) by mouth 2 times daily, Disp: 20 capsule, Rfl: 0     fexofenadine (ALLEGRA) 180 MG tablet, Take 1 tablet (180 mg) by mouth daily as needed for allergies, Disp: 30 tablet, Rfl: 6     fluocinolone acetonide 0.01 % OIL, Use on scalp once a week, as needed., Disp: 1 Bottle, Rfl: 11     fluticasone (FLONASE) 50 MCG/ACT nasal spray, Spray 2 sprays into both nostrils daily as needed for allergies, Disp: 16 g, Rfl: 11     hydrOXYzine (ATARAX) 25 MG tablet, Take 1 tablet (25 mg) by mouth 3 times daily as needed for itching, Disp: 30 tablet, Rfl: 1     ketoconazole (NIZORAL) 2 % external shampoo, Apply to the affected area and wash off after 5 minutes, as needed., Disp: 120 mL, Rfl: 11     ketorolac (TORADOL) 10 MG tablet, Take 1 tablet (10 mg) by mouth every 6 hours as needed for moderate pain, Disp: 30 tablet, Rfl: 1     meclizine (ANTIVERT) 25 MG tablet, Take 1 tablet (25 mg) by mouth 3 times daily as needed for nausea, Disp: 30 tablet, Rfl: 3     Menthol, Topical Analgesic, (BIOFREEZE COLORLESS) 4 % GEL, Externally apply topically 3 times daily as needed, Disp: 118 mL, Rfl: 1     naproxen (NAPROSYN) 500 MG tablet, TAKE ONE TABLET BY MOUTH TWICE A DAY WITH MEALS, Disp: 60 tablet, Rfl: 3     naratriptan (AMERGE) 2.5 MG tablet, , Disp: , Rfl:      phenazopyridine (PYRIDIUM) 200 MG tablet, Take 1 tablet (200 mg) by mouth 3 times daily as needed for irritation, Disp: 30 tablet, Rfl: 3     solifenacin (VESICARE) 10 MG tablet, TAKE ONE TABLET BY MOUTH ONCE DAILY, Disp: 90 tablet, Rfl: 2     valACYclovir (VALTREX) 1000 mg tablet, Take 1 tablet (1,000 mg) by mouth daily, Disp: 90 tablet, Rfl: 3     " zolpidem (AMBIEN) 5 MG tablet, Take 1 tablet (5 mg) by mouth nightly as needed for sleep, Disp: 30 tablet, Rfl: 2    10 point ROS of systems including Constitutional, Eyes, Respiratory, Cardiovascular, Gastroenterology, Genitourinary, Integumentary, Muscularskeletal, Psychiatric and Neurologic were all negative except for pertinent positives noted in my HPI.    Examination: Based on video observation  Ht 1.626 m (5' 4\")   Wt 70.3 kg (155 lb)   LMP 02/25/2014   BMI 26.61 kg/m    General Impression: Very pleasant patient in no acute distress, well-oriented in time place and person and quite conversational  Mental Status: Normal  HEENT: Extraocular movements intact.  No clinical evidence of jaundice on examination of eyes.  Mucous membranes are unremarkable  Skin:  No other abnormalities  Respiratory System: Unlabored on room air.  Respiratory cycle normal  Lymph Nodes: Not examined  Back/Flank Tenderness: Not examined  Cardiovascular System: Not examined  Abdominal Examination: Not examined  Extremities: Not examined  Genitial: Not examined  Rectal Examination: Not examined  Neurologic System: There are no significant acute abnormal neurological signs in the central or peripheral nervous systems    Impression  : I reviewed her KUB today.  XR KUB 5/18/2021 11:40 AM     HISTORY: Calculus of kidney     COMPARISON: 5/18/2020                                                                      IMPRESSION: Punctate calcifications projected over both kidneys  probably represent renal calculi, better seen on prior CT. Stable  phleboliths right hemipelvis.     KAYCEE KENNEY MD       I have personally reviewed these films and there are some very small stones seen in each kidney but none in the ureter none in the bladder.  The overall impression is that situation is stable.  I do not think we need to change any of our current management for the long-term follow-up after you treatment for urinary stone disease by 2 episodes " of ESWL in the past.  She should follow all the recommendations that have been made by the nephrologist in the past and this seems to be working well.  I would recommend we see her in 1 years time for a KUB and examination.  I did discuss the entire situation with the patient in detail today.  I addressed and answered all her questions    Plan: 1 year for KUB and examination    Time: 20 minutes.  Greater than 50% in discussion and consultation      Bassam Vu MD

## 2021-05-24 NOTE — PROGRESS NOTES
*SEND LINK TO CELL PHONE*    Laney is a 56 year old who is being evaluated via a billable video visit.      How would you like to obtain your AVS? [id number]  If the video visit is dropped, the invitation should be resent by: [phone]  Will anyone else be joining your video visit? No    It is a great pleasure to see to have the opportunity to have a video follow-up consultation with this very pleasant 56-year-old lady today.  We recall she has history of recurrent urinary stone disease with a stone analysis predominantly calcium phosphate with a small amount of calcium oxalate, who is being followed by our nephrologist at the present time.  She had been having had ESWL in 2016 and 2018 and had some residual calyceal stones on the left side causing some symptoms.  In 2018 we did perform flexible ureteroscopy with laser lithotripsy on stones in the left renal calyces.  Subsequently a few small stones are seen in both kidneys.  Calculi composed primarily of:   10% calcium oxalate monohydrate, and   90% calcium phosphate (hydroxy- and carbonate- apatite).      The patient is being seen by a nephrologist at this time, for metabolic stone evaluation, and is currently on potassium citrate.     She is also being seen by Dr. Shawna COLLADO for management of frequency of micturition and is currently on treatment with Vesicare with some moderate benefit.    She has passed some small stones over the last 12 months but with little discomfort and is currently very stable.         Past Medical History:   Diagnosis Date     Abnormal glandular Papanicolaou smear of cervix      Allergic rhinitis, cause unspecified      Alopecia      Attention deficit hyperactivity disorder (ADHD)      Chronic pain of left knee      Complete rupture of rotator cuff      Constipation      Gastro-oesophageal reflux disease      Heart murmur     murmur     Hematuria      Herpes simplex virus infection      Hydronephrosis, right      Lymphocytopenia  4/4/2011     Migraine headache      Migraine, unspecified, without mention of intractable migraine without mention of status migrainosus      Mixed incontinence urge and stress      Mumps      Numbness and tingling     LEFT ARM     Palpitations      Plantar fasciitis, bilateral      Renal disease     hx stones x 2 episodes     Seasonal allergic rhinitis      Sleep disorder      Ureterolithiasis      Urinary frequency     burning     Vertigo        Social History     Socioeconomic History     Marital status:      Spouse name: None     Number of children: 2     Years of education: None     Highest education level: Associate degree: occupational, technical, or vocational program   Occupational History     Occupation: LPN     Employer: [employer]     Comment: self employed, lifting, wheeling, walking     Employer: [employer]   Social Needs     Financial resource strain: Not hard at all     Food insecurity     Worry: Never true     Inability: Never true     Transportation needs     Medical: No     Non-medical: No   Tobacco Use     Smoking status: Never Smoker     Smokeless tobacco: Never Used   Substance and Sexual Activity     Alcohol use: No     Alcohol/week: 0.0 standard drinks     Frequency: Never     Drinks per session: 1 or 2     Binge frequency: Never     Drug use: No     Sexual activity: Yes     Partners: Male     Birth control/protection: Post-menopausal, Female Surgical     Comment: Tubaligation   Lifestyle     Physical activity     Days per week: 3 days     Minutes per session: 20 min     Stress: Only a little   Relationships     Social connections     Talks on phone: Once a week     Gets together: Once a week     Attends [employer] service: Never     Active member of club or organization: No     Attends meetings of clubs or organizations: Never     Relationship status:      Intimate partner violence     Fear of current or ex partner: None     Emotionally abused: None      Physically abused: None     Forced sexual activity: None   Other Topics Concern      Service No     Blood Transfusions No     Caffeine Concern No     Occupational Exposure No     Hobby Hazards Not Asked     Sleep Concern Yes     Stress Concern No     Weight Concern No     Special Diet No     Back Care Not Asked     Exercise Yes     Comment: 2 days/week     Bike Helmet Not Asked     Seat Belt No     Self-Exams Yes     Parent/sibling w/ CABG, MI or angioplasty before 65F 55M? No   Social History Narrative     None       Past Surgical History:   Procedure Laterality Date     ARTHROSCOPY KNEE Right 04/26/2017    Procedure: Right knee arthroscopy and anterior fat pad resection with medial plica resection. Partial lateral menisectomy. Surgeon:  Fredi Lindsay MD  Location: [facility]     ARTHROSCOPY SHOULDER, OPEN ROTATOR CUFF REPAIR, COMBINED  7/31/2013    Procedure: COMBINED ARTHROSCOPY SHOULDER, OPEN ROTATOR CUFF REPAIR;  Left Shoulder Arthroscopy, Distal Clavicale Resection, Decompression, Mini Open Rotator Cuff Repair    ;  Surgeon: Fredi Lindsay MD;  Location:  OR     BREAST SURGERY  2014    Augmentation     C REMOVAL OF KIDNEY STONE       COLONOSCOPY  2/7/2014    Procedure: COLONOSCOPY;  Colonoscopy/WW;  Surgeon: Pancho Olsen MD;  Location:  GI     COMBINED CYSTOSCOPY, RETROGRADES, URETEROSCOPY, LASER HOLMIUM LITHOTRIPSY URETER(S), INSERT STENT Right 4/23/2016    Procedure: COMBINED CYSTOSCOPY, RETROGRADES, URETEROSCOPY, LASER HOLMIUM LITHOTRIPSY URETER(S), INSERT STENT;  Surgeon: Kushal Noriega MD;  Location:  OR     CYSTOSCOPY       CYSTOSCOPY, RETROGRADES, EXTRACT STONE, COMBINED  1/9/2013    Procedure: COMBINED CYSTOSCOPY, RETROGRADES, EXTRACT STONE;  Video Cystoscopy,  Right Ureteroscopy, ureteral dilatation,  Stone extraction;  Surgeon: Subhash Vann MD;  Location:  OR     EXTRACORPOREAL SHOCK WAVE LITHOTRIPSY (ESWL) Bilateral 4/29/2016    Procedure:  EXTRACORPOREAL SHOCK WAVE LITHOTRIPSY (ESWL);  Surgeon: Bassam Vu MD;  Location: [facility] OR     EXTRACORPOREAL SHOCK WAVE LITHOTRIPSY, CYSTOSCOPY, INSERT STENT URETER(S), COMBINED Bilateral 2018    Procedure: COMBINED EXTRACORPOREAL SHOCK WAVE LITHOTRIPSY, CYSTOSCOPY, INSERT STENT URETER(S);  BILATERAL COMBINED EXTRACORPOREAL SHOCK WAVE LITHOTRIPSY, CYSTOSCOPY, LEFT STENT PLACEMENT;  Surgeon: Bassam Vu MD;  Location:  OR     EYE SURGERY      Lasik     GYN SURGERY      laparoscopy     LAPAROSCOPIC CHOLECYSTECTOMY WITH CHOLANGIOGRAMS  2012    Procedure: LAPAROSCOPIC CHOLECYSTECTOMY WITH CHOLANGIOGRAMS;  LAPAROSCOPIC CHOLECYSTECTOMY WITH CHOLANGIOGRAMS ;  Surgeon: Nataliya Gabriel MD;  Location:  OR     LASER HOLMIUM LITHOTRIPSY URETER(S), INSERT STENT, COMBINED Left 10/30/2019    Procedure: CYSTOSCOPY,LEFT URETEROSCOPY, HOLMIUM LASER, STONE EXTRACTION, LEFT STENT PLACEMENT;  Surgeon: Bassam Vu MD;  Location: [facility] OR     TUBAL LIGATION       ZZC NONSPECIFIC PROCEDURE  age 16    colposcopy for abnormal pap     ZZC NONSPECIFIC PROCEDURE      surgery L thumb     ZZC NONSPECIFIC PROCEDURE      breast augmentation-silicone     ZZC NONSPECIFIC PROCEDURE      s/p  x 2     ZZC NONSPECIFIC PROCEDURE      Bilateral tubal ligation       Family History   Problem Relation Age of Onset     Diabetes Mother         type 2     Alcohol/Drug Mother         alcohol         Current Outpatient Medications:      acyclovir (ZOVIRAX) 5 % external ointment, Apply topically 2 times daily as needed (Outbreak), Disp: , Rfl:      amphetamine-dextroamphetamine (ADDERALL) 30 MG tablet, TAKE ONE TABLET BY MOUTH THREE TIMES A DAY, Disp: 90 tablet, Rfl: 0     cefPROZIL (CEFZIL) 500 MG tablet, Take 1 tablet (500 mg) by mouth 2 times daily (Patient not taking: Reported on 2021), Disp: 20 tablet, Rfl: 0     cephALEXin (KEFLEX) 500 MG capsule, Take 1 capsule (500 mg) by mouth 2 times  daily, Disp: 20 capsule, Rfl: 0     fexofenadine (ALLEGRA) 180 MG tablet, Take 1 tablet (180 mg) by mouth daily as needed for allergies, Disp: 30 tablet, Rfl: 6     fluocinolone acetonide 0.01 % OIL, Use on scalp once a week, as needed., Disp: 1 Bottle, Rfl: 11     fluticasone (FLONASE) 50 MCG/ACT nasal spray, Spray 2 sprays into both nostrils daily as needed for allergies, Disp: 16 g, Rfl: 11     hydrOXYzine (ATARAX) 25 MG tablet, Take 1 tablet (25 mg) by mouth 3 times daily as needed for itching, Disp: 30 tablet, Rfl: 1     ketoconazole (NIZORAL) 2 % external shampoo, Apply to the affected area and wash off after 5 minutes, as needed., Disp: 120 mL, Rfl: 11     ketorolac (TORADOL) 10 MG tablet, Take 1 tablet (10 mg) by mouth every 6 hours as needed for moderate pain, Disp: 30 tablet, Rfl: 1     meclizine (ANTIVERT) 25 MG tablet, Take 1 tablet (25 mg) by mouth 3 times daily as needed for nausea, Disp: 30 tablet, Rfl: 3     Menthol, Topical Analgesic, (BIOFREEZE COLORLESS) 4 % GEL, Externally apply topically 3 times daily as needed, Disp: 118 mL, Rfl: 1     naproxen (NAPROSYN) 500 MG tablet, TAKE ONE TABLET BY MOUTH TWICE A DAY WITH MEALS, Disp: 60 tablet, Rfl: 3     naratriptan (AMERGE) 2.5 MG tablet, , Disp: , Rfl:      phenazopyridine (PYRIDIUM) 200 MG tablet, Take 1 tablet (200 mg) by mouth 3 times daily as needed for irritation, Disp: 30 tablet, Rfl: 3     solifenacin (VESICARE) 10 MG tablet, TAKE ONE TABLET BY MOUTH ONCE DAILY, Disp: 90 tablet, Rfl: 2     valACYclovir (VALTREX) 1000 mg tablet, Take 1 tablet (1,000 mg) by mouth daily, Disp: 90 tablet, Rfl: 3     zolpidem (AMBIEN) 5 MG tablet, Take 1 tablet (5 mg) by mouth nightly as needed for sleep, Disp: 30 tablet, Rfl: 2    10 point ROS of systems including Constitutional, Eyes, Respiratory, Cardiovascular, Gastroenterology, Genitourinary, Integumentary, Muscularskeletal, Psychiatric and Neurologic were all negative except for pertinent positives noted in my  "HPI.    Examination: Based on video observation  Ht 1.626 m (5' 4\")   Wt 70.3 kg (155 lb)   LMP 02/25/2014   BMI 26.61 kg/m    General Impression: Very pleasant patient in no acute distress, well-oriented in time place and person and quite conversational  Mental Status: Normal  HEENT: Extraocular movements intact.  No clinical evidence of jaundice on examination of eyes.  Mucous membranes are unremarkable  Skin:  No other abnormalities  Respiratory System: Unlabored on room air.  Respiratory cycle normal  Lymph Nodes: Not examined  Back/Flank Tenderness: Not examined  Cardiovascular System: Not examined  Abdominal Examination: Not examined  Extremities: Not examined  Genitial: Not examined  Rectal Examination: Not examined  Neurologic System: There are no significant acute abnormal neurological signs in the central or peripheral nervous systems    Impression  : I reviewed her KUB today.  XR KUB 5/18/2021 11:40 AM     HISTORY: Calculus of kidney     COMPARISON: 5/18/2020                                                                      IMPRESSION: Punctate calcifications projected over both kidneys  probably represent renal calculi, better seen on prior CT. Stable  phleboliths right hemipelvis.     KAYCEE KENNEY MD       I have personally reviewed these films and there are some very small stones seen in each kidney but none in the ureter none in the bladder.  The overall impression is that situation is stable.  I do not think we need to change any of our current management for the long-term follow-up after you treatment for urinary stone disease by 2 episodes of ESWL in the past.  She should follow all the recommendations that have been made by the nephrologist in the past and this seems to be working well.  I would recommend we see her in 1 years time for a KUB and examination.  I did discuss the entire situation with the patient in detail today.  I addressed and answered all her questions    Plan: 1 year " for KUB and examination    Time: 20 minutes.  Greater than 50% in discussion and consultation

## 2021-06-01 ENCOUNTER — TELEPHONE (OUTPATIENT)
Dept: UROLOGY | Facility: CLINIC | Age: 56
End: 2021-06-01

## 2021-06-01 NOTE — TELEPHONE ENCOUNTER
[facility] Call [facility]    Phone Message    May a detailed message be left on voicemail: yes     Reason for Call: Other: PT Lane[place] called ref'd by Cinthia Martins PA-C for in person appt w/Dr. Wei for follow-up. PT had requested [place], but [facility] informed PT Dr. Wei was seeing PTs in [place] and [facility] [place], [place]. PT scheduled first available 8/18 in [place] w/Dr. Wei, added to wait list. Per PT requests to be seen sooner per Cinthia Martins's referral. Pls review and contact PT to reschedule.    Action Taken: Other: UA URO    Travel Screening: Negative

## 2021-06-02 ASSESSMENT — ENCOUNTER SYMPTOMS
CHILLS: 0
DIARRHEA: 0
DYSURIA: 0
FREQUENCY: 1
FEVER: 0
SHORTNESS OF BREATH: 0
CONSTIPATION: 1

## 2021-06-02 NOTE — PATIENT INSTRUCTIONS
-Will start probiotic and Miralax.  -Follow up with Dr. Vu for nephrolithiasis.  -UA, PVR, and cystoscopy with Dr. Wei for Benedict and LUTS  -Consideration of restarting topical estrogen.  -Can consider Hiprex and Vitamin C, but some concern with Vitamin C given history of nephrolithiasis.

## 2021-06-09 ENCOUNTER — OFFICE VISIT (OUTPATIENT)
Dept: UROLOGY | Facility: CLINIC | Age: 56
End: 2021-06-09
Payer: COMMERCIAL

## 2021-06-09 VITALS
SYSTOLIC BLOOD PRESSURE: 142 MMHG | HEART RATE: 97 BPM | HEIGHT: 65 IN | BODY MASS INDEX: 26.16 KG/M2 | DIASTOLIC BLOOD PRESSURE: 78 MMHG | OXYGEN SATURATION: 96 % | WEIGHT: 157 LBS

## 2021-06-09 DIAGNOSIS — N20.0 CALCULUS OF KIDNEY: ICD-10-CM

## 2021-06-09 DIAGNOSIS — N39.0 RECURRENT UTI: Primary | ICD-10-CM

## 2021-06-09 LAB
ALBUMIN UR-MCNC: 30 MG/DL
APPEARANCE UR: CLEAR
BILIRUB UR QL STRIP: NEGATIVE
COLOR UR AUTO: YELLOW
GLUCOSE UR STRIP-MCNC: NEGATIVE MG/DL
HGB UR QL STRIP: ABNORMAL
KETONES UR STRIP-MCNC: NEGATIVE MG/DL
LEUKOCYTE ESTERASE UR QL STRIP: ABNORMAL
NITRATE UR QL: POSITIVE
PH UR STRIP: 7 PH (ref 5–7)
SOURCE: ABNORMAL
SP GR UR STRIP: 1.02 (ref 1–1.03)
UROBILINOGEN UR STRIP-ACNC: 0.2 EU/DL (ref 0.2–1)

## 2021-06-09 PROCEDURE — 87086 URINE CULTURE/COLONY COUNT: CPT | Performed by: UROLOGY

## 2021-06-09 PROCEDURE — 81003 URINALYSIS AUTO W/O SCOPE: CPT | Performed by: UROLOGY

## 2021-06-09 PROCEDURE — 99213 OFFICE O/P EST LOW 20 MIN: CPT | Performed by: UROLOGY

## 2021-06-09 PROCEDURE — 87186 SC STD MICRODIL/AGAR DIL: CPT | Performed by: UROLOGY

## 2021-06-09 PROCEDURE — 87088 URINE BACTERIA CULTURE: CPT | Performed by: UROLOGY

## 2021-06-09 RX ORDER — CEPHALEXIN 500 MG/1
500 CAPSULE ORAL 2 TIMES DAILY
Qty: 14 CAPSULE | Refills: 0 | Status: SHIPPED | OUTPATIENT
Start: 2021-06-09 | End: 2021-06-16

## 2021-06-09 ASSESSMENT — PAIN SCALES - GENERAL: PAINLEVEL: SEVERE PAIN (7)

## 2021-06-09 ASSESSMENT — MIFFLIN-ST. JEOR: SCORE: 1290.09

## 2021-06-09 NOTE — NURSING NOTE
"Chief Complaint   Patient presents with     Recurrent UTI     Patient here today for Cystoscopy with Dr Wei         No Cystoscopy done today due to Urine tract infection.    Blood pressure (!) 142/78, pulse 97, height 1.638 m (5' 4.5\"), weight 71.2 kg (157 lb), last menstrual period 02/25/2014, SpO2 96 %, not currently breastfeeding. Body mass index is 26.53 kg/m .    Patient Active Problem List   Diagnosis     Alopecia     Seasonal allergic rhinitis     GERD (gastroesophageal reflux disease)     CARDIOVASCULAR SCREENING; LDL GOAL LESS THAN 160     Migraine headache     Sleep disorder     Family history of rheumatoid arthritis     Family history of sarcoidosis (mother)     Urinary frequency     Herpes simplex virus infection     Attention deficit hyperactivity disorder (ADHD)     Plantar fasciitis, bilateral     Complete rupture of rotator cuff     Mixed incontinence urge and stress (male)(female)     Ureterolithiasis       Allergies   Allergen Reactions     Cefatrizine Itching     Aloe Itching and Rash     Codeine      GI upset     Compazine Nausea and Itching     Darvocet [Acetaminophen] Nausea and Vomiting     Percocet [Oxycodone-Acetaminophen] Nausea and Vomiting     Sulphadimidine [Sulfamethazine] Rash       Current Outpatient Medications   Medication Sig Dispense Refill     acyclovir (ZOVIRAX) 5 % external ointment Apply topically 2 times daily as needed (Outbreak)       [START ON 7/1/2021] amphetamine-dextroamphetamine (ADDERALL) 30 MG tablet TAKE ONE TABLET BY MOUTH THREE TIMES A DAY 90 tablet 0     fexofenadine (ALLEGRA) 180 MG tablet Take 1 tablet (180 mg) by mouth daily as needed for allergies 30 tablet 6     fluocinolone acetonide 0.01 % OIL Use on scalp once a week, as needed. 1 Bottle 11     fluticasone (FLONASE) 50 MCG/ACT nasal spray Spray 2 sprays into both nostrils daily as needed for allergies 16 g 11     hydrOXYzine (ATARAX) 25 MG tablet Take 1 tablet (25 mg) by mouth 3 times daily as needed for " itching 30 tablet 1     ketoconazole (NIZORAL) 2 % external shampoo Apply to the affected area and wash off after 5 minutes, as needed. 120 mL 11     ketorolac (TORADOL) 10 MG tablet Take 1 tablet (10 mg) by mouth every 6 hours as needed for moderate pain 30 tablet 1     meclizine (ANTIVERT) 25 MG tablet Take 1 tablet (25 mg) by mouth 3 times daily as needed for nausea 30 tablet 3     Menthol, Topical Analgesic, (BIOFREEZE COLORLESS) 4 % GEL Externally apply topically 3 times daily as needed 118 mL 1     naproxen (NAPROSYN) 500 MG tablet TAKE ONE TABLET BY MOUTH TWICE A DAY WITH MEALS 60 tablet 3     naratriptan (AMERGE) 2.5 MG tablet        solifenacin (VESICARE) 10 MG tablet TAKE ONE TABLET BY MOUTH ONCE DAILY 90 tablet 2     valACYclovir (VALTREX) 1000 mg tablet Take 1 tablet (1,000 mg) by mouth daily 90 tablet 3     zolpidem (AMBIEN) 5 MG tablet Take 1 tablet (5 mg) by mouth nightly as needed for sleep 30 tablet 2     cephALEXin (KEFLEX) 500 MG capsule Take 1 capsule (500 mg) by mouth 2 times daily (Patient not taking: Reported on 6/9/2021) 20 capsule 0     phenazopyridine (PYRIDIUM) 200 MG tablet Take 1 tablet (200 mg) by mouth 3 times daily as needed for irritation (Patient not taking: Reported on 6/9/2021) 30 tablet 3       Social History     Tobacco Use     Smoking status: Never Smoker     Smokeless tobacco: Never Used   Substance Use Topics     Alcohol use: No     Alcohol/week: 0.0 standard drinks     Frequency: Never     Drinks per session: 1 or 2     Binge frequency: Never     Drug use: No       UA RESULTS:  Recent Labs   Lab Test 06/09/21  0932 05/11/21  1048   COLOR Yellow Yellow   APPEARANCE Clear Clear   URINEGLC Negative Negative   URINEBILI Negative Negative   URINEKETONE Negative Negative   SG 1.020 1.020   UBLD Moderate* Moderate*   URINEPH 7.0 6.0   PROTEIN 30* Negative   UROBILINOGEN 0.2 0.2   NITRITE Positive* Positive*   LEUKEST Moderate* Small*   RBCU  --  2-5*   WBCU  --  10-25*     NO  cystoscopy done with Dr Wei patient will return next week.    RACHAEL Ruiz  6/9/2021  9:08 AM

## 2021-06-09 NOTE — LETTER
"6/9/2021       RE: Laney Maynard  [address] [address] [address]     Dear Colleague,    Thank you for referring your patient, Laney Maynard, to the [facility] [place] at [facility]. Please see a copy of my visit note below.        Assessment & Plan     Recurrent UTI  Symptoms and urine dip consistent with ACUTE UTI.  UCx and empiric abx based on review of recent UCx results in epic,  Will reschedule cystoscopy for next week while still on abx  - cephALEXin (KEFLEX) 500 MG capsule; Take 1 capsule (500 mg) by mouth 2 times daily for 7 days  - Urine Culture Aerobic Bacterial [OIM194]    Calculus of kidney  Stable a this time  - UA without Microscopic    Shawna Wei MD MPH  (she/her/hers)   of Urology  [facility]      Subjective   Laney is a 56 year old who presents for the following health issues   HPI     Was scheduled for cystoscopy today but feels lousy, thinks she has another UTI      Objective    BP (!) 142/78   Pulse 97   Ht 1.638 m (5' 4.5\")   Wt 71.2 kg (157 lb)   LMP 02/25/2014   SpO2 96%   BMI 26.53 kg/m    Body mass index is 26.53 kg/m .  Physical Exam   GENERAL: healthy, alert and no distress  EYES: Eyes grossly normal to inspection, conjunctivae and sclerae normal  HENT: normal cephalic/atraumatic.  External ears, nose and mouth without ulcers or lesions.  RESP: no audible wheeze, cough, or visible cyanosis.  No visible retractions or increased work of breathing.  Able to speak fully in complete sentences.  NEURO: Cranial nerves grossly intact, mentation intact and speech normal  PSYCH: mentation appears normal, affect normal/bright, judgement and insight intact, normal speech and appearance well-groomed    Urine dip positive for blood, leuks and nitrite      "

## 2021-06-09 NOTE — PATIENT INSTRUCTIONS

## 2021-06-10 ENCOUNTER — ANCILLARY PROCEDURE (OUTPATIENT)
Dept: GENERAL RADIOLOGY | Facility: CLINIC | Age: 56
End: 2021-06-10
Attending: ORTHOPAEDIC SURGERY
Payer: COMMERCIAL

## 2021-06-10 ENCOUNTER — OFFICE VISIT (OUTPATIENT)
Dept: ORTHOPEDICS | Facility: CLINIC | Age: 56
End: 2021-06-10
Payer: COMMERCIAL

## 2021-06-10 VITALS
SYSTOLIC BLOOD PRESSURE: 132 MMHG | DIASTOLIC BLOOD PRESSURE: 78 MMHG | WEIGHT: 155 LBS | HEIGHT: 65 IN | BODY MASS INDEX: 25.83 KG/M2

## 2021-06-10 DIAGNOSIS — G89.29 CHRONIC PAIN OF LEFT KNEE: Primary | ICD-10-CM

## 2021-06-10 DIAGNOSIS — M25.562 CHRONIC PAIN OF LEFT KNEE: ICD-10-CM

## 2021-06-10 DIAGNOSIS — M17.12 PRIMARY OSTEOARTHRITIS OF LEFT KNEE: ICD-10-CM

## 2021-06-10 DIAGNOSIS — M25.562 CHRONIC PAIN OF LEFT KNEE: Primary | ICD-10-CM

## 2021-06-10 DIAGNOSIS — G89.29 CHRONIC PAIN OF LEFT KNEE: ICD-10-CM

## 2021-06-10 DIAGNOSIS — M25.832 MASS OF JOINT OF LEFT WRIST: ICD-10-CM

## 2021-06-10 LAB
BACTERIA SPEC CULT: ABNORMAL
Lab: ABNORMAL
SPECIMEN SOURCE: ABNORMAL

## 2021-06-10 PROCEDURE — 73562 X-RAY EXAM OF KNEE 3: CPT | Mod: LT | Performed by: ORTHOPAEDIC SURGERY

## 2021-06-10 PROCEDURE — 20610 DRAIN/INJ JOINT/BURSA W/O US: CPT | Mod: LT | Performed by: ORTHOPAEDIC SURGERY

## 2021-06-10 PROCEDURE — 99213 OFFICE O/P EST LOW 20 MIN: CPT | Mod: 25 | Performed by: ORTHOPAEDIC SURGERY

## 2021-06-10 RX ORDER — LIDOCAINE HYDROCHLORIDE 10 MG/ML
1 INJECTION, SOLUTION INFILTRATION; PERINEURAL
Status: DISCONTINUED | OUTPATIENT
Start: 2021-06-10 | End: 2021-08-06

## 2021-06-10 RX ORDER — TESTOSTERONE CYPIONATE 200 MG/ML
2 INJECTION INTRAMUSCULAR
Status: DISCONTINUED | OUTPATIENT
Start: 2021-06-10 | End: 2021-08-06

## 2021-06-10 RX ADMIN — TESTOSTERONE CYPIONATE 2 ML: 200 INJECTION INTRAMUSCULAR at 11:38

## 2021-06-10 RX ADMIN — LIDOCAINE HYDROCHLORIDE 1 ML: 10 INJECTION, SOLUTION INFILTRATION; PERINEURAL at 11:38

## 2021-06-10 ASSESSMENT — KOOS JR
TWISING OR PIVOTING ON KNEE: EXTREME
BENDING TO THE FLOOR TO PICK UP OBJECT: EXTREME
STRAIGHTENING KNEE FULLY: MODERATE
STANDING UPRIGHT: MODERATE
RISING FROM SITTING: SEVERE
HOW SEVERE IS YOUR KNEE STIFFNESS AFTER FIRST WAKING IN MORNING: SEVERE
KOOS JR SCORING: 34.17
GOING UP OR DOWN STAIRS: SEVERE

## 2021-06-10 ASSESSMENT — MIFFLIN-ST. JEOR: SCORE: 1281.02

## 2021-06-10 NOTE — PROGRESS NOTES
HISTORY OF PRESENT ILLNESS:    Laney Maynard is a 56 year old female who is seen in follow up for left knee pain. Patient was previously evaluated 7/15/19 regarding her left knee, with history of knee pain since 2015.  Since July of 2019 she reports worsening pain.  Patient currently working as nurse.  Present symptoms: Patient reports continued and worsening knee pain. She reports worsening arthritic pain in the knee. Pain in both the medial, lateral, and across the front of the knee. She reports pain with ADL's, with prolonged sitting, or bending beyond 90 degrees. Swelling in the knee, sensitive to touch in the medial aspect. Difficulties falling and staying asleep.  Current pain level: 9/10, Worst pain level: 10/10   Patient also reports new bump along the dorsal aspect of the left hand. She reports noticing bump x2 months ago. Previously was more tender. Now pain is more intermittent, but painful with pushing, and extending the wrist backwards.   Treatments tried to this point: naproxen, Aleve, Tylenol   Past Medical History: Unchanged from the visit of 7/15/19. Please refer to that note.    REVIEW OF SYSTEMS:  CONSTITUTIONAL:  NEGATIVE for fever, chills, change in weight  INTEGUMENTARY/SKIN:  NEGATIVE for worrisome rashes, moles or lesions  EYES:  NEGATIVE for vision changes or irritation  ENT/MOUTH:  NEGATIVE for ear, mouth and throat problems  RESP:  NEGATIVE for significant cough or SOB  BREAST:  NEGATIVE for masses, tenderness or discharge  CV:  NEGATIVE for chest pain, palpitations or peripheral edema  GI:  NEGATIVE for nausea, abdominal pain, heartburn, or change in bowel habits  :  Negative   MUSCULOSKELETAL:  See HPI above  NEURO:  NEGATIVE for weakness, dizziness or paresthesias  ENDOCRINE:  NEGATIVE for temperature intolerance, skin/hair changes  HEME/ALLERGY/IMMUNE:  NEGATIVE for bleeding problems  PSYCHIATRIC:  NEGATIVE for changes in mood or affect    PHYSICAL EXAM:  /78 (BP  "Location: Right arm, Patient Position: Chair, Cuff Size: Adult Regular)   Ht 1.638 m (5' 4.5\")   Wt 70.3 kg (155 lb)   LMP 02/25/2014   BMI 26.19 kg/m    Body mass index is 26.19 kg/m .   GENERAL APPEARANCE: healthy, alert and no distress   SKIN: no suspicious lesions or rashes  NEURO: Normal strength and tone, mentation intact and speech normal  VASCULAR:  good pulses, and cappillary refill   LYMPH: no lymphadenopathy   PSYCH:  mentation appears normal and affect normal/bright    MSK:  Not in acute distress  Demonstrate significant difficulty of getting up from sitting  Walks with obvious limp  No gross deformities, ecchymosis or swelling of the left knee  Moderate patellofemoral crepitus  Significant tenderness with a patellofemoral compression  Intact ligaments  No specific medial lateral joint line pain  Intact extensor mechanism    Right knee without swelling  Right knee with full range of motion  No significant patellofemoral crepitus      IMAGING INTERPRETATION: Noticeable patella subchondral sclerosis  Otherwise, joint spaces are well-maintained     ASSESSMENT:  Chronic knee pain with recent aggravation from a fall  Patellofemoral DJD    PLAN:    The MRI scan images of July 11, 2019 were visualized. We also visualized x-rays from today. No acute findings are noted. The main pathology was felt to be that of subchondral sclerosis of the patella.  Clinically, on examination, she demonstrates significant patellofemoral crepitus and tenderness.    We talked about the treatment options and the issue was felt to be more of arthritis and for that reason knee arthroscopy may not be predictable in terms of outcome.    Initially at this point, I recommended her to consider cortisone injection which she accepted.  She tolerated the injection well.  What to expect from the cord injection were explained.    We may consider another cortisone injection and discuss possible partial knee replacement of the patellofemoral " joint if pain persist.    Large Joint Injection/Arthocentesis: L knee joint    Date/Time: 6/10/2021 11:38 AM  Performed by: Fredi Lindsay MD  Authorized by: Fredi Lindsay MD     Indications:  Osteoarthritis  Needle Size:  22 G  Guidance: landmark guided    Approach:  Anterolateral  Location:  Knee      Medications:  2 mL dexamethasone 120 MG/30ML; 1 mL lidocaine 1 %  Medications comment:  Lidocaine 8 ml was infiltrated into the left knee joint.   Outcome:  Tolerated well, no immediate complications  Procedure discussed: discussed risks, benefits, and alternatives    Consent Given by:  Patient  Timeout: timeout called immediately prior to procedure          Fredi Lindsay MD  Dept. Orthopedic Surgery  [facility]       Disclaimer: This note consists of symbols derived from keyboarding, dictation and/or voice recognition software. As a result, there may be errors in the script that have gone undetected. Please consider this when interpreting information found in this chart.

## 2021-06-10 NOTE — LETTER
6/10/2021         RE: Laney Maynard  [address] [address] [id number]        Dear Colleague,    Thank you for referring your patient, Laney Maynard, to the [facility] [place]. Please see a copy of my visit note below.    HISTORY OF PRESENT ILLNESS:    Laney Maynard is a 56 year old female who is seen in follow up for left knee pain. Patient was previously evaluated 7/15/19 regarding her left knee, with history of knee pain since 2015.  Since July of 2019 she reports worsening pain.  Patient currently working as nurse.  Present symptoms: Patient reports continued and worsening knee pain. She reports worsening arthritic pain in the knee. Pain in both the medial, lateral, and across the front of the knee. She reports pain with ADL's, with prolonged sitting, or bending beyond 90 degrees. Swelling in the knee, sensitive to touch in the medial aspect. Difficulties falling and staying asleep.  Current pain level: 9/10, Worst pain level: 10/10   Patient also reports new bump along the dorsal aspect of the left hand. She reports noticing bump x2 months ago. Previously was more tender. Now pain is more intermittent, but painful with pushing, and extending the wrist backwards.   Treatments tried to this point: naproxen, Aleve, Tylenol   Past Medical History: Unchanged from the visit of 7/15/19. Please refer to that note.    REVIEW OF SYSTEMS:  CONSTITUTIONAL:  NEGATIVE for fever, chills, change in weight  INTEGUMENTARY/SKIN:  NEGATIVE for worrisome rashes, moles or lesions  EYES:  NEGATIVE for vision changes or irritation  ENT/MOUTH:  NEGATIVE for ear, mouth and throat problems  RESP:  NEGATIVE for significant cough or SOB  BREAST:  NEGATIVE for masses, tenderness or discharge  CV:  NEGATIVE for chest pain, palpitations or peripheral edema  GI:  NEGATIVE for nausea, abdominal pain, heartburn, or change in bowel habits  :  Negative  "  MUSCULOSKELETAL:  See HPI above  NEURO:  NEGATIVE for weakness, dizziness or paresthesias  ENDOCRINE:  NEGATIVE for temperature intolerance, skin/hair changes  HEME/ALLERGY/IMMUNE:  NEGATIVE for bleeding problems  PSYCHIATRIC:  NEGATIVE for changes in mood or affect    PHYSICAL EXAM:  /78 (BP Location: Right arm, Patient Position: Chair, Cuff Size: Adult Regular)   Ht 1.638 m (5' 4.5\")   Wt 70.3 kg (155 lb)   LMP 02/25/2014   BMI 26.19 kg/m    Body mass index is 26.19 kg/m .   GENERAL APPEARANCE: healthy, alert and no distress   SKIN: no suspicious lesions or rashes  NEURO: Normal strength and tone, mentation intact and speech normal  VASCULAR:  good pulses, and cappillary refill   LYMPH: no lymphadenopathy   PSYCH:  mentation appears normal and affect normal/bright    MSK:  Not in acute distress  Demonstrate significant difficulty of getting up from sitting  Walks with obvious limp  No gross deformities, ecchymosis or swelling of the left knee  Moderate patellofemoral crepitus  Significant tenderness with a patellofemoral compression  Intact ligaments  No specific medial lateral joint line pain  Intact extensor mechanism    Right knee without swelling  Right knee with full range of motion  No significant patellofemoral crepitus      IMAGING INTERPRETATION: Noticeable patella subchondral sclerosis  Otherwise, joint spaces are well-maintained     ASSESSMENT:  Chronic knee pain with recent aggravation from a fall  Patellofemoral DJD    PLAN:    The MRI scan images of July 11, 2019 were visualized. We also visualized x-rays from today. No acute findings are noted. The main pathology was felt to be that of subchondral sclerosis of the patella.  Clinically, on examination, she demonstrates significant patellofemoral crepitus and tenderness.    We talked about the treatment options and the issue was felt to be more of arthritis and for that reason knee arthroscopy may not be predictable in terms of " outcome.    Initially at this point, I recommended her to consider cortisone injection which she accepted.  She tolerated the injection well.  What to expect from the cord injection were explained.    We may consider another cortisone injection and discuss possible partial knee replacement of the patellofemoral joint if pain persist.    Large Joint Injection/Arthocentesis: L knee joint    Date/Time: 6/10/2021 11:38 AM  Performed by: Fredi Lindsay MD  Authorized by: Fredi Lindsay MD     Indications:  Osteoarthritis  Needle Size:  22 G  Guidance: landmark guided    Approach:  Anterolateral  Location:  Knee      Medications:  2 mL dexamethasone 120 MG/30ML; 1 mL lidocaine 1 %  Medications comment:  Lidocaine 8 ml was infiltrated into the left knee joint.   Outcome:  Tolerated well, no immediate complications  Procedure discussed: discussed risks, benefits, and alternatives    Consent Given by:  Patient  Timeout: timeout called immediately prior to procedure          Fredi Lindsay MD  Dept. Orthopedic Surgery  [facility]       Disclaimer: This note consists of symbols derived from keyboarding, dictation and/or voice recognition software. As a result, there may be errors in the script that have gone undetected. Please consider this when interpreting information found in this chart.        Again, thank you for allowing me to participate in the care of your patient.        Sincerely,        Fredi Lindsay MD

## 2021-06-10 NOTE — PATIENT INSTRUCTIONS
1. Chronic pain of left knee    2. Primary osteoarthritis of left knee      Steroid injection of the left knee was performed today in clinic    - Would not soak in a hot tub, bath or swimming pool for 48 hours  - Ok to shower  - Ice today and only do your normal amounts of activity  - The lidocaine (what is giving you pain relief right now) will likely stop working in 1-2 hours.  You will then have pain again, similar to before you received the injection. The corticosteroid will not start working until approximately 1-2 weeks from now.  In a small percentage of people, cortisone can cause flushing/redness in the face. This usually lasts for 1-3 days and resolves. Cool compress and Ibuprofen/Tylenol can help if this happens.      Follow up with Dr. Lindsay in 3-4 months, sooner if pain persists or fails to improve.     Call my office with any questions or concerns, [phone].

## 2021-06-14 ENCOUNTER — PREP FOR PROCEDURE (OUTPATIENT)
Dept: ORTHOPEDICS | Facility: CLINIC | Age: 56
End: 2021-06-14

## 2021-06-15 ENCOUNTER — TELEPHONE (OUTPATIENT)
Dept: ORTHOPEDICS | Facility: CLINIC | Age: 56
End: 2021-06-15

## 2021-06-15 DIAGNOSIS — Z11.59 ENCOUNTER FOR SCREENING FOR OTHER VIRAL DISEASES: Primary | ICD-10-CM

## 2021-06-15 NOTE — TELEPHONE ENCOUNTER
Scheduled surgery     ATC: please place covid order.     Type of surgery: mass excision - left dorsl wrist  Location of surgery: Other: [facility]   Date and time of surgery: 6/30/21 @ 1:45pm  Surgeon: Angélica  Pre-Op Appt Date: 6/17/21  Post-Op Appt Date: 7/12/21   Packet sent out: Yes  Pre-cert/Authorization completed:  No  Date: 6.15.21      Juliana Barrett, Surgery Scheduler

## 2021-06-16 ENCOUNTER — TELEPHONE (OUTPATIENT)
Dept: UROLOGY | Facility: CLINIC | Age: 56
End: 2021-06-16

## 2021-06-16 ENCOUNTER — TELEPHONE (OUTPATIENT)
Dept: ORTHOPEDICS | Facility: CLINIC | Age: 56
End: 2021-06-16

## 2021-06-16 ENCOUNTER — OFFICE VISIT (OUTPATIENT)
Dept: UROLOGY | Facility: CLINIC | Age: 56
End: 2021-06-16
Payer: COMMERCIAL

## 2021-06-16 VITALS
BODY MASS INDEX: 26.16 KG/M2 | HEIGHT: 65 IN | SYSTOLIC BLOOD PRESSURE: 156 MMHG | OXYGEN SATURATION: 96 % | DIASTOLIC BLOOD PRESSURE: 88 MMHG | WEIGHT: 157 LBS

## 2021-06-16 DIAGNOSIS — R39.15 URGENCY OF URINATION: ICD-10-CM

## 2021-06-16 DIAGNOSIS — M79.18 MYALGIA OF PELVIC FLOOR: ICD-10-CM

## 2021-06-16 DIAGNOSIS — N39.0 RECURRENT UTI: Primary | ICD-10-CM

## 2021-06-16 DIAGNOSIS — M62.89 HIGH-TONE PELVIC FLOOR DYSFUNCTION: ICD-10-CM

## 2021-06-16 LAB
ALBUMIN UR-MCNC: 30 MG/DL
APPEARANCE UR: CLEAR
BILIRUB UR QL STRIP: NEGATIVE
COLOR UR AUTO: YELLOW
GLUCOSE UR STRIP-MCNC: NEGATIVE MG/DL
HGB UR QL STRIP: ABNORMAL
KETONES UR STRIP-MCNC: ABNORMAL MG/DL
LEUKOCYTE ESTERASE UR QL STRIP: ABNORMAL
NITRATE UR QL: NEGATIVE
PH UR STRIP: 5.5 PH (ref 5–7)
SOURCE: ABNORMAL
SP GR UR STRIP: >1.03 (ref 1–1.03)
UROBILINOGEN UR STRIP-ACNC: 0.2 EU/DL (ref 0.2–1)

## 2021-06-16 PROCEDURE — 81003 URINALYSIS AUTO W/O SCOPE: CPT | Performed by: UROLOGY

## 2021-06-16 PROCEDURE — 52000 CYSTOURETHROSCOPY: CPT | Performed by: UROLOGY

## 2021-06-16 RX ORDER — NITROFURANTOIN 25; 75 MG/1; MG/1
100 CAPSULE ORAL DAILY
Qty: 30 CAPSULE | Refills: 3 | Status: SHIPPED | OUTPATIENT
Start: 2021-06-16 | End: 2021-09-28

## 2021-06-16 RX ORDER — LIDOCAINE HYDROCHLORIDE 20 MG/ML
JELLY TOPICAL ONCE
Status: COMPLETED | OUTPATIENT
Start: 2021-06-16 | End: 2021-06-16

## 2021-06-16 RX ORDER — ESTRADIOL 0.1 MG/G
2 CREAM VAGINAL
Qty: 42.5 G | Refills: 3 | Status: SHIPPED | OUTPATIENT
Start: 2021-06-17 | End: 2023-04-04

## 2021-06-16 RX ADMIN — LIDOCAINE HYDROCHLORIDE 20 ML: 20 JELLY TOPICAL at 09:30

## 2021-06-16 ASSESSMENT — MIFFLIN-ST. JEOR: SCORE: 1290.09

## 2021-06-16 ASSESSMENT — PAIN SCALES - GENERAL: PAINLEVEL: MILD PAIN (2)

## 2021-06-16 NOTE — TELEPHONE ENCOUNTER
Per Dr Wei:    Risk is still pretty small.  She can also ask her PCP their opinion     Thanks   Informed patient .Lor Hernandez LPN

## 2021-06-16 NOTE — TELEPHONE ENCOUNTER
Phone call to patient. She states she was not having any knee pain until last night when it woke her up and she was in excruciating pain like before the injection.   She describes a throbbing pain of the knee and denies redness. There is slight swelling. Patient informed that since she had a cortisone injection it can increase her risk for infection if surgery is done at this time. She will need to wait 8-12 weeks before being able to have surgery. Also discussed that it can take up to14 days to see the full effect from the cortisone, and it has been only 6 days since her injection.     Recommended she continue to ice/elevate and take NSAIDS for pain management and give the injection more time.   She verbalized understanding.     CANDIE Gonsales RN

## 2021-06-16 NOTE — TELEPHONE ENCOUNTER
[facility] Call Center    Phone Message    May a detailed message be left on voicemail: yes     Reason for Call: Other: Pt would like a call back to discuss the cream as her sister had used that 23 years ago and had a stroke and she would like to discuss before taking this     Action Taken: Message routed to:  [facility]    Travel Screening: Not Applicable

## 2021-06-16 NOTE — TELEPHONE ENCOUNTER
Dr Wei her twin sister did have a stroke about 30 years ago. She is asking with this in her history  Should she still start the estrogen cream ?

## 2021-06-16 NOTE — NURSING NOTE
"Chief Complaint   Patient presents with     Recurrent Urine Tract infection     Patient here today for Cystsocopy with Dr Wei       Blood pressure (!) 156/88, height 1.638 m (5' 4.5\"), weight 71.2 kg (157 lb), last menstrual period 02/25/2014, SpO2 96 %, not currently breastfeeding. Body mass index is 26.53 kg/m .    Patient Active Problem List   Diagnosis     Alopecia     Seasonal allergic rhinitis     GERD (gastroesophageal reflux disease)     CARDIOVASCULAR SCREENING; LDL GOAL LESS THAN 160     Migraine headache     Sleep disorder     Family history of rheumatoid arthritis     Family history of sarcoidosis (mother)     Urinary frequency     Herpes simplex virus infection     Attention deficit hyperactivity disorder (ADHD)     Plantar fasciitis, bilateral     Complete rupture of rotator cuff     Mixed incontinence urge and stress (male)(female)     Ureterolithiasis       Allergies   Allergen Reactions     Cefatrizine Itching     Aloe Itching and Rash     Codeine      GI upset     Compazine Nausea and Itching     Darvocet [Acetaminophen] Nausea and Vomiting     Percocet [Oxycodone-Acetaminophen] Nausea and Vomiting     Sulphadimidine [Sulfamethazine] Rash       Current Outpatient Medications   Medication Sig Dispense Refill     acyclovir (ZOVIRAX) 5 % external ointment Apply topically 2 times daily as needed (Outbreak)       [START ON 7/1/2021] amphetamine-dextroamphetamine (ADDERALL) 30 MG tablet TAKE ONE TABLET BY MOUTH THREE TIMES A DAY 90 tablet 0     cephALEXin (KEFLEX) 500 MG capsule Take 1 capsule (500 mg) by mouth 2 times daily for 7 days 14 capsule 0     fexofenadine (ALLEGRA) 180 MG tablet Take 1 tablet (180 mg) by mouth daily as needed for allergies 30 tablet 6     fluocinolone acetonide 0.01 % OIL Use on scalp once a week, as needed. 1 Bottle 11     fluticasone (FLONASE) 50 MCG/ACT nasal spray Spray 2 sprays into both nostrils daily as needed for allergies 16 g 11     hydrOXYzine (ATARAX) 25 MG tablet " Take 1 tablet (25 mg) by mouth 3 times daily as needed for itching 30 tablet 1     ketoconazole (NIZORAL) 2 % external shampoo Apply to the affected area and wash off after 5 minutes, as needed. 120 mL 11     ketorolac (TORADOL) 10 MG tablet Take 1 tablet (10 mg) by mouth every 6 hours as needed for moderate pain 30 tablet 1     meclizine (ANTIVERT) 25 MG tablet Take 1 tablet (25 mg) by mouth 3 times daily as needed for nausea 30 tablet 3     Menthol, Topical Analgesic, (BIOFREEZE COLORLESS) 4 % GEL Externally apply topically 3 times daily as needed 118 mL 1     naproxen (NAPROSYN) 500 MG tablet TAKE ONE TABLET BY MOUTH TWICE A DAY WITH MEALS 60 tablet 3     naratriptan (AMERGE) 2.5 MG tablet        phenazopyridine (PYRIDIUM) 200 MG tablet Take 1 tablet (200 mg) by mouth 3 times daily as needed for irritation 30 tablet 3     solifenacin (VESICARE) 10 MG tablet TAKE ONE TABLET BY MOUTH ONCE DAILY 90 tablet 2     valACYclovir (VALTREX) 1000 mg tablet Take 1 tablet (1,000 mg) by mouth daily 90 tablet 3     zolpidem (AMBIEN) 5 MG tablet Take 1 tablet (5 mg) by mouth nightly as needed for sleep 30 tablet 2     cephALEXin (KEFLEX) 500 MG capsule Take 1 capsule (500 mg) by mouth 2 times daily (Patient not taking: Reported on 6/9/2021) 20 capsule 0       Social History     Tobacco Use     Smoking status: Never Smoker     Smokeless tobacco: Never Used   Substance Use Topics     Alcohol use: No     Alcohol/week: 0.0 standard drinks     Frequency: Never     Drinks per session: 1 or 2     Binge frequency: Never     Drug use: No       UA RESULTS:  Recent Labs   Lab Test 06/16/21  0840 05/11/21  1048 05/11/21  1048   COLOR Yellow   < > Yellow   APPEARANCE Clear   < > Clear   URINEGLC Negative   < > Negative   URINEBILI Negative   < > Negative   URINEKETONE Trace*   < > Negative   SG >1.030   < > 1.020   UBLD Moderate*   < > Moderate*   URINEPH 5.5   < > 6.0   PROTEIN 30*   < > Negative   UROBILINOGEN 0.2   < > 0.2   NITRITE  Negative   < > Positive*   LEUKEST Small*   < > Small*   RBCU  --   --  2-5*   WBCU  --   --  10-25*    < > = values in this interval not displayed.     Prior to the start of the procedure and with procedural staff participation, I verbally confirmed the patient s identity using two indicators, relevant allergies, that the procedure was appropriate and matched the consent or emergent situation, and that the correct equipment/implants were available. Immediately prior to starting the procedure I conducted the Time Out with the procedural staff and re-confirmed the patient s name, procedure, and site/side. I have wiped the patient off with the povidone-Iodine solution, draped them,  used Lidocaine hydrochloride jelly, and instilled sterile water into the bladder. (The Joint Commission universal protocol was followed.)  Yes    Sedation (Moderate or Deep): None      5mL 2% lidocaine hydrochloride Urojet instilled into urethra.    NDC# [address]-0  Lot #: [id number]  Expiration Date:  12/22      Bella Hernandez [facility]  6/16/2021  9:04 AM

## 2021-06-16 NOTE — PROGRESS NOTES
"June 16, 2021    Return visit    Patient returns today for follow up.  She is tearful from the UTIs as it is really impacting her relationship with her .   She denies any changes in her health since last visit.    BP (!) 156/88   Ht 1.638 m (5' 4.5\")   Wt 71.2 kg (157 lb)   LMP 02/25/2014   SpO2 96%   BMI 26.53 kg/m    She is comfortable, in no distress, non-labored breathing.  Abdomen is soft, non-tender, non-distended.  Normal external female genitalia.  Negative CST.  Pelvic exam is remarkable for high tone pelvic floor and diffuse myofascial tenderness.    Cystoscopy Note: After informed consent was obtained patient was prepped and draped in the standard fashion.  The flexible cystoscope was inserted into a normal appearing urethral meatus.  The urothelium was carefully examined and there were was scattered erythema consistent with recent UTI.  There were no obvious tumors, masses, stones, foreign bodies, or other urothelial abnormalities noted.  Bilateral ureteral orifices were noted in the normal orthotopic position and both effluxed clear urine.  The cystoscope was retroflexed and the bladder neck was unremarkable.  The urethra was carefully examined upon removing the cystoscope and was unremarkable.  Patient tolerated the procedure without complications noted.      A/P: 56 year old F with Benedict, urinary urgency, high tone pelvic floor and myofascial tenderness of the pelvic floor    Discussed options for UTI prevention.  Will start with daily macrobid and estrogen cream.  Also given a list for suppements    We discussed how her pelvic floor symptoms are related to the physical exam findings and her pelvic floor myofascial dysfunction.  We discussed how the recommended treatment is dedicated pelvic floor therapy.  We discussed how the pelvic floor physical therapy works and patient is agreeable.  Referral was placed.      She has a lot of stress right now so was also given the information for the " behavioral access line    RTC 3 months, sooner if needed    Shawna Wei MD MPH   of Urology    CC  Patient Care Team:  Haase, Susan Rachele, APRN CNP as PCP - General (Nurse Practitioner)  Reyna Angelo MD as MD ([facility])  Haase, Susan Rachele, APRN CNP as Assigned PCP  Bassam Vu MD as Assigned Surgical Provider  Cinthia Martins PA-C as Physician Assistant (Urology)  Shawna Wei MD as MD (Urology)

## 2021-06-16 NOTE — LETTER
"6/16/2021       RE: Laney Maynard  [address] [address]     Dear Colleague,    Thank you for referring your patient, Laney Maynard, to the [facility] [place] at [facility]. Please see a copy of my visit note below.    June 16, 2021    Return visit    Patient returns today for follow up.  She is tearful from the UTIs as it is really impacting her relationship with her .   She denies any changes in her health since last visit.    BP (!) 156/88   Ht 1.638 m (5' 4.5\")   Wt 71.2 kg (157 lb)   LMP 02/25/2014   SpO2 96%   BMI 26.53 kg/m    She is comfortable, in no distress, non-labored breathing.  Abdomen is soft, non-tender, non-distended.  Normal external female genitalia.  Negative CST.  Pelvic exam is remarkable for high tone pelvic floor and diffuse myofascial tenderness.    Cystoscopy Note: After informed consent was obtained patient was prepped and draped in the standard fashion.  The flexible cystoscope was inserted into a normal appearing urethral meatus.  The urothelium was carefully examined and there were was scattered erythema consistent with recent UTI.  There were no obvious tumors, masses, stones, foreign bodies, or other urothelial abnormalities noted.  Bilateral ureteral orifices were noted in the normal orthotopic position and both effluxed clear urine.  The cystoscope was retroflexed and the bladder neck was unremarkable.  The urethra was carefully examined upon removing the cystoscope and was unremarkable.  Patient tolerated the procedure without complications noted.      A/P: 56 year old F with Benedict, urinary urgency, high tone pelvic floor and myofascial tenderness of the pelvic floor    Discussed options for UTI prevention.  Will start with daily macrobid and estrogen cream.  Also given a list for suppements    We discussed how her pelvic floor symptoms are related to the " physical exam findings and her pelvic floor myofascial dysfunction.  We discussed how the recommended treatment is dedicated pelvic floor therapy.  We discussed how the pelvic floor physical therapy works and patient is agreeable.  Referral was placed.      She has a lot of stress right now so was also given the information for the behavioral access line    RTC 3 months, sooner if needed    Shawna Wei MD MPH   of Urology    CC  Patient Care Team:  Haase, Susan Rachele, APRN CNP as PCP - General (Nurse Practitioner)  Reyna Angelo MD as MD (Family Practice)  Haase, Susan Rachele, APRN CNP as Assigned PCP  Bassam Vu MD as Assigned Surgical Provider  Cinthia Martins PA-C as Physician Assistant (Urology)  Shawna Wei MD as MD (Urology)

## 2021-06-16 NOTE — TELEPHONE ENCOUNTER
"Received voicemail from patient stating her left knee is causing her pain. She states her knee pain \"kept her up all night\" and was requesting we send a message to  to see if patient can have left knee surgery \"taken care of at the same time\" as her recently scheduled left mass excision - left dorsal wrist, scheduled at [facility] on 6/30/21.    LOV 6/10/2021 with , patient had left knee CSI. Per plan of LOV: Initially at this point, I recommended her to consider cortisone injection which she accepted.  She tolerated the injection well.  What to expect from the cord injection were explained.     We may consider another cortisone injection and discuss possible partial knee replacement of the patellofemoral joint if pain persist.\"      Call out to patient to discuss options pain she is having to route to provider, patient did not answer, left voicemail     Anjali Maldonado, ATC          "

## 2021-06-16 NOTE — PATIENT INSTRUCTIONS
"Please call the Behavioral Access Line [phone] to set up an appointment with a Mental Health Therapist or Psychiatric Provider.    Websites with free information:    American Urogynec[employer] Society patient website: www.voicesforpfd.org    Total Control Program: www.totalcontrolprogram.com    Supplements to prevent UTI to consider  -Probiotics  -Cranberry (for these products let them know a doctor is recommending them)   Elllars: [email or web address]   Theracran HP by [employer] [facility] [address]  -d-mannose  -Vitamin C 500-1000mg twice a day    Please see one of the dedicated pelvic floor physical therapist ([facility] [phone])    Please return to see me in 3 months, sooner if needed    It was a pleasure meeting with you today.  Thank you for allowing me and my team the privilege of caring for you today.  YOU are the reason we are here, and I truly hope we provided you with the excellent service you deserve.  Please let us know if there is anything else we can do for you so that we can be sure you are leaving completely satisfied with your care experience.                                AFTER YOUR CYSTOSCOPY  ?  ?  You have just completed a cystoscopy, or \"cysto\", which allowed your physician to learn more about your bladder (or to remove a stent placed after surgery). We suggest that you continue to avoid caffeine, fruit juice, and alcohol for the next 24 hours, however, you are encouraged to return to your normal activities.  ?  ?  A few things that are considered normal after your cystoscopy:  ?  * small amount of bleeding (or spotting) that clears within the next 24 hours  ?  * slight burning sensation with urination  ?  * sensation of needing to void (urinate) more frequently  ?  * the feeling of \"air\" in your urine  ?  * mild discomfort that is relieved with Tylenol    * bladder spasms  ?  ?  ?  Please contact our office promptly if you:  ?  * develop a fever above 101 " degrees  ?  * are unable to urinate  ?  * develop bright red blood that does not stop  ?  * experience severe pain or swelling  ?  ?  ?  And of course, please contact our office with any concerns or questions [phone]  ?

## 2021-06-17 ENCOUNTER — OFFICE VISIT (OUTPATIENT)
Dept: FAMILY MEDICINE | Facility: CLINIC | Age: 56
End: 2021-06-17
Payer: COMMERCIAL

## 2021-06-17 ENCOUNTER — TELEPHONE (OUTPATIENT)
Dept: FAMILY MEDICINE | Facility: CLINIC | Age: 56
End: 2021-06-17

## 2021-06-17 VITALS
WEIGHT: 168.1 LBS | RESPIRATION RATE: 20 BRPM | HEART RATE: 94 BPM | OXYGEN SATURATION: 96 % | TEMPERATURE: 98.2 F | BODY MASS INDEX: 28.7 KG/M2 | SYSTOLIC BLOOD PRESSURE: 120 MMHG | HEIGHT: 64 IN | DIASTOLIC BLOOD PRESSURE: 82 MMHG

## 2021-06-17 DIAGNOSIS — F90.2 ATTENTION DEFICIT HYPERACTIVITY DISORDER (ADHD), COMBINED TYPE: Primary | ICD-10-CM

## 2021-06-17 DIAGNOSIS — Z01.818 PREOP GENERAL PHYSICAL EXAM: Primary | ICD-10-CM

## 2021-06-17 LAB — HGB BLD-MCNC: 15 G/DL (ref 11.7–15.7)

## 2021-06-17 PROCEDURE — 85018 HEMOGLOBIN: CPT | Performed by: PHYSICIAN ASSISTANT

## 2021-06-17 PROCEDURE — 99214 OFFICE O/P EST MOD 30 MIN: CPT | Performed by: PHYSICIAN ASSISTANT

## 2021-06-17 PROCEDURE — 36415 COLL VENOUS BLD VENIPUNCTURE: CPT | Performed by: PHYSICIAN ASSISTANT

## 2021-06-17 RX ORDER — CHOLECALCIFEROL (VITAMIN D3) 50 MCG
TABLET ORAL
COMMUNITY
Start: 2021-06-01 | End: 2023-04-04

## 2021-06-17 RX ORDER — ASPIRIN 81 MG/1
81 TABLET, CHEWABLE ORAL DAILY
COMMUNITY
End: 2023-04-04

## 2021-06-17 ASSESSMENT — ANXIETY QUESTIONNAIRES
6. BECOMING EASILY ANNOYED OR IRRITABLE: NOT AT ALL
7. FEELING AFRAID AS IF SOMETHING AWFUL MIGHT HAPPEN: NOT AT ALL
GAD7 TOTAL SCORE: 4
GAD7 TOTAL SCORE: 4
4. TROUBLE RELAXING: SEVERAL DAYS
2. NOT BEING ABLE TO STOP OR CONTROL WORRYING: SEVERAL DAYS
7. FEELING AFRAID AS IF SOMETHING AWFUL MIGHT HAPPEN: NOT AT ALL
1. FEELING NERVOUS, ANXIOUS, OR ON EDGE: SEVERAL DAYS
3. WORRYING TOO MUCH ABOUT DIFFERENT THINGS: SEVERAL DAYS
5. BEING SO RESTLESS THAT IT IS HARD TO SIT STILL: NOT AT ALL
GAD7 TOTAL SCORE: 4

## 2021-06-17 ASSESSMENT — PATIENT HEALTH QUESTIONNAIRE - PHQ9
SUM OF ALL RESPONSES TO PHQ QUESTIONS 1-9: 11
SUM OF ALL RESPONSES TO PHQ QUESTIONS 1-9: 11
10. IF YOU CHECKED OFF ANY PROBLEMS, HOW DIFFICULT HAVE THESE PROBLEMS MADE IT FOR YOU TO DO YOUR WORK, TAKE CARE OF THINGS AT HOME, OR GET ALONG WITH OTHER PEOPLE: VERY DIFFICULT

## 2021-06-17 ASSESSMENT — MIFFLIN-ST. JEOR: SCORE: 1328.53

## 2021-06-17 NOTE — TELEPHONE ENCOUNTER
Please call Laney and let her know that since I am her primary I am willing to discuss her need for an emotional support animal with her, she will just need to set up a phone/video visit with me, I usually have appointments on Wed available.  Thanks,  Susan Haase, CNP

## 2021-06-17 NOTE — PROGRESS NOTES
"Pre-Visit Planning   Next 5 appointments (look out 90 days)    Jun 17, 2021  3:20 PM  (Arrive by 3:00 PM)  Pre-Operative Physical with Asif Gray PA-C  [facility] ([facility] ) [address] [address]  [phone]   Jun 28, 2021 10:20 AM  Pre-procedure Covid with [facility]  [facility] ([facility] ) [address] [address]  [phone]   Jul 12, 2021  8:40 AM  Return Visit with Jimenez Knight PA-C  [facility] ([facility] ) [address] [address] [address]  [phone]          Appointment Notes for this encounter:   Pre-op for: Mass excision -Dorsum left wrist;  w/Dr Lindsay DOS: 6/30/21 @ [facility]; Covid Test ([facility] pharm) Mon 6/28/21 @ 10:20 :  Post-Op Appt Date: 7/12/21     Questionnaires Reviewed/Assigned  Additional questionnaires assigned: PHQ-9, BRANDIE-7    {SCRIPTING IF PT ANSWERS \"Hi, my name is ALVARO Vicente RN and I am calling on behalf of your provider's office at [facility].  I am calling to confirm and prep your upcoming appointment on Thur 6/17/21 @ 1520 (1500) w/Asif Gray PA-C for a pre-op exam . Are there any additional questions or concerns you'd like to review with your provider during your visit?  N/A    Patient preferred phone number: [phone]     Are there any additional questions or concerns you'd like to review with your provider during your visit? N/A     Visit is not preventive.    Meds  Is there anything on your medication list that needs to be updated? Ask pt @ time of appt    Current Outpatient Medications   Medication     acyclovir (ZOVIRAX) 5 % external ointment     [START ON 7/1/2021] amphetamine-dextroamphetamine (ADDERALL) 30 MG tablet     cephALEXin (KEFLEX) 500 MG capsule     " "estradiol (ESTRACE) 0.1 MG/GM vaginal cream     fexofenadine (ALLEGRA) 180 MG tablet     fluocinolone acetonide 0.01 % OIL     fluticasone (FLONASE) 50 MCG/ACT nasal spray     hydrOXYzine (ATARAX) 25 MG tablet     ketoconazole (NIZORAL) 2 % external shampoo     ketorolac (TORADOL) 10 MG tablet     meclizine (ANTIVERT) 25 MG tablet     Menthol, Topical Analgesic, (BIOFREEZE COLORLESS) 4 % GEL     naproxen (NAPROSYN) 500 MG tablet     naratriptan (AMERGE) 2.5 MG tablet     nitroFURantoin macrocrystal-monohydrate (MACROBID) 100 MG capsule     phenazopyridine (PYRIDIUM) 200 MG tablet     solifenacin (VESICARE) 10 MG tablet     valACYclovir (VALTREX) 1000 mg tablet     zolpidem (AMBIEN) 5 MG tablet     Current Facility-Administered Medications   Medication     dexamethasone (DECADRON) injection 2 mL     lidocaine 1 % injection 1 mL     Which pharmacy do you prefer to use for medications during this visit if needed?[facility] [id number] [place] [address]      Do you need refills on any of your medications? N/A    Health Maintenance Due   Topic Date Due     URINE DRUG SCREEN  08/29/2020       [facility]  Patient is active on [facility].    Questionnaire Review   Offered information on completing questionnaires via [facility].    Call Summary  \"Thank you for your time today.  If anything comes up before your appointment, please feel free to contact us at [phone].\"    Cinthia Ndiaye, Registered Nurse, PAL (Patient Advocate Liaison) [facility]     [phone]      "

## 2021-06-17 NOTE — PATIENT INSTRUCTIONS
Stop toradol, naproxen, and aspirin 7 days before surgery.    Preparing for Your Surgery  Getting started  A nurse will call you to review your health history and instructions. They will give you an arrival time based on your scheduled surgery time.  Please be ready to share the following:    Your doctor's clinic name and phone number    Your medical, surgical and anesthesia history    A list of allergies and sensitivities    A list of medicines, including herbal treatments and over-the-counter drugs    Whether the patient has a legal guardian (ask how to send us the papers in advance)  If you have a child who's having surgery, please ask for a copy of Preparing for Your Child's Surgery.    Preparing for surgery    Within 30 days of surgery: Have a pre-op exam (sometimes called an H&P, or History and Physical). This can be done at a clinic or pre-operative center.  ? If you're having a , you may not need this exam. Talk to your care team    At your pre-op exam, talk to your care team about all medicines you take. If you need to stop any medicines before surgery, ask when to start taking them again.  ? We do this for your safety. Many medicines can make you bleed too much during surgery. Some change how well surgery (anesthesia) drugs work.    Call your insurance company to let them know you're having surgery. (If you don't have insurance, call [phone].)    Call your clinic if there's any change in your health. This includes signs of a cold or flu (sore throat, runny nose, cough, rash, fever). It also includes a scrape or scratch near the surgery site.    If you have questions on the day of surgery, call your hospital or surgery center.  Eating and drinking guidelines  For your safety: Unless your surgeon tells you otherwise, follow the guidelines below.    Eat and drink as usual until 8 hours before surgery. After that, no food or milk.    Drink clear liquids until 2 hours before surgery. These are  liquids you can see through, like water, Gatorade and Propel Water. You may also have black coffee and tea (no cream or milk).    Nothing by mouth within 2 hours of surgery. This includes gum, candy and breath mints.    If you drink, stop drinking alcohol the night before surgery.    If your care team tells you to take medicine on the morning of surgery, it's okay to take it with a sip of water.  Preventing infection    Shower or bathe the night before and morning of your surgery. Follow the instructions your clinic gave you. (If no instructions, use regular soap.)    Don't shave or clip hair near your surgery site. We'll remove the hair if needed.    Don't smoke or vape the morning of surgery. You may chew nicotine gum up to 2 hours before surgery. A nicotine patch is okay.  ? Note: Some surgeries require you to completely quit smoking and nicotine. Check with your surgeon.    Your care team will make every effort to keep you safe from infection. We will:  ? Clean our hands often with soap and water (or an alcohol-based hand rub).  ? Clean the skin at your surgery site with a special soap that kills germs.  ? Give you a special gown to keep you warm. (Cold raises the risk of infection.)  ? Wear special hair covers, masks, gowns and gloves during surgery.  ? Give antibiotic medicine, if prescribed. Not all surgeries need antibiotics.  What to bring on the day of surgery    Photo ID and insurance card    Copy of your health care directive, if you have one    Glasses and hearing aides (bring cases)  ? You can't wear contacts during surgery    Inhaler and eye drops, if you use them (tell us about these when you arrive)    CPAP machine or breathing device, if you use them    A few personal items, if spending the night    If you have . . .  ? A pacemaker or ICD (cardiac defibrillator): Bring the ID card.  ? An implanted stimulator: Bring the remote control.  ? A legal guardian: Bring a copy of the certified  (court-stamped) guardianship papers.  Please remove any jewelry, including body piercings. Leave jewelry and other valuables at home.  If you're going home the day of surgery  Important: If you don't follow the rules below, we must cancel your surgery.     Arrange for someone to drive you home after surgery. You may not drive, take a taxi or take public transportation by yourself (unless you'll have local anesthesia only).    Arrange for a responsible adult to stay with you overnight. If you don't, we may keep you in the hospital overnight, and you may need to pay the costs yourself.  Questions?   If you have any questions for your care team, list them here: _________________________________________________________________________________________________________________________________________________________________________________________________________________________________________________________________________________________________________________________  For informational purposes only. Not to replace the advice of your health care provider. Copyright   2003, 2019 [employer]. All rights reserved. Clinically reviewed by Elissa Arreola MD. [employer] [id number] - REV 4/20.

## 2021-06-17 NOTE — PROGRESS NOTES
"Answers for HPI/ROS submitted by the patient on 6/17/2021   If you checked off any problems, how difficult have these problems made it for you to do your work, take care of things at home, or get along with other people?: Very difficult  PHQ9 TOTAL SCORE: 11  BRANDIE 7 TOTAL SCORE: 4    [place] [id number] [facility] [address]  Phone: [phone]  Primary Provider: Haase, Susan Rachele  Pre-op Performing Provider: KOBE VALDEZ      PREOPERATIVE EVALUATION:  Today's date: 6/17/2021    Laney Maynard is a 56 year old female who presents for a preoperative evaluation.    Surgical Information:  Surgery/Procedure: mass excision  Surgery Location: Left dorsal wrist  Surgeon: Dr. Lindsay  Surgery Date: 6/30/2021  Time of Surgery: Pt asked to be there at 12:40p. Did not know exact time of procedure.  Where patient plans to recover: At home with family  Fax number for surgical facility: [phone]    Type of Anesthesia Anticipated: Local (\"something\")    Assessment & Plan     The proposed surgical procedure is considered INTERMEDIATE risk.      Preop general physical exam    OK to proceed with surgery.    - Hemoglobin         Risks and Recommendations:  The patient has the following additional risks and recommendations for perioperative complications:   - No identified additional risk factors other than previously addressed    Medication Instructions:  Patient is to take all scheduled medications on the day of surgery EXCEPT for modifications listed below:     Will stop toradol, naproxen, and aspirin 7 days before surgery.      RECOMMENDATION:  APPROVAL GIVEN to proceed with proposed procedure, without further diagnostic evaluation.                      Subjective     HPI related to upcoming procedure: Mass on left wrist that is bothersome    Preop Questions 6/17/2021   1. Have you ever had a heart attack or stroke? No   2. Have you ever had surgery on your heart or blood " vessels, such as a stent placement, a coronary artery bypass, or surgery on an artery in your head, neck, heart, or legs? No   3. Do you have chest pain with activity? No   4. Do you have a history of  heart failure? No   5. Do you currently have a cold, bronchitis or symptoms of other infection? YES - bladder infection   6. Do you have a cough, shortness of breath, or wheezing? No   7. Do you or anyone in your family have previous history of blood clots? No   8. Do you or does anyone in your family have a serious bleeding problem such as prolonged bleeding following surgeries or cuts? No   9. Have you ever had problems with anemia or been told to take iron pills? No   10. Have you had any abnormal blood loss such as black, tarry or bloody stools, or abnormal vaginal bleeding? No   11. Have you ever had a blood transfusion? No   12. Are you willing to have a blood transfusion if it is medically needed before, during, or after your surgery? Yes   13. Have you or any of your relatives ever had problems with anesthesia? No   14. Do you have sleep apnea, excessive snoring or daytime drowsiness? No   15. Do you have any artifical heart valves or other implanted medical devices like a pacemaker, defibrillator, or continuous glucose monitor? No   16. Do you have artificial joints? No   17. Are you allergic to latex? No   18. Is there any chance that you may be pregnant? No       Health Care Directive:  Patient does not have a Health Care Directive or Living Will: Discussed advance care planning with patient; however, patient declined at this time.    Preoperative Review of :   reviewed - controlled substances reflected in medication list.      Status of Chronic Conditions:  See problem list for active medical problems.  Problems all longstanding and stable, except as noted/documented.  See ROS for pertinent symptoms related to these conditions.      Review of Systems  CONSTITUTIONAL: NEGATIVE for fever, chills,  change in weight  INTEGUMENTARY/SKIN: NEGATIVE for worrisome rashes, moles or lesions  EYES: NEGATIVE for vision changes or irritation  ENT/MOUTH: NEGATIVE for ear, mouth and throat problems  RESP: NEGATIVE for significant cough or SOB  BREAST: NEGATIVE for masses, tenderness or discharge  CV: NEGATIVE for chest pain, palpitations or peripheral edema  GI: NEGATIVE for nausea, abdominal pain, heartburn, or change in bowel habits   female: chronic UTI  MUSCULOSKELETAL: NEGATIVE for significant arthralgias or myalgia  NEURO: NEGATIVE for weakness, dizziness or paresthesias  ENDOCRINE: NEGATIVE for temperature intolerance, skin/hair changes  HEME: NEGATIVE for bleeding problems  PSYCHIATRIC: NEGATIVE for changes in mood or affect    Patient Active Problem List    Diagnosis Date Noted     Ureterolithiasis 04/22/2016     Priority: Medium     Complete rupture of rotator cuff 04/02/2015     Priority: Medium     Mixed incontinence urge and stress (male)(female) 04/02/2015     Priority: Medium     Plantar fasciitis, bilateral 11/05/2014     Priority: Medium     Attention deficit hyperactivity disorder (ADHD) 01/12/2014     Priority: Medium     Patient is followed by HAASE, SUSAN RACHELE for ongoing prescription of stimulants.  All refills should be approved by this provider, or covering partner.    Medication(s): Aderall 20 mg 3 times a day.  Maximum quantity per month: #90   Clinic visit frequency required: Q 3  months     Controlled substance agreement on file: Yes       Date(s): 8/29/2019  Neuropsych evaluation for ADD completed:  No    Last [facility] website verification: 3/4/20   [email or web address]         Herpes simplex virus infection 08/02/2013     Priority: Medium     Urinary frequency      Priority: Medium     Family history of rheumatoid arthritis 03/08/2013     Priority: Medium     Family history of sarcoidosis (mother) 03/08/2013     Priority: Medium     Sleep disorder 10/05/2012     Priority: Medium      Migraine headache 03/29/2011     Priority: Medium     Topamax       CARDIOVASCULAR SCREENING; LDL GOAL LESS THAN 160 10/31/2010     Priority: Medium     The 10-year ASCVD risk score (Nemobakari ALLEN Jr., et al., 2013) is: 4.1%    Values used to calculate the score:      Age: 55 years      Sex: Female      Is Non- : Yes      Diabetic: No      Tobacco smoker: No      Systolic Blood Pressure: 132 mmHg      Is BP treated: No      HDL Cholesterol: 64 mg/dL      Total Cholesterol: 239 mg/dL         GERD (gastroesophageal reflux disease) 07/05/2010     Priority: Medium     Seasonal allergic rhinitis 05/07/2010     Priority: Medium     Alopecia 05/18/2004     Priority: Medium      Past Medical History:   Diagnosis Date     Abnormal glandular Papanicolaou smear of cervix      Allergic rhinitis, cause unspecified      Alopecia      Attention deficit hyperactivity disorder (ADHD)      Chronic pain of left knee      Complete rupture of rotator cuff      Constipation      Gastro-oesophageal reflux disease      Heart murmur     murmur     Hematuria      Herpes simplex virus infection      Hydronephrosis, right      Lymphocytopenia 4/4/2011     Migraine headache      Migraine, unspecified, without mention of intractable migraine without mention of status migrainosus      Mixed incontinence urge and stress      Mumps      Numbness and tingling     LEFT ARM     Palpitations      Plantar fasciitis, bilateral      Renal disease     hx stones x 2 episodes     Seasonal allergic rhinitis      Sleep disorder      Ureterolithiasis      Urinary frequency     burning     Vertigo      Past Surgical History:   Procedure Laterality Date     ARTHROSCOPY KNEE Right 04/26/2017    Procedure: Right knee arthroscopy and anterior fat pad resection with medial plica resection. Partial lateral menisectomy. Surgeon:  Fredi Lindsay MD  [facility]     ARTHROSCOPY SHOULDER, OPEN ROTATOR CUFF REPAIR, COMBINED   7/31/2013    Procedure: COMBINED ARTHROSCOPY SHOULDER, OPEN ROTATOR CUFF REPAIR;  Left Shoulder Arthroscopy, Distal Clavicale Resection, Decompression, Mini Open Rotator Cuff Repair    ;  Surgeon: Fredi Lindsay MD;  Location:  OR     BREAST SURGERY  2014    Augmentation     C REMOVAL OF KIDNEY STONE       COLONOSCOPY  2/7/2014    Procedure: COLONOSCOPY;  Colonoscopy/WW;  Surgeon: Pancho Olsen MD;  Location:  GI     COMBINED CYSTOSCOPY, RETROGRADES, URETEROSCOPY, LASER HOLMIUM LITHOTRIPSY URETER(S), INSERT STENT Right 4/23/2016    Procedure: COMBINED CYSTOSCOPY, RETROGRADES, URETEROSCOPY, LASER HOLMIUM LITHOTRIPSY URETER(S), INSERT STENT;  Surgeon: Kushal Noriega MD;  Location: [facility] OR     CYSTOSCOPY       CYSTOSCOPY, RETROGRADES, EXTRACT STONE, COMBINED  1/9/2013    Procedure: COMBINED CYSTOSCOPY, RETROGRADES, EXTRACT STONE;  Video Cystoscopy,  Right Ureteroscopy, ureteral dilatation,  Stone extraction;  Surgeon: Subhash Vann MD;  Location:  OR     EXTRACORPOREAL SHOCK WAVE LITHOTRIPSY (ESWL) Bilateral 4/29/2016    Procedure: EXTRACORPOREAL SHOCK WAVE LITHOTRIPSY (ESWL);  Surgeon: Bassam Vu MD;  Location:  OR     EXTRACORPOREAL SHOCK WAVE LITHOTRIPSY, CYSTOSCOPY, INSERT STENT URETER(S), COMBINED Bilateral 4/26/2018    Procedure: COMBINED EXTRACORPOREAL SHOCK WAVE LITHOTRIPSY, CYSTOSCOPY, INSERT STENT URETER(S);  BILATERAL COMBINED EXTRACORPOREAL SHOCK WAVE LITHOTRIPSY, CYSTOSCOPY, LEFT STENT PLACEMENT;  Surgeon: Bassam Vu MD;  Location:  OR     EYE SURGERY  2015    Lasik     GYN SURGERY      laparoscopy     LAPAROSCOPIC CHOLECYSTECTOMY WITH CHOLANGIOGRAMS  11/21/2012    Procedure: LAPAROSCOPIC CHOLECYSTECTOMY WITH CHOLANGIOGRAMS;  LAPAROSCOPIC CHOLECYSTECTOMY WITH CHOLANGIOGRAMS ;  Surgeon: Nataliya Gabriel MD;  Location:  OR     LASER HOLMIUM LITHOTRIPSY URETER(S), INSERT STENT, COMBINED Left 10/30/2019    Procedure: CYSTOSCOPY,LEFT URETEROSCOPY,  HOLMIUM LASER, STONE EXTRACTION, LEFT STENT PLACEMENT;  Surgeon: Bassam Vu MD;  Location: [facility] OR     TUBAL LIGATION       ZZ NONSPECIFIC PROCEDURE  age 16    colposcopy for abnormal pap     Z NONSPECIFIC PROCEDURE      surgery L thumb     Z NONSPECIFIC PROCEDURE      breast augmentation-silicone     [facility] NONSPECIFIC PROCEDURE      s/p  x 2     Z NONSPECIFIC PROCEDURE      Bilateral tubal ligation     Current Outpatient Medications   Medication Sig Dispense Refill     acyclovir (ZOVIRAX) 5 % external ointment Apply topically 2 times daily as needed (Outbreak)       [START ON 2021] amphetamine-dextroamphetamine (ADDERALL) 30 MG tablet TAKE ONE TABLET BY MOUTH THREE TIMES A DAY 90 tablet 0     aspirin (ASA) 81 MG chewable tablet Take 81 mg by mouth daily       estradiol (ESTRACE) 0.1 MG/GM vaginal cream Place 2 g vaginally twice a week Please use quarter size amount in the vagina nightly for two weeks and then three times a week at night thereafter 42.5 g 3     fexofenadine (ALLEGRA) 180 MG tablet Take 1 tablet (180 mg) by mouth daily as needed for allergies 30 tablet 6     fluocinolone acetonide 0.01 % OIL Use on scalp once a week, as needed. 1 Bottle 11     fluticasone (FLONASE) 50 MCG/ACT nasal spray Spray 2 sprays into both nostrils daily as needed for allergies 16 g 11     hydrOXYzine (ATARAX) 25 MG tablet Take 1 tablet (25 mg) by mouth 3 times daily as needed for itching 30 tablet 1     ketoconazole (NIZORAL) 2 % external shampoo Apply to the affected area and wash off after 5 minutes, as needed. 120 mL 11     ketorolac (TORADOL) 10 MG tablet Take 1 tablet (10 mg) by mouth every 6 hours as needed for moderate pain 30 tablet 1     meclizine (ANTIVERT) 25 MG tablet Take 1 tablet (25 mg) by mouth 3 times daily as needed for nausea 30 tablet 3     Menthol, Topical Analgesic, (BIOFREEZE COLORLESS) 4 % GEL Externally apply topically 3 times daily as needed 118 mL 1     naproxen (NAPROSYN)  "500 MG tablet TAKE ONE TABLET BY MOUTH TWICE A DAY WITH MEALS 60 tablet 3     naratriptan (AMERGE) 2.5 MG tablet        nitroFURantoin macrocrystal-monohydrate (MACROBID) 100 MG capsule Take 1 capsule (100 mg) by mouth daily 30 capsule 3     phenazopyridine (PYRIDIUM) 200 MG tablet Take 1 tablet (200 mg) by mouth 3 times daily as needed for irritation 30 tablet 3     solifenacin (VESICARE) 10 MG tablet TAKE ONE TABLET BY MOUTH ONCE DAILY 90 tablet 2     valACYclovir (VALTREX) 1000 mg tablet Take 1 tablet (1,000 mg) by mouth daily 90 tablet 3     zolpidem (AMBIEN) 5 MG tablet Take 1 tablet (5 mg) by mouth nightly as needed for sleep 30 tablet 2     Cholecalciferol (VITAMIN D3) 50 MCG (2000 UT) TABS          Allergies   Allergen Reactions     Cefatrizine Itching     Aloe Itching and Rash     Codeine      GI upset     Compazine Nausea and Itching     Darvocet [Acetaminophen] Nausea and Vomiting     Percocet [Oxycodone-Acetaminophen] Nausea and Vomiting     Sulphadimidine [Sulfamethazine] Rash        Social History     Tobacco Use     Smoking status: Never Smoker     Smokeless tobacco: Never Used   Substance Use Topics     Alcohol use: No     Alcohol/week: 0.0 standard drinks     Frequency: Never     Drinks per session: 1 or 2     Binge frequency: Never     Family History   Problem Relation Age of Onset     Diabetes Mother         type 2     Alcohol/Drug Mother         alcohol     History   Drug Use No         Objective     /82 (BP Location: Right arm, Patient Position: Sitting, Cuff Size: Adult Regular)   Pulse 94   Temp 98.2  F (36.8  C) (Oral)   Resp 20   Ht 1.619 m (5' 3.75\")   Wt 76.2 kg (168 lb 1.6 oz)   LMP 02/25/2014   SpO2 96%   BMI 29.08 kg/m        Physical Exam    GENERAL APPEARANCE: healthy, alert and no distress     EYES: EOMI, PERRL     HENT: ear canals and TM's normal and nose and mouth without ulcers or lesions     NECK: no adenopathy, no asymmetry, masses, or scars and thyroid normal to " palpation     RESP: lungs clear to auscultation - no rales, rhonchi or wheezes     CV: regular rates and rhythm, normal S1 S2, no S3 or S4 and no murmur, click or rub     ABDOMEN:  soft, nontender, no HSM or masses and bowel sounds normal     MS: extremities normal- no gross deformities noted, no evidence of inflammation in joints, FROM in all extremities.     SKIN: no suspicious lesions or rashes     NEURO: Normal strength and tone, sensory exam grossly normal, mentation intact and speech normal     PSYCH: mentation appears normal. and affect normal/bright     LYMPHATICS: No cervical adenopathy    Recent Labs   Lab Test 09/14/20  0902 10/28/19  0852   HGB 15.6  --      --     142   POTASSIUM 4.2 4.0   CR 0.98 0.91        Diagnostics:  Recent Results (from the past 24 hour(s))   Hemoglobin    Collection Time: 06/17/21  3:30 PM   Result Value Ref Range    Hemoglobin 15.0 11.7 - 15.7 g/dL      No EKG required for low risk surgery (cataract, skin procedure, breast biopsy, etc).  No EKG required, no history of coronary heart disease, significant arrhythmia, peripheral arterial disease or other structural heart disease.    Revised Cardiac Risk Index (RCRI):  The patient has the following serious cardiovascular risks for perioperative complications:   - No serious cardiac risks = 0 points     RCRI Interpretation: 0 points: Class I (very low risk - 0.4% complication rate)           Signed Electronically by: Asif Gray PA-C  Copy of this evaluation report is provided to requesting physician.

## 2021-06-17 NOTE — TELEPHONE ENCOUNTER
Patient asked about getting a letter for a support animal. I informed her that we typically refer to psychology for evaluation.     Referral placed but also routing to primary care provider in case she wants to write letter instead.     She recently got a puppy and thinks it has helped with her mental health.     Asif Gray PA-C on 6/17/2021 at 3:44 PM

## 2021-06-18 ASSESSMENT — PATIENT HEALTH QUESTIONNAIRE - PHQ9: SUM OF ALL RESPONSES TO PHQ QUESTIONS 1-9: 11

## 2021-06-18 ASSESSMENT — ANXIETY QUESTIONNAIRES: GAD7 TOTAL SCORE: 4

## 2021-06-20 NOTE — PROGRESS NOTES
"    Assessment & Plan     Recurrent UTI  Symptoms and urine dip consistent with ACUTE UTI.  UCx and empiric abx based on review of recent UCx results in epic,  Will reschedule cystoscopy for next week while still on abx  - cephALEXin (KEFLEX) 500 MG capsule; Take 1 capsule (500 mg) by mouth 2 times daily for 7 days  - Urine Culture Aerobic Bacterial [LFS372]    Calculus of kidney  Stable a this time  - UA without Microscopic    Shawna Wei MD MPH  (she/her/hers)   of Urology  [facility]      Aylin Davison is a 56 year old who presents for the following health issues   HPI     Was scheduled for cystoscopy today but feels lousy, thinks she has another UTI      Objective    BP (!) 142/78   Pulse 97   Ht 1.638 m (5' 4.5\")   Wt 71.2 kg (157 lb)   LMP 02/25/2014   SpO2 96%   BMI 26.53 kg/m    Body mass index is 26.53 kg/m .  Physical Exam   GENERAL: healthy, alert and no distress  EYES: Eyes grossly normal to inspection, conjunctivae and sclerae normal  HENT: normal cephalic/atraumatic.  External ears, nose and mouth without ulcers or lesions.  RESP: no audible wheeze, cough, or visible cyanosis.  No visible retractions or increased work of breathing.  Able to speak fully in complete sentences.  NEURO: Cranial nerves grossly intact, mentation intact and speech normal  PSYCH: mentation appears normal, affect normal/bright, judgement and insight intact, normal speech and appearance well-groomed    Urine dip positive for blood, leuks and nitrite        "

## 2021-06-28 ENCOUNTER — VIRTUAL VISIT (OUTPATIENT)
Dept: FAMILY MEDICINE | Facility: CLINIC | Age: 56
End: 2021-06-28
Payer: COMMERCIAL

## 2021-06-28 VITALS — WEIGHT: 160 LBS | BODY MASS INDEX: 27.31 KG/M2 | RESPIRATION RATE: 20 BRPM | HEIGHT: 64 IN

## 2021-06-28 DIAGNOSIS — Z11.59 ENCOUNTER FOR SCREENING FOR OTHER VIRAL DISEASES: ICD-10-CM

## 2021-06-28 DIAGNOSIS — F41.9 ANXIETY: Primary | ICD-10-CM

## 2021-06-28 LAB
LABORATORY COMMENT REPORT: NORMAL
SARS-COV-2 RNA RESP QL NAA+PROBE: NEGATIVE
SARS-COV-2 RNA RESP QL NAA+PROBE: NORMAL
SPECIMEN SOURCE: NORMAL
SPECIMEN SOURCE: NORMAL

## 2021-06-28 PROCEDURE — U0003 INFECTIOUS AGENT DETECTION BY NUCLEIC ACID (DNA OR RNA); SEVERE ACUTE RESPIRATORY SYNDROME CORONAVIRUS 2 (SARS-COV-2) (CORONAVIRUS DISEASE [COVID-19]), AMPLIFIED PROBE TECHNIQUE, MAKING USE OF HIGH THROUGHPUT TECHNOLOGIES AS DESCRIBED BY CMS-2020-01-R: HCPCS | Performed by: ORTHOPAEDIC SURGERY

## 2021-06-28 PROCEDURE — 99213 OFFICE O/P EST LOW 20 MIN: CPT | Mod: 95 | Performed by: NURSE PRACTITIONER

## 2021-06-28 PROCEDURE — U0005 INFEC AGEN DETEC AMPLI PROBE: HCPCS | Performed by: ORTHOPAEDIC SURGERY

## 2021-06-28 ASSESSMENT — MIFFLIN-ST. JEOR: SCORE: 1295.76

## 2021-06-28 ASSESSMENT — PAIN SCALES - GENERAL: PAINLEVEL: NO PAIN (0)

## 2021-06-28 NOTE — LETTER
[facility]  [address] [address]  Phone: [phone]    June 28, 2021        Laney Maynard  [address] [address]          To whom it may concern:    RE: Laney Maynard is my patient, and has been under my care since 2015. I am intimately familiar with her history and with the functional limitations imposed by her depression and anxiety disorders.     Due to mental illness, Laney has certain limitations regarding anxiety and coping with depression and stress. In order to help alleviate these difficulties it is important that she have her emotional support animal with her at all times during her daily activities including at work, during outings and in her home.  I am prescribing an emotional support animal that will assist Laney in coping with her depression and anxiety disorder.     I am familiar with the voluminous professional literature concerning the therapeutic benefits of assistance animals for people with anxiety and depression disorders such as that experienced by Laney. I would be happy to answer other questions you may have concerning my recommendations that Laney have an emotional support animal. Should you have additional questions, please do not hesitate to contact me directly.    Sincerely,        Susan Haase, APRN CNP

## 2021-06-28 NOTE — PROGRESS NOTES
"Laney is a 56 year old who is being evaluated via a billable video visit.      How would you like to obtain your AVS? MyChart  If the video visit is dropped, the invitation should be resent by: Text to cell phone: [phone]  Will anyone else be joining your video visit? No  Video Start Time: 1338    Assessment & Plan     Anxiety: letter given for an emotional support animal (see under letters)      BMI:   Estimated body mass index is 27.46 kg/m  as calculated from the following:    Height as of this encounter: 1.626 m (5' 4\").    Weight as of this encounter: 72.6 kg (160 lb).       Return in about 3 months (around 9/28/2021) for Routine Visit, aDHD.    Susan Haase, APRN CNP  [facility]   Laney is a 56 year old who presents for the following health issues   HPI     Need for Support Animal     How many servings of fruits and vegetables do you eat daily?  2-3    On average, how many sweetened beverages do you drink each day (Examples: soda, juice, sweet tea, etc.  Do NOT count diet or artificially sweetened beverages)?   1    How many days per week do you exercise enough to make your heart beat faster? 3 or less    How many minutes a day do you exercise enough to make your heart beat faster? 9 or less  How many days per week do you miss taking your medication? 1    What makes it hard for you to take your medications?  remembering to take    Depression:  PHQ 9 of 11, BRANDIE 7 of 4.  Has a yorkie puppy that she got 2-3 months ago.  Increased anxiety due to knee pain, wrist surgery and recurrent UTI's.      UTI:  Had a cystoscopy completed to see if they can find a cause of her frequent UTI's. She will be on macrobid for the next 90 days.    Review of Systems   CONSTITUTIONAL: NEGATIVE for fever, chills, change in weight  RESP: NEGATIVE for significant cough or SOB  CV: NEGATIVE for chest pain, palpitations or peripheral edema  PSYCHIATRIC: see HPI      Objective     "       Vitals:  No vitals were obtained today due to virtual visit.    Physical Exam   GENERAL: Healthy, alert and no distress  RESP: No audible wheeze, cough, or visible cyanosis.  No visible retractions or increased work of breathing.    SKIN: Visible skin clear. No significant rash, abnormal pigmentation or lesions.  NEURO: Cranial nerves grossly intact.  Mentation and speech appropriate for age.  PSYCH: Mentation appears normal, affect normal/bright, judgement and insight intact, normal speech and appearance well-groomed.          Video-Visit Details    Type of service:  Video Visit    Video End Time:1347  Originating Location (pt. Location): Home    Distant Location (provider location):  [facility]     Platform used for Video Visit: [place]

## 2021-06-30 ENCOUNTER — TRANSFERRED RECORDS (OUTPATIENT)
Dept: HEALTH INFORMATION MANAGEMENT | Facility: CLINIC | Age: 56
End: 2021-06-30

## 2021-07-01 DIAGNOSIS — L29.9 GENERALIZED PRURITUS: ICD-10-CM

## 2021-07-01 RX ORDER — HYDROXYZINE HYDROCHLORIDE 25 MG/1
TABLET, FILM COATED ORAL
Qty: 30 TABLET | Refills: 1 | Status: SHIPPED | OUTPATIENT
Start: 2021-07-01 | End: 2021-10-01

## 2021-07-01 NOTE — TELEPHONE ENCOUNTER
Prescription approved per [facility] Refill Protocol.  CHECO BarrosN, RN  [facility]

## 2021-07-28 DIAGNOSIS — F90.2 ATTENTION DEFICIT HYPERACTIVITY DISORDER (ADHD), COMBINED TYPE: ICD-10-CM

## 2021-07-28 RX ORDER — DEXTROAMPHETAMINE SACCHARATE, AMPHETAMINE ASPARTATE, DEXTROAMPHETAMINE SULFATE AND AMPHETAMINE SULFATE 7.5; 7.5; 7.5; 7.5 MG/1; MG/1; MG/1; MG/1
TABLET ORAL
Qty: 90 TABLET | Refills: 0 | Status: SHIPPED | OUTPATIENT
Start: 2021-07-28 | End: 2021-08-27

## 2021-08-06 ENCOUNTER — HOSPITAL ENCOUNTER (OUTPATIENT)
Dept: MRI IMAGING | Facility: CLINIC | Age: 56
Discharge: HOME | End: 2021-08-06
Attending: ORTHOPAEDIC SURGERY | Admitting: ORTHOPAEDIC SURGERY
Payer: COMMERCIAL

## 2021-08-06 ENCOUNTER — OFFICE VISIT (OUTPATIENT)
Dept: ORTHOPEDICS | Facility: CLINIC | Age: 56
End: 2021-08-06
Payer: COMMERCIAL

## 2021-08-06 VITALS
HEIGHT: 64 IN | DIASTOLIC BLOOD PRESSURE: 78 MMHG | SYSTOLIC BLOOD PRESSURE: 120 MMHG | WEIGHT: 164 LBS | BODY MASS INDEX: 28 KG/M2

## 2021-08-06 DIAGNOSIS — M25.562 CHRONIC PAIN OF LEFT KNEE: Primary | ICD-10-CM

## 2021-08-06 DIAGNOSIS — M17.12 PRIMARY OSTEOARTHRITIS OF LEFT KNEE: ICD-10-CM

## 2021-08-06 DIAGNOSIS — G89.29 CHRONIC PAIN OF LEFT KNEE: Primary | ICD-10-CM

## 2021-08-06 DIAGNOSIS — G89.29 CHRONIC PAIN OF LEFT KNEE: ICD-10-CM

## 2021-08-06 DIAGNOSIS — M25.562 CHRONIC PAIN OF LEFT KNEE: ICD-10-CM

## 2021-08-06 PROCEDURE — 73721 MRI JNT OF LWR EXTRE W/O DYE: CPT | Mod: LT

## 2021-08-06 PROCEDURE — 73721 MRI JNT OF LWR EXTRE W/O DYE: CPT | Mod: 26 | Performed by: RADIOLOGY

## 2021-08-06 PROCEDURE — 99214 OFFICE O/P EST MOD 30 MIN: CPT | Performed by: ORTHOPAEDIC SURGERY

## 2021-08-06 ASSESSMENT — KOOS JR
RISING FROM SITTING: MODERATE
KOOS JR SCORING: 50.01
BENDING TO THE FLOOR TO PICK UP OBJECT: SEVERE
STRAIGHTENING KNEE FULLY: MODERATE
STANDING UPRIGHT: MODERATE
GOING UP OR DOWN STAIRS: SEVERE
TWISING OR PIVOTING ON KNEE: SEVERE

## 2021-08-06 ASSESSMENT — MIFFLIN-ST. JEOR: SCORE: 1309.93

## 2021-08-06 NOTE — PATIENT INSTRUCTIONS
The [facility] team will call you to schedule your imaging exam in the next 1-2 days. You can also call them directly at [facility] [phone] ([address], [address], [place], [place]) to schedule your appointment.     Follow up in the office to discuss MRI results.   Call [phone] to schedule this appointment.

## 2021-08-06 NOTE — PROGRESS NOTES
"HISTORY OF PRESENT ILLNESS:    Laney Maynard is a 56 year old female who is seen in follow up for left knee pain. Patient was last seen on 6/10/21, and obtained left knee cortisone injection. Since this visit she reports continued pain of the left knee.   Present symptoms: left knee pain in the anterior, medial, and lateral aspects. She reports no changes in her knee pain since her last visit.   Current pain level: 8/10  Treatments tried to this point: cortisone injection 6/10/21, Tylenol, Naproxen, Aleve  Past Medical History: Patient had left wrist, dorsal mass excision on 6/30/21, history otherwise unchanged from the visit of 7/15/19. Please refer to that note.     REVIEW OF SYSTEMS:  CONSTITUTIONAL:  NEGATIVE for fever, chills, change in weight  INTEGUMENTARY/SKIN:  NEGATIVE for worrisome rashes, moles or lesions  EYES:  NEGATIVE for vision changes or irritation  ENT/MOUTH:  NEGATIVE for ear, mouth and throat problems  RESP:  NEGATIVE for significant cough or SOB  BREAST:  NEGATIVE for masses, tenderness or discharge  CV:  NEGATIVE for chest pain, palpitations or peripheral edema  GI:  NEGATIVE for nausea, abdominal pain, heartburn, or change in bowel habits  :  Negative   MUSCULOSKELETAL:  See HPI above  NEURO:  NEGATIVE for weakness, dizziness or paresthesias  ENDOCRINE:  NEGATIVE for temperature intolerance, skin/hair changes  HEME/ALLERGY/IMMUNE:  NEGATIVE for bleeding problems  PSYCHIATRIC:  NEGATIVE for changes in mood or affect    PHYSICAL EXAM:  /78   Ht 1.619 m (5' 3.75\")   Wt 74.4 kg (164 lb)   LMP 02/25/2014   BMI 28.37 kg/m    Body mass index is 28.37 kg/m .   GENERAL APPEARANCE: healthy, alert and no distress   SKIN: no suspicious lesions or rashes  NEURO: Normal strength and tone, mentation intact and speech normal  VASCULAR:  good pulses, and cappillary refill   LYMPH: no lymphadenopathy   PSYCH:  mentation appears normal and affect normal/bright    MSK:  Not in acute " distress  Minimal difficulty of getting up from sitting  Mild limping  No gross deformities  Range of motion is well-maintained  No effusion noted  Patellofemoral tenderness with a palpation  Extensor mechanism is intact  No specific medial or lateral joint line pain  Circulation is intact  Skin is intact      IMAGING INTERPRETATION: None taken today     ASSESSMENT:  Persisting chronic left knee pain not helped by cortisone injection    PLAN:  Her previous MRI scan in 2019 was fairly unremarkable.  Recent x-rays demonstrated presence of subchondral sclerosis  However, she has not really responded to cortisone injection that was given recently in April.    She is continuing to experience difficulty of walking without limp.  The amount of pain that she is experiencing was felt to be not completely understood based on x-ray findings.    She certainly is not ready for knee replacement based on her clinical symptoms as well as x-ray findings.    To assess her situation further more accurately, MRI scan evaluation is recommended.  She will follow-up with me to go over the result and to discuss treatment options.           Fredi Lindsay MD  Dept. Orthopedic Surgery  [facility]       Disclaimer: This note consists of symbols derived from keyboarding, dictation and/or voice recognition software. As a result, there may be errors in the script that have gone undetected. Please consider this when interpreting information found in this chart.

## 2021-08-06 NOTE — LETTER
"    8/6/2021         RE: Laney Maynard  [address] [address]        Dear Colleague,    Thank you for referring your patient, Laney Maynard, to the [facility] [place]. Please see a copy of my visit note below.    HISTORY OF PRESENT ILLNESS:    Laney Maynard is a 56 year old female who is seen in follow up for left knee pain. Patient was last seen on 6/10/21, and obtained left knee cortisone injection. Since this visit she reports continued pain of the left knee.   Present symptoms: left knee pain in the anterior, medial, and lateral aspects. She reports no changes in her knee pain since her last visit.   Current pain level: 8/10  Treatments tried to this point: cortisone injection 6/10/21, Tylenol, Naproxen, Aleve  Past Medical History: Patient had left wrist, dorsal mass excision on 6/30/21, history otherwise unchanged from the visit of 7/15/19. Please refer to that note.     REVIEW OF SYSTEMS:  CONSTITUTIONAL:  NEGATIVE for fever, chills, change in weight  INTEGUMENTARY/SKIN:  NEGATIVE for worrisome rashes, moles or lesions  EYES:  NEGATIVE for vision changes or irritation  ENT/MOUTH:  NEGATIVE for ear, mouth and throat problems  RESP:  NEGATIVE for significant cough or SOB  BREAST:  NEGATIVE for masses, tenderness or discharge  CV:  NEGATIVE for chest pain, palpitations or peripheral edema  GI:  NEGATIVE for nausea, abdominal pain, heartburn, or change in bowel habits  :  Negative   MUSCULOSKELETAL:  See HPI above  NEURO:  NEGATIVE for weakness, dizziness or paresthesias  ENDOCRINE:  NEGATIVE for temperature intolerance, skin/hair changes  HEME/ALLERGY/IMMUNE:  NEGATIVE for bleeding problems  PSYCHIATRIC:  NEGATIVE for changes in mood or affect    PHYSICAL EXAM:  /78   Ht 1.619 m (5' 3.75\")   Wt 74.4 kg (164 lb)   LMP 02/25/2014   BMI 28.37 kg/m    Body mass index is 28.37 kg/m .   GENERAL APPEARANCE: healthy, alert " and no distress   SKIN: no suspicious lesions or rashes  NEURO: Normal strength and tone, mentation intact and speech normal  VASCULAR:  good pulses, and cappillary refill   LYMPH: no lymphadenopathy   PSYCH:  mentation appears normal and affect normal/bright    MSK:  Not in acute distress  Minimal difficulty of getting up from sitting  Mild limping  No gross deformities  Range of motion is well-maintained  No effusion noted  Patellofemoral tenderness with a palpation  Extensor mechanism is intact  No specific medial or lateral joint line pain  Circulation is intact  Skin is intact      IMAGING INTERPRETATION: None taken today     ASSESSMENT:  Persisting chronic left knee pain not helped by cortisone injection    PLAN:  Her previous MRI scan in 2019 was fairly unremarkable.  Recent x-rays demonstrated presence of subchondral sclerosis  However, she has not really responded to cortisone injection that was given recently in April.    She is continuing to experience difficulty of walking without limp.  The amount of pain that she is experiencing was felt to be not completely understood based on x-ray findings.    She certainly is not ready for knee replacement based on her clinical symptoms as well as x-ray findings.    To assess her situation further more accurately, MRI scan evaluation is recommended.  She will follow-up with me to go over the result and to discuss treatment options.           Fredi Lindsay MD  Dept. Orthopedic Surgery  [facility]       Disclaimer: This note consists of symbols derived from keyboarding, dictation and/or voice recognition software. As a result, there may be errors in the script that have gone undetected. Please consider this when interpreting information found in this chart.      Again, thank you for allowing me to participate in the care of your patient.        Sincerely,        Fredi Lindsay MD

## 2021-08-26 ENCOUNTER — OFFICE VISIT (OUTPATIENT)
Dept: ORTHOPEDICS | Facility: CLINIC | Age: 56
End: 2021-08-26
Payer: COMMERCIAL

## 2021-08-26 VITALS
DIASTOLIC BLOOD PRESSURE: 70 MMHG | SYSTOLIC BLOOD PRESSURE: 112 MMHG | WEIGHT: 164 LBS | BODY MASS INDEX: 28 KG/M2 | HEIGHT: 64 IN

## 2021-08-26 DIAGNOSIS — M17.12 PRIMARY OSTEOARTHRITIS OF LEFT KNEE: Primary | ICD-10-CM

## 2021-08-26 PROCEDURE — 99213 OFFICE O/P EST LOW 20 MIN: CPT | Mod: 24 | Performed by: ORTHOPAEDIC SURGERY

## 2021-08-26 ASSESSMENT — KOOS JR
STRAIGHTENING KNEE FULLY: MODERATE
BENDING TO THE FLOOR TO PICK UP OBJECT: SEVERE
GOING UP OR DOWN STAIRS: MODERATE
HOW SEVERE IS YOUR KNEE STIFFNESS AFTER FIRST WAKING IN MORNING: MODERATE
TWISING OR PIVOTING ON KNEE: SEVERE
KOOS JR SCORING: 44.91
STANDING UPRIGHT: SEVERE
RISING FROM SITTING: MODERATE

## 2021-08-26 ASSESSMENT — MIFFLIN-ST. JEOR: SCORE: 1309.93

## 2021-08-26 NOTE — LETTER
"    8/26/2021         RE: Laney Maynard  [id number] [address] [address]        Dear Colleague,    Thank you for referring your patient, Laney Maynard, to the [facility] [place]. Please see a copy of my visit note below.    HISTORY OF PRESENT ILLNESS:    Laney Maynard is a 56 year old female who is seen in follow up for left knee pain, and MRI results.  MRI of the left knee was obtained on 8/6/21.   Present symptoms: continued pain of the left knee. Pain located in the anterior, medial and lateral aspects of the knee. Increased pain with activities.   Treatments tried to this point: cortisone injection with most recent dated: 6/10/21 providing no significant, or lasting relief. Trial of OTC Naproxen, Tylenol, and Aleve as needed.   Past Medical History: Unchanged from the visit of 8/6/21. Please refer to that note.    REVIEW OF SYSTEMS:  CONSTITUTIONAL:  NEGATIVE for fever, chills, change in weight  INTEGUMENTARY/SKIN:  NEGATIVE for worrisome rashes, moles or lesions  EYES:  NEGATIVE for vision changes or irritation  ENT/MOUTH:  NEGATIVE for ear, mouth and throat problems  RESP:  NEGATIVE for significant cough or SOB  BREAST:  NEGATIVE for masses, tenderness or discharge  CV:  NEGATIVE for chest pain, palpitations or peripheral edema  GI:  NEGATIVE for nausea, abdominal pain, heartburn, or change in bowel habits  :  Negative   MUSCULOSKELETAL:  See HPI above  NEURO:  NEGATIVE for weakness, dizziness or paresthesias  ENDOCRINE:  NEGATIVE for temperature intolerance, skin/hair changes  HEME/ALLERGY/IMMUNE:  NEGATIVE for bleeding problems  PSYCHIATRIC:  NEGATIVE for changes in mood or affect    PHYSICAL EXAM:  /70 (BP Location: Right arm, Patient Position: Chair, Cuff Size: Adult Regular)   Ht 1.619 m (5' 3.75\")   Wt 74.4 kg (164 lb)   LMP 02/25/2014   BMI 28.37 kg/m    Body mass index is 28.37 kg/m .   GENERAL APPEARANCE: healthy, " alert and no distress   SKIN: no suspicious lesions or rashes  NEURO: Normal strength and tone, mentation intact and speech normal  VASCULAR:  good pulses, and cappillary refill   LYMPH: no lymphadenopathy   PSYCH:  mentation appears normal and affect normal/bright    MSK:  Not in acute distress  More noticeable limping when she walks  No effusion or erythema noted  Range of motion is well-maintained  Motor strength is grossly intact  Circulation is intact  Skin is intact  Sensation is intact    IMAGING INTERPRETATION:    Recent Results (from the past 744 hour(s))   MR Knee Left w/o Contrast    Narrative    MR left knee without contrast 8/6/2021 1:40 PM    Techniques: Multiplanar multisequence imaging of the left knee was  obtained without administration of intra-articular or intravenous  contrast using routing protocol.    History: Knee pain, chronic, osteoarthritis suspected; Chronic pain of  left knee; Chronic pain of left knee; Primary osteoarthritis of left  knee    Additional History from EMR: 56-year-old woman with chronic left knee  pain. No acute injury reported. History of contralateral knee surgery  in 2017.    Comparison: Radiographs 6/10/2021    Findings:    MENISCI:  Medial meniscus: Intact.  Lateral meniscus: Intact.    LIGAMENTS  Cruciate ligaments: Intact.  Medial supporting structures: Intact.  Lateral supporting structures: Intact.    EXTENSOR MECHANISM  Intact. Mild proximal patellar tendinosis.    FLUID  Small joint effusion. Small Baker's cyst communicating with trace pes  anserine bursa fluid.    OSSEOUS and ARTICULAR STRUCTURES  Bones: No fracture, contusion, or osseous lesion is seen.    Patellofemoral compartment: Apparent diffuse moderate chondral  thinning of patellar articular cartilage. No subchondral osseous  abnormality.    Medial compartment: No high-grade hyaline cartilage disease.    Lateral compartment: No high-grade cartilage loss.    ANCILLARY FINDINGS  None.      Impression     Impression:  1. Apparent diffuse moderate chondral thinning of patellar articular  cartilage without focal loss.  2. Otherwise no internal derangement.    I have personally reviewed the examination and initial interpretation  and I agree with the findings.    JUAN CARLOS STEVE         SYSTEM ID:  [id number]          ASSESSMENT:  DJD left knee mostly at the patellofemoral joint  Not responding to cortisone injections    PLAN:  MRI scan images were visualized.  Findings are thoroughly explained.  The amount of cartilage loss especially on the patella was noted to be rather significant compared to plain x-ray findings.  At this point, she is not interested in having further cortisone injections because of rather limited benefit from it.  She understands that eventually she may be a candidate for knee replacement either partial or total but she certainly is too young to consider that and her knee is not bad enough to warrant total knee replacement at this point.    We will see her back as needed.           Fredi Lindsay MD  Dept. Orthopedic Surgery  [facility]       Disclaimer: This note consists of symbols derived from keyboarding, dictation and/or voice recognition software. As a result, there may be errors in the script that have gone undetected. Please consider this when interpreting information found in this chart.        Again, thank you for allowing me to participate in the care of your patient.        Sincerely,        Fredi Lindsay MD

## 2021-08-26 NOTE — PROGRESS NOTES
"HISTORY OF PRESENT ILLNESS:    Laney Maynard is a 56 year old female who is seen in follow up for left knee pain, and MRI results.  MRI of the left knee was obtained on 8/6/21.   Present symptoms: continued pain of the left knee. Pain located in the anterior, medial and lateral aspects of the knee. Increased pain with activities.   Treatments tried to this point: cortisone injection with most recent dated: 6/10/21 providing no significant, or lasting relief. Trial of OTC Naproxen, Tylenol, and Aleve as needed.   Past Medical History: Unchanged from the visit of 8/6/21. Please refer to that note.    REVIEW OF SYSTEMS:  CONSTITUTIONAL:  NEGATIVE for fever, chills, change in weight  INTEGUMENTARY/SKIN:  NEGATIVE for worrisome rashes, moles or lesions  EYES:  NEGATIVE for vision changes or irritation  ENT/MOUTH:  NEGATIVE for ear, mouth and throat problems  RESP:  NEGATIVE for significant cough or SOB  BREAST:  NEGATIVE for masses, tenderness or discharge  CV:  NEGATIVE for chest pain, palpitations or peripheral edema  GI:  NEGATIVE for nausea, abdominal pain, heartburn, or change in bowel habits  :  Negative   MUSCULOSKELETAL:  See HPI above  NEURO:  NEGATIVE for weakness, dizziness or paresthesias  ENDOCRINE:  NEGATIVE for temperature intolerance, skin/hair changes  HEME/ALLERGY/IMMUNE:  NEGATIVE for bleeding problems  PSYCHIATRIC:  NEGATIVE for changes in mood or affect    PHYSICAL EXAM:  /70 (BP Location: Right arm, Patient Position: Chair, Cuff Size: Adult Regular)   Ht 1.619 m (5' 3.75\")   Wt 74.4 kg (164 lb)   LMP 02/25/2014   BMI 28.37 kg/m    Body mass index is 28.37 kg/m .   GENERAL APPEARANCE: healthy, alert and no distress   SKIN: no suspicious lesions or rashes  NEURO: Normal strength and tone, mentation intact and speech normal  VASCULAR:  good pulses, and cappillary refill   LYMPH: no lymphadenopathy   PSYCH:  mentation appears normal and affect normal/bright    MSK:  Not in acute " distress  More noticeable limping when she walks  No effusion or erythema noted  Range of motion is well-maintained  Motor strength is grossly intact  Circulation is intact  Skin is intact  Sensation is intact    IMAGING INTERPRETATION:    Recent Results (from the past 744 hour(s))   MR Knee Left w/o Contrast    Narrative    MR left knee without contrast 8/6/2021 1:40 PM    Techniques: Multiplanar multisequence imaging of the left knee was  obtained without administration of intra-articular or intravenous  contrast using routing protocol.    History: Knee pain, chronic, osteoarthritis suspected; Chronic pain of  left knee; Chronic pain of left knee; Primary osteoarthritis of left  knee    Additional History from EMR: 56-year-old woman with chronic left knee  pain. No acute injury reported. History of contralateral knee surgery  in 2017.    Comparison: Radiographs 6/10/2021    Findings:    MENISCI:  Medial meniscus: Intact.  Lateral meniscus: Intact.    LIGAMENTS  Cruciate ligaments: Intact.  Medial supporting structures: Intact.  Lateral supporting structures: Intact.    EXTENSOR MECHANISM  Intact. Mild proximal patellar tendinosis.    FLUID  Small joint effusion. Small Baker's cyst communicating with trace pes  anserine bursa fluid.    OSSEOUS and ARTICULAR STRUCTURES  Bones: No fracture, contusion, or osseous lesion is seen.    Patellofemoral compartment: Apparent diffuse moderate chondral  thinning of patellar articular cartilage. No subchondral osseous  abnormality.    Medial compartment: No high-grade hyaline cartilage disease.    Lateral compartment: No high-grade cartilage loss.    ANCILLARY FINDINGS  None.      Impression    Impression:  1. Apparent diffuse moderate chondral thinning of patellar articular  cartilage without focal loss.  2. Otherwise no internal derangement.    I have personally reviewed the examination and initial interpretation  and I agree with the findings.    JUAN CARLOS STEVE          SYSTEM ID:  [id number]          ASSESSMENT:  DJD left knee mostly at the patellofemoral joint  Not responding to cortisone injections    PLAN:  MRI scan images were visualized.  Findings are thoroughly explained.  The amount of cartilage loss especially on the patella was noted to be rather significant compared to plain x-ray findings.  At this point, she is not interested in having further cortisone injections because of rather limited benefit from it.  She understands that eventually she may be a candidate for knee replacement either partial or total but she certainly is too young to consider that and her knee is not bad enough to warrant total knee replacement at this point.    We will see her back as needed.           Fredi Lindsay MD  Dept. Orthopedic Surgery  [facility]       Disclaimer: This note consists of symbols derived from keyboarding, dictation and/or voice recognition software. As a result, there may be errors in the script that have gone undetected. Please consider this when interpreting information found in this chart.

## 2021-08-27 DIAGNOSIS — N39.46 MIXED INCONTINENCE URGE AND STRESS (MALE)(FEMALE): ICD-10-CM

## 2021-08-27 RX ORDER — SOLIFENACIN SUCCINATE 10 MG/1
TABLET, FILM COATED ORAL
Qty: 90 TABLET | Refills: 0 | Status: SHIPPED | OUTPATIENT
Start: 2021-08-27 | End: 2021-10-14

## 2021-08-27 NOTE — TELEPHONE ENCOUNTER
Routing refill request to provider for review/approval because:  Drug interaction warning    Ravin JEREZ RN

## 2021-09-04 ENCOUNTER — HEALTH MAINTENANCE LETTER (OUTPATIENT)
Age: 56
End: 2021-09-04

## 2021-09-11 ENCOUNTER — IMMUNIZATION (OUTPATIENT)
Dept: FAMILY MEDICINE | Facility: CLINIC | Age: 56
End: 2021-09-11
Payer: COMMERCIAL

## 2021-09-11 PROCEDURE — 90662 IIV NO PRSV INCREASED AG IM: CPT

## 2021-09-11 PROCEDURE — 90471 IMMUNIZATION ADMIN: CPT

## 2021-09-28 ENCOUNTER — VIRTUAL VISIT (OUTPATIENT)
Dept: UROLOGY | Facility: CLINIC | Age: 56
End: 2021-09-28
Payer: COMMERCIAL

## 2021-09-28 VITALS — BODY MASS INDEX: 26.29 KG/M2 | HEIGHT: 64 IN | WEIGHT: 154 LBS

## 2021-09-28 DIAGNOSIS — N39.0 RECURRENT UTI: ICD-10-CM

## 2021-09-28 DIAGNOSIS — R39.15 URGENCY OF URINATION: Primary | ICD-10-CM

## 2021-09-28 DIAGNOSIS — F43.9 STRESS: ICD-10-CM

## 2021-09-28 PROCEDURE — 99213 OFFICE O/P EST LOW 20 MIN: CPT | Mod: GT | Performed by: UROLOGY

## 2021-09-28 RX ORDER — NITROFURANTOIN 25; 75 MG/1; MG/1
100 CAPSULE ORAL DAILY
Qty: 30 CAPSULE | Refills: 3 | Status: SHIPPED | OUTPATIENT
Start: 2021-09-28 | End: 2021-12-21

## 2021-09-28 ASSESSMENT — MIFFLIN-ST. JEOR: SCORE: 1268.54

## 2021-09-28 ASSESSMENT — PAIN SCALES - GENERAL: PAINLEVEL: NO PAIN (0)

## 2021-09-28 NOTE — PATIENT INSTRUCTIONS
Websites with free information:    [employer] patient website: www.voicesforpfd.org    Total Control Program: www.totalcontrolprogram.com    Supplements to prevent UTI to consider  -Probiotics  -Cranberry (for these products let them know a doctor is recommending them)   Ellura: www.myellura.[employer]   Theracran HP by [employer] [facility] [address]  -d-mannose 2gm daily  -Vitamin C 500-1000mg twice a day    Continue your daily antibiotics and estrace    Please call the Behavioral Access Line [phone] to set up an appointment with a Mental Health Therapist or Psychiatric Provider.    It was a pleasure meeting with you today.  Thank you for allowing me and my team the privilege of caring for you today.  YOU are the reason we are here, and I truly hope we provided you with the excellent service you deserve.  Please let us know if there is anything else we can do for you so that we can be sure you are leaving completely satisfied with your care experience.

## 2021-09-28 NOTE — LETTER
9/28/2021       RE: Laney Maynard  [address] [address] [address]     Dear Colleague,    Thank you for referring your patient, Laney Maynard, to the [facility] [place] at [facility]. Please see a copy of my visit note below.    *SEND LINK TO CELL PHONE*    PT ON ABX    Laney is a 56 year old who is being evaluated via a billable video visit.      How would you like to obtain your AVS? MyChart  If the video visit is dropped, the invitation should be resent by: Text to cell phone: [phone]  Will anyone else be joining your video visit? No      Video Start Time: 2:27 PM     Assessment & Plan     Recurrent UTI  We discussed pros and cons of continued daily prophylaxis.  Fear of UTI contributes a lot to her stress so will continue the estrogen cream and the macrobid for now  - nitroFURantoin macrocrystal-monohydrate (MACROBID) 100 MG capsule; Take 1 capsule (100 mg) by mouth daily    Urgency of urination  Stable    Stress  She continues to be under a lot of stress and we discussed how this can impact her symptoms.  She is again given the number for the behavioral access line and encouraged to use it    Return in about 6 months (around 3/28/2022).    14 minutes were spent today in reviewing the EMR, direct patient care, and coordination of care including refilling her prescription for macrobid    Shawna Wei MD MPH  (she/her/hers)   of Urology  [facility]    Subjective   Laney is a 56 year old who presents for the following health issues    HPI     She is here today for 3 month follow up for [place].  She was started on daily prophylactic antibiotic and estrogen cream at last visit.      We also discussed supplements, PFPT and she was under a lot of stress at that time so also given the number to the behavioral access line.    No UTIs but still under a lot of stress.        Objective     Vitals - Patient Reported  Pain Score: No Pain (0)    Physical Exam   GENERAL: Healthy, alert and no distress  EYES: Eyes grossly normal to inspection.  No discharge or erythema, or obvious scleral/conjunctival abnormalities.  RESP: No audible wheeze, cough, or visible cyanosis.  No visible retractions or increased work of breathing.    SKIN: Visible skin clear. No significant rash, abnormal pigmentation or lesions.  NEURO: Cranial nerves grossly intact.  Mentation and speech appropriate for age.  PSYCH: Mentation appears normal, affect normal/bright, judgement and insight intact, normal speech and appearance well-groomed.          Video-Visit Details    Type of service:  Video Visit    Video End Time:2:40 PM    Originating Location (pt. Location): Home    Distant Location (provider location):  [facility] [place]     Platform used for Video Visit: Xeros  Patient Care Team:  Haase, Susan Rachele, APRN CNP as PCP - General (Nurse Practitioner)  Reyna Angelo MD as MD (Family Practice)  Haase, Susan Rachele, APRN CNP as Assigned PCP  Cinthia Martins PA-C as Physician Assistant (Urology)  Shawna Wei MD as MD (Urology)  Fredi Lindsay MD as Assigned Musculoskeletal Provider  Shawna Wei MD as Assigned Surgical Provider

## 2021-09-28 NOTE — PROGRESS NOTES
*SEND LINK TO CELL PHONE*    PT ON ABX    Laney is a 56 year old who is being evaluated via a billable video visit.      How would you like to obtain your AVS? MyChart  If the video visit is dropped, the invitation should be resent by: Text to cell phone: [phone]  Will anyone else be joining your video visit? No      Video Start Time: 2:27 PM     Assessment & Plan     Recurrent UTI  We discussed pros and cons of continued daily prophylaxis.  Fear of UTI contributes a lot to her stress so will continue the estrogen cream and the macrobid for now  - nitroFURantoin macrocrystal-monohydrate (MACROBID) 100 MG capsule; Take 1 capsule (100 mg) by mouth daily    Urgency of urination  Stable    Stress  She continues to be under a lot of stress and we discussed how this can impact her symptoms.  She is again given the number for the behavioral access line and encouraged to use it    Return in about 6 months (around 3/28/2022).    14 minutes were spent today in reviewing the EMR, direct patient care, and coordination of care including refilling her prescription for macrobid    Shawna Wei MD MPH  (she/her/hers)   of Urology  [facility]    Subjective   Laney is a 56 year old who presents for the following health issues    HPI     She is here today for 3 month follow up for Benedict.  She was started on daily prophylactic antibiotic and estrogen cream at last visit.      We also discussed supplements, PFPT and she was under a lot of stress at that time so also given the number to the behavioral access line.    No UTIs but still under a lot of stress.        Objective    Vitals - Patient Reported  Pain Score: No Pain (0)    Physical Exam   GENERAL: Healthy, alert and no distress  EYES: Eyes grossly normal to inspection.  No discharge or erythema, or obvious scleral/conjunctival abnormalities.  RESP: No audible wheeze, cough, or visible cyanosis.  No visible retractions or increased work of  breathing.    SKIN: Visible skin clear. No significant rash, abnormal pigmentation or lesions.  NEURO: Cranial nerves grossly intact.  Mentation and speech appropriate for age.  PSYCH: Mentation appears normal, affect normal/bright, judgement and insight intact, normal speech and appearance well-groomed.          Video-Visit Details    Type of service:  Video Visit    Video End Time:2:40 PM    Originating Location (pt. Location): Home    Distant Location (provider location):  [facility] [place]     Platform used for Video Visit: Teach 'n Go  Patient Care Team:  Haase, Susan Rachele, APRN CNP as PCP - General (Nurse Practitioner)  Reyna Angelo MD as MD (Family Practice)  Haase, Susan Rachele, APRN CNP as Assigned PCP  Cinthia Martins PA-C as Physician Assistant (Urology)  Shawna Wei MD as MD (Urology)  Fredi Lindsay MD as Assigned Musculoskeletal Provider  Shawna Wei MD as Assigned Surgical Provider

## 2021-09-29 ENCOUNTER — TELEPHONE (OUTPATIENT)
Dept: UROLOGY | Facility: CLINIC | Age: 56
End: 2021-09-29

## 2021-09-29 NOTE — TELEPHONE ENCOUNTER
----- Message from Caroline Heller sent at 9/29/2021  8:31 AM CDT -----  Return in about 6 months (around 3/28/2022).    [facility]

## 2021-09-30 DIAGNOSIS — L29.9 GENERALIZED PRURITUS: ICD-10-CM

## 2021-10-01 RX ORDER — HYDROXYZINE HYDROCHLORIDE 25 MG/1
TABLET, FILM COATED ORAL
Qty: 30 TABLET | Refills: 0 | Status: SHIPPED | OUTPATIENT
Start: 2021-10-01 | End: 2021-10-14

## 2021-10-14 DIAGNOSIS — L29.9 GENERALIZED PRURITUS: ICD-10-CM

## 2021-10-14 DIAGNOSIS — N39.46 MIXED INCONTINENCE URGE AND STRESS (MALE)(FEMALE): ICD-10-CM

## 2021-10-14 RX ORDER — HYDROXYZINE HYDROCHLORIDE 25 MG/1
TABLET, FILM COATED ORAL
Qty: 30 TABLET | Refills: 1 | Status: SHIPPED | OUTPATIENT
Start: 2021-10-14 | End: 2021-12-20

## 2021-10-14 RX ORDER — SOLIFENACIN SUCCINATE 10 MG/1
TABLET, FILM COATED ORAL
Qty: 90 TABLET | Refills: 1 | Status: SHIPPED | OUTPATIENT
Start: 2021-10-14 | End: 2022-03-29

## 2021-10-14 NOTE — TELEPHONE ENCOUNTER
Hydroxyzine- Prescription approved per [facility] Refill Protocol.    V[facility]are- Routing refill request to provider for review/approval because:  Drug interaction warning    Samra Jalloh RN

## 2021-10-20 ENCOUNTER — TELEPHONE (OUTPATIENT)
Dept: ORTHOPEDICS | Facility: CLINIC | Age: 56
End: 2021-10-20

## 2021-10-20 ENCOUNTER — OFFICE VISIT (OUTPATIENT)
Dept: ORTHOPEDICS | Facility: CLINIC | Age: 56
End: 2021-10-20
Payer: COMMERCIAL

## 2021-10-20 VITALS
DIASTOLIC BLOOD PRESSURE: 82 MMHG | WEIGHT: 161 LBS | BODY MASS INDEX: 27.49 KG/M2 | SYSTOLIC BLOOD PRESSURE: 132 MMHG | HEIGHT: 64 IN

## 2021-10-20 DIAGNOSIS — Z01.812 ENCOUNTER FOR PREPROCEDURE SCREENING LABORATORY TESTING FOR COVID-19: Primary | ICD-10-CM

## 2021-10-20 DIAGNOSIS — M65.331 TRIGGER MIDDLE FINGER OF RIGHT HAND: Primary | ICD-10-CM

## 2021-10-20 DIAGNOSIS — Z11.52 ENCOUNTER FOR PREPROCEDURE SCREENING LABORATORY TESTING FOR COVID-19: Primary | ICD-10-CM

## 2021-10-20 PROCEDURE — 99213 OFFICE O/P EST LOW 20 MIN: CPT | Performed by: ORTHOPAEDIC SURGERY

## 2021-10-20 ASSESSMENT — MIFFLIN-ST. JEOR: SCORE: 1296.32

## 2021-10-20 NOTE — PROGRESS NOTES
"HISTORY OF PRESENT ILLNESS:    Laney Maynard is a 56 year old female who is seen in consultation for right long trigger finger. Onset of symptoms ~2 months. She reports painful locking, catching of the digit. She is left hand dominant, and working in nursing.   Present symptoms: right long finger catching and locking. Digit will lock, and she will need to manually extend it.  She reports tenderness at A1 pulley. Pain with gripping, grasping, opening doors.   Treatments tried to this point: splinting, taping  Past Medical History: Unchanged from the visit of  8/6/21. Please refer to that note.     REVIEW OF SYSTEMS:  CONSTITUTIONAL:  NEGATIVE for fever, chills, change in weight  INTEGUMENTARY/SKIN:  NEGATIVE for worrisome rashes, moles or lesions  EYES:  NEGATIVE for vision changes or irritation  ENT/MOUTH:  NEGATIVE for ear, mouth and throat problems  RESP:  NEGATIVE for significant cough or SOB  BREAST:  NEGATIVE for masses, tenderness or discharge  CV:  NEGATIVE for chest pain, palpitations or peripheral edema  GI:  NEGATIVE for nausea, abdominal pain, heartburn, or change in bowel habits  :  Negative   MUSCULOSKELETAL:  See HPI above  NEURO:  NEGATIVE for weakness, dizziness or paresthesias  ENDOCRINE:  NEGATIVE for temperature intolerance, skin/hair changes  HEME/ALLERGY/IMMUNE:  NEGATIVE for bleeding problems  PSYCHIATRIC:  NEGATIVE for changes in mood or affect       PHYSICAL EXAM:  /82 (BP Location: Right arm, Patient Position: Chair, Cuff Size: Adult Regular)   Ht 1.619 m (5' 3.75\")   Wt 73 kg (161 lb)   LMP 02/25/2014   BMI 27.85 kg/m    Body mass index is 27.85 kg/m .   GENERAL APPEARANCE: healthy, alert and no distress   SKIN: no suspicious lesions or rashes  NEURO: Normal strength and tone, mentation intact and speech normal  VASCULAR:  good pulses, and cappillary refill   LYMPH: no lymphadenopathy   PSYCH:  mentation appears normal and affect normal/bright    MSK:  Not in acute " distress  Normal gait  No deformities of the hands  Very tender A1 pulley, right long finger  Catching or locking with finger movement  Circulation is is intact  Sensation is intact    Left dorsal wrist surgical site is healed well    IMAGING INTERPRETATION: None today  ASSESSMENT:  Chronic trigger finger, right long finger    PLAN:  Because of rather significant difficulty of using the hand because of constant locking, with understanding of the nature of the problem and the options of further observation versus cortisone injection for surgery, she is comfortable with doing the surgery for the purpose of having quicker resolution of the locking episodes.    The nature of the problem was discussed and the details of the surgery were informed.    She wants to do the surgery with a local MAC this time.  She understands the implications of doing the surgery under local MAC, namely, n.p.o. status and having a ride as well as preop physical.    Right long trigger finger release will be scheduled according to her convenience.           Fredi Lindsay MD  Dept. Orthopedic Surgery  [facility]       Disclaimer: This note consists of symbols derived from keyboarding, dictation and/or voice recognition software. As a result, there may be errors in the script that have gone undetected. Please consider this when interpreting information found in this chart.

## 2021-10-20 NOTE — PATIENT INSTRUCTIONS
Patient Education     What is Trigger Finger?  Trigger finger is an inflammation of tissue inside your finger or thumb. It is also called tenosynovitis (ten-oh-sin-oh-VY-tis). Tendons (cordlike fibers that attach muscle to bone and allow you to bend the joints) become swollen. So does the synovium (a slick membrane that allows the tendons to move easily). This makes it hard to straighten the finger or thumb.    Causes  Repeated use of a tool with strong gripping, such as a drill or wrench, can irritate and inflame the tendons and the synovium. It is also more common in certain medical conditions, such as rheumatoid arthritis, gout, and diabetes. But often the cause of trigger finger is unknown.  Inside your finger  Tendons connect muscles in your forearm to the bones in your fingers. The tendons in each finger are surrounded by a protective tendon sheath. This sheath is lined with synovium, which produces a fluid that allows the tendons to slide easily when you bend and straighten the finger. If a tendon is irritated, it becomes inflamed.  When a tendon is inflamed  When a tendon is inflamed, it causes the lining of the tendon sheath to swell and thicken. Or the tendon itself may thicken. Then the sheath pinches the tendon. The tendon can then no longer slide easily inside the sheath. When you straighten your finger, the tendon sticks or  locks  as it tries to squeeze back through the sheath.    Symptoms  The first sign of trigger finger may be pain where the finger or thumb joins the palm. You may also notice some swelling. As the tendon becomes more inflamed, the finger may start to catch when you try to straighten or bend it. When the locked tendon releases, the finger jumps, as if you were releasing the trigger of a gun. This further irritates the tendon. It may set up a cycle of catching and swelling.   [employer] last reviewed this educational content on 1/1/2018 2000-2021 [employer]. All  rights reserved. This information is not intended as a substitute for professional medical care. Always follow your healthcare professional's instructions.           Patient Education     Treating Trigger Finger    Trigger finger occurs when the tissue inside your finger or thumb becomes inflamed. Mild cases can be treated without surgery. If the problem is severe, surgery may be needed. Your healthcare provider will talk with you about your options.  Nonsurgical treatment  For mild symptoms, your healthcare provider may have you rest the finger or thumb. You may also be told to take anti-inflammatory medicines. These include ibuprofen or aspirin. You may be given an injection of medicine in the base of the finger or thumb. This typically is a steroid, such as cortisone.  Surgery  If nonsurgical treatments don t ease your symptoms, you may need surgery. A tendon is a cordlike fiber that attaches muscle to bone and allows joints to bend. The tendon is surrounded by a protective cover called a sheath. During surgery, the sheath in your finger or thumb is opened to enlarge the space and release the swollen tendon. This allows the finger or thumb to bend and straighten normally. Surgery takes about 20 minutes. It can often be done using a local anesthetic. You may also be sedated. You will likely be able to go home the same day. Your hand will be wrapped in a soft bandage. You may need to wear a plaster splint for a short time to keep the finger or thumb still as it heals. The stitches will be removed in about 2 weeks. Your healthcare provider will talk with you about the risks and benefits of surgery.  [employer] last reviewed this educational content on 1/1/2018 2000-2021 [employer]. All rights reserved. This information is not intended as a substitute for professional medical care. Always follow your healthcare professional's instructions.

## 2021-10-20 NOTE — TELEPHONE ENCOUNTER
Scheduled surgery    ATC: please place covid order    Type of surgery: right long trigger finger release  Location of surgery: [facility]  Date and time of surgery: 11/3/21  Surgeon: Angélica  Pre-Op Appt Date: 10/25/21  Post-Op Appt Date: 11/15/21   Packet sent out: Yes  Pre-cert/Authorization completed:  No  Date: 10/20/21      Juliana Barrett, Surgery Scheduler

## 2021-10-20 NOTE — LETTER
"    10/20/2021         RE: Laney Maynard  [address] [id number]        Dear Colleague,    Thank you for referring your patient, Laney Maynard, to the [facility] [place]. Please see a copy of my visit note below.    HISTORY OF PRESENT ILLNESS:    Laney Maynard is a 56 year old female who is seen in consultation for right long trigger finger. Onset of symptoms ~2 months. She reports painful locking, catching of the digit. She is left hand dominant, and working in nursing.   Present symptoms: right long finger catching and locking. Digit will lock, and she will need to manually extend it.  She reports tenderness at A1 pulley. Pain with gripping, grasping, opening doors.   Treatments tried to this point: splinting, taping  Past Medical History: Unchanged from the visit of  8/6/21. Please refer to that note.     REVIEW OF SYSTEMS:  CONSTITUTIONAL:  NEGATIVE for fever, chills, change in weight  INTEGUMENTARY/SKIN:  NEGATIVE for worrisome rashes, moles or lesions  EYES:  NEGATIVE for vision changes or irritation  ENT/MOUTH:  NEGATIVE for ear, mouth and throat problems  RESP:  NEGATIVE for significant cough or SOB  BREAST:  NEGATIVE for masses, tenderness or discharge  CV:  NEGATIVE for chest pain, palpitations or peripheral edema  GI:  NEGATIVE for nausea, abdominal pain, heartburn, or change in bowel habits  :  Negative   MUSCULOSKELETAL:  See HPI above  NEURO:  NEGATIVE for weakness, dizziness or paresthesias  ENDOCRINE:  NEGATIVE for temperature intolerance, skin/hair changes  HEME/ALLERGY/IMMUNE:  NEGATIVE for bleeding problems  PSYCHIATRIC:  NEGATIVE for changes in mood or affect       PHYSICAL EXAM:  /82 (BP Location: Right arm, Patient Position: Chair, Cuff Size: Adult Regular)   Ht 1.619 m (5' 3.75\")   Wt 73 kg (161 lb)   LMP 02/25/2014   BMI 27.85 kg/m    Body mass index is 27.85 kg/m .   GENERAL APPEARANCE: healthy, " alert and no distress   SKIN: no suspicious lesions or rashes  NEURO: Normal strength and tone, mentation intact and speech normal  VASCULAR:  good pulses, and cappillary refill   LYMPH: no lymphadenopathy   PSYCH:  mentation appears normal and affect normal/bright    MSK:  Not in acute distress  Normal gait  No deformities of the hands  Very tender A1 pulley, right long finger  Catching or locking with finger movement  Circulation is is intact  Sensation is intact    Left dorsal wrist surgical site is healed well    IMAGING INTERPRETATION: None today  ASSESSMENT:  Chronic trigger finger, right long finger    PLAN:  Because of rather significant difficulty of using the hand because of constant locking, with understanding of the nature of the problem and the options of further observation versus cortisone injection for surgery, she is comfortable with doing the surgery for the purpose of having quicker resolution of the locking episodes.    The nature of the problem was discussed and the details of the surgery were informed.    She wants to do the surgery with a local MAC this time.  She understands the implications of doing the surgery under local MAC, namely, n.p.o. status and having a ride as well as preop physical.    Right long trigger finger release will be scheduled according to her convenience.           Fredi Lindsay MD  Dept. Orthopedic Surgery  [facility]       Disclaimer: This note consists of symbols derived from keyboarding, dictation and/or voice recognition software. As a result, there may be errors in the script that have gone undetected. Please consider this when interpreting information found in this chart.        Again, thank you for allowing me to participate in the care of your patient.        Sincerely,        Fredi Lindsay MD

## 2021-10-25 ENCOUNTER — OFFICE VISIT (OUTPATIENT)
Dept: FAMILY MEDICINE | Facility: CLINIC | Age: 56
End: 2021-10-25
Payer: COMMERCIAL

## 2021-10-25 VITALS
HEIGHT: 64 IN | HEART RATE: 108 BPM | TEMPERATURE: 99.2 F | BODY MASS INDEX: 27.83 KG/M2 | DIASTOLIC BLOOD PRESSURE: 89 MMHG | SYSTOLIC BLOOD PRESSURE: 132 MMHG | OXYGEN SATURATION: 96 % | WEIGHT: 163 LBS

## 2021-10-25 DIAGNOSIS — Z01.818 PREOP GENERAL PHYSICAL EXAM: Primary | ICD-10-CM

## 2021-10-25 PROCEDURE — 99214 OFFICE O/P EST MOD 30 MIN: CPT | Performed by: PHYSICIAN ASSISTANT

## 2021-10-25 ASSESSMENT — PATIENT HEALTH QUESTIONNAIRE - PHQ9
10. IF YOU CHECKED OFF ANY PROBLEMS, HOW DIFFICULT HAVE THESE PROBLEMS MADE IT FOR YOU TO DO YOUR WORK, TAKE CARE OF THINGS AT HOME, OR GET ALONG WITH OTHER PEOPLE: SOMEWHAT DIFFICULT
SUM OF ALL RESPONSES TO PHQ QUESTIONS 1-9: 10
SUM OF ALL RESPONSES TO PHQ QUESTIONS 1-9: 10

## 2021-10-25 ASSESSMENT — ANXIETY QUESTIONNAIRES
4. TROUBLE RELAXING: NOT AT ALL
1. FEELING NERVOUS, ANXIOUS, OR ON EDGE: NOT AT ALL
GAD7 TOTAL SCORE: 2
2. NOT BEING ABLE TO STOP OR CONTROL WORRYING: MORE THAN HALF THE DAYS
GAD7 TOTAL SCORE: 2
7. FEELING AFRAID AS IF SOMETHING AWFUL MIGHT HAPPEN: NOT AT ALL
6. BECOMING EASILY ANNOYED OR IRRITABLE: NOT AT ALL
5. BEING SO RESTLESS THAT IT IS HARD TO SIT STILL: NOT AT ALL
7. FEELING AFRAID AS IF SOMETHING AWFUL MIGHT HAPPEN: NOT AT ALL
8. IF YOU CHECKED OFF ANY PROBLEMS, HOW DIFFICULT HAVE THESE MADE IT FOR YOU TO DO YOUR WORK, TAKE CARE OF THINGS AT HOME, OR GET ALONG WITH OTHER PEOPLE?: SOMEWHAT DIFFICULT
GAD7 TOTAL SCORE: 2
3. WORRYING TOO MUCH ABOUT DIFFERENT THINGS: NOT AT ALL

## 2021-10-25 ASSESSMENT — MIFFLIN-ST. JEOR: SCORE: 1309.36

## 2021-10-25 NOTE — PROGRESS NOTES
[facility]  [id number] [place] [id number]  Phone: [phone]  Primary Provider: Haase, Susan Rachele  Pre-op Performing Provider: KOBE VALDEZ      PREOPERATIVE EVALUATION:  Today's date: 10/25/2021    aLney Maynard is a 56 year old female who presents for a preoperative evaluation.    Surgical Information:  Surgery/Procedure: right long trigger finger release  Surgery Location: [facility] outpatient  Surgeon: Angélica  Surgery Date: 11/03/21  Time of Surgery: 12:00 pm  Where patient plans to recover: At home with family  Fax number for surgical facility:     Type of Anesthesia Anticipated: to be determined    Assessment & Plan     The proposed surgical procedure is considered LOW risk.    Preop general physical exam    Cleared for surgery.         Risks and Recommendations:  The patient has the following additional risks and recommendations for perioperative complications:   - No identified additional risk factors other than previously addressed    Medication Instructions:  Patient will hold all medications the morning of surgery.    RECOMMENDATION:  APPROVAL GIVEN to proceed with proposed procedure, without further diagnostic evaluation.                      Subjective     HPI related to upcoming procedure: Trigger finger      Preop Questions 10/25/2021   1. Have you ever had a heart attack or stroke? No   2. Have you ever had surgery on your heart or blood vessels, such as a stent placement, a coronary artery bypass, or surgery on an artery in your head, neck, heart, or legs? No   3. Do you have chest pain with activity? No   4. Do you have a history of  heart failure? No   5. Do you currently have a cold, bronchitis or symptoms of other infection? No   6. Do you have a cough, shortness of breath, or wheezing? No   7. Do you or anyone in your family have previous history of blood clots? No   8. Do you or does anyone in your family have a serious  bleeding problem such as prolonged bleeding following surgeries or cuts? No   9. Have you ever had problems with anemia or been told to take iron pills? No   10. Have you had any abnormal blood loss such as black, tarry or bloody stools, or abnormal vaginal bleeding? No   11. Have you ever had a blood transfusion? No   12. Are you willing to have a blood transfusion if it is medically needed before, during, or after your surgery? Yes   13. Have you or any of your relatives ever had problems with anesthesia? No   14. Do you have sleep apnea, excessive snoring or daytime drowsiness? No   15. Do you have any artifical heart valves or other implanted medical devices like a pacemaker, defibrillator, or continuous glucose monitor? No   16. Do you have artificial joints? No   17. Are you allergic to latex? No   18. Is there any chance that you may be pregnant? No     Health Care Directive:  Patient does not have a Health Care Directive or Living Will: Discussed advance care planning with patient; however, patient declined at this time.    Preoperative Review of :   reviewed - controlled substances reflected in medication list.      Status of Chronic Conditions:  See problem list for active medical problems.  Problems all longstanding and stable, except as noted/documented.  See ROS for pertinent symptoms related to these conditions.      Review of Systems  CONSTITUTIONAL: NEGATIVE for fever, chills, change in weight  INTEGUMENTARY/SKIN: NEGATIVE for worrisome rashes, moles or lesions  EYES: NEGATIVE for vision changes or irritation  ENT/MOUTH: NEGATIVE for ear, mouth and throat problems  RESP: NEGATIVE for significant cough or SOB  CV: NEGATIVE for chest pain, palpitations or peripheral edema  GI: NEGATIVE for nausea, abdominal pain, heartburn, or change in bowel habits  : NEGATIVE for frequency, dysuria, or hematuria  MUSCULOSKELETAL: NEGATIVE for significant arthralgias or myalgia  NEURO: NEGATIVE for weakness,  dizziness or paresthesias  ENDOCRINE: NEGATIVE for temperature intolerance, skin/hair changes  HEME: NEGATIVE for bleeding problems  PSYCHIATRIC: NEGATIVE for changes in mood or affect    Patient Active Problem List    Diagnosis Date Noted     Anxiety 06/28/2021     Priority: Medium     Ureterolithiasis 04/22/2016     Priority: Medium     Complete rupture of rotator cuff 04/02/2015     Priority: Medium     Mixed incontinence urge and stress (male)(female) 04/02/2015     Priority: Medium     Plantar fasciitis, bilateral 11/05/2014     Priority: Medium     Attention deficit hyperactivity disorder (ADHD) 01/12/2014     Priority: Medium     Patient is followed by HAASE, SUSAN RACHELE for ongoing prescription of stimulants.  All refills should be approved by this provider, or covering partner.    Medication(s): Aderall 20 mg 3 times a day.  Maximum quantity per month: #90   Clinic visit frequency required: Q 3  months     Controlled substance agreement on file: Yes       Date(s): 8/29/2019  Neuropsych evaluation for ADD completed:  No    Last [facility] website verification: 3/4/20   [email or web address]         Herpes simplex virus infection 08/02/2013     Priority: Medium     Urinary frequency      Priority: Medium     Family history of rheumatoid arthritis 03/08/2013     Priority: Medium     Family history of sarcoidosis (mother) 03/08/2013     Priority: Medium     Sleep disorder 10/05/2012     Priority: Medium     Migraine headache 03/29/2011     Priority: Medium     Topamax       CARDIOVASCULAR SCREENING; LDL GOAL LESS THAN 160 10/31/2010     Priority: Medium     The 10-year ASCVD risk score (Suzie ALLEN Jr., et al., 2013) is: 4.1%    Values used to calculate the score:      Age: 55 years      Sex: Female      Is Non- : Yes      Diabetic: No      Tobacco smoker: No      Systolic Blood Pressure: 132 mmHg      Is BP treated: No      HDL Cholesterol: 64 mg/dL      Total Cholesterol: 239 mg/dL          GERD (gastroesophageal reflux disease) 07/05/2010     Priority: Medium     Seasonal allergic rhinitis 05/07/2010     Priority: Medium     Alopecia 05/18/2004     Priority: Medium      Past Medical History:   Diagnosis Date     Abnormal glandular Papanicolaou smear of cervix      Allergic rhinitis, cause unspecified      Alopecia      Attention deficit hyperactivity disorder (ADHD)      Chronic pain of left knee      Complete rupture of rotator cuff      Constipation      Gastro-oesophageal reflux disease      Heart murmur     murmur     Hematuria      Herpes simplex virus infection      Hydronephrosis, right      Lymphocytopenia 4/4/2011     Migraine headache      Migraine, unspecified, without mention of intractable migraine without mention of status migrainosus      Mixed incontinence urge and stress      Mumps      Numbness and tingling     LEFT ARM     Palpitations      Plantar fasciitis, bilateral      Renal disease     hx stones x 2 episodes     Seasonal allergic rhinitis      Sleep disorder      Ureterolithiasis      Urinary frequency     burning     Vertigo      Past Surgical History:   Procedure Laterality Date     ARTHROSCOPY KNEE Right 04/26/2017    Procedure: Right knee arthroscopy and anterior fat pad resection with medial plica resection. Partial lateral menisectomy. Surgeon:  Fredi Lindsay MD  Location: [facility]     ARTHROSCOPY SHOULDER, OPEN ROTATOR CUFF REPAIR, COMBINED  07/31/2013    Procedure: COMBINED ARTHROSCOPY SHOULDER, OPEN ROTATOR CUFF REPAIR;  Left Shoulder Arthroscopy, Distal Clavicale Resection, Decompression, Mini Open Rotator Cuff Repair    ;  Surgeon: Fredi Lindsay MD;  Location:  OR     BREAST SURGERY  2014    Augmentation     C REMOVAL OF KIDNEY STONE       COLONOSCOPY  02/07/2014    Procedure: COLONOSCOPY;  Colonoscopy/WW;  Surgeon: Pancho Olsen MD;  Location:  GI     COMBINED CYSTOSCOPY, RETROGRADES, URETEROSCOPY, LASER HOLMIUM LITHOTRIPSY  URETER(S), INSERT STENT Right 2016    Procedure: COMBINED CYSTOSCOPY, RETROGRADES, URETEROSCOPY, LASER HOLMIUM LITHOTRIPSY URETER(S), INSERT STENT;  Surgeon: Kushal Noriega MD;  Location:  OR     CYSTOSCOPY       CYSTOSCOPY, RETROGRADES, EXTRACT STONE, COMBINED  2013    Procedure: COMBINED CYSTOSCOPY, RETROGRADES, EXTRACT STONE;  Video Cystoscopy,  Right Ureteroscopy, ureteral dilatation,  Stone extraction;  Surgeon: Subhash Vann MD;  Location:  OR     EXCISE MASS HAND Left 2021    Excision of left dorsal hand/ wrist mass (ganglion cyst), Dr. Fredi Lindsay, [facility].     EXTRACORPOREAL SHOCK WAVE LITHOTRIPSY (ESWL) Bilateral 2016    Procedure: EXTRACORPOREAL SHOCK WAVE LITHOTRIPSY (ESWL);  Surgeon: Bassam Vu MD;  Location:  OR     EXTRACORPOREAL SHOCK WAVE LITHOTRIPSY, CYSTOSCOPY, INSERT STENT URETER(S), COMBINED Bilateral 2018    Procedure: COMBINED EXTRACORPOREAL SHOCK WAVE LITHOTRIPSY, CYSTOSCOPY, INSERT STENT URETER(S);  BILATERAL COMBINED EXTRACORPOREAL SHOCK WAVE LITHOTRIPSY, CYSTOSCOPY, LEFT STENT PLACEMENT;  Surgeon: Bassam Vu MD;  Location:  OR     EYE SURGERY      Lasik     GYN SURGERY      laparoscopy     LAPAROSCOPIC CHOLECYSTECTOMY WITH CHOLANGIOGRAMS  2012    Procedure: LAPAROSCOPIC CHOLECYSTECTOMY WITH CHOLANGIOGRAMS;  LAPAROSCOPIC CHOLECYSTECTOMY WITH CHOLANGIOGRAMS ;  Surgeon: Nataliya Gabriel MD;  Location:  OR     LASER HOLMIUM LITHOTRIPSY URETER(S), INSERT STENT, COMBINED Left 10/30/2019    Procedure: CYSTOSCOPY,LEFT URETEROSCOPY, HOLMIUM LASER, STONE EXTRACTION, LEFT STENT PLACEMENT;  Surgeon: Bassam Vu MD;  Location:  OR     TUBAL LIGATION       ZZC NONSPECIFIC PROCEDURE  age 16    colposcopy for abnormal pap     ZZC NONSPECIFIC PROCEDURE      surgery L thumb     ZZC NONSPECIFIC PROCEDURE      breast augmentation-silicone     ZZC NONSPECIFIC PROCEDURE      s/p  x 2      ZZC NONSPECIFIC PROCEDURE      Bilateral tubal ligation     Current Outpatient Medications   Medication Sig Dispense Refill     acyclovir (ZOVIRAX) 5 % external ointment Apply topically 2 times daily as needed (Outbreak)       amphetamine-dextroamphetamine (ADDERALL) 30 MG tablet TAKE ONE TABLET BY MOUTH THREE TIMES A DAY 90 tablet 0     aspirin (ASA) 81 MG chewable tablet Take 81 mg by mouth daily       Cholecalciferol (VITAMIN D3) 50 MCG (2000 UT) TABS        estradiol (ESTRACE) 0.1 MG/GM vaginal cream Place 2 g vaginally twice a week Please use quarter size amount in the vagina nightly for two weeks and then three times a week at night thereafter 42.5 g 3     fexofenadine (ALLEGRA) 180 MG tablet Take 1 tablet (180 mg) by mouth daily as needed for allergies 30 tablet 6     fluocinolone acetonide 0.01 % OIL Use on scalp once a week, as needed. 1 Bottle 11     fluticasone (FLONASE) 50 MCG/ACT nasal spray Spray 2 sprays into both nostrils daily as needed for allergies 16 g 11     hydrOXYzine (ATARAX) 25 MG tablet TAKE ONE TABLET BY MOUTH THREE TIMES A DAY AS NEEDED FOR ITCHING 30 tablet 1     ketoconazole (NIZORAL) 2 % external shampoo Apply to the affected area and wash off after 5 minutes, as needed. 120 mL 11     ketorolac (TORADOL) 10 MG tablet Take 1 tablet (10 mg) by mouth every 6 hours as needed for moderate pain 30 tablet 1     meclizine (ANTIVERT) 25 MG tablet Take 1 tablet (25 mg) by mouth 3 times daily as needed for nausea 30 tablet 3     Menthol, Topical Analgesic, (BIOFREEZE COLORLESS) 4 % GEL Externally apply topically 3 times daily as needed 118 mL 1     naproxen (NAPROSYN) 500 MG tablet TAKE ONE TABLET BY MOUTH TWICE A DAY WITH MEALS 60 tablet 3     naratriptan (AMERGE) 2.5 MG tablet        nitroFURantoin macrocrystal-monohydrate (MACROBID) 100 MG capsule Take 1 capsule (100 mg) by mouth daily 30 capsule 3     phenazopyridine (PYRIDIUM) 200 MG tablet Take 1 tablet (200 mg) by mouth 3 times daily as  "needed for irritation 30 tablet 3     solifenacin (VESICARE) 10 MG tablet TAKE ONE TABLET BY MOUTH ONCE DAILY 90 tablet 1     valACYclovir (VALTREX) 1000 mg tablet Take 1 tablet (1,000 mg) by mouth daily 90 tablet 3     zolpidem (AMBIEN) 5 MG tablet Take 1 tablet (5 mg) by mouth nightly as needed for sleep 30 tablet 2       Allergies   Allergen Reactions     Cefatrizine Itching     Aloe Itching and Rash     Codeine      GI upset     Compazine Nausea and Itching     Darvocet [Acetaminophen] Nausea and Vomiting     Percocet [Oxycodone-Acetaminophen] Nausea and Vomiting     Sulphadimidine [Sulfamethazine] Rash        Social History     Tobacco Use     Smoking status: Never Smoker     Smokeless tobacco: Never Used   Substance Use Topics     Alcohol use: No     Alcohol/week: 0.0 standard drinks     Family History   Problem Relation Age of Onset     Diabetes Mother         type 2     Alcohol/Drug Mother         alcohol     History   Drug Use No         Objective     /89 (BP Location: Right arm, Patient Position: Chair, Cuff Size: Adult Regular)   Pulse 108   Temp 99.2  F (37.3  C) (Oral)   Ht 1.626 m (5' 4\")   Wt 73.9 kg (163 lb)   LMP 02/25/2014   SpO2 96%   BMI 27.98 kg/m        Physical Exam    GENERAL APPEARANCE: healthy, alert and no distress     EYES: EOMI, PERRL     HENT: ear canals and TM's normal and nose and mouth without ulcers or lesions     NECK: no adenopathy, no asymmetry, masses, or scars and thyroid normal to palpation     RESP: lungs clear to auscultation - no rales, rhonchi or wheezes     CV: regular rates and rhythm, normal S1 S2, no S3 or S4 and no murmur, click or rub     ABDOMEN:  soft, nontender, no HSM or masses and bowel sounds normal     MS: extremities normal- no gross deformities noted, no evidence of inflammation in joints, FROM in all extremities.     SKIN: no suspicious lesions or rashes     NEURO: Normal strength and tone, sensory exam grossly normal, mentation intact and " speech normal     PSYCH: mentation appears normal. and affect normal/bright     LYMPHATICS: No cervical adenopathy    Recent Labs   Lab Test 06/17/21  1530 09/14/20  0902 10/28/19  0852   HGB 15.0 15.6  --    PLT  --  220  --    NA  --  138 142   POTASSIUM  --  4.2 4.0   CR  --  0.98 0.91        Diagnostics:  No labs were ordered during this visit.   No EKG required for low risk surgery (cataract, skin procedure, breast biopsy, etc).    Revised Cardiac Risk Index (RCRI):  The patient has the following serious cardiovascular risks for perioperative complications:   - No serious cardiac risks = 0 points     RCRI Interpretation: 0 points: Class I (very low risk - 0.4% complication rate)           Signed Electronically by: Asif Gray PA-C  Copy of this evaluation report is provided to requesting physician.      Answers for HPI/ROS submitted by the patient on 10/25/2021  If you checked off any problems, how difficult have these problems made it for you to do your work, take care of things at home, or get along with other people?: Somewhat difficult  PHQ9 TOTAL SCORE: 10  BRANDIE 7 TOTAL SCORE: 2

## 2021-10-25 NOTE — PATIENT INSTRUCTIONS
Hold all medications the morning of surgery.    Stop taking aspirin 7 days before surgery.        Preparing for Your Surgery  Getting started  A nurse will call you to review your health history and instructions. They will give you an arrival time based on your scheduled surgery time.  Please be ready to share the following:    Your doctor's clinic name and phone number    Your medical, surgical and anesthesia history    A list of allergies and sensitivities    A list of medicines, including herbal treatments and over-the-counter drugs    Whether the patient has a legal guardian (ask how to send us the papers in advance)  If you have a child who's having surgery, please ask for a copy of Preparing for Your Child's Surgery.    Preparing for surgery    Within 30 days of surgery: Have a pre-op exam (sometimes called an H&P, or History and Physical). This can be done at a clinic or pre-operative center.  ? If you're having a , you may not need this exam. Talk to your care team    At your pre-op exam, talk to your care team about all medicines you take. If you need to stop any medicines before surgery, ask when to start taking them again.  ? We do this for your safety. Many medicines can make you bleed too much during surgery. Some change how well surgery (anesthesia) drugs work.    Call your insurance company to let them know you're having surgery. (If you don't have insurance, call [phone].)    Call your clinic if there's any change in your health. This includes signs of a cold or flu (sore throat, runny nose, cough, rash, fever). It also includes a scrape or scratch near the surgery site.    If you have questions on the day of surgery, call your hospital or surgery center.  Eating and drinking guidelines  For your safety: Unless your surgeon tells you otherwise, follow the guidelines below.    Eat and drink as usual until 8 hours before surgery. After that, no food or milk.    Drink clear liquids until 2  hours before surgery. These are liquids you can see through, like water, Gatorade and Propel Water. You may also have black coffee and tea (no cream or milk).    Nothing by mouth within 2 hours of surgery. This includes gum, candy and breath mints.    If you drink, stop drinking alcohol the night before surgery.    If your care team tells you to take medicine on the morning of surgery, it's okay to take it with a sip of water.  Preventing infection    Shower or bathe the night before and morning of your surgery. Follow the instructions your clinic gave you. (If no instructions, use regular soap.)    Don't shave or clip hair near your surgery site. We'll remove the hair if needed.    Don't smoke or vape the morning of surgery. You may chew nicotine gum up to 2 hours before surgery. A nicotine patch is okay.  ? Note: Some surgeries require you to completely quit smoking and nicotine. Check with your surgeon.    Your care team will make every effort to keep you safe from infection. We will:  ? Clean our hands often with soap and water (or an alcohol-based hand rub).  ? Clean the skin at your surgery site with a special soap that kills germs.  ? Give you a special gown to keep you warm. (Cold raises the risk of infection.)  ? Wear special hair covers, masks, gowns and gloves during surgery.  ? Give antibiotic medicine, if prescribed. Not all surgeries need antibiotics.  What to bring on the day of surgery    Photo ID and insurance card    Copy of your health care directive, if you have one    Glasses and hearing aides (bring cases)  ? You can't wear contacts during surgery    Inhaler and eye drops, if you use them (tell us about these when you arrive)    CPAP machine or breathing device, if you use them    A few personal items, if spending the night    If you have . . .  ? A pacemaker or ICD (cardiac defibrillator): Bring the ID card.  ? An implanted stimulator: Bring the remote control.  ? A legal guardian: Bring a  copy of the certified (court-stamped) guardianship papers.  Please remove any jewelry, including body piercings. Leave jewelry and other valuables at home.  If you're going home the day of surgery  Important: If you don't follow the rules below, we must cancel your surgery.     Arrange for someone to drive you home after surgery. You may not drive, take a taxi or take public transportation by yourself (unless you'll have local anesthesia only).    Arrange for a responsible adult to stay with you overnight. If you don't, we may keep you in the hospital overnight, and you may need to pay the costs yourself.  Questions?   If you have any questions for your care team, list them here: _________________________________________________________________________________________________________________________________________________________________________________________________________________________________________________________________________________________________________________________  For informational purposes only. Not to replace the advice of your health care provider. Copyright   2003, 2019 [facility]. All rights reserved. Clinically reviewed by Elissa Arreola MD. [place] [id number] - REV 4/20.

## 2021-10-26 ASSESSMENT — ANXIETY QUESTIONNAIRES: GAD7 TOTAL SCORE: 2

## 2021-10-26 ASSESSMENT — PATIENT HEALTH QUESTIONNAIRE - PHQ9: SUM OF ALL RESPONSES TO PHQ QUESTIONS 1-9: 10

## 2021-10-28 ENCOUNTER — LAB (OUTPATIENT)
Dept: URGENT CARE | Facility: URGENT CARE | Age: 56
End: 2021-10-28
Payer: COMMERCIAL

## 2021-10-28 DIAGNOSIS — Z01.812 ENCOUNTER FOR PREPROCEDURE SCREENING LABORATORY TESTING FOR COVID-19: ICD-10-CM

## 2021-10-28 DIAGNOSIS — Z11.52 ENCOUNTER FOR PREPROCEDURE SCREENING LABORATORY TESTING FOR COVID-19: ICD-10-CM

## 2021-10-28 PROCEDURE — U0003 INFECTIOUS AGENT DETECTION BY NUCLEIC ACID (DNA OR RNA); SEVERE ACUTE RESPIRATORY SYNDROME CORONAVIRUS 2 (SARS-COV-2) (CORONAVIRUS DISEASE [COVID-19]), AMPLIFIED PROBE TECHNIQUE, MAKING USE OF HIGH THROUGHPUT TECHNOLOGIES AS DESCRIBED BY CMS-2020-01-R: HCPCS

## 2021-10-28 PROCEDURE — U0005 INFEC AGEN DETEC AMPLI PROBE: HCPCS

## 2021-10-29 LAB — SARS-COV-2 RNA RESP QL NAA+PROBE: NEGATIVE

## 2021-10-30 ENCOUNTER — HEALTH MAINTENANCE LETTER (OUTPATIENT)
Age: 56
End: 2021-10-30

## 2021-11-03 ENCOUNTER — TRANSFERRED RECORDS (OUTPATIENT)
Dept: HEALTH INFORMATION MANAGEMENT | Facility: CLINIC | Age: 56
End: 2021-11-03

## 2021-11-11 DIAGNOSIS — G47.9 SLEEP DISORDER: ICD-10-CM

## 2021-11-11 DIAGNOSIS — F90.2 ATTENTION DEFICIT HYPERACTIVITY DISORDER (ADHD), COMBINED TYPE: ICD-10-CM

## 2021-11-11 RX ORDER — DEXTROAMPHETAMINE SACCHARATE, AMPHETAMINE ASPARTATE, DEXTROAMPHETAMINE SULFATE AND AMPHETAMINE SULFATE 7.5; 7.5; 7.5; 7.5 MG/1; MG/1; MG/1; MG/1
TABLET ORAL
Qty: 90 TABLET | Refills: 0 | Status: SHIPPED | OUTPATIENT
Start: 2021-11-11 | End: 2021-12-20

## 2021-11-11 NOTE — TELEPHONE ENCOUNTER
Routing refill request to provider for review/approval because:  Drug not on the [facility] refill protocol     Samra Jalloh RN

## 2021-11-12 RX ORDER — ZOLPIDEM TARTRATE 5 MG/1
5 TABLET ORAL
Qty: 30 TABLET | Refills: 2 | Status: SHIPPED | OUTPATIENT
Start: 2021-11-12 | End: 2023-04-04

## 2021-11-12 NOTE — TELEPHONE ENCOUNTER
Routing refill request to provider for review/approval because:  Drug not on the [facility] refill protocol     Ravin JEREZ RN

## 2021-12-21 DIAGNOSIS — N39.0 RECURRENT UTI: ICD-10-CM

## 2021-12-21 RX ORDER — NITROFURANTOIN 25; 75 MG/1; MG/1
100 CAPSULE ORAL DAILY
Qty: 90 CAPSULE | Refills: 3 | Status: SHIPPED | OUTPATIENT
Start: 2021-12-21 | End: 2022-06-06

## 2021-12-21 NOTE — TELEPHONE ENCOUNTER
Hello,     Patient wanted a 90 day supply to match up with her other medications she gets from her PCP. Please reorder and sent to [facility] if appropriate.    Thank you,  Blanca Desai & [facility] Staff Technician   [facility]  [email or web address]   Ph#: [phone]  Fax#: [phone]    .

## 2022-03-29 DIAGNOSIS — N39.46 MIXED INCONTINENCE URGE AND STRESS (MALE)(FEMALE): ICD-10-CM

## 2022-03-29 DIAGNOSIS — F90.2 ATTENTION DEFICIT HYPERACTIVITY DISORDER (ADHD), COMBINED TYPE: ICD-10-CM

## 2022-03-30 RX ORDER — DEXTROAMPHETAMINE SACCHARATE, AMPHETAMINE ASPARTATE, DEXTROAMPHETAMINE SULFATE AND AMPHETAMINE SULFATE 7.5; 7.5; 7.5; 7.5 MG/1; MG/1; MG/1; MG/1
TABLET ORAL
Qty: 90 TABLET | Refills: 0 | Status: SHIPPED | OUTPATIENT
Start: 2022-03-30 | End: 2022-04-08

## 2022-03-30 RX ORDER — SOLIFENACIN SUCCINATE 10 MG/1
10 TABLET, FILM COATED ORAL DAILY
Qty: 90 TABLET | Refills: 1 | Status: SHIPPED | OUTPATIENT
Start: 2022-03-30 | End: 2022-08-12

## 2022-03-30 NOTE — TELEPHONE ENCOUNTER
Routing refill request to provider for review/approval because:  Drug not on the [facility] refill protocol   Brianne Parks RN on 3/30/2022 at 3:13 PM

## 2022-04-01 RX ORDER — DEXTROAMPHETAMINE SACCHARATE, AMPHETAMINE ASPARTATE, DEXTROAMPHETAMINE SULFATE AND AMPHETAMINE SULFATE 7.5; 7.5; 7.5; 7.5 MG/1; MG/1; MG/1; MG/1
TABLET ORAL
Qty: 90 TABLET | Refills: 0 | OUTPATIENT
Start: 2022-04-01

## 2022-04-01 NOTE — TELEPHONE ENCOUNTER
Patient calling and can no longer use [facility] due to insurance change.  [facility]  Advised to have -[facility] transfer her other medications.  Samra Jalloh RN

## 2022-04-08 RX ORDER — DEXTROAMPHETAMINE SACCHARATE, AMPHETAMINE ASPARTATE, DEXTROAMPHETAMINE SULFATE AND AMPHETAMINE SULFATE 7.5; 7.5; 7.5; 7.5 MG/1; MG/1; MG/1; MG/1
TABLET ORAL
Qty: 90 TABLET | Refills: 0 | Status: SHIPPED | OUTPATIENT
Start: 2022-04-08 | End: 2022-05-19

## 2022-05-19 DIAGNOSIS — F90.2 ATTENTION DEFICIT HYPERACTIVITY DISORDER (ADHD), COMBINED TYPE: ICD-10-CM

## 2022-05-19 RX ORDER — DEXTROAMPHETAMINE SACCHARATE, AMPHETAMINE ASPARTATE, DEXTROAMPHETAMINE SULFATE AND AMPHETAMINE SULFATE 7.5; 7.5; 7.5; 7.5 MG/1; MG/1; MG/1; MG/1
TABLET ORAL
Qty: 90 TABLET | Refills: 0 | Status: SHIPPED | OUTPATIENT
Start: 2022-05-19 | End: 2022-07-08

## 2022-06-06 ENCOUNTER — OFFICE VISIT (OUTPATIENT)
Dept: FAMILY MEDICINE | Facility: CLINIC | Age: 57
End: 2022-06-06
Payer: COMMERCIAL

## 2022-06-06 VITALS
TEMPERATURE: 98.2 F | SYSTOLIC BLOOD PRESSURE: 152 MMHG | BODY MASS INDEX: 29.41 KG/M2 | HEART RATE: 99 BPM | WEIGHT: 172.3 LBS | HEIGHT: 64 IN | OXYGEN SATURATION: 94 % | DIASTOLIC BLOOD PRESSURE: 96 MMHG

## 2022-06-06 DIAGNOSIS — M62.838 MUSCLE SPASM: ICD-10-CM

## 2022-06-06 DIAGNOSIS — R03.0 ELEVATED BLOOD PRESSURE READING WITHOUT DIAGNOSIS OF HYPERTENSION: ICD-10-CM

## 2022-06-06 DIAGNOSIS — Z12.31 VISIT FOR SCREENING MAMMOGRAM: ICD-10-CM

## 2022-06-06 DIAGNOSIS — N39.0 RECURRENT UTI: ICD-10-CM

## 2022-06-06 DIAGNOSIS — F90.2 ATTENTION DEFICIT HYPERACTIVITY DISORDER (ADHD), COMBINED TYPE: ICD-10-CM

## 2022-06-06 DIAGNOSIS — M54.50 CHRONIC LEFT-SIDED LOW BACK PAIN WITHOUT SCIATICA: ICD-10-CM

## 2022-06-06 DIAGNOSIS — R35.0 URINARY FREQUENCY: ICD-10-CM

## 2022-06-06 DIAGNOSIS — G89.29 CHRONIC LEFT-SIDED LOW BACK PAIN WITHOUT SCIATICA: ICD-10-CM

## 2022-06-06 DIAGNOSIS — Z00.00 ROUTINE GENERAL MEDICAL EXAMINATION AT A HEALTH CARE FACILITY: Primary | ICD-10-CM

## 2022-06-06 DIAGNOSIS — Z23 NEED FOR COVID-19 VACCINE: ICD-10-CM

## 2022-06-06 PROBLEM — R01.1 HEART MURMUR: Status: ACTIVE | Noted: 2022-06-06

## 2022-06-06 PROBLEM — R42 VERTIGO: Status: ACTIVE | Noted: 2022-06-06

## 2022-06-06 PROBLEM — Z87.442 HISTORY OF NEPHROLITHIASIS: Status: ACTIVE | Noted: 2022-06-06

## 2022-06-06 LAB
ALBUMIN SERPL-MCNC: 3.6 G/DL (ref 3.4–5)
ALBUMIN UR-MCNC: NEGATIVE MG/DL
ALP SERPL-CCNC: 153 U/L (ref 40–150)
ALT SERPL W P-5'-P-CCNC: 29 U/L (ref 0–50)
AMPHETAMINES UR QL: DETECTED
ANION GAP SERPL CALCULATED.3IONS-SCNC: 3 MMOL/L (ref 3–14)
APPEARANCE UR: CLEAR
AST SERPL W P-5'-P-CCNC: 21 U/L (ref 0–45)
BACTERIA #/AREA URNS HPF: ABNORMAL /HPF
BARBITURATES UR QL SCN: NOT DETECTED
BENZODIAZ UR QL SCN: NOT DETECTED
BILIRUB SERPL-MCNC: 0.3 MG/DL (ref 0.2–1.3)
BILIRUB UR QL STRIP: NEGATIVE
BUN SERPL-MCNC: 14 MG/DL (ref 7–30)
BUPRENORPHINE UR QL: NOT DETECTED
CALCIUM SERPL-MCNC: 9.7 MG/DL (ref 8.5–10.1)
CANNABINOIDS UR QL: NOT DETECTED
CHLORIDE BLD-SCNC: 108 MMOL/L (ref 94–109)
CHOLEST SERPL-MCNC: 223 MG/DL
CO2 SERPL-SCNC: 29 MMOL/L (ref 20–32)
COCAINE UR QL SCN: NOT DETECTED
COLOR UR AUTO: YELLOW
CREAT SERPL-MCNC: 0.98 MG/DL (ref 0.52–1.04)
D-METHAMPHET UR QL: NOT DETECTED
ERYTHROCYTE [DISTWIDTH] IN BLOOD BY AUTOMATED COUNT: 12 % (ref 10–15)
FASTING STATUS PATIENT QL REPORTED: ABNORMAL
GFR SERPL CREATININE-BSD FRML MDRD: 67 ML/MIN/1.73M2
GLUCOSE BLD-MCNC: 108 MG/DL (ref 70–99)
GLUCOSE UR STRIP-MCNC: NEGATIVE MG/DL
HBA1C MFR BLD: 5.3 % (ref 0–5.6)
HCT VFR BLD AUTO: 45.6 % (ref 35–47)
HDLC SERPL-MCNC: 53 MG/DL
HGB BLD-MCNC: 15.8 G/DL (ref 11.7–15.7)
HGB UR QL STRIP: ABNORMAL
KETONES UR STRIP-MCNC: NEGATIVE MG/DL
LDLC SERPL CALC-MCNC: 145 MG/DL
LEUKOCYTE ESTERASE UR QL STRIP: ABNORMAL
MCH RBC QN AUTO: 32.8 PG (ref 26.5–33)
MCHC RBC AUTO-ENTMCNC: 34.6 G/DL (ref 31.5–36.5)
MCV RBC AUTO: 95 FL (ref 78–100)
METHADONE UR QL SCN: NOT DETECTED
NITRATE UR QL: POSITIVE
NONHDLC SERPL-MCNC: 170 MG/DL
OPIATES UR QL SCN: NOT DETECTED
OXYCODONE UR QL SCN: NOT DETECTED
PCP UR QL SCN: NOT DETECTED
PH UR STRIP: 6 [PH] (ref 5–7)
PLATELET # BLD AUTO: 259 10E3/UL (ref 150–450)
POTASSIUM BLD-SCNC: 4.5 MMOL/L (ref 3.4–5.3)
PROPOXYPH UR QL: NOT DETECTED
PROT SERPL-MCNC: 8.4 G/DL (ref 6.8–8.8)
RBC # BLD AUTO: 4.81 10E6/UL (ref 3.8–5.2)
RBC #/AREA URNS AUTO: ABNORMAL /HPF
SODIUM SERPL-SCNC: 140 MMOL/L (ref 133–144)
SP GR UR STRIP: 1.02 (ref 1–1.03)
SQUAMOUS #/AREA URNS AUTO: ABNORMAL /LPF
TRICYCLICS UR QL SCN: NOT DETECTED
TRIGL SERPL-MCNC: 124 MG/DL
TSH SERPL DL<=0.005 MIU/L-ACNC: 0.58 MU/L (ref 0.4–4)
UROBILINOGEN UR STRIP-ACNC: 0.2 E.U./DL
WBC # BLD AUTO: 4.5 10E3/UL (ref 4–11)
WBC #/AREA URNS AUTO: ABNORMAL /HPF

## 2022-06-06 PROCEDURE — 80053 COMPREHEN METABOLIC PANEL: CPT | Performed by: FAMILY MEDICINE

## 2022-06-06 PROCEDURE — 80061 LIPID PANEL: CPT | Performed by: FAMILY MEDICINE

## 2022-06-06 PROCEDURE — 91306 COVID-19,PF,MODERNA (18+ YRS BOOSTER .25ML): CPT | Performed by: FAMILY MEDICINE

## 2022-06-06 PROCEDURE — 80306 DRUG TEST PRSMV INSTRMNT: CPT | Performed by: FAMILY MEDICINE

## 2022-06-06 PROCEDURE — 36415 COLL VENOUS BLD VENIPUNCTURE: CPT | Performed by: FAMILY MEDICINE

## 2022-06-06 PROCEDURE — 85027 COMPLETE CBC AUTOMATED: CPT | Performed by: FAMILY MEDICINE

## 2022-06-06 PROCEDURE — 99396 PREV VISIT EST AGE 40-64: CPT | Mod: 25 | Performed by: FAMILY MEDICINE

## 2022-06-06 PROCEDURE — 99214 OFFICE O/P EST MOD 30 MIN: CPT | Mod: 25 | Performed by: FAMILY MEDICINE

## 2022-06-06 PROCEDURE — 83036 HEMOGLOBIN GLYCOSYLATED A1C: CPT | Performed by: FAMILY MEDICINE

## 2022-06-06 PROCEDURE — 84443 ASSAY THYROID STIM HORMONE: CPT | Performed by: FAMILY MEDICINE

## 2022-06-06 PROCEDURE — 0064A COVID-19,PF,MODERNA (18+ YRS BOOSTER .25ML): CPT | Performed by: FAMILY MEDICINE

## 2022-06-06 PROCEDURE — 87186 SC STD MICRODIL/AGAR DIL: CPT | Performed by: FAMILY MEDICINE

## 2022-06-06 PROCEDURE — 87086 URINE CULTURE/COLONY COUNT: CPT | Performed by: FAMILY MEDICINE

## 2022-06-06 RX ORDER — METHOCARBAMOL 500 MG/1
1000 TABLET, FILM COATED ORAL 4 TIMES DAILY PRN
Qty: 60 TABLET | Refills: 1 | Status: SHIPPED | OUTPATIENT
Start: 2022-06-06 | End: 2022-08-11

## 2022-06-06 RX ORDER — CIPROFLOXACIN 500 MG/1
500 TABLET, FILM COATED ORAL 2 TIMES DAILY
Qty: 6 TABLET | Refills: 0 | Status: SHIPPED | OUTPATIENT
Start: 2022-06-06 | End: 2022-06-09

## 2022-06-06 RX ORDER — NITROFURANTOIN 25; 75 MG/1; MG/1
100 CAPSULE ORAL DAILY
Qty: 90 CAPSULE | Refills: 3 | Status: SHIPPED | OUTPATIENT
Start: 2022-06-06 | End: 2023-02-21

## 2022-06-06 SDOH — HEALTH STABILITY: PHYSICAL HEALTH: ON AVERAGE, HOW MANY DAYS PER WEEK DO YOU ENGAGE IN MODERATE TO STRENUOUS EXERCISE (LIKE A BRISK WALK)?: 2 DAYS

## 2022-06-06 SDOH — HEALTH STABILITY: PHYSICAL HEALTH: ON AVERAGE, HOW MANY MINUTES DO YOU ENGAGE IN EXERCISE AT THIS LEVEL?: 30 MIN

## 2022-06-06 ASSESSMENT — ENCOUNTER SYMPTOMS
FEVER: 0
HEMATOCHEZIA: 0
JOINT SWELLING: 0
HEMATURIA: 0
HEADACHES: 1
BREAST MASS: 0
PALPITATIONS: 0
DYSURIA: 1
ARTHRALGIAS: 1
MYALGIAS: 1
HEARTBURN: 0
DIARRHEA: 0
SORE THROAT: 0
EYE PAIN: 0
WEAKNESS: 0
NAUSEA: 0
CHILLS: 0
SHORTNESS OF BREATH: 0
COUGH: 0
NERVOUS/ANXIOUS: 0
ABDOMINAL PAIN: 0
CONSTIPATION: 0
FREQUENCY: 1
DIZZINESS: 0

## 2022-06-06 ASSESSMENT — ANXIETY QUESTIONNAIRES
7. FEELING AFRAID AS IF SOMETHING AWFUL MIGHT HAPPEN: NOT AT ALL
8. IF YOU CHECKED OFF ANY PROBLEMS, HOW DIFFICULT HAVE THESE MADE IT FOR YOU TO DO YOUR WORK, TAKE CARE OF THINGS AT HOME, OR GET ALONG WITH OTHER PEOPLE?: NOT DIFFICULT AT ALL
2. NOT BEING ABLE TO STOP OR CONTROL WORRYING: NOT AT ALL
7. FEELING AFRAID AS IF SOMETHING AWFUL MIGHT HAPPEN: NOT AT ALL
GAD7 TOTAL SCORE: 0
3. WORRYING TOO MUCH ABOUT DIFFERENT THINGS: NOT AT ALL
4. TROUBLE RELAXING: NOT AT ALL
GAD7 TOTAL SCORE: 0
GAD7 TOTAL SCORE: 0
1. FEELING NERVOUS, ANXIOUS, OR ON EDGE: NOT AT ALL
6. BECOMING EASILY ANNOYED OR IRRITABLE: NOT AT ALL
5. BEING SO RESTLESS THAT IT IS HARD TO SIT STILL: NOT AT ALL

## 2022-06-06 ASSESSMENT — LIFESTYLE VARIABLES
SKIP TO QUESTIONS 9-10: 1
HOW MANY STANDARD DRINKS CONTAINING ALCOHOL DO YOU HAVE ON A TYPICAL DAY: PATIENT DOES NOT DRINK
HOW OFTEN DO YOU HAVE A DRINK CONTAINING ALCOHOL: NEVER
AUDIT-C TOTAL SCORE: 0
HOW OFTEN DO YOU HAVE SIX OR MORE DRINKS ON ONE OCCASION: NEVER

## 2022-06-06 ASSESSMENT — SOCIAL DETERMINANTS OF HEALTH (SDOH)
DO YOU BELONG TO ANY CLUBS OR ORGANIZATIONS SUCH AS CHURCH GROUPS UNIONS, FRATERNAL OR ATHLETIC GROUPS, OR SCHOOL GROUPS?: PATIENT DECLINED
HOW OFTEN DO YOU ATTEND CHURCH OR RELIGIOUS SERVICES?: PATIENT DECLINED
HOW OFTEN DO YOU GET TOGETHER WITH FRIENDS OR RELATIVES?: TWICE A WEEK
IN A TYPICAL WEEK, HOW MANY TIMES DO YOU TALK ON THE PHONE WITH FAMILY, FRIENDS, OR NEIGHBORS?: THREE TIMES A WEEK

## 2022-06-06 ASSESSMENT — PATIENT HEALTH QUESTIONNAIRE - PHQ9
SUM OF ALL RESPONSES TO PHQ QUESTIONS 1-9: 0
SUM OF ALL RESPONSES TO PHQ QUESTIONS 1-9: 0
10. IF YOU CHECKED OFF ANY PROBLEMS, HOW DIFFICULT HAVE THESE PROBLEMS MADE IT FOR YOU TO DO YOUR WORK, TAKE CARE OF THINGS AT HOME, OR GET ALONG WITH OTHER PEOPLE: NOT DIFFICULT AT ALL

## 2022-06-06 NOTE — PROGRESS NOTES
SUBJECTIVE:   CC: Laney Maynard is an 57 year old woman who presents for preventive health visit.     Here for physical.    Would like to get her COVID Booster today.        Patient has been advised of split billing requirements and indicates understanding: Yes  Healthy Habits:     Getting at least 3 servings of Calcium per day:  Yes    Bi-annual eye exam:  Yes    Dental care twice a year:  NO    Sleep apnea or symptoms of sleep apnea:  None    Diet:  Low salt and Low fat/cholesterol    Frequency of exercise:  2-3 days/week    Duration of exercise:  15-30 minutes    Taking medications regularly:  Yes    Medication side effects:  None    PHQ-2 Total Score: 0    Additional concerns today:  Yes        UTI - Female  Duration of complaint: started about 2 weeks ago   Description:   Painful urination (Dysuria): YES           Frequency: YES  Blood in urine (Hematuria): YES  Delay in urine (Hesitency): YES  Intensity: moderate  Progression of Symptoms:  worsening  Accompanying Signs & Symptoms:  Fever/chills: no  Flank pain no  Nausea and vomiting: no  Any vaginal symptoms: vaginal odor  Abdominal/Pelvic Pain: no  History:   History of frequent UTI's: YES  History of kidney stones: YES  Sexually Active: YES  Possibility of pregnancy: No  Precipitating factors:   Therapies Tried and outcome: AZO  Used to take Macrobid daily for bladder, has some insurance concerns, has not been taking daily      Has a history of low back pain with spasms, has flares on occasion, would like to get a prescription for methocarbamol, used her husbands during her last spasm and it was very helpful for her.      Has problems with blood pressure being elevated lately, has been checking blood pressures on occasion, has history of kidney problems, uncertain if this is related. Pressures have been in the 140-150s/86-90s, for the last two weeks.    Today's PHQ-2 Score:   PHQ-2 ( 1999 Pfizer) 6/6/2022   Q1: Little interest or pleasure in  doing things 0   Q2: Feeling down, depressed or hopeless 0   PHQ-2 Score 0   PHQ-2 Total Score (12-17 Years)- Positive if 3 or more points; Administer PHQ-A if positive -   Q1: Little interest or pleasure in doing things Not at all   Q2: Feeling down, depressed or hopeless Not at all   PHQ-2 Score 0       Abuse: Current or Past (Physical, Sexual or Emotional) - No  Do you feel safe in your environment? Yes        Social History     Tobacco Use     Smoking status: Never Smoker     Smokeless tobacco: Never Used   Substance Use Topics     Alcohol use: No     Alcohol/week: 0.0 standard drinks     If you drink alcohol do you typically have >3 drinks per day or >7 drinks per week? No    Alcohol Use 6/6/2022   Prescreen: >3 drinks/day or >7 drinks/week? Not Applicable   Prescreen: >3 drinks/day or >7 drinks/week? -       Reviewed orders with patient.  Reviewed health maintenance and updated orders accordingly - Yes  Lab work is in process  Labs reviewed in EPIC  BP Readings from Last 3 Encounters:   06/06/22 (!) 152/96   10/25/21 132/89   10/20/21 132/82    Wt Readings from Last 3 Encounters:   06/06/22 78.2 kg (172 lb 4.8 oz)   10/25/21 73.9 kg (163 lb)   10/20/21 73 kg (161 lb)                  Patient Active Problem List   Diagnosis     Alopecia     Seasonal allergic rhinitis     GERD (gastroesophageal reflux disease)     CARDIOVASCULAR SCREENING; LDL GOAL LESS THAN 160     Migraine headache     Sleep disorder     Family history of rheumatoid arthritis     Family history of sarcoidosis (mother)     Urinary frequency     Herpes simplex virus infection     Attention deficit hyperactivity disorder (ADHD)     Plantar fasciitis, bilateral     Mixed incontinence urge and stress (male)(female)     Anxiety     History of nephrolithiasis     Vertigo     Heart murmur     Past Surgical History:   Procedure Laterality Date     ARTHROSCOPY KNEE Right 04/26/2017    Procedure: Right knee arthroscopy and anterior fat pad resection with  medial plica resection. Partial lateral menisectomy. Surgeon:  Fredi Lindsay MD  Location: [facility]     ARTHROSCOPY SHOULDER, OPEN ROTATOR CUFF REPAIR, COMBINED  07/31/2013    Procedure: COMBINED ARTHROSCOPY SHOULDER, OPEN ROTATOR CUFF REPAIR;  Left Shoulder Arthroscopy, Distal Clavicale Resection, Decompression, Mini Open Rotator Cuff Repair    ;  Surgeon: Fredi Lindsay MD;  Location:  OR     BREAST SURGERY  2014    Augmentation     COLONOSCOPY  02/07/2014    Procedure: COLONOSCOPY;  Colonoscopy/WW;  Surgeon: Pancho Olsen MD;  Location:  GI     COMBINED CYSTOSCOPY, RETROGRADES, URETEROSCOPY, LASER HOLMIUM LITHOTRIPSY URETER(S), INSERT STENT Right 04/23/2016    Procedure: COMBINED CYSTOSCOPY, RETROGRADES, URETEROSCOPY, LASER HOLMIUM LITHOTRIPSY URETER(S), INSERT STENT;  Surgeon: Kushal Noriega MD;  Location:  OR     CYSTOSCOPY       CYSTOSCOPY, RETROGRADES, EXTRACT STONE, COMBINED  01/09/2013    Procedure: COMBINED CYSTOSCOPY, RETROGRADES, EXTRACT STONE;  Video Cystoscopy,  Right Ureteroscopy, ureteral dilatation,  Stone extraction;  Surgeon: Subhash Vann MD;  Location:  OR     EXCISE MASS HAND Left 06/30/2021    Excision of left dorsal hand/ wrist mass (ganglion cyst), Dr. Fredi Lindsay, [facility].     EXTRACORPOREAL SHOCK WAVE LITHOTRIPSY (ESWL) Bilateral 04/29/2016    Procedure: EXTRACORPOREAL SHOCK WAVE LITHOTRIPSY (ESWL);  Surgeon: Bassam Vu MD;  Location:  OR     EXTRACORPOREAL SHOCK WAVE LITHOTRIPSY, CYSTOSCOPY, INSERT STENT URETER(S), COMBINED Bilateral 04/26/2018    Procedure: COMBINED EXTRACORPOREAL SHOCK WAVE LITHOTRIPSY, CYSTOSCOPY, INSERT STENT URETER(S);  BILATERAL COMBINED EXTRACORPOREAL SHOCK WAVE LITHOTRIPSY, CYSTOSCOPY, LEFT STENT PLACEMENT;  Surgeon: Bassam Vu MD;  Location:  OR     EYE SURGERY  2015    Lasik     GYN SURGERY      laparoscopy     LAPAROSCOPIC CHOLECYSTECTOMY WITH CHOLANGIOGRAMS  11/21/2012     Procedure: LAPAROSCOPIC CHOLECYSTECTOMY WITH CHOLANGIOGRAMS;  LAPAROSCOPIC CHOLECYSTECTOMY WITH CHOLANGIOGRAMS ;  Surgeon: Nataliya Gabriel MD;  Location: [facility] OR     LASER HOLMIUM LITHOTRIPSY URETER(S), INSERT STENT, COMBINED Left 10/30/2019    Procedure: CYSTOSCOPY,LEFT URETEROSCOPY, HOLMIUM LASER, STONE EXTRACTION, LEFT STENT PLACEMENT;  Surgeon: Bassam Vu MD;  Location: [facility] OR     RELEASE TRIGGER FINGER Right 2021    Right long finger trigger finger release, DOS 11/3/21, Dr. Fredi Lindsay     TUBAL LIGATION       ZZ NONSPECIFIC PROCEDURE  age 16    colposcopy for abnormal pap     ZZC NONSPECIFIC PROCEDURE      surgery L thumb     ZZC NONSPECIFIC PROCEDURE      breast augmentation-silicone     ZZC NONSPECIFIC PROCEDURE      s/p  x 2     ZZC NONSPECIFIC PROCEDURE      Bilateral tubal ligation     ZZC REMOVAL OF KIDNEY STONE         Social History     Tobacco Use     Smoking status: Never Smoker     Smokeless tobacco: Never Used   Substance Use Topics     Alcohol use: No     Alcohol/week: 0.0 standard drinks     Family History   Problem Relation Age of Onset     Diabetes Mother         type 2     Alcohol/Drug Mother         alcohol     Seizure Disorder Mother          Current Outpatient Medications   Medication Sig Dispense Refill     acyclovir (ZOVIRAX) 5 % external ointment Apply topically 2 times daily as needed (Outbreak)       amphetamine-dextroamphetamine (ADDERALL) 30 MG tablet TAKE ONE TABLET BY MOUTH THREE TIMES A DAY 90 tablet 0     aspirin (ASA) 81 MG chewable tablet Take 81 mg by mouth daily       Cholecalciferol (VITAMIN D3) 50 MCG ( UT) TABS        ciprofloxacin (CIPRO) 500 MG tablet Take 1 tablet (500 mg) by mouth 2 times daily for 3 days 6 tablet 0     estradiol (ESTRACE) 0.1 MG/GM vaginal cream Place 2 g vaginally twice a week Please use quarter size amount in the vagina nightly for two weeks and then three times a week at night thereafter 42.5 g 3     fexofenadine  (ALLEGRA) 180 MG tablet Take 1 tablet (180 mg) by mouth daily as needed for allergies 30 tablet 6     Fluocinolone Acetonide Scalp 0.01 % OIL oil USE ON SCALP ONCE A WEEK AS NEEDED 118.28 mL 11     fluticasone (FLONASE) 50 MCG/ACT nasal spray SHAKE GENTLY AND USE 2 SPRAYS INTO EACH NOSTRIL ONCE DAILY AS NEEDED FOR ALLERGIES 16 g 11     hydrOXYzine (ATARAX) 25 MG tablet TAKE ONE TABLET BY MOUTH THREE TIMES A DAY AS NEEDED FOR ITCHING 30 tablet 1     ketoconazole (NIZORAL) 2 % external shampoo APPLY TO AFFECTED AREA(S) AND WASH OFF AFTER 5 MINUTES AS NEEDED 120 mL 11     ketorolac (TORADOL) 10 MG tablet Take 1 tablet (10 mg) by mouth every 6 hours as needed for moderate pain 30 tablet 1     meclizine (ANTIVERT) 25 MG tablet Take 1 tablet (25 mg) by mouth 3 times daily as needed for nausea 30 tablet 3     Menthol, Topical Analgesic, (BIOFREEZE COLORLESS) 4 % GEL Externally apply topically 3 times daily as needed 118 mL 1     methocarbamol (ROBAXIN) 500 MG tablet Take 2 tablets (1,000 mg) by mouth 4 times daily as needed for muscle spasms 60 tablet 1     naproxen (NAPROSYN) 500 MG tablet TAKE ONE TABLET BY MOUTH TWICE A DAY WITH MEALS 60 tablet 3     naratriptan (AMERGE) 2.5 MG tablet        nitroFURantoin macrocrystal-monohydrate (MACROBID) 100 MG capsule Take 1 capsule (100 mg) by mouth daily 90 capsule 3     phenazopyridine (PYRIDIUM) 200 MG tablet Take 1 tablet (200 mg) by mouth 3 times daily as needed for irritation 30 tablet 3     solifenacin (VESICARE) 10 MG tablet Take 1 tablet (10 mg) by mouth daily 90 tablet 1     valACYclovir (VALTREX) 1000 mg tablet TAKE ONE TABLET BY MOUTH ONCE DAILY 90 tablet 3     zolpidem (AMBIEN) 5 MG tablet Take 1 tablet (5 mg) by mouth nightly as needed for sleep 30 tablet 2     Allergies   Allergen Reactions     Cefatrizine Itching     Aloe Itching and Rash     Codeine      GI upset     Compazine Nausea and Itching     Darvocet [Acetaminophen] Nausea and Vomiting     Percocet  [Oxycodone-Acetaminophen] Nausea and Vomiting     Sulphadimidine [Sulfamethazine] Rash     Recent Labs   Lab Test 09/14/20  0902 10/28/19  0852 05/06/19  0947 12/13/18  0000 06/27/18  0918 04/13/18  1339   A1C  --   --   --   --   --  4.7   *  --  155*  --   --  171*   HDL 64  --  55  --   --  54   TRIG 104  --  73  --   --  84   ALT 33  --  31  --  25 16   CR 0.98 0.91 0.98   < > 1.03 0.87   GFRESTIMATED 64 71 65   < > 56* 67   GFRESTBLACK 74 82 75   < > 67 82   POTASSIUM 4.2 4.0 3.9   < > 4.1 3.7   TSH 0.45  --  0.92  --  0.97 0.83    < > = values in this interval not displayed.        Breast Cancer Screening:  Any new diagnosis of family breast, ovarian, or bowel cancer? No    FHS-7:   Breast CA Risk Assessment (FHS-7) 6/6/2022   Did any of your first-degree relatives have breast or ovarian cancer? No   Did any of your relatives have bilateral breast cancer? No   Did any man in your family have breast cancer? No   Did any woman in your family have breast and ovarian cancer? Yes   Did any woman in your family have breast cancer before age 50 y? No   Do you have 2 or more relatives with breast and/or ovarian cancer? No   Do you have 2 or more relatives with breast and/or bowel cancer? No       Mammogram Screening: Recommended mammography every 1-2 years with patient discussion and risk factor consideration  Pertinent mammograms are reviewed under the imaging tab.    History of abnormal Pap smear: NO - age 30-65 PAP every 5 years with negative HPV co-testing recommended  PAP / HPV Latest Ref Rng & Units 5/6/2019 9/28/2015 3/8/2013   PAP (Historical) - NIL NIL NIL   HPV16 NEG:Negative Negative Negative -   HPV18 NEG:Negative Negative Negative -   HRHPV NEG:Negative Negative Negative -     Reviewed and updated as needed this visit by clinical staff   Tobacco  Allergies    Med Hx  Surg Hx  Fam Hx  Soc Hx          Reviewed and updated as needed this visit by Provider                   Past Medical History:    Diagnosis Date     Abnormal glandular Papanicolaou smear of cervix      Allergic rhinitis, cause unspecified      Alopecia      Attention deficit hyperactivity disorder (ADHD)      Chronic pain of left knee      Complete rupture of rotator cuff      Constipation      Gastro-oesophageal reflux disease      Heart murmur     murmur     Hematuria      Herpes simplex virus infection      Hydronephrosis, right      Lymphocytopenia 4/4/2011     Migraine headache      Migraine, unspecified, without mention of intractable migraine without mention of status migrainosus      Mixed incontinence urge and stress      Mumps      Numbness and tingling     LEFT ARM     Palpitations      Plantar fasciitis, bilateral      Renal disease     hx stones x 2 episodes     Seasonal allergic rhinitis      Sleep disorder      Ureterolithiasis      Urinary frequency     burning     Vertigo       Past Surgical History:   Procedure Laterality Date     ARTHROSCOPY KNEE Right 04/26/2017    Procedure: Right knee arthroscopy and anterior fat pad resection with medial plica resection. Partial lateral menisectomy. Surgeon:  Fredi Lindsay MD  Location: [facility]     ARTHROSCOPY SHOULDER, OPEN ROTATOR CUFF REPAIR, COMBINED  07/31/2013    Procedure: COMBINED ARTHROSCOPY SHOULDER, OPEN ROTATOR CUFF REPAIR;  Left Shoulder Arthroscopy, Distal Clavicale Resection, Decompression, Mini Open Rotator Cuff Repair    ;  Surgeon: Fredi Lindsay MD;  Location:  OR     BREAST SURGERY  2014    Augmentation     COLONOSCOPY  02/07/2014    Procedure: COLONOSCOPY;  Colonoscopy/WW;  Surgeon: Pancho Olsen MD;  Location:  GI     COMBINED CYSTOSCOPY, RETROGRADES, URETEROSCOPY, LASER HOLMIUM LITHOTRIPSY URETER(S), INSERT STENT Right 04/23/2016    Procedure: COMBINED CYSTOSCOPY, RETROGRADES, URETEROSCOPY, LASER HOLMIUM LITHOTRIPSY URETER(S), INSERT STENT;  Surgeon: Kushal Noriega MD;  Location:  OR     CYSTOSCOPY       CYSTOSCOPY,  RETROGRADES, EXTRACT STONE, COMBINED  2013    Procedure: COMBINED CYSTOSCOPY, RETROGRADES, EXTRACT STONE;  Video Cystoscopy,  Right Ureteroscopy, ureteral dilatation,  Stone extraction;  Surgeon: Subhash Vann MD;  Location:  OR     EXCISE MASS HAND Left 2021    Excision of left dorsal hand/ wrist mass (ganglion cyst), Dr. Fredi Lindsay, [facility].     EXTRACORPOREAL SHOCK WAVE LITHOTRIPSY (ESWL) Bilateral 2016    Procedure: EXTRACORPOREAL SHOCK WAVE LITHOTRIPSY (ESWL);  Surgeon: Bassam Vu MD;  Location:  OR     EXTRACORPOREAL SHOCK WAVE LITHOTRIPSY, CYSTOSCOPY, INSERT STENT URETER(S), COMBINED Bilateral 2018    Procedure: COMBINED EXTRACORPOREAL SHOCK WAVE LITHOTRIPSY, CYSTOSCOPY, INSERT STENT URETER(S);  BILATERAL COMBINED EXTRACORPOREAL SHOCK WAVE LITHOTRIPSY, CYSTOSCOPY, LEFT STENT PLACEMENT;  Surgeon: Bassam Vu MD;  Location:  OR     EYE SURGERY      Lasik     GYN SURGERY      laparoscopy     LAPAROSCOPIC CHOLECYSTECTOMY WITH CHOLANGIOGRAMS  2012    Procedure: LAPAROSCOPIC CHOLECYSTECTOMY WITH CHOLANGIOGRAMS;  LAPAROSCOPIC CHOLECYSTECTOMY WITH CHOLANGIOGRAMS ;  Surgeon: Nataliya Gabriel MD;  Location:  OR     LASER HOLMIUM LITHOTRIPSY URETER(S), INSERT STENT, COMBINED Left 10/30/2019    Procedure: CYSTOSCOPY,LEFT URETEROSCOPY, HOLMIUM LASER, STONE EXTRACTION, LEFT STENT PLACEMENT;  Surgeon: Bassam Vu MD;  Location:  OR     RELEASE TRIGGER FINGER Right 2021    Right long finger trigger finger release, DOS 11/3/21, Dr. Fredi Lindsay     TUBAL LIGATION       ZZC NONSPECIFIC PROCEDURE  age 16    colposcopy for abnormal pap     ZZC NONSPECIFIC PROCEDURE      surgery L thumb     ZZC NONSPECIFIC PROCEDURE      breast augmentation-silicone     ZZC NONSPECIFIC PROCEDURE      s/p  x 2     ZZC NONSPECIFIC PROCEDURE      Bilateral tubal ligation     ZZC REMOVAL OF KIDNEY STONE         Review of Systems  "  Constitutional: Negative for chills and fever.   HENT: Negative for congestion, ear pain, hearing loss and sore throat.    Eyes: Negative for pain.   Respiratory: Negative for cough and shortness of breath.    Cardiovascular: Negative for chest pain, palpitations and peripheral edema.   Gastrointestinal: Negative for abdominal pain, constipation, diarrhea, heartburn, hematochezia and nausea.   Breasts:  Negative for tenderness, breast mass and discharge.   Genitourinary: Positive for dysuria, frequency and urgency. Negative for genital sores, hematuria, pelvic pain, vaginal bleeding and vaginal discharge.   Musculoskeletal: Positive for arthralgias and myalgias. Negative for joint swelling.   Skin: Negative for rash.   Neurological: Positive for headaches. Negative for dizziness and weakness.   Psychiatric/Behavioral: Negative for mood changes. The patient is not nervous/anxious.           OBJECTIVE:   BP (!) 152/96 (BP Location: Right arm, Patient Position: Sitting, Cuff Size: Adult Regular)   Pulse 99   Temp 98.2  F (36.8  C) (Oral)   Ht 1.626 m (5' 4\")   Wt 78.2 kg (172 lb 4.8 oz)   LMP 02/25/2014   SpO2 94%   BMI 29.58 kg/m    Physical Exam  GENERAL: healthy, alert and no distress  EYES: Eyes grossly normal to inspection, PERRL and conjunctivae and sclerae normal  HENT: ear canals and TM's normal, nose and mouth without ulcers or lesions  NECK: no adenopathy, no asymmetry, masses, or scars and thyroid normal to palpation  RESP: lungs clear to auscultation - no rales, rhonchi or wheezes  CV: regular rate and rhythm, normal S1 S2, no S3 or S4, no murmur, click or rub, no peripheral edema and peripheral pulses strong  ABDOMEN: soft, nontender, no hepatosplenomegaly, no masses and bowel sounds normal  MS: no gross musculoskeletal defects noted, no edema.  Tenderness to palpation of lower back, left side.  Wearing back brace.  SKIN: no suspicious lesions or rashes  NEURO: Normal strength and tone, mentation " intact and speech normal  PSYCH: mentation appears normal, affect normal/bright    Diagnostic Test Results:  Labs reviewed in Epic    ASSESSMENT/PLAN:   (Z00.00) Routine general medical examination at a health care facility  (primary encounter diagnosis)  Comment: Exam completed today, routine health maintenance items updated as able.  Labs ordered.  Follow up one year or sooner as needed.  Plan: Lipid panel reflex to direct LDL Fasting,         Hemoglobin A1c, CBC with platelets,         Comprehensive metabolic panel (BMP + Alb, Alk         Phos, ALT, AST, Total. Bili, TP), TSH with free        T4 reflex            (R35.0) Urinary frequency  Comment: UA shows probable infection.  Will treat with Cipro given history of kidney issues and have patient restart her daily macrobid for prophylaxis on completion of Cipro.  Plan: UA Macro with Reflex to Micro and Culture - lab        collect, Urine Microscopic Exam, Urine Culture,        ciprofloxacin (CIPRO) 500 MG tablet            (N39.0) Recurrent UTI  Comment: restart daily prophylaxis.  Plan: nitroFURantoin macrocrystal-monohydrate         (MACROBID) 100 MG capsule            (M54.50,  G89.29) Chronic left-sided low back pain without sciatica  Comment: Will do muscle relaxer.  PT in the past was somewhat helpful, consider again in future.  Plan: methocarbamol (ROBAXIN) 500 MG tablet            (M62.838) Muscle spasm  Comment: As above  Plan: methocarbamol (ROBAXIN) 500 MG tablet            (R03.0) Elevated blood pressure reading without diagnosis of hypertension  Comment: Recommended patient continue to monitor her blood pressure as she does not want to start BP medications.  Monitor diet for possible changes.   Plan: Patient will look at lifestyle to see if there are changes she can make before she considers starting a medication.  Advised patient to follow up in 1-2 weeks or sooner if able.    (F90.2) Attention deficit hyperactivity disorder (ADHD), combined  "type  Plan: Drug Abuse Screen Panel 13, Urine (Pain Care         Package) - lab collect           (Z12.31) Visit for screening mammogram  Plan: *MA Screening Digital Bilateral            (Z23) Need for COVID-19 vaccine  Plan: COVID-19,PF,MODERNA (18+ YRS BOOSTER .25ML)              Patient has been advised of split billing requirements and indicates understanding: Yes    COUNSELING:  Reviewed preventive health counseling, as reflected in patient instructions    Estimated body mass index is 29.58 kg/m  as calculated from the following:    Height as of this encounter: 1.626 m (5' 4\").    Weight as of this encounter: 78.2 kg (172 lb 4.8 oz).    Weight management plan: Discussed healthy diet and exercise guidelines    She reports that she has never smoked. She has never used smokeless tobacco.      Counseling Resources:  ATP IV Guidelines  Pooled Cohorts Equation Calculator  Breast Cancer Risk Calculator  BRCA-Related Cancer Risk Assessment: FHS-7 Tool  FRAX Risk Assessment  ICSI Preventive Guidelines  Dietary Guidelines for Americans, 2010  USDA's MyPlate  ASA Prophylaxis  Lung CA Screening    April J. Sheila Hutchins MD  [facility]  Answers for HPI/ROS submitted by the patient on 6/6/2022  If you checked off any problems, how difficult have these problems made it for you to do your work, take care of things at home, or get along with other people?: Not difficult at all  PHQ9 TOTAL SCORE: 0  BRANDIE 7 TOTAL SCORE: 0      "

## 2022-06-09 LAB
BACTERIA UR CULT: ABNORMAL
BACTERIA UR CULT: ABNORMAL

## 2022-06-11 ENCOUNTER — HEALTH MAINTENANCE LETTER (OUTPATIENT)
Age: 57
End: 2022-06-11

## 2022-06-21 ENCOUNTER — TELEPHONE (OUTPATIENT)
Dept: FAMILY MEDICINE | Facility: CLINIC | Age: 57
End: 2022-06-21

## 2022-06-21 NOTE — TELEPHONE ENCOUNTER
----- Message from April Radha Hutchins MD sent at 6/21/2022  3:02 PM CDT -----    Please call patient and let her know that the computer input information estimating her cholesterol risk numbers incorrectly.  She IS in the range that requires medication use for cholesterol lowering.  She also needs to work on improving her blood pressure.  Please have her follow up in 3-6 months to recheck fasting cholesterol and blood pressure numbers, and discuss this further.

## 2022-06-22 NOTE — TELEPHONE ENCOUNTER
Pt calls, informed, agrees, f/u appointment scheduled  Genet Wolfe, RN, BSN  [facility]

## 2022-07-08 DIAGNOSIS — F90.2 ATTENTION DEFICIT HYPERACTIVITY DISORDER (ADHD), COMBINED TYPE: ICD-10-CM

## 2022-07-08 RX ORDER — DEXTROAMPHETAMINE SACCHARATE, AMPHETAMINE ASPARTATE, DEXTROAMPHETAMINE SULFATE AND AMPHETAMINE SULFATE 7.5; 7.5; 7.5; 7.5 MG/1; MG/1; MG/1; MG/1
TABLET ORAL
Qty: 90 TABLET | Refills: 0 | Status: SHIPPED | OUTPATIENT
Start: 2022-07-08 | End: 2022-08-02

## 2022-07-08 NOTE — TELEPHONE ENCOUNTER
Patient is out of medication today.     Routing refill request to provider for review/approval because:  Drug not on the [facility] refill protocol     Socorro Alejandre RN on 7/8/2022 at 10:54 AM

## 2022-07-08 NOTE — TELEPHONE ENCOUNTER
pdmp reviewed. No concerns. pcp ooo. Was seen by pcp ~1 month ago. Refilled in absence.     -castillo becker, lien

## 2022-07-10 ENCOUNTER — MYC MEDICAL ADVICE (OUTPATIENT)
Dept: FAMILY MEDICINE | Facility: CLINIC | Age: 57
End: 2022-07-10

## 2022-07-10 DIAGNOSIS — I10 BENIGN ESSENTIAL HYPERTENSION: Primary | ICD-10-CM

## 2022-07-10 DIAGNOSIS — R07.9 CHEST PAIN, UNSPECIFIED TYPE: ICD-10-CM

## 2022-07-11 NOTE — TELEPHONE ENCOUNTER
"See [facility] BP update, ok for [facility] 7/12, pt wants BP now, routed to [facility], please review and advise BP plan    \"Elevated blood pressure reading without diagnosis of hypertension  Comment: Recommended patient continue to monitor her blood pressure as she does not want to start BP medications.  Monitor diet for possible changes.   Plan: Patient will look at lifestyle to see if there are changes she can make before she considers starting a medication.  Advised patient to follow up in 1-2 weeks or sooner if able\"       Genet Wolfe RN, BSN  [facility]    "

## 2022-07-12 RX ORDER — AMLODIPINE BESYLATE 5 MG/1
5 TABLET ORAL DAILY
Qty: 90 TABLET | Refills: 1 | Status: SHIPPED | OUTPATIENT
Start: 2022-07-12 | End: 2022-11-14

## 2022-07-12 NOTE — TELEPHONE ENCOUNTER
Provider sent communication to patient.   -Provided expectations of setting up appointments.   -Will check back next week to see if labs have been scheduled, if not RN will reach out to pt.     Kim CORTES, RN, BSN, PHN - Patient Advocate Liaison - PAL RN  [facility]  [phone]

## 2022-07-12 NOTE — TELEPHONE ENCOUNTER
Orders placed for Amlodipine, 5 mg daily.  Labs in two weeks, orders placed.    Also placing orders for ECHO and stress test given reports of chest pain.  Patient advised to go to ED for chest pains if she experiences them again.

## 2022-07-14 ENCOUNTER — HOSPITAL ENCOUNTER (OUTPATIENT)
Dept: CARDIOLOGY | Facility: CLINIC | Age: 57
Discharge: HOME | End: 2022-07-14
Attending: FAMILY MEDICINE
Payer: COMMERCIAL

## 2022-07-14 DIAGNOSIS — R07.9 CHEST PAIN, UNSPECIFIED TYPE: ICD-10-CM

## 2022-07-14 DIAGNOSIS — I10 BENIGN ESSENTIAL HYPERTENSION: ICD-10-CM

## 2022-07-14 PROCEDURE — 93016 CV STRESS TEST SUPVJ ONLY: CPT | Performed by: INTERNAL MEDICINE

## 2022-07-14 PROCEDURE — 93018 CV STRESS TEST I&R ONLY: CPT | Performed by: INTERNAL MEDICINE

## 2022-07-14 PROCEDURE — 93306 TTE W/DOPPLER COMPLETE: CPT

## 2022-07-14 PROCEDURE — 93017 CV STRESS TEST TRACING ONLY: CPT

## 2022-07-14 PROCEDURE — 93306 TTE W/DOPPLER COMPLETE: CPT | Mod: 26 | Performed by: INTERNAL MEDICINE

## 2022-07-19 NOTE — TELEPHONE ENCOUNTER
Left message  -Advised pt she needs a lab appointment in the next week or two.   -Provided main clinic number [phone] and advised she can schedule through [facility].   -Will check back later in the week.     Kim Carrera, RN, BSN, PHN  [facility]

## 2022-07-22 NOTE — TELEPHONE ENCOUNTER
Sent MyChart reminding pt to schedule lab appointment.     Kim CORTES, RN, BSN, PHN - Patient Advocate Liaison - PAL RN  [facility]  [phone]

## 2022-07-27 NOTE — TELEPHONE ENCOUNTER
Pt has not read [employer] message yet.   -Left message.     Kim CORTES, RN, BSN, PHN - Patient Advocate Liaison - PAL RN  [facility]  [phone]

## 2022-08-01 ENCOUNTER — LAB (OUTPATIENT)
Dept: LAB | Facility: CLINIC | Age: 57
End: 2022-08-01
Payer: COMMERCIAL

## 2022-08-01 DIAGNOSIS — I10 BENIGN ESSENTIAL HYPERTENSION: ICD-10-CM

## 2022-08-01 LAB
ANION GAP SERPL CALCULATED.3IONS-SCNC: 6 MMOL/L (ref 3–14)
BUN SERPL-MCNC: 14 MG/DL (ref 7–30)
CALCIUM SERPL-MCNC: 9.8 MG/DL (ref 8.5–10.1)
CHLORIDE BLD-SCNC: 107 MMOL/L (ref 94–109)
CO2 SERPL-SCNC: 26 MMOL/L (ref 20–32)
CREAT SERPL-MCNC: 1.06 MG/DL (ref 0.52–1.04)
GFR SERPL CREATININE-BSD FRML MDRD: 61 ML/MIN/1.73M2
GLUCOSE BLD-MCNC: 114 MG/DL (ref 70–99)
POTASSIUM BLD-SCNC: 4.3 MMOL/L (ref 3.4–5.3)
SODIUM SERPL-SCNC: 139 MMOL/L (ref 133–144)

## 2022-08-01 PROCEDURE — 80048 BASIC METABOLIC PNL TOTAL CA: CPT

## 2022-08-01 PROCEDURE — 36415 COLL VENOUS BLD VENIPUNCTURE: CPT

## 2022-08-02 ENCOUNTER — MYC MEDICAL ADVICE (OUTPATIENT)
Dept: FAMILY MEDICINE | Facility: CLINIC | Age: 57
End: 2022-08-02

## 2022-08-02 DIAGNOSIS — F90.2 ATTENTION DEFICIT HYPERACTIVITY DISORDER (ADHD), COMBINED TYPE: ICD-10-CM

## 2022-08-02 NOTE — LETTER
August 3, 2022      Laney Maynard  [address] [facility] [id number]        To whom it may concern:     RE: Laney Maynard is my patient.      Due to mental illness, Laney has certain limitations regarding anxiety and coping with depression and stress. In order to help alleviate these difficulties it is important that she have her emotional support animal with her at all times during her daily activities including at work, during outings and in her home.  I am prescribing an emotional support animal that will assist Laney in coping with her depression and anxiety disorder.      I am familiar with the voluminous professional literature concerning the therapeutic benefits of assistance animals for people with anxiety and depression disorders such as that experienced by Laney. I would be happy to answer other questions you may have concerning my recommendations that Laney have an emotional support animal. Should you have additional questions, please do not hesitate to contact me directly.     Sincerely,        Leah Hutchins MD

## 2022-08-02 NOTE — TELEPHONE ENCOUNTER
Routing refill request to provider for review/approval because:  Drug not on the [facility] refill protocol   Alberto Bolton RN

## 2022-08-02 NOTE — TELEPHONE ENCOUNTER
Reason for Call:  Medication or medication refill:    Do you use a [facility]?  Name of the pharmacy and phone number for the current request:  [facility]   Name of the medication requested: Adderall has 6 pills left     Other request: no     Can we leave a detailed message on this number? Yes..    Phone number patient can be reached at: [phone]      Best Time: Any     Call taken on 8/2/2022 at 4:47 PM by Martha Oquendo

## 2022-08-03 RX ORDER — DEXTROAMPHETAMINE SACCHARATE, AMPHETAMINE ASPARTATE, DEXTROAMPHETAMINE SULFATE AND AMPHETAMINE SULFATE 7.5; 7.5; 7.5; 7.5 MG/1; MG/1; MG/1; MG/1
TABLET ORAL
Qty: 90 TABLET | Refills: 0 | Status: SHIPPED | OUTPATIENT
Start: 2022-08-05 | End: 2022-09-10

## 2022-08-03 NOTE — TELEPHONE ENCOUNTER
Dr. Sheila Hutchins- see [employer] message below.  T'd up from [place]'s previous letter.  Samra Jalloh RN

## 2022-08-03 NOTE — TELEPHONE ENCOUNTER
reviewed. Refilled. Up to date on visit.    Asif Gray PA-C on 8/3/2022 at 2:45 PM (covering for [facility])     Paramedian Forehead Flap Text: A decision was made to reconstruct the defect utilizing an interpolation axial flap and a staged reconstruction.  A telfa template was made of the defect.  This telfa template was then used to outline the paramedian forehead pedicle flap.  The donor area for the pedicle flap was then injected with anesthesia.  The flap was excised through the skin and subcutaneous tissue down to the layer of the underlying musculature.  The pedicle flap was carefully excised within this deep plane to maintain its blood supply.  The edges of the donor site were undermined.   The donor site was closed in a primary fashion.  The pedicle was then rotated into position and sutured.  Once the tube was sutured into place, adequate blood supply was confirmed with blanching and refill.  The pedicle was then wrapped with xeroform gauze and dressed appropriately with a telfa and gauze bandage to ensure continued blood supply and protect the attached pedicle.

## 2022-08-08 NOTE — TELEPHONE ENCOUNTER
Patient has not viewed my chart message - will need to call after 8 AM to advise of Dr. Sheila Hutchins's message     Jazzmine Duncan, Registered Nurse  [facility]

## 2022-08-09 DIAGNOSIS — M62.838 MUSCLE SPASM: ICD-10-CM

## 2022-08-09 DIAGNOSIS — G89.29 CHRONIC LEFT-SIDED LOW BACK PAIN WITHOUT SCIATICA: ICD-10-CM

## 2022-08-09 DIAGNOSIS — M54.50 CHRONIC LEFT-SIDED LOW BACK PAIN WITHOUT SCIATICA: ICD-10-CM

## 2022-08-09 DIAGNOSIS — N39.46 MIXED INCONTINENCE URGE AND STRESS (MALE)(FEMALE): ICD-10-CM

## 2022-08-11 RX ORDER — METHOCARBAMOL 500 MG/1
1000 TABLET, FILM COATED ORAL 4 TIMES DAILY PRN
Qty: 60 TABLET | Refills: 1 | Status: SHIPPED | OUTPATIENT
Start: 2022-08-11 | End: 2023-04-04

## 2022-08-12 RX ORDER — SOLIFENACIN SUCCINATE 10 MG/1
TABLET, FILM COATED ORAL
Qty: 90 TABLET | Refills: 1 | Status: SHIPPED | OUTPATIENT
Start: 2022-08-12 | End: 2022-11-11

## 2022-08-12 NOTE — TELEPHONE ENCOUNTER
Routing refill request to provider for review/approval because:  Prescribing provider has retired, pt has upcoming visit with Dr. Sheila Hutchins on 12/12/22    Kim Carrera, RN, BSN, PHN  [facility]

## 2022-09-10 ENCOUNTER — MYC REFILL (OUTPATIENT)
Dept: FAMILY MEDICINE | Facility: CLINIC | Age: 57
End: 2022-09-10

## 2022-09-10 ENCOUNTER — MYC MEDICAL ADVICE (OUTPATIENT)
Dept: FAMILY MEDICINE | Facility: CLINIC | Age: 57
End: 2022-09-10

## 2022-09-10 DIAGNOSIS — M72.2 PLANTAR FASCIITIS, BILATERAL: ICD-10-CM

## 2022-09-10 DIAGNOSIS — F90.2 ATTENTION DEFICIT HYPERACTIVITY DISORDER (ADHD), COMBINED TYPE: ICD-10-CM

## 2022-09-12 NOTE — TELEPHONE ENCOUNTER
Routing refill request to provider for review/approval because:  Labs out of range:  CBC, Creatinine    BP Readings from Last 3 Encounters:   06/06/22 (!) 152/96   10/25/21 132/89   10/20/21 132/82     CBC RESULTS:   Recent Labs   Lab Test 06/06/22  1024   WBC 4.5   RBC 4.81   HGB 15.8*   HCT 45.6   MCV 95   MCH 32.8   MCHC 34.6   RDW 12.0         Creatinine   Date Value Ref Range Status   08/01/2022 1.06 (H) 0.52 - 1.04 mg/dL Final   09/14/2020 0.98 0.52 - 1.04 mg/dL Final     Kim Carrera, RN, BSN, PHN  [facility]

## 2022-09-12 NOTE — TELEPHONE ENCOUNTER
Pt sent MyChart and requested a refill for naproxen (NAPROSYN) 500 MG tablet.   -Last office visit shows followup in a year.   -Will give 1 year of medication.   -Routed to refill pool.     Kim CORTES RN, BSN, PHN - Patient Advocate Liaison - PAL RN  [facility]  [phone]

## 2022-09-13 RX ORDER — NAPROXEN 500 MG/1
500 TABLET ORAL 2 TIMES DAILY WITH MEALS
Qty: 60 TABLET | Refills: 3 | Status: SHIPPED | OUTPATIENT
Start: 2022-09-13 | End: 2023-04-04

## 2022-09-13 RX ORDER — DEXTROAMPHETAMINE SACCHARATE, AMPHETAMINE ASPARTATE, DEXTROAMPHETAMINE SULFATE AND AMPHETAMINE SULFATE 7.5; 7.5; 7.5; 7.5 MG/1; MG/1; MG/1; MG/1
TABLET ORAL
Qty: 90 TABLET | Refills: 0 | Status: SHIPPED | OUTPATIENT
Start: 2022-09-13 | End: 2022-10-10

## 2022-10-03 ENCOUNTER — MYC MEDICAL ADVICE (OUTPATIENT)
Dept: FAMILY MEDICINE | Facility: CLINIC | Age: 57
End: 2022-10-03

## 2022-10-03 DIAGNOSIS — L29.9 GENERALIZED PRURITUS: ICD-10-CM

## 2022-10-03 RX ORDER — HYDROXYZINE HYDROCHLORIDE 25 MG/1
TABLET, FILM COATED ORAL
Qty: 30 TABLET | Refills: 1 | Status: SHIPPED | OUTPATIENT
Start: 2022-10-03 | End: 2023-04-04

## 2022-10-03 NOTE — TELEPHONE ENCOUNTER
Routing refill request to provider for review/approval because:  Last prescribed by Susan Haase NP Jean Marie Fitzke, RN - Patient Advocate and Liaison (PAL)  [facility]   [phone]

## 2022-10-10 ENCOUNTER — MYC REFILL (OUTPATIENT)
Dept: FAMILY MEDICINE | Facility: CLINIC | Age: 57
End: 2022-10-10

## 2022-10-10 DIAGNOSIS — F90.2 ATTENTION DEFICIT HYPERACTIVITY DISORDER (ADHD), COMBINED TYPE: ICD-10-CM

## 2022-10-11 RX ORDER — DEXTROAMPHETAMINE SACCHARATE, AMPHETAMINE ASPARTATE, DEXTROAMPHETAMINE SULFATE AND AMPHETAMINE SULFATE 7.5; 7.5; 7.5; 7.5 MG/1; MG/1; MG/1; MG/1
TABLET ORAL
Qty: 90 TABLET | Refills: 0 | Status: SHIPPED | OUTPATIENT
Start: 2022-10-14 | End: 2022-11-11

## 2022-10-16 ENCOUNTER — HEALTH MAINTENANCE LETTER (OUTPATIENT)
Age: 57
End: 2022-10-16

## 2022-11-11 DIAGNOSIS — B00.9 HERPES SIMPLEX VIRUS INFECTION: ICD-10-CM

## 2022-11-11 DIAGNOSIS — N39.46 MIXED INCONTINENCE URGE AND STRESS (MALE)(FEMALE): ICD-10-CM

## 2022-11-11 DIAGNOSIS — F90.2 ATTENTION DEFICIT HYPERACTIVITY DISORDER (ADHD), COMBINED TYPE: ICD-10-CM

## 2022-11-11 RX ORDER — DEXTROAMPHETAMINE SACCHARATE, AMPHETAMINE ASPARTATE, DEXTROAMPHETAMINE SULFATE AND AMPHETAMINE SULFATE 7.5; 7.5; 7.5; 7.5 MG/1; MG/1; MG/1; MG/1
TABLET ORAL
Qty: 90 TABLET | Refills: 0 | Status: SHIPPED | OUTPATIENT
Start: 2022-11-13 | End: 2022-12-13

## 2022-11-11 RX ORDER — SOLIFENACIN SUCCINATE 10 MG/1
TABLET, FILM COATED ORAL
Qty: 90 TABLET | Refills: 1 | Status: SHIPPED | OUTPATIENT
Start: 2022-11-11 | End: 2023-03-28

## 2022-11-11 RX ORDER — VALACYCLOVIR HYDROCHLORIDE 1 G/1
TABLET, FILM COATED ORAL
Qty: 90 TABLET | Refills: 3 | Status: SHIPPED | OUTPATIENT
Start: 2022-11-11 | End: 2023-04-04

## 2022-11-11 NOTE — TELEPHONE ENCOUNTER
Pt returns call (message left for pt to reschedule appt due to provider availability). Rescheduled pt's previous appt to 12/26/22 with April Coming MD Cuong.    Pt also requesting refill of Adderall 30mg tablets.    T'd up med and pharmacy and routing refill request to provider for review/approval because:  Drug not on the [facility] refill protocol   Sherry Rader RN

## 2022-12-13 ENCOUNTER — MYC REFILL (OUTPATIENT)
Dept: FAMILY MEDICINE | Facility: CLINIC | Age: 57
End: 2022-12-13

## 2022-12-13 DIAGNOSIS — F90.2 ATTENTION DEFICIT HYPERACTIVITY DISORDER (ADHD), COMBINED TYPE: ICD-10-CM

## 2022-12-13 NOTE — TELEPHONE ENCOUNTER
Routing refill request to provider for review/approval because:  Drug not on the [facility] refill protocol     Ravin JEREZ RN 12/13/2022 at 3:00 PM

## 2022-12-16 RX ORDER — DEXTROAMPHETAMINE SACCHARATE, AMPHETAMINE ASPARTATE, DEXTROAMPHETAMINE SULFATE AND AMPHETAMINE SULFATE 7.5; 7.5; 7.5; 7.5 MG/1; MG/1; MG/1; MG/1
TABLET ORAL
Qty: 90 TABLET | Refills: 0 | Status: SHIPPED | OUTPATIENT
Start: 2022-12-16 | End: 2023-02-21

## 2022-12-26 ENCOUNTER — OFFICE VISIT (OUTPATIENT)
Dept: FAMILY MEDICINE | Facility: CLINIC | Age: 57
End: 2022-12-26
Payer: COMMERCIAL

## 2022-12-26 VITALS
RESPIRATION RATE: 14 BRPM | SYSTOLIC BLOOD PRESSURE: 136 MMHG | TEMPERATURE: 98.4 F | HEART RATE: 84 BPM | WEIGHT: 161.4 LBS | DIASTOLIC BLOOD PRESSURE: 80 MMHG | OXYGEN SATURATION: 97 % | BODY MASS INDEX: 27.55 KG/M2 | HEIGHT: 64 IN

## 2022-12-26 DIAGNOSIS — F90.2 ATTENTION DEFICIT HYPERACTIVITY DISORDER (ADHD), COMBINED TYPE: Primary | ICD-10-CM

## 2022-12-26 DIAGNOSIS — I10 BENIGN ESSENTIAL HYPERTENSION: ICD-10-CM

## 2022-12-26 DIAGNOSIS — R00.2 PALPITATIONS: ICD-10-CM

## 2022-12-26 PROCEDURE — 99214 OFFICE O/P EST MOD 30 MIN: CPT | Performed by: FAMILY MEDICINE

## 2022-12-26 NOTE — PROGRESS NOTES
"  Assessment & Plan     Attention deficit hyperactivity disorder (ADHD), combined type  ADHD well controlled.  Discussed that decreasing her dose could potentially help determine if the palpitation and anxiety are related to her Adderall, but she feels she needs it to get through her work day.  Follow up in 6 months or sooner as needed.    Benign essential hypertension  Well controlled, continue current medication.  Labs up to date.    Palpitations  Will order zio patch to make sure she does not have an underlying arrhythmia and that it is indeed just anxiety.  Recommendations pending results  - Adult Leadless EKG Monitor 3 to 7 Days; Future      17 minutes spent on the date of the encounter doing chart review, history and exam, documentation and further activities per the note       BMI:   Estimated body mass index is 27.7 kg/m  as calculated from the following:    Height as of this encounter: 1.626 m (5' 4\").    Weight as of this encounter: 73.2 kg (161 lb 6.4 oz).       See Patient Instructions    Return in about 6 months (around 6/26/2023) for Preventative Care Visit, Blood pressure follow up.    Leah Hutchins MD  [facility]    Aylin Davison is a 57 year old presenting for the following health issues:  No chief complaint on file.      History of Present Illness       Vascular Disease:  She presents for follow up of vascular disease.  She never takes nitroglycerin. She takes daily aspirin.    She eats 2-3 servings of fruits and vegetables daily.She consumes 0 sweetened beverage(s) daily.She exercises with enough effort to increase her heart rate 30 to 60 minutes per day.  She exercises with enough effort to increase her heart rate 3 or less days per week.   She is taking medications regularly.     ADHD Follow-Up (Adult)  Concerns: patient states no concerns  Changes since last visit: Stable  Taking controlled (daily) medications as prescribed: Yes  Sleep: trouble falling " asleep but patient is taking Ambien  Adult ADHD Self-Reporting form given to patient?:  No  Currently in counseling: No    Medication Benefits:   Controlled symptoms: Stay focused and do one task at a time  Uncontrolled symptoms:  None    Medication Side Effects:  Reports:  none  Sleep Problems? Yes Details: trouble falling asleep but takes Ambien  ++++++++++++++++++++++++++++++++++++++++++++++++    Employer Concerns/Feedback: None  Coworker Concerns:   None  Home/Family Concerns: None    Feels like the dose is working well for her, not wanting to increase dose, but it is getting her through her day.    Hypertension Follow-up      Do you check your blood pressure regularly outside of the clinic? Yes     Are you following a low salt diet? Yes    Are your blood pressures ever more than 140 on the top number (systolic) OR more   than 90 on the bottom number (diastolic), for example 140/90? Yes    Blood pressure controlled, labs up to date.  No concerns regarding BP.    Still having left sided neck pains, has had to shovel a lot lately.  Had the same pains back in July when she was having chest pain.  Recalls that starting the Amlodipine seemed to help the pain.    Heart palpitations are still present, believes  it is related to her anxiety.  Feels anxious at the time, HR will be elevated while at rest.  Says when she calms herself down it improves.  Stress test in July was normal, ECHO was normal.  Has not had a zio patch done.  Her anxiety is related to her son, tries not to think about it as much as able.    Current Outpatient Medications   Medication Sig Dispense Refill     acyclovir (ZOVIRAX) 5 % external ointment Apply topically 2 times daily as needed (Outbreak)       amLODIPine (NORVASC) 5 MG tablet Take 1 tablet (5 mg) by mouth daily 90 tablet 1     amphetamine-dextroamphetamine (ADDERALL) 30 MG tablet TAKE ONE TABLET BY MOUTH THREE TIMES A DAY 90 tablet 0     aspirin (ASA) 81 MG chewable tablet Take 81 mg by  mouth daily       Cholecalciferol (VITAMIN D3) 50 MCG (2000 UT) TABS        estradiol (ESTRACE) 0.1 MG/GM vaginal cream Place 2 g vaginally twice a week Please use quarter size amount in the vagina nightly for two weeks and then three times a week at night thereafter 42.5 g 3     fexofenadine (ALLEGRA) 180 MG tablet Take 1 tablet (180 mg) by mouth daily as needed for allergies 30 tablet 6     Fluocinolone Acetonide Scalp 0.01 % OIL oil USE ON SCALP ONCE A WEEK AS NEEDED 118.28 mL 11     fluticasone (FLONASE) 50 MCG/ACT nasal spray SHAKE GENTLY AND USE 2 SPRAYS INTO EACH NOSTRIL ONCE DAILY AS NEEDED FOR ALLERGIES 16 g 11     hydrOXYzine (ATARAX) 25 MG tablet TAKE ONE TABLET BY MOUTH THREE TIMES A DAY AS NEEDED FOR ITCHING 30 tablet 1     ketoconazole (NIZORAL) 2 % external shampoo APPLY TO AFFECTED AREA(S) AND WASH OFF AFTER 5 MINUTES AS NEEDED 120 mL 11     ketorolac (TORADOL) 10 MG tablet Take 1 tablet (10 mg) by mouth every 6 hours as needed for moderate pain 30 tablet 1     meclizine (ANTIVERT) 25 MG tablet Take 1 tablet (25 mg) by mouth 3 times daily as needed for nausea 30 tablet 3     Menthol, Topical Analgesic, (BIOFREEZE COLORLESS) 4 % GEL Externally apply topically 3 times daily as needed 118 mL 1     methocarbamol (ROBAXIN) 500 MG tablet Take 2 tablets (1,000 mg) by mouth 4 times daily as needed for muscle spasms 60 tablet 1     naproxen (NAPROSYN) 500 MG tablet Take 1 tablet (500 mg) by mouth 2 times daily (with meals) 60 tablet 3     naratriptan (AMERGE) 2.5 MG tablet        nitroFURantoin macrocrystal-monohydrate (MACROBID) 100 MG capsule Take 1 capsule (100 mg) by mouth daily 90 capsule 3     phenazopyridine (PYRIDIUM) 200 MG tablet Take 1 tablet (200 mg) by mouth 3 times daily as needed for irritation 30 tablet 3     solifenacin (VESICARE) 10 MG tablet TAKE 1 TABLET BY MOUTH DAILY 90 tablet 1     valACYclovir (VALTREX) 1000 mg tablet TAKE 1 TABLET BY MOUTH ONCE DAILY 90 tablet 3     zolpidem (AMBIEN)  "5 MG tablet Take 1 tablet (5 mg) by mouth nightly as needed for sleep 30 tablet 2         Review of Systems   Constitutional, HEENT, cardiovascular, pulmonary, gi and gu systems are negative, except as otherwise noted.      Objective    /80 (BP Location: Right arm, Patient Position: Sitting, Cuff Size: Adult Regular)   Pulse 84   Temp 98.4  F (36.9  C) (Oral)   Resp 14   Ht 1.626 m (5' 4\")   Wt 73.2 kg (161 lb 6.4 oz)   LMP 02/25/2014   SpO2 97%   BMI 27.70 kg/m    Body mass index is 27.7 kg/m .    Physical Exam   GENERAL: healthy, alert and no distress  RESP: lungs clear to auscultation - no rales, rhonchi or wheezes  CV: regular rates and rhythm  PSYCH: mentation appears normal, affect normal/bright    Labs and imaging reviewed in chart              "

## 2023-02-21 DIAGNOSIS — N39.0 RECURRENT UTI: ICD-10-CM

## 2023-02-21 DIAGNOSIS — F90.2 ATTENTION DEFICIT HYPERACTIVITY DISORDER (ADHD), COMBINED TYPE: ICD-10-CM

## 2023-02-21 DIAGNOSIS — B00.9 HERPES SIMPLEX VIRUS INFECTION: ICD-10-CM

## 2023-02-21 RX ORDER — DEXTROAMPHETAMINE SACCHARATE, AMPHETAMINE ASPARTATE, DEXTROAMPHETAMINE SULFATE AND AMPHETAMINE SULFATE 7.5; 7.5; 7.5; 7.5 MG/1; MG/1; MG/1; MG/1
TABLET ORAL
Qty: 90 TABLET | Refills: 0 | Status: SHIPPED | OUTPATIENT
Start: 2023-02-21 | End: 2023-03-08

## 2023-02-21 RX ORDER — NITROFURANTOIN 25; 75 MG/1; MG/1
100 CAPSULE ORAL DAILY
Qty: 90 CAPSULE | Refills: 1 | Status: SHIPPED | OUTPATIENT
Start: 2023-02-21 | End: 2023-04-04

## 2023-02-21 NOTE — TELEPHONE ENCOUNTER
Routing refill request to provider for review/approval because:  Drug not on the [facility] refill protocol : Adderall    FYI: patient had to reschedule heart monitor due to insurance change. It is now scheduled for 3/10/23.    Sara Reed RN

## 2023-02-22 RX ORDER — VALACYCLOVIR HYDROCHLORIDE 1 G/1
TABLET, FILM COATED ORAL
Qty: 90 TABLET | Refills: 3 | OUTPATIENT
Start: 2023-02-22

## 2023-02-22 NOTE — TELEPHONE ENCOUNTER
Patient needs to do a visit, an e-visit is fine.  I have never prescribed this to her and we have not discussed it.    I did look at the chart though, and it looks like it was prescribed in November 2022 with refills?

## 2023-02-22 NOTE — TELEPHONE ENCOUNTER
Routing refill request to provider for review/approval because:  Labs out of range:  creatinine    Creatinine   Date Value Ref Range Status   08/01/2022 1.06 (H) 0.52 - 1.04 mg/dL Final   09/14/2020 0.98 0.52 - 1.04 mg/dL Final     Ravin JEREZ RN 2/22/2023 at 4:18 PM

## 2023-02-23 RX ORDER — VALACYCLOVIR HYDROCHLORIDE 1 G/1
1000 TABLET, FILM COATED ORAL DAILY
Qty: 90 TABLET | Refills: 3 | OUTPATIENT
Start: 2023-02-23

## 2023-02-23 NOTE — TELEPHONE ENCOUNTER
Year RX sent by Dr. Sheila Hutchins 11/11/22.  Note to pharmacy that patient has refills on file.  Samra Jalloh, RN    Order  valACYclovir (VALTREX) 1000 mg tablet [417] (Order [id number])  Order Information    Ordered Status Priority Ordering User Department   11/11/22 Sent (none) Leah Campoverde MD [facility]     Order History  Outpatient  Date/Time Action Taken User Additional Information   11/11/22 1356 Pend Interface, Eprescribing    11/11/22 1409 Pend Alberto Bolton RN    11/11/22 1820 Sign Leah Campoverde MD Reorder from Order:[id number]   11/11/22 1820 Taking Flag Checked Leah Campoverde MD [id number]   12/26/22 1016 Taking Flag Checked Valerie Hurtado MA    02/21/23 1254 Reorder Leah Campoverde MD To Order:[id number]     Outpatient Medication Detail     Disp Refills Start End SARAH   valACYclovir (VALTREX) 1000 mg tablet 90 tablet 3 11/11/2022  No   Sig: TAKE 1 TABLET BY MOUTH ONCE DAILY   Sent to pharmacy as: valACYclovir HCl 1 GM Oral Tablet (VALTREX)   Class: E-Prescribe   Order: [id number]   E-Prescribing Status: Receipt confirmed by pharmacy (11/11/2022  6:20 PM CST)   Renewals    Renewal provider: Haase, Susan Rachele, APRN CNP        Printout Tracking    External Result Report     Pharmacy    [id number] - [facility] [id number]  [address]     Associated Diagnoses    Herpes simplex virus infection [B00.9]

## 2023-03-07 ENCOUNTER — TELEPHONE (OUTPATIENT)
Dept: FAMILY MEDICINE | Facility: CLINIC | Age: 58
End: 2023-03-07
Payer: COMMERCIAL

## 2023-03-07 ENCOUNTER — TELEPHONE (OUTPATIENT)
Dept: CARDIOLOGY | Facility: CLINIC | Age: 58
End: 2023-03-07

## 2023-03-07 ENCOUNTER — TELEPHONE (OUTPATIENT)
Dept: ORTHOPEDICS | Facility: CLINIC | Age: 58
End: 2023-03-07
Payer: COMMERCIAL

## 2023-03-07 DIAGNOSIS — F90.2 ATTENTION DEFICIT HYPERACTIVITY DISORDER (ADHD), COMBINED TYPE: ICD-10-CM

## 2023-03-07 NOTE — TELEPHONE ENCOUNTER
Hello,  I'm with ortho alberto Wu, ph# [phone] from [facility] called saying they do not have the [place] address in network and they are asking for a letter.  The letter can be faxed on company letter head, having the doctors name, address, NPI for doctor, tax ID# and the address we are requesting to add.  Fax this letter to .  Thank you.

## 2023-03-07 NOTE — LETTER
March 8, 2023              To Whom It May Concern,             Please approve the following provider, Dr. Jairo Hoffman for patient to establish orthopedic care. She has an appointment scheduled for 3/24/23.     Provider: Dr. Jairo Hoffman  NPI: [id number]    Clinic Location: [address] [facility], Floor [address]            [place] [address]    Please add location: [address] [place] [address], [address]               [address] Site Tax ID: [id number]      Please contact our office by written request faxed to [phone].         Yojana Onofre ATC  On behalf of Dr. Jairo Hoffman

## 2023-03-07 NOTE — TELEPHONE ENCOUNTER
[facility] Call Center    Phone Message    May a detailed message be left on voicemail: yes     Reason for Call: Other:     Pt is calling to request the billing code for there Ziopatch monitor.  Please call back to inform    Action Taken: Other: cardio    Travel Screening: Not Applicable     Thank you!  [facility]

## 2023-03-07 NOTE — TELEPHONE ENCOUNTER
Due to insurance reasons, Laney needs all her medications transferred back to [facility], [place] - [address] [facility]  [phone].    She has not been able to  her Adderall script and has been out for almost a month. She needs that sent over to our pharmacy today if possible.    Tara VALENTINE  [facility]   [facility]

## 2023-03-08 RX ORDER — DEXTROAMPHETAMINE SACCHARATE, AMPHETAMINE ASPARTATE, DEXTROAMPHETAMINE SULFATE AND AMPHETAMINE SULFATE 7.5; 7.5; 7.5; 7.5 MG/1; MG/1; MG/1; MG/1
TABLET ORAL
Qty: 90 TABLET | Refills: 0 | Status: SHIPPED | OUTPATIENT
Start: 2023-03-08 | End: 2023-04-04

## 2023-03-09 ENCOUNTER — MYC MEDICAL ADVICE (OUTPATIENT)
Dept: FAMILY MEDICINE | Facility: CLINIC | Age: 58
End: 2023-03-09
Payer: COMMERCIAL

## 2023-03-10 ENCOUNTER — TELEPHONE (OUTPATIENT)
Dept: FAMILY MEDICINE | Facility: CLINIC | Age: 58
End: 2023-03-10
Payer: COMMERCIAL

## 2023-03-10 DIAGNOSIS — F90.2 ATTENTION DEFICIT HYPERACTIVITY DISORDER (ADHD), COMBINED TYPE: ICD-10-CM

## 2023-03-10 RX ORDER — DEXTROAMPHETAMINE SACCHARATE, AMPHETAMINE ASPARTATE, DEXTROAMPHETAMINE SULFATE AND AMPHETAMINE SULFATE 7.5; 7.5; 7.5; 7.5 MG/1; MG/1; MG/1; MG/1
TABLET ORAL
Qty: 90 TABLET | Refills: 0 | Status: CANCELLED | OUTPATIENT
Start: 2023-03-10

## 2023-03-10 NOTE — TELEPHONE ENCOUNTER
Please do not close this encounter until this has been addressed.  (prior auth approved/denied, prescriber refusal to complete prior auth or medication changed/discontinued)    Prior Authorization needed on: amphetamine salts 30mg tablets  Drug NDC: [address]-68     Insurance: ISABEL Felix   Member ID: [id number]   Insurance phone #: [phone]    Pharmacy NPI: [id number]  Pharmacy Phone #: [phone]  Pharmacy Fax #: [phone]    Please let us know if the PA gets approved or denied or if medication is changed

## 2023-03-14 NOTE — TELEPHONE ENCOUNTER
Central Prior Authorization Team  Phone: [phone]    PA Initiation    Medication: amphetamine salts 30mg tablets  Insurance Company:    Pharmacy Filling the Rx: [place], [place] - [address]  Filling Pharmacy Phone: [phone]  Filling Pharmacy Fax:    Start Date: 3/14/2023

## 2023-03-17 ENCOUNTER — HOSPITAL ENCOUNTER (OUTPATIENT)
Dept: CARDIOLOGY | Facility: CLINIC | Age: 58
Discharge: HOME | End: 2023-03-17
Attending: FAMILY MEDICINE | Admitting: FAMILY MEDICINE
Payer: COMMERCIAL

## 2023-03-17 DIAGNOSIS — R00.2 PALPITATIONS: ICD-10-CM

## 2023-03-17 PROCEDURE — 93244 EXT ECG>48HR<7D REV&INTERPJ: CPT | Performed by: INTERNAL MEDICINE

## 2023-03-17 PROCEDURE — 93242 EXT ECG>48HR<7D RECORDING: CPT

## 2023-03-17 NOTE — TELEPHONE ENCOUNTER
Central Prior Authorization Team  Phone: [phone]    Prior Authorization Approval    Authorization Effective Date: 3/17/2023  Authorization Expiration Date: 3/16/2024  Medication: amphetamine salts 30mg tablets  Approved Dose/Quantity:   Reference #:     Insurance Company:    Expected CoPay:       CoPay Card Available:      Foundation Assistance Needed:    Which Pharmacy is filling the prescription (Not needed for infusion/clinic administered): [facility] [place], [place] - [address] [facility]  Pharmacy Notified: Yes  Patient Notified:

## 2023-03-22 DIAGNOSIS — N39.46 MIXED INCONTINENCE URGE AND STRESS (MALE)(FEMALE): ICD-10-CM

## 2023-03-22 RX ORDER — SOLIFENACIN SUCCINATE 10 MG/1
10 TABLET, FILM COATED ORAL DAILY
Qty: 90 TABLET | Refills: 1 | OUTPATIENT
Start: 2023-03-22

## 2023-03-22 NOTE — TELEPHONE ENCOUNTER
Refill declined: requesting too soon,   Last refill: solifenacin (VESICARE) 10 MG tablet, 90 tablets, 1 refill, 11/11/2022      Shira SOTO RN  [facility]

## 2023-03-24 ENCOUNTER — ANCILLARY PROCEDURE (OUTPATIENT)
Dept: GENERAL RADIOLOGY | Facility: CLINIC | Age: 58
End: 2023-03-24
Attending: STUDENT IN AN ORGANIZED HEALTH CARE EDUCATION/TRAINING PROGRAM
Payer: COMMERCIAL

## 2023-03-24 ENCOUNTER — HOSPITAL ENCOUNTER (OUTPATIENT)
Dept: MRI IMAGING | Facility: CLINIC | Age: 58
Discharge: HOME | End: 2023-03-24
Attending: STUDENT IN AN ORGANIZED HEALTH CARE EDUCATION/TRAINING PROGRAM | Admitting: STUDENT IN AN ORGANIZED HEALTH CARE EDUCATION/TRAINING PROGRAM
Payer: COMMERCIAL

## 2023-03-24 ENCOUNTER — OFFICE VISIT (OUTPATIENT)
Dept: ORTHOPEDICS | Facility: CLINIC | Age: 58
End: 2023-03-24
Payer: COMMERCIAL

## 2023-03-24 VITALS
WEIGHT: 167.2 LBS | HEIGHT: 64 IN | SYSTOLIC BLOOD PRESSURE: 129 MMHG | DIASTOLIC BLOOD PRESSURE: 86 MMHG | BODY MASS INDEX: 28.54 KG/M2

## 2023-03-24 DIAGNOSIS — M17.12 PRIMARY OSTEOARTHRITIS OF LEFT KNEE: ICD-10-CM

## 2023-03-24 DIAGNOSIS — M17.12 PRIMARY OSTEOARTHRITIS OF LEFT KNEE: Primary | ICD-10-CM

## 2023-03-24 DIAGNOSIS — N39.46 MIXED INCONTINENCE URGE AND STRESS (MALE)(FEMALE): ICD-10-CM

## 2023-03-24 DIAGNOSIS — M22.8X2 PATELLAR TRACKING DISORDER OF LEFT KNEE: ICD-10-CM

## 2023-03-24 DIAGNOSIS — N39.0 RECURRENT UTI: ICD-10-CM

## 2023-03-24 PROCEDURE — 99214 OFFICE O/P EST MOD 30 MIN: CPT | Performed by: STUDENT IN AN ORGANIZED HEALTH CARE EDUCATION/TRAINING PROGRAM

## 2023-03-24 PROCEDURE — 73721 MRI JNT OF LWR EXTRE W/O DYE: CPT | Mod: LT

## 2023-03-24 PROCEDURE — 77073 BONE LENGTH STUDIES: CPT | Mod: TC | Performed by: RADIOLOGY

## 2023-03-24 PROCEDURE — 73562 X-RAY EXAM OF KNEE 3: CPT | Mod: TC | Performed by: RADIOLOGY

## 2023-03-24 ASSESSMENT — KOOS JR
TWISING OR PIVOTING ON KNEE: SEVERE
GOING UP OR DOWN STAIRS: SEVERE
BENDING TO THE FLOOR TO PICK UP OBJECT: SEVERE
STRAIGHTENING KNEE FULLY: SEVERE
HOW SEVERE IS YOUR KNEE STIFFNESS AFTER FIRST WAKING IN MORNING: SEVERE
RISING FROM SITTING: SEVERE
KOOS JR SCORING: 36.93
STANDING UPRIGHT: MODERATE

## 2023-03-24 NOTE — PROGRESS NOTES
"    [facility]  Orthopaedic Surgery Consultation by Jairo Hoffman M.D.    Laney Maynard MRN# [id number]   Age: 58 year old YOB: 1965     Requesting physician: No ref. provider found  Coming Leah Hutchins Radha     Background history:  DX:  1. Migraine  2. GERD  3. Alopecia  4. HSV infection  5. ADHD    TREATMENTS:  1. 2012, laparoscopic cholecystectomy  2. 2013, left shoulder arthroscopy with distal clavicle resection and mini open cuff repair, Dr. Lindsay           History of Present Illness:   58 year old female who was previously seen by Dr. Lindsay in regards to chronic left knee pain.  She presents to our clinic today for further follow-up in regards to the same issue.  Patient states that this pain has been present for multiple years.  Approximately 1 month ago she fell on ice onto her anterior knee and since then her pain has increased.  She describes pain throughout the entire knee.  She reports swelling, clicking and instability.  She reports the presence of night pain, initiation pain and stiffness.    To mitigate the pain she has tried over-the-counter analgesics, activity modification, knee sleeves, Ace wraps, intra-articular injection via Dr. Lindsay that did not provide significant relief.    On an MRI in 2021 the left knee exhibited no clear intra-articular pathologies other than diffuse thinning of articular surfaces patellofemoral joint.    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 Normal mood and affect.  VITAL SIGNS: Height 1.626 m (5' 4\"), weight 75.8 kg (167 lb 3.2 oz), last menstrual period 02/25/2014, not currently breastfeeding.  Reviewed " nursing intake notes.   Body mass index is 28.7 kg/m .  RESP: non labored breathing   ABD: benign, soft, non-tender, no acute peritoneal findings  SKIN: grossly normal   LYMPHATIC: grossly normal, no adenopathy, no extremity edema  NEURO: grossly normal , no motor deficits  VASCULAR: satisfactory perfusion of all extremities   MUSCULOSKELETAL:   Alignment: Neutral  Gait: Normal.    The left hip exhibits a full range of motion.     L knee:  - 0 - 0  . Straight leg raise +. No redness, warmth or skin changes present. Effusion No. Ligamentously stable in both ML and AP direction. Normal PF tracking with some crepitus.  Audible and palpable clicking of patella lateral facet.  No clear J sign.  Apprehension -.  No clear instability of patella.  Meniscal provocation tests are negative.  Diffuse tenderness to palpation over the joint line and patella.     Left LE:   Thigh and leg compartments soft and compressible   +Quad/TA/GSC/FHL/EHL   SILT DP/SP/Bernard/Saph/Tib nerve distributions   Palpable dorsalis pedis pulse          Data:   All laboratory data reviewed  All imaging studies reviewed by me personally.    XR alignment films 3/24/2023:  My interpretation: Neutral alignment of left lower extremity.    XR knee left 3/24/2023:  My interpretation: Well-preserved tibiofemoral joint.  No signs of osteoarthritic change of patellofemoral joint.  No signs of patellofemoral dysplasia.  Borderline patella kiara with CDI of 1.2.  No bump or crossing sign visualized.         Assessment and Plan:   Assessment:  58-year-old female presenting with chronic pain of left knee of unknown etiology.  The differential diagnosis includes patellar maltracking in setting of patella alto or osteoarthritic changes of patellofemoral joint.    Plan:  I extensively discussed today's findings with the patient.  It is unclear to me what the exact etiology of her complaints is.  Based on the MRI of 2021 there are not any obvious intra-articular  abnormalities.  It would be my recommendation to obtain a repeat MRI especially after her recent fall to see if any intra-articular changes are present.  In the interim it would be my recommendation to focus on nonsurgical treatment with the goal of optimizing patellar tracking.  This would consist of focused physical therapy potentially augmented with Schneider taping.  Patient understands and agrees to treatment plan as set forth.  We will follow-up with her once the results of advanced imaging studies are in.    Thank you for your referral.    Jairo Hoffman MD, PhD     Adult Reconstruction  [facility] Department of Orthopaedic Surgery    This note was created using dictation software and may contain errors.  Please contact the creator for any clarifications that are needed.    DATA for DOCUMENTATION:         Past Medical History:     Patient Active Problem List   Diagnosis     Alopecia     Seasonal allergic rhinitis     GERD (gastroesophageal reflux disease)     CARDIOVASCULAR SCREENING; LDL GOAL LESS THAN 160     Migraine headache     Sleep disorder     Family history of rheumatoid arthritis     Family history of sarcoidosis (mother)     Urinary frequency     Herpes simplex virus infection     Attention deficit hyperactivity disorder (ADHD)     Plantar fasciitis, bilateral     Mixed incontinence urge and stress (male)(female)     Anxiety     History of nephrolithiasis     Vertigo     Heart murmur     Past Medical History:   Diagnosis Date     Abnormal glandular Papanicolaou smear of cervix      Allergic rhinitis, cause unspecified      Alopecia      Attention deficit hyperactivity disorder (ADHD)      Chronic pain of left knee      Complete rupture of rotator cuff      Constipation      Gastro-oesophageal reflux disease      Heart murmur     murmur     Hematuria      Herpes simplex virus infection      Hydronephrosis, right      Lymphocytopenia 4/4/2011     Migraine headache       Migraine, unspecified, without mention of intractable migraine without mention of status migrainosus      Mixed incontinence urge and stress      Mumps      Numbness and tingling     LEFT ARM     Palpitations      Plantar fasciitis, bilateral      Renal disease     hx stones x 2 episodes     Seasonal allergic rhinitis      Sleep disorder      Ureterolithiasis      Urinary frequency     burning     Vertigo        Also see scanned health assessment forms.       Past Surgical History:     Past Surgical History:   Procedure Laterality Date     ARTHROSCOPY KNEE Right 04/26/2017    Procedure: Right knee arthroscopy and anterior fat pad resection with medial plica resection. Partial lateral menisectomy. Surgeon:  Fredi Lindsay MD  Location: [facility]     ARTHROSCOPY SHOULDER, OPEN ROTATOR CUFF REPAIR, COMBINED  07/31/2013    Procedure: COMBINED ARTHROSCOPY SHOULDER, OPEN ROTATOR CUFF REPAIR;  Left Shoulder Arthroscopy, Distal Clavicale Resection, Decompression, Mini Open Rotator Cuff Repair    ;  Surgeon: Fredi Lindsay MD;  Location:  OR     BREAST SURGERY  2014    Augmentation     COLONOSCOPY  02/07/2014    Procedure: COLONOSCOPY;  Colonoscopy/WW;  Surgeon: Pancho Olsen MD;  Location:  GI     COMBINED CYSTOSCOPY, RETROGRADES, URETEROSCOPY, LASER HOLMIUM LITHOTRIPSY URETER(S), INSERT STENT Right 04/23/2016    Procedure: COMBINED CYSTOSCOPY, RETROGRADES, URETEROSCOPY, LASER HOLMIUM LITHOTRIPSY URETER(S), INSERT STENT;  Surgeon: Kushal Noriega MD;  Location:  OR     CYSTOSCOPY       CYSTOSCOPY, RETROGRADES, EXTRACT STONE, COMBINED  01/09/2013    Procedure: COMBINED CYSTOSCOPY, RETROGRADES, EXTRACT STONE;  Video Cystoscopy,  Right Ureteroscopy, ureteral dilatation,  Stone extraction;  Surgeon: Subhash Vann MD;  Location:  OR     EXCISE MASS HAND Left 06/30/2021    Excision of left dorsal hand/ wrist mass (ganglion cyst), Dr. Fredi Lindsay, [facility].     EXTRACORPOREAL  SHOCK WAVE LITHOTRIPSY (ESWL) Bilateral 2016    Procedure: EXTRACORPOREAL SHOCK WAVE LITHOTRIPSY (ESWL);  Surgeon: Bassam Vu MD;  Location:  OR     EXTRACORPOREAL SHOCK WAVE LITHOTRIPSY, CYSTOSCOPY, INSERT STENT URETER(S), COMBINED Bilateral 2018    Procedure: COMBINED EXTRACORPOREAL SHOCK WAVE LITHOTRIPSY, CYSTOSCOPY, INSERT STENT URETER(S);  BILATERAL COMBINED EXTRACORPOREAL SHOCK WAVE LITHOTRIPSY, CYSTOSCOPY, LEFT STENT PLACEMENT;  Surgeon: Bassam Vu MD;  Location:  OR     EYE SURGERY      Lasik     GYN SURGERY      laparoscopy     LAPAROSCOPIC CHOLECYSTECTOMY WITH CHOLANGIOGRAMS  2012    Procedure: LAPAROSCOPIC CHOLECYSTECTOMY WITH CHOLANGIOGRAMS;  LAPAROSCOPIC CHOLECYSTECTOMY WITH CHOLANGIOGRAMS ;  Surgeon: Nataliya Gabriel MD;  Location:  OR     LASER HOLMIUM LITHOTRIPSY URETER(S), INSERT STENT, COMBINED Left 10/30/2019    Procedure: CYSTOSCOPY,LEFT URETEROSCOPY, HOLMIUM LASER, STONE EXTRACTION, LEFT STENT PLACEMENT;  Surgeon: Bassam Vu MD;  Location:  OR     RELEASE TRIGGER FINGER Right 2021    Right long finger trigger finger release, DOS 11/3/21, Dr. Fredi Lindsay     TUBAL LIGATION       ZZC NONSPECIFIC PROCEDURE  age 16    colposcopy for abnormal pap     ZZC NONSPECIFIC PROCEDURE      surgery L thumb     ZZC NONSPECIFIC PROCEDURE      breast augmentation-silicone     ZZC NONSPECIFIC PROCEDURE      s/p  x 2     ZZC NONSPECIFIC PROCEDURE      Bilateral tubal ligation     ZZC REMOVAL OF KIDNEY STONE              Social History:     Social History     Socioeconomic History     Marital status:      Spouse name: Not on file     Number of children: 2     Years of education: Not on file     Highest education level: Associate degree: occupational, technical, or vocational program   Occupational History     Occupation: LPN     Employer: [employer]     Comment: self employed, lifting, wheeling, walking     Employer:  [employer]   Tobacco Use     Smoking status: Never     Smokeless tobacco: Never   Vaping Use     Vaping Use: Never used   Substance and Sexual Activity     Alcohol use: No     Alcohol/week: 0.0 standard drinks     Drug use: No     Sexual activity: Yes     Partners: Male     Birth control/protection: Post-menopausal, Female Surgical     Comment: Tubaligation   Other Topics Concern      Service No     Blood Transfusions No     Caffeine Concern No     Occupational Exposure No     Hobby Hazards Not Asked     Sleep Concern Yes     Stress Concern No     Weight Concern No     Special Diet No     Back Care Not Asked     Exercise Yes     Comment: 2 days/week     Bike Helmet Not Asked     Seat Belt No     Self-Exams Yes     Parent/sibling w/ CABG, MI or angioplasty before 65F 55M? No   Social History Narrative     Not on file     Social Determinants of Health     Financial Resource Strain: Not on file   Food Insecurity: Not on file   Transportation Needs: Not on file   Physical Activity: Insufficiently Active     Days of Exercise per Week: 2 days     Minutes of Exercise per Session: 30 min   Stress: No Stress Concern Present     Feeling of Stress : Not at all   Social Connections: Unknown     Frequency of Communication with Friends and Family: Three times a week     Frequency of Social Gatherings with Friends and Family: Twice a week     Attends Rastafarian Services: Patient refused     Active Member of Clubs or Organizations: Patient refused     Attends Club or Organization Meetings: Not on file     Marital Status: Not on file   Intimate Partner Violence: Not on file   Housing Stability: Not on file            Family History:       Family History   Problem Relation Age of Onset     Diabetes Mother         type 2     Alcohol/Drug Mother         alcohol     Seizure Disorder Mother             Medications:     Current Outpatient Medications   Medication Sig     acyclovir (ZOVIRAX) 5 % external ointment  Apply topically 2 times daily as needed (Outbreak)     amLODIPine (NORVASC) 5 MG tablet Take 1 tablet (5 mg) by mouth daily     amphetamine-dextroamphetamine (ADDERALL) 30 MG tablet TAKE ONE TABLET BY MOUTH THREE TIMES A DAY     aspirin (ASA) 81 MG chewable tablet Take 81 mg by mouth daily     Cholecalciferol (VITAMIN D3) 50 MCG (2000 UT) TABS      estradiol (ESTRACE) 0.1 MG/GM vaginal cream Place 2 g vaginally twice a week Please use quarter size amount in the vagina nightly for two weeks and then three times a week at night thereafter     fexofenadine (ALLEGRA) 180 MG tablet Take 1 tablet (180 mg) by mouth daily as needed for allergies     Fluocinolone Acetonide Scalp 0.01 % OIL oil USE ON SCALP ONCE A WEEK AS NEEDED     fluticasone (FLONASE) 50 MCG/ACT nasal spray SHAKE GENTLY AND USE 2 SPRAYS INTO EACH NOSTRIL ONCE DAILY AS NEEDED FOR ALLERGIES     hydrOXYzine (ATARAX) 25 MG tablet TAKE ONE TABLET BY MOUTH THREE TIMES A DAY AS NEEDED FOR ITCHING     ketoconazole (NIZORAL) 2 % external shampoo APPLY TO AFFECTED AREA(S) AND WASH OFF AFTER 5 MINUTES AS NEEDED     ketorolac (TORADOL) 10 MG tablet Take 1 tablet (10 mg) by mouth every 6 hours as needed for moderate pain     meclizine (ANTIVERT) 25 MG tablet Take 1 tablet (25 mg) by mouth 3 times daily as needed for nausea     Menthol, Topical Analgesic, (BIOFREEZE COLORLESS) 4 % GEL Externally apply topically 3 times daily as needed     methocarbamol (ROBAXIN) 500 MG tablet Take 2 tablets (1,000 mg) by mouth 4 times daily as needed for muscle spasms     naproxen (NAPROSYN) 500 MG tablet Take 1 tablet (500 mg) by mouth 2 times daily (with meals)     naratriptan (AMERGE) 2.5 MG tablet      nitroFURantoin macrocrystal-monohydrate (MACROBID) 100 MG capsule Take 1 capsule (100 mg) by mouth daily     phenazopyridine (PYRIDIUM) 200 MG tablet Take 1 tablet (200 mg) by mouth 3 times daily as needed for irritation     solifenacin (VESICARE) 10 MG tablet TAKE 1 TABLET BY MOUTH  DAILY     valACYclovir (VALTREX) 1000 mg tablet TAKE 1 TABLET BY MOUTH ONCE DAILY     zolpidem (AMBIEN) 5 MG tablet Take 1 tablet (5 mg) by mouth nightly as needed for sleep     No current facility-administered medications for this visit.              Review of Systems:   A comprehensive 10 point review of systems (constitutional, ENT, cardiac, peripheral vascular, lymphatic, respiratory, GI, , Musculoskeletal, skin, Neurological) was performed and found to be negative except as described in this note.     See intake form completed by patient

## 2023-03-24 NOTE — PATIENT INSTRUCTIONS
1. Primary osteoarthritis of left knee      Physical Therapy orders have been placed with [facility].  You can call [phone]  to schedule at your convenience.     An order for a left knee MRI was placed today. You can call [phone] to schedule at your convenience.    Follow up with Dr. Hoffman after completion of MRI. Please call [phone]  to schedule your follow up visit.    Call my office with any questions or concerns, [phone].

## 2023-03-24 NOTE — LETTER
3/24/2023         RE: Laney Maynard  [address] [facility] [address]        Dear Colleague,    Thank you for referring your patient, Laney Maynard, to the [facility] [place]. Please see a copy of my visit note below.        [facility]  Orthopaedic Surgery Consultation by Jairo Hoffman M.D.    Laney Maynard MRN# [id number]   Age: 58 year old YOB: 1965     Requesting physician: No ref. provider found  Coming Hay, April Radha     Background history:  DX:  1. Migraine  2. GERD  3. Alopecia  4. HSV infection  5. ADHD    TREATMENTS:  1. 2012, laparoscopic cholecystectomy  2. 2013, left shoulder arthroscopy with distal clavicle resection and mini open cuff repair, Dr. Lindsay           History of Present Illness:   58 year old female who was previously seen by Dr. Lindsay in regards to chronic left knee pain.  She presents to our clinic today for further follow-up in regards to the same issue.  Patient states that this pain has been present for multiple years.  Approximately 1 month ago she fell on ice onto her anterior knee and since then her pain has increased.  She describes pain throughout the entire knee.  She reports swelling, clicking and instability.  She reports the presence of night pain, initiation pain and stiffness.    To mitigate the pain she has tried over-the-counter analgesics, activity modification, knee sleeves, Ace wraps, intra-articular injection via Dr. Lindsay that did not provide significant relief.    On an MRI in 2021 the left knee exhibited no clear intra-articular pathologies other than diffuse thinning of articular surfaces patellofemoral joint.    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 Normal mood and affect.  VITAL SIGNS: Height 1.626 m (5' 4\"), weight 75.8 kg (167 lb 3.2 oz), last menstrual period 02/25/2014, not currently breastfeeding.  Reviewed nursing intake notes.   Body mass index is 28.7 kg/m .  RESP: non labored breathing   ABD: benign, soft, non-tender, no acute peritoneal findings  SKIN: grossly normal   LYMPHATIC: grossly normal, no adenopathy, no extremity edema  NEURO: grossly normal , no motor deficits  VASCULAR: satisfactory perfusion of all extremities   MUSCULOSKELETAL:   Alignment: Neutral  Gait: Normal.    The left hip exhibits a full range of motion.     L knee:  - 0 - 0  . Straight leg raise +. No redness, warmth or skin changes present. Effusion No. Ligamentously stable in both ML and AP direction. Normal PF tracking with some crepitus.  Audible and palpable clicking of patella lateral facet.  No clear J sign.  Apprehension -.  No clear instability of patella.  Meniscal provocation tests are negative.  Diffuse tenderness to palpation over the joint line and patella.     Left LE:   Thigh and leg compartments soft and compressible   +Quad/TA/GSC/FHL/EHL   SILT DP/SP/Bernard/Saph/Tib nerve distributions   Palpable dorsalis pedis pulse          Data:   All laboratory data reviewed  All imaging studies reviewed by me personally.    XR alignment films 3/24/2023:  My interpretation: Neutral alignment of left lower extremity.    XR knee left 3/24/2023:  My interpretation: Well-preserved tibiofemoral joint.  No signs of osteoarthritic change of patellofemoral joint.  No signs of patellofemoral dysplasia.  Borderline patella kiara with CDI of 1.2.  No bump or crossing sign visualized.         Assessment and Plan:   Assessment:  58-year-old female presenting with chronic pain of left knee of unknown etiology.  The differential diagnosis includes patellar " maltracking in setting of patella alto or osteoarthritic changes of patellofemoral joint.    Plan:  I extensively discussed today's findings with the patient.  It is unclear to me what the exact etiology of her complaints is.  Based on the MRI of 2021 there are not any obvious intra-articular abnormalities.  It would be my recommendation to obtain a repeat MRI especially after her recent fall to see if any intra-articular changes are present.  In the interim it would be my recommendation to focus on nonsurgical treatment with the goal of optimizing patellar tracking.  This would consist of focused physical therapy potentially augmented with Schneider taping.  Patient understands and agrees to treatment plan as set forth.  We will follow-up with her once the results of advanced imaging studies are in.    Thank you for your referral.    Jairo Hoffman MD, PhD     Adult Reconstruction  [facility] Department of Orthopaedic Surgery    This note was created using dictation software and may contain errors.  Please contact the creator for any clarifications that are needed.    DATA for DOCUMENTATION:         Past Medical History:     Patient Active Problem List   Diagnosis     Alopecia     Seasonal allergic rhinitis     GERD (gastroesophageal reflux disease)     CARDIOVASCULAR SCREENING; LDL GOAL LESS THAN 160     Migraine headache     Sleep disorder     Family history of rheumatoid arthritis     Family history of sarcoidosis (mother)     Urinary frequency     Herpes simplex virus infection     Attention deficit hyperactivity disorder (ADHD)     Plantar fasciitis, bilateral     Mixed incontinence urge and stress (male)(female)     Anxiety     History of nephrolithiasis     Vertigo     Heart murmur     Past Medical History:   Diagnosis Date     Abnormal glandular Papanicolaou smear of cervix      Allergic rhinitis, cause unspecified      Alopecia      Attention deficit hyperactivity  disorder (ADHD)      Chronic pain of left knee      Complete rupture of rotator cuff      Constipation      Gastro-oesophageal reflux disease      Heart murmur     murmur     Hematuria      Herpes simplex virus infection      Hydronephrosis, right      Lymphocytopenia 4/4/2011     Migraine headache      Migraine, unspecified, without mention of intractable migraine without mention of status migrainosus      Mixed incontinence urge and stress      Mumps      Numbness and tingling     LEFT ARM     Palpitations      Plantar fasciitis, bilateral      Renal disease     hx stones x 2 episodes     Seasonal allergic rhinitis      Sleep disorder      Ureterolithiasis      Urinary frequency     burning     Vertigo        Also see scanned health assessment forms.       Past Surgical History:     Past Surgical History:   Procedure Laterality Date     ARTHROSCOPY KNEE Right 04/26/2017    Procedure: Right knee arthroscopy and anterior fat pad resection with medial plica resection. Partial lateral menisectomy. Surgeon:  Fredi Lindsay MD  Location: [facility]     ARTHROSCOPY SHOULDER, OPEN ROTATOR CUFF REPAIR, COMBINED  07/31/2013    Procedure: COMBINED ARTHROSCOPY SHOULDER, OPEN ROTATOR CUFF REPAIR;  Left Shoulder Arthroscopy, Distal Clavicale Resection, Decompression, Mini Open Rotator Cuff Repair    ;  Surgeon: Fredi Lindsay MD;  Location:  OR     BREAST SURGERY  2014    Augmentation     COLONOSCOPY  02/07/2014    Procedure: COLONOSCOPY;  Colonoscopy/WW;  Surgeon: Pancho Olsen MD;  Location:  GI     COMBINED CYSTOSCOPY, RETROGRADES, URETEROSCOPY, LASER HOLMIUM LITHOTRIPSY URETER(S), INSERT STENT Right 04/23/2016    Procedure: COMBINED CYSTOSCOPY, RETROGRADES, URETEROSCOPY, LASER HOLMIUM LITHOTRIPSY URETER(S), INSERT STENT;  Surgeon: Kushal Noriega MD;  Location:  OR     CYSTOSCOPY       CYSTOSCOPY, RETROGRADES, EXTRACT STONE, COMBINED  01/09/2013    Procedure: COMBINED CYSTOSCOPY,  RETROGRADES, EXTRACT STONE;  Video Cystoscopy,  Right Ureteroscopy, ureteral dilatation,  Stone extraction;  Surgeon: Subhash Vann MD;  Location:  OR     EXCISE MASS HAND Left 2021    Excision of left dorsal hand/ wrist mass (ganglion cyst), Dr. Fredi Lindsay, [facility].     EXTRACORPOREAL SHOCK WAVE LITHOTRIPSY (ESWL) Bilateral 2016    Procedure: EXTRACORPOREAL SHOCK WAVE LITHOTRIPSY (ESWL);  Surgeon: Bassam Vu MD;  Location:  OR     EXTRACORPOREAL SHOCK WAVE LITHOTRIPSY, CYSTOSCOPY, INSERT STENT URETER(S), COMBINED Bilateral 2018    Procedure: COMBINED EXTRACORPOREAL SHOCK WAVE LITHOTRIPSY, CYSTOSCOPY, INSERT STENT URETER(S);  BILATERAL COMBINED EXTRACORPOREAL SHOCK WAVE LITHOTRIPSY, CYSTOSCOPY, LEFT STENT PLACEMENT;  Surgeon: Bassam Vu MD;  Location:  OR     EYE SURGERY      Lasik     GYN SURGERY      laparoscopy     LAPAROSCOPIC CHOLECYSTECTOMY WITH CHOLANGIOGRAMS  2012    Procedure: LAPAROSCOPIC CHOLECYSTECTOMY WITH CHOLANGIOGRAMS;  LAPAROSCOPIC CHOLECYSTECTOMY WITH CHOLANGIOGRAMS ;  Surgeon: Nataliya Gabriel MD;  Location:  OR     LASER HOLMIUM LITHOTRIPSY URETER(S), INSERT STENT, COMBINED Left 10/30/2019    Procedure: CYSTOSCOPY,LEFT URETEROSCOPY, HOLMIUM LASER, STONE EXTRACTION, LEFT STENT PLACEMENT;  Surgeon: Bassam Vu MD;  Location:  OR     RELEASE TRIGGER FINGER Right 2021    Right long finger trigger finger release, DOS 11/3/21, Dr. Fredi Lindsay     TUBAL LIGATION       ZZC NONSPECIFIC PROCEDURE  age 16    colposcopy for abnormal pap     ZZC NONSPECIFIC PROCEDURE      surgery L thumb     ZZC NONSPECIFIC PROCEDURE      breast augmentation-silicone     ZZC NONSPECIFIC PROCEDURE      s/p  x 2     ZZC NONSPECIFIC PROCEDURE      Bilateral tubal ligation     ZZC REMOVAL OF KIDNEY STONE              Social History:     Social History     Socioeconomic History     Marital status:      Spouse name: Not on  file     Number of children: 2     Years of education: Not on file     Highest education level: Associate degree: occupational, technical, or vocational program   Occupational History     Occupation: LPN     Employer: [employer]     Comment: self employed, lifting, wheeling, walking     Employer: [employer]   Tobacco Use     Smoking status: Never     Smokeless tobacco: Never   Vaping Use     Vaping Use: Never used   Substance and Sexual Activity     Alcohol use: No     Alcohol/week: 0.0 standard drinks     Drug use: No     Sexual activity: Yes     Partners: Male     Birth control/protection: Post-menopausal, Female Surgical     Comment: Tubaligation   Other Topics Concern      Service No     Blood Transfusions No     Caffeine Concern No     Occupational Exposure No     Hobby Hazards Not Asked     Sleep Concern Yes     Stress Concern No     Weight Concern No     Special Diet No     Back Care Not Asked     Exercise Yes     Comment: 2 days/week     Bike Helmet Not Asked     Seat Belt No     Self-Exams Yes     Parent/sibling w/ CABG, MI or angioplasty before 65F 55M? No   Social History Narrative     Not on file     Social Determinants of Health     Financial Resource Strain: Not on file   Food Insecurity: Not on file   Transportation Needs: Not on file   Physical Activity: Insufficiently Active     Days of Exercise per Week: 2 days     Minutes of Exercise per Session: 30 min   Stress: No Stress Concern Present     Feeling of Stress : Not at all   Social Connections: Unknown     Frequency of Communication with Friends and Family: Three times a week     Frequency of Social Gatherings with Friends and Family: Twice a week     Attends Cheondoism Services: Patient refused     Active Member of Clubs or Organizations: Patient refused     Attends Club or Organization Meetings: Not on file     Marital Status: Not on file   Intimate Partner Violence: Not on file   Housing Stability: Not on file             Family History:       Family History   Problem Relation Age of Onset     Diabetes Mother         type 2     Alcohol/Drug Mother         alcohol     Seizure Disorder Mother             Medications:     Current Outpatient Medications   Medication Sig     acyclovir (ZOVIRAX) 5 % external ointment Apply topically 2 times daily as needed (Outbreak)     amLODIPine (NORVASC) 5 MG tablet Take 1 tablet (5 mg) by mouth daily     amphetamine-dextroamphetamine (ADDERALL) 30 MG tablet TAKE ONE TABLET BY MOUTH THREE TIMES A DAY     aspirin (ASA) 81 MG chewable tablet Take 81 mg by mouth daily     Cholecalciferol (VITAMIN D3) 50 MCG (2000 UT) TABS      estradiol (ESTRACE) 0.1 MG/GM vaginal cream Place 2 g vaginally twice a week Please use quarter size amount in the vagina nightly for two weeks and then three times a week at night thereafter     fexofenadine (ALLEGRA) 180 MG tablet Take 1 tablet (180 mg) by mouth daily as needed for allergies     Fluocinolone Acetonide Scalp 0.01 % OIL oil USE ON SCALP ONCE A WEEK AS NEEDED     fluticasone (FLONASE) 50 MCG/ACT nasal spray SHAKE GENTLY AND USE 2 SPRAYS INTO EACH NOSTRIL ONCE DAILY AS NEEDED FOR ALLERGIES     hydrOXYzine (ATARAX) 25 MG tablet TAKE ONE TABLET BY MOUTH THREE TIMES A DAY AS NEEDED FOR ITCHING     ketoconazole (NIZORAL) 2 % external shampoo APPLY TO AFFECTED AREA(S) AND WASH OFF AFTER 5 MINUTES AS NEEDED     ketorolac (TORADOL) 10 MG tablet Take 1 tablet (10 mg) by mouth every 6 hours as needed for moderate pain     meclizine (ANTIVERT) 25 MG tablet Take 1 tablet (25 mg) by mouth 3 times daily as needed for nausea     Menthol, Topical Analgesic, (BIOFREEZE COLORLESS) 4 % GEL Externally apply topically 3 times daily as needed     methocarbamol (ROBAXIN) 500 MG tablet Take 2 tablets (1,000 mg) by mouth 4 times daily as needed for muscle spasms     naproxen (NAPROSYN) 500 MG tablet Take 1 tablet (500 mg) by mouth 2 times daily (with meals)     naratriptan  (AMERGE) 2.5 MG tablet      nitroFURantoin macrocrystal-monohydrate (MACROBID) 100 MG capsule Take 1 capsule (100 mg) by mouth daily     phenazopyridine (PYRIDIUM) 200 MG tablet Take 1 tablet (200 mg) by mouth 3 times daily as needed for irritation     solifenacin (VESICARE) 10 MG tablet TAKE 1 TABLET BY MOUTH DAILY     valACYclovir (VALTREX) 1000 mg tablet TAKE 1 TABLET BY MOUTH ONCE DAILY     zolpidem (AMBIEN) 5 MG tablet Take 1 tablet (5 mg) by mouth nightly as needed for sleep     No current facility-administered medications for this visit.              Review of Systems:   A comprehensive 10 point review of systems (constitutional, ENT, cardiac, peripheral vascular, lymphatic, respiratory, GI, , Musculoskeletal, skin, Neurological) was performed and found to be negative except as described in this note.     See intake form completed by patient        Again, thank you for allowing me to participate in the care of your patient.        Sincerely,        Jairo Hoffman MD

## 2023-03-27 RX ORDER — NITROFURANTOIN 25; 75 MG/1; MG/1
100 CAPSULE ORAL DAILY
Qty: 90 CAPSULE | Refills: 1 | OUTPATIENT
Start: 2023-03-27

## 2023-03-27 RX ORDER — SOLIFENACIN SUCCINATE 10 MG/1
10 TABLET, FILM COATED ORAL DAILY
Qty: 90 TABLET | Refills: 1 | OUTPATIENT
Start: 2023-03-27

## 2023-03-28 DIAGNOSIS — N39.46 MIXED INCONTINENCE URGE AND STRESS (MALE)(FEMALE): ICD-10-CM

## 2023-03-28 DIAGNOSIS — F90.2 ATTENTION DEFICIT HYPERACTIVITY DISORDER (ADHD), COMBINED TYPE: ICD-10-CM

## 2023-03-28 RX ORDER — DEXTROAMPHETAMINE SACCHARATE, AMPHETAMINE ASPARTATE, DEXTROAMPHETAMINE SULFATE AND AMPHETAMINE SULFATE 7.5; 7.5; 7.5; 7.5 MG/1; MG/1; MG/1; MG/1
TABLET ORAL
Qty: 90 TABLET | Refills: 0 | OUTPATIENT
Start: 2023-03-28

## 2023-03-28 RX ORDER — SOLIFENACIN SUCCINATE 10 MG/1
10 TABLET, FILM COATED ORAL DAILY
Qty: 90 TABLET | Refills: 0 | Status: SHIPPED | OUTPATIENT
Start: 2023-03-28 | End: 2023-04-04

## 2023-03-28 NOTE — TELEPHONE ENCOUNTER
Prescription approved per [facility] Refill Protocol.  Verified with [facility] script from 11/11/2022 was not received.     EMILY Arizmendi on 3/28/2023 at 5:36 PM

## 2023-03-28 NOTE — TELEPHONE ENCOUNTER
Patient should have refills on file for both prescriptions.    Please contact patient have have her f/up with her pharmacy directly.

## 2023-03-28 NOTE — TELEPHONE ENCOUNTER
Called patient, she states pharmacy does not show them available, asked to speak with nurse. Ruth Behrens.

## 2023-03-28 NOTE — TELEPHONE ENCOUNTER
Medication Question or Refill        What medication are you calling about (include dose and sig)?: Vesicare, & Macrobid     Preferred Pharmacy:     [id number] - [place], [place] - [address]  [facility] [address]  Phone: [phone] Fax: [phone]      Controlled Substance Agreement on file:   CSA -- Patient Level:     [Media Unavailable] Controlled Substance Agreement - Non - Opioid - Scan on 9/5/2019 10:50 AM: NON-OPIOID CONTROLLED SUBSTANCE AGREEMENT       Who prescribed the medication?: Coming Hay    Do you need a refill? Yes    When did you use the medication last?     Patient offered an appointment? No    Do you have any questions or concerns?  No      Could we send this information to you in [facility] or would you prefer to receive a phone call?:   Patient would prefer a phone call   Okay to leave a detailed message?: Yes at Cell number on file:    Telephone Information:   Mobile [phone]

## 2023-03-28 NOTE — TELEPHONE ENCOUNTER
Patient called needs help with prescriptions.     Called pharmacy [facility], she states they DID NOT receive the script for vesicare on 11/11/2022. Advised I would refax that script.     As far as the Adderall script, a 3 month panel was sent so pharmacy wont see it until 04/08/2023.     Pharmacy has a refill for Macrobid.     Routing to refill pool.    EMILY Arizmendi on 3/28/2023 at 5:33 PM

## 2023-04-04 ENCOUNTER — OFFICE VISIT (OUTPATIENT)
Dept: FAMILY MEDICINE | Facility: CLINIC | Age: 58
End: 2023-04-04
Payer: COMMERCIAL

## 2023-04-04 VITALS
HEART RATE: 99 BPM | DIASTOLIC BLOOD PRESSURE: 60 MMHG | HEIGHT: 64 IN | TEMPERATURE: 98.2 F | BODY MASS INDEX: 28.57 KG/M2 | SYSTOLIC BLOOD PRESSURE: 123 MMHG | OXYGEN SATURATION: 96 % | WEIGHT: 167.38 LBS

## 2023-04-04 DIAGNOSIS — M79.672 PAIN IN BOTH FEET: ICD-10-CM

## 2023-04-04 DIAGNOSIS — N39.46 MIXED INCONTINENCE URGE AND STRESS (MALE)(FEMALE): ICD-10-CM

## 2023-04-04 DIAGNOSIS — M79.671 PAIN IN BOTH FEET: ICD-10-CM

## 2023-04-04 DIAGNOSIS — N20.1 URETEROLITHIASIS: ICD-10-CM

## 2023-04-04 DIAGNOSIS — M72.2 PLANTAR FASCIITIS, BILATERAL: ICD-10-CM

## 2023-04-04 DIAGNOSIS — B00.9 HERPES SIMPLEX VIRUS INFECTION: ICD-10-CM

## 2023-04-04 DIAGNOSIS — J30.2 ACUTE SEASONAL ALLERGIC RHINITIS: ICD-10-CM

## 2023-04-04 DIAGNOSIS — L29.9 GENERALIZED PRURITUS: ICD-10-CM

## 2023-04-04 DIAGNOSIS — Z13.1 SCREENING FOR DIABETES MELLITUS: ICD-10-CM

## 2023-04-04 DIAGNOSIS — N30.00 ACUTE CYSTITIS WITHOUT HEMATURIA: ICD-10-CM

## 2023-04-04 DIAGNOSIS — M21.42 PES PLANUS OF BOTH FEET: ICD-10-CM

## 2023-04-04 DIAGNOSIS — M54.50 CHRONIC LEFT-SIDED LOW BACK PAIN WITHOUT SCIATICA: ICD-10-CM

## 2023-04-04 DIAGNOSIS — N39.0 RECURRENT UTI: ICD-10-CM

## 2023-04-04 DIAGNOSIS — M21.41 PES PLANUS OF BOTH FEET: ICD-10-CM

## 2023-04-04 DIAGNOSIS — G89.29 CHRONIC LEFT-SIDED LOW BACK PAIN WITHOUT SCIATICA: ICD-10-CM

## 2023-04-04 DIAGNOSIS — I10 BENIGN ESSENTIAL HYPERTENSION: ICD-10-CM

## 2023-04-04 DIAGNOSIS — Z00.00 PREVENTATIVE HEALTH CARE: Primary | ICD-10-CM

## 2023-04-04 DIAGNOSIS — F90.2 ATTENTION DEFICIT HYPERACTIVITY DISORDER (ADHD), COMBINED TYPE: ICD-10-CM

## 2023-04-04 DIAGNOSIS — E55.9 VITAMIN D DEFICIENCY: ICD-10-CM

## 2023-04-04 DIAGNOSIS — M62.838 MUSCLE SPASM: ICD-10-CM

## 2023-04-04 DIAGNOSIS — Z12.31 ENCOUNTER FOR SCREENING MAMMOGRAM FOR BREAST CANCER: ICD-10-CM

## 2023-04-04 DIAGNOSIS — L21.9 SEBORRHEIC DERMATITIS OF SCALP: ICD-10-CM

## 2023-04-04 DIAGNOSIS — R39.89 URINARY PROBLEM: ICD-10-CM

## 2023-04-04 DIAGNOSIS — G47.9 SLEEP DISORDER: ICD-10-CM

## 2023-04-04 DIAGNOSIS — G43.809 OTHER MIGRAINE WITHOUT STATUS MIGRAINOSUS, NOT INTRACTABLE: ICD-10-CM

## 2023-04-04 LAB
ALBUMIN UR-MCNC: NEGATIVE MG/DL
ANION GAP SERPL CALCULATED.3IONS-SCNC: 10 MMOL/L (ref 7–15)
APPEARANCE UR: ABNORMAL
BACTERIA #/AREA URNS HPF: ABNORMAL /HPF
BILIRUB UR QL STRIP: NEGATIVE
BUN SERPL-MCNC: 13.6 MG/DL (ref 8–23)
CALCIUM SERPL-MCNC: 10.1 MG/DL (ref 8.6–10)
CHLORIDE SERPL-SCNC: 104 MMOL/L (ref 98–107)
CHOLEST SERPL-MCNC: 243 MG/DL
COLOR UR AUTO: YELLOW
CREAT SERPL-MCNC: 0.94 MG/DL (ref 0.51–0.95)
DEPRECATED HCO3 PLAS-SCNC: 26 MMOL/L (ref 22–29)
ERYTHROCYTE [DISTWIDTH] IN BLOOD BY AUTOMATED COUNT: 11.9 % (ref 10–15)
GFR SERPL CREATININE-BSD FRML MDRD: 70 ML/MIN/1.73M2
GLUCOSE SERPL-MCNC: 98 MG/DL (ref 70–99)
GLUCOSE UR STRIP-MCNC: NEGATIVE MG/DL
HBA1C MFR BLD: 5.2 % (ref 0–5.6)
HCT VFR BLD AUTO: 41.6 % (ref 35–47)
HDLC SERPL-MCNC: 54 MG/DL
HGB BLD-MCNC: 14.2 G/DL (ref 11.7–15.7)
HGB UR QL STRIP: ABNORMAL
KETONES UR STRIP-MCNC: NEGATIVE MG/DL
LDLC SERPL CALC-MCNC: 166 MG/DL
LEUKOCYTE ESTERASE UR QL STRIP: ABNORMAL
MCH RBC QN AUTO: 33.3 PG (ref 26.5–33)
MCHC RBC AUTO-ENTMCNC: 34.1 G/DL (ref 31.5–36.5)
MCV RBC AUTO: 97 FL (ref 78–100)
NITRATE UR QL: NEGATIVE
NONHDLC SERPL-MCNC: 189 MG/DL
PH UR STRIP: 7 [PH] (ref 5–7)
PLATELET # BLD AUTO: 265 10E3/UL (ref 150–450)
POTASSIUM SERPL-SCNC: 4 MMOL/L (ref 3.4–5.3)
RBC # BLD AUTO: 4.27 10E6/UL (ref 3.8–5.2)
RBC #/AREA URNS AUTO: ABNORMAL /HPF
SODIUM SERPL-SCNC: 140 MMOL/L (ref 136–145)
SP GR UR STRIP: 1.02 (ref 1–1.03)
SQUAMOUS #/AREA URNS AUTO: ABNORMAL /LPF
TRIGL SERPL-MCNC: 114 MG/DL
UROBILINOGEN UR STRIP-ACNC: 0.2 E.U./DL
WBC # BLD AUTO: 4.7 10E3/UL (ref 4–11)
WBC #/AREA URNS AUTO: ABNORMAL /HPF

## 2023-04-04 PROCEDURE — 90472 IMMUNIZATION ADMIN EACH ADD: CPT | Performed by: STUDENT IN AN ORGANIZED HEALTH CARE EDUCATION/TRAINING PROGRAM

## 2023-04-04 PROCEDURE — 90471 IMMUNIZATION ADMIN: CPT | Performed by: STUDENT IN AN ORGANIZED HEALTH CARE EDUCATION/TRAINING PROGRAM

## 2023-04-04 PROCEDURE — 83036 HEMOGLOBIN GLYCOSYLATED A1C: CPT | Performed by: STUDENT IN AN ORGANIZED HEALTH CARE EDUCATION/TRAINING PROGRAM

## 2023-04-04 PROCEDURE — 99396 PREV VISIT EST AGE 40-64: CPT | Mod: 25 | Performed by: STUDENT IN AN ORGANIZED HEALTH CARE EDUCATION/TRAINING PROGRAM

## 2023-04-04 PROCEDURE — 90677 PCV20 VACCINE IM: CPT | Performed by: STUDENT IN AN ORGANIZED HEALTH CARE EDUCATION/TRAINING PROGRAM

## 2023-04-04 PROCEDURE — 87086 URINE CULTURE/COLONY COUNT: CPT | Performed by: STUDENT IN AN ORGANIZED HEALTH CARE EDUCATION/TRAINING PROGRAM

## 2023-04-04 PROCEDURE — 80048 BASIC METABOLIC PNL TOTAL CA: CPT | Performed by: STUDENT IN AN ORGANIZED HEALTH CARE EDUCATION/TRAINING PROGRAM

## 2023-04-04 PROCEDURE — 90715 TDAP VACCINE 7 YRS/> IM: CPT | Performed by: STUDENT IN AN ORGANIZED HEALTH CARE EDUCATION/TRAINING PROGRAM

## 2023-04-04 PROCEDURE — 80061 LIPID PANEL: CPT | Performed by: STUDENT IN AN ORGANIZED HEALTH CARE EDUCATION/TRAINING PROGRAM

## 2023-04-04 PROCEDURE — 99214 OFFICE O/P EST MOD 30 MIN: CPT | Mod: 25 | Performed by: STUDENT IN AN ORGANIZED HEALTH CARE EDUCATION/TRAINING PROGRAM

## 2023-04-04 PROCEDURE — 85027 COMPLETE CBC AUTOMATED: CPT | Performed by: STUDENT IN AN ORGANIZED HEALTH CARE EDUCATION/TRAINING PROGRAM

## 2023-04-04 PROCEDURE — 81001 URINALYSIS AUTO W/SCOPE: CPT | Performed by: STUDENT IN AN ORGANIZED HEALTH CARE EDUCATION/TRAINING PROGRAM

## 2023-04-04 PROCEDURE — 36415 COLL VENOUS BLD VENIPUNCTURE: CPT | Performed by: STUDENT IN AN ORGANIZED HEALTH CARE EDUCATION/TRAINING PROGRAM

## 2023-04-04 PROCEDURE — 90746 HEPB VACCINE 3 DOSE ADULT IM: CPT | Performed by: STUDENT IN AN ORGANIZED HEALTH CARE EDUCATION/TRAINING PROGRAM

## 2023-04-04 RX ORDER — ACYCLOVIR 50 MG/G
OINTMENT TOPICAL 2 TIMES DAILY PRN
Qty: 15 G | Refills: 3 | Status: SHIPPED | OUTPATIENT
Start: 2023-04-04 | End: 2024-08-23

## 2023-04-04 RX ORDER — KETOROLAC TROMETHAMINE 10 MG/1
10 TABLET, FILM COATED ORAL EVERY 6 HOURS PRN
Qty: 30 TABLET | Refills: 1 | Status: SHIPPED | OUTPATIENT
Start: 2023-04-04

## 2023-04-04 RX ORDER — ESTRADIOL 0.1 MG/G
2 CREAM VAGINAL
Qty: 42.5 G | Refills: 3 | Status: SHIPPED | OUTPATIENT
Start: 2023-04-06 | End: 2024-08-21

## 2023-04-04 RX ORDER — ASPIRIN 81 MG/1
81 TABLET, CHEWABLE ORAL DAILY
Qty: 90 TABLET | Refills: 3 | Status: SHIPPED | OUTPATIENT
Start: 2023-04-04

## 2023-04-04 RX ORDER — ZOLPIDEM TARTRATE 5 MG/1
5 TABLET ORAL
Qty: 15 TABLET | Refills: 0 | Status: SHIPPED | OUTPATIENT
Start: 2023-04-04

## 2023-04-04 RX ORDER — DEXTROAMPHETAMINE SACCHARATE, AMPHETAMINE ASPARTATE, DEXTROAMPHETAMINE SULFATE AND AMPHETAMINE SULFATE 7.5; 7.5; 7.5; 7.5 MG/1; MG/1; MG/1; MG/1
TABLET ORAL
Qty: 90 TABLET | Refills: 0 | Status: SHIPPED | OUTPATIENT
Start: 2023-04-04 | End: 2023-05-17

## 2023-04-04 RX ORDER — NARATRIPTAN 2.5 MG/1
2.5 TABLET ORAL
Qty: 12 TABLET | Refills: 3 | Status: SHIPPED | OUTPATIENT
Start: 2023-04-04

## 2023-04-04 RX ORDER — AMLODIPINE BESYLATE 5 MG/1
5 TABLET ORAL DAILY
Qty: 90 TABLET | Refills: 1 | Status: SHIPPED | OUTPATIENT
Start: 2023-04-04 | End: 2023-12-11

## 2023-04-04 RX ORDER — FLUOCINOLONE ACETONIDE 0.11 MG/ML
OIL TOPICAL
Qty: 118.28 ML | Refills: 11 | Status: SHIPPED | OUTPATIENT
Start: 2023-04-04

## 2023-04-04 RX ORDER — NITROFURANTOIN 25; 75 MG/1; MG/1
100 CAPSULE ORAL DAILY
Qty: 90 CAPSULE | Refills: 1 | Status: SHIPPED | OUTPATIENT
Start: 2023-04-04 | End: 2024-03-05

## 2023-04-04 RX ORDER — FLUTICASONE PROPIONATE 50 MCG
2 SPRAY, SUSPENSION (ML) NASAL DAILY PRN
Qty: 16 G | Refills: 11 | Status: SHIPPED | OUTPATIENT
Start: 2023-04-04

## 2023-04-04 RX ORDER — PHENAZOPYRIDINE HYDROCHLORIDE 200 MG/1
200 TABLET, FILM COATED ORAL 3 TIMES DAILY PRN
Qty: 30 TABLET | Refills: 3 | Status: SHIPPED | OUTPATIENT
Start: 2023-04-04

## 2023-04-04 RX ORDER — NAPROXEN 500 MG/1
500 TABLET ORAL 2 TIMES DAILY WITH MEALS
Qty: 60 TABLET | Refills: 3 | Status: SHIPPED | OUTPATIENT
Start: 2023-04-04 | End: 2024-06-17

## 2023-04-04 RX ORDER — CHOLECALCIFEROL (VITAMIN D3) 50 MCG
1 TABLET ORAL DAILY
Qty: 30 TABLET | Refills: 11 | Status: SHIPPED | OUTPATIENT
Start: 2023-04-04

## 2023-04-04 RX ORDER — VALACYCLOVIR HYDROCHLORIDE 1 G/1
1000 TABLET, FILM COATED ORAL DAILY
Qty: 90 TABLET | Refills: 3 | Status: SHIPPED | OUTPATIENT
Start: 2023-04-04 | End: 2024-03-05

## 2023-04-04 RX ORDER — KETOCONAZOLE 20 MG/ML
SHAMPOO TOPICAL
Qty: 120 ML | Refills: 11 | Status: SHIPPED | OUTPATIENT
Start: 2023-04-04

## 2023-04-04 RX ORDER — FEXOFENADINE HCL 180 MG/1
180 TABLET ORAL DAILY PRN
Qty: 30 TABLET | Refills: 6 | Status: SHIPPED | OUTPATIENT
Start: 2023-04-04 | End: 2024-08-21

## 2023-04-04 RX ORDER — HYDROXYZINE HYDROCHLORIDE 25 MG/1
TABLET, FILM COATED ORAL
Qty: 30 TABLET | Refills: 1 | Status: SHIPPED | OUTPATIENT
Start: 2023-04-04 | End: 2024-06-17

## 2023-04-04 RX ORDER — METHOCARBAMOL 500 MG/1
1000 TABLET, FILM COATED ORAL 4 TIMES DAILY PRN
Qty: 60 TABLET | Refills: 1 | Status: SHIPPED | OUTPATIENT
Start: 2023-04-04 | End: 2023-12-11

## 2023-04-04 RX ORDER — SOLIFENACIN SUCCINATE 10 MG/1
10 TABLET, FILM COATED ORAL DAILY
Qty: 90 TABLET | Refills: 0 | Status: SHIPPED | OUTPATIENT
Start: 2023-04-04 | End: 2023-07-21

## 2023-04-04 ASSESSMENT — ENCOUNTER SYMPTOMS
MYALGIAS: 0
SORE THROAT: 0
PARESTHESIAS: 0
NERVOUS/ANXIOUS: 0
WEAKNESS: 0
DIZZINESS: 0
DYSURIA: 1
FREQUENCY: 1
CONSTIPATION: 1
EYE PAIN: 0
ABDOMINAL PAIN: 0
DIARRHEA: 0
SHORTNESS OF BREATH: 0
JOINT SWELLING: 0
HEARTBURN: 0
BREAST MASS: 0
NAUSEA: 0
HEMATOCHEZIA: 0
FEVER: 0
COUGH: 0
HEADACHES: 0
CHILLS: 0
PALPITATIONS: 0
ARTHRALGIAS: 0
HEMATURIA: 0

## 2023-04-04 NOTE — PROGRESS NOTES
SUBJECTIVE:   CC: Laney is an 58 year old who presents for preventive health visit.       2023    10:32 AM   Additional Questions   Roomed by Pratima LEUNG     Healthy Habits:     Getting at least 3 servings of Calcium per day:  Yes    Bi-annual eye exam:  Yes    Dental care twice a year:  Yes    Sleep apnea or symptoms of sleep apnea:  None    Diet:  Low salt and Low fat/cholesterol    Frequency of exercise:  2-3 days/week    Duration of exercise:  15-30 minutes    Taking medications regularly:  Yes    Medication side effects:  None    PHQ-2 Total Score: 0    Additional concerns today:  No      Has recurrent UTIs and takes Macrobid for prophylaxis. Reports 1 week of dysuria/burning, urgency, and frequency. Denies fevers/chills, abdominal pain, flank pain, nausea or vomiting.     Had a Zio Patch Holter Monitor read last month which was normal. She denies any palpitations since then.     ADHD feels manageable.     She has chronic left shoulder and neck pain. Had rotator cuff surgery on her left shoulder, and ongoing pain related to that.         Today's PHQ-2 Score:       2023    10:19 AM   PHQ-2 (  [employer])   Q1: Little interest or pleasure in doing things 0   Q2: Feeling down, depressed or hopeless 0   PHQ-2 Score 0   Q1: Little interest or pleasure in doing things Not at all    Not at all   Q2: Feeling down, depressed or hopeless Not at all    Not at all   PHQ-2 Score 0    0           Social History     Tobacco Use     Smoking status: Never     Smokeless tobacco: Never   Vaping Use     Vaping status: Never Used   Substance Use Topics     Alcohol use: No     Alcohol/week: 0.0 standard drinks of alcohol             2023    10:19 AM   Alcohol Use   Prescreen: >3 drinks/day or >7 drinks/week? Not Applicable     Reviewed orders with patient.  Reviewed health maintenance and updated orders accordingly - Yes  Labs reviewed in EPIC    Breast Cancer Screenin/6/2022     9:28 AM 2023    10:20 AM    Breast CA Risk Assessment (FHS-7)   Do you have a family history of breast, colon, or ovarian cancer? Yes No / Unknown       Mammogram Screening: Recommended mammography every 1-2 years with patient discussion and risk factor consideration  Pertinent mammograms are reviewed under the imaging tab.    History of abnormal Pap smear: NO - age 30-65 PAP every 5 years with negative HPV co-testing recommended      Latest Ref Rng & Units 5/6/2019     9:47 AM 5/6/2019     9:40 AM 9/28/2015     9:49 AM   PAP / HPV   PAP (Historical)  NIL       HPV 16 DNA NEG^Negative  Negative   Negative     HPV 18 DNA NEG^Negative  Negative   Negative     Other HR HPV NEG^Negative  Negative   Negative       Reviewed and updated as needed this visit by clinical staff   Tobacco  Allergies  Meds  Problems  Med Hx  Surg Hx           Reviewed and updated as needed this visit by Provider     Meds  Problems  Med Hx  Surg Hx          Past Medical History:   Diagnosis Date     Abnormal glandular Papanicolaou smear of cervix      Allergic rhinitis, cause unspecified      Alopecia      Attention deficit hyperactivity disorder (ADHD)      Chronic pain of left knee      Complete rupture of rotator cuff      Constipation      Gastro-oesophageal reflux disease      Heart murmur     murmur     Hematuria      Herpes simplex virus infection      Hydronephrosis, right      Lymphocytopenia 4/4/2011     Migraine headache      Migraine, unspecified, without mention of intractable migraine without mention of status migrainosus      Mixed incontinence urge and stress      Mumps      Numbness and tingling     LEFT ARM     Palpitations      Plantar fasciitis, bilateral      Renal disease     hx stones x 2 episodes     Seasonal allergic rhinitis      Sleep disorder      Ureterolithiasis      Urinary frequency     burning     Vertigo       Past Surgical History:   Procedure Laterality Date     ARTHROSCOPY KNEE Right 04/26/2017    Procedure: Right knee  arthroscopy and anterior fat pad resection with medial plica resection. Partial lateral menisectomy. Surgeon:  Fredi Lindsay MD  Location: [facility]     ARTHROSCOPY SHOULDER, OPEN ROTATOR CUFF REPAIR, COMBINED  07/31/2013    Procedure: COMBINED ARTHROSCOPY SHOULDER, OPEN ROTATOR CUFF REPAIR;  Left Shoulder Arthroscopy, Distal Clavicale Resection, Decompression, Mini Open Rotator Cuff Repair    ;  Surgeon: Fredi Lindsay MD;  Location:  OR     BREAST SURGERY  2014    Augmentation     COLONOSCOPY  02/07/2014    Procedure: COLONOSCOPY;  Colonoscopy/WW;  Surgeon: Pancho Olsen MD;  Location:  GI     COMBINED CYSTOSCOPY, RETROGRADES, URETEROSCOPY, LASER HOLMIUM LITHOTRIPSY URETER(S), INSERT STENT Right 04/23/2016    Procedure: COMBINED CYSTOSCOPY, RETROGRADES, URETEROSCOPY, LASER HOLMIUM LITHOTRIPSY URETER(S), INSERT STENT;  Surgeon: Kushal Noriega MD;  Location:  OR     CYSTOSCOPY       CYSTOSCOPY, RETROGRADES, EXTRACT STONE, COMBINED  01/09/2013    Procedure: COMBINED CYSTOSCOPY, RETROGRADES, EXTRACT STONE;  Video Cystoscopy,  Right Ureteroscopy, ureteral dilatation,  Stone extraction;  Surgeon: Subhash Vann MD;  Location:  OR     EXCISE MASS HAND Left 06/30/2021    Excision of left dorsal hand/ wrist mass (ganglion cyst), Dr. Fredi Lindsay, [facility].     EXTRACORPOREAL SHOCK WAVE LITHOTRIPSY (ESWL) Bilateral 04/29/2016    Procedure: EXTRACORPOREAL SHOCK WAVE LITHOTRIPSY (ESWL);  Surgeon: Bassam Vu MD;  Location:  OR     EXTRACORPOREAL SHOCK WAVE LITHOTRIPSY, CYSTOSCOPY, INSERT STENT URETER(S), COMBINED Bilateral 04/26/2018    Procedure: COMBINED EXTRACORPOREAL SHOCK WAVE LITHOTRIPSY, CYSTOSCOPY, INSERT STENT URETER(S);  BILATERAL COMBINED EXTRACORPOREAL SHOCK WAVE LITHOTRIPSY, CYSTOSCOPY, LEFT STENT PLACEMENT;  Surgeon: Bassam Vu MD;  Location:  OR     EYE SURGERY  2015    Lasik     GYN SURGERY      laparoscopy     LAPAROSCOPIC  CHOLECYSTECTOMY WITH CHOLANGIOGRAMS  2012    Procedure: LAPAROSCOPIC CHOLECYSTECTOMY WITH CHOLANGIOGRAMS;  LAPAROSCOPIC CHOLECYSTECTOMY WITH CHOLANGIOGRAMS ;  Surgeon: Nataliya Gabriel MD;  Location: [facility] OR     LASER HOLMIUM LITHOTRIPSY URETER(S), INSERT STENT, COMBINED Left 10/30/2019    Procedure: CYSTOSCOPY,LEFT URETEROSCOPY, HOLMIUM LASER, STONE EXTRACTION, LEFT STENT PLACEMENT;  Surgeon: Bassam Vu MD;  Location: [facility] OR     RELEASE TRIGGER FINGER Right 2021    Right long finger trigger finger release, DOS 11/3/21, Dr. Fredi Lindsay     TUBAL LIGATION       ZZC NONSPECIFIC PROCEDURE  age 16    colposcopy for abnormal pap     ZZC NONSPECIFIC PROCEDURE      surgery L thumb     ZZC NONSPECIFIC PROCEDURE      breast augmentation-silicone     ZZC NONSPECIFIC PROCEDURE      s/p  x 2     ZZC NONSPECIFIC PROCEDURE      Bilateral tubal ligation     ZZC REMOVAL OF KIDNEY STONE       OB History    Para Term  AB Living   3 2 0 0 1 0   SAB IAB Ectopic Multiple Live Births   0 1 0 0 0      # Outcome Date GA Lbr Masood/2nd Weight Sex Delivery Anes PTL Lv   3 IAB            2 Para            1 Para                Review of Systems   Constitutional: Negative for chills and fever.   HENT: Negative for congestion, ear pain, hearing loss and sore throat.    Eyes: Positive for visual disturbance. Negative for pain.   Respiratory: Negative for cough and shortness of breath.    Cardiovascular: Positive for chest pain. Negative for palpitations and peripheral edema.   Gastrointestinal: Positive for constipation. Negative for abdominal pain, diarrhea, heartburn, hematochezia and nausea.   Breasts:  Negative for tenderness, breast mass and discharge.   Genitourinary: Positive for dysuria and frequency. Negative for genital sores, hematuria, pelvic pain, urgency, vaginal bleeding and vaginal discharge.   Musculoskeletal: Negative for arthralgias, joint swelling and myalgias.   Skin: Negative for  "rash.   Neurological: Negative for dizziness, weakness, headaches and paresthesias.   Psychiatric/Behavioral: Negative for mood changes. The patient is not nervous/anxious.      OBJECTIVE:   /60 (BP Location: Right arm, Patient Position: Sitting, Cuff Size: Adult Regular)   Pulse 99   Temp 98.2  F (36.8  C) (Oral)   Ht 1.626 m (5' 4\")   Wt 75.9 kg (167 lb 6 oz)   LMP 02/25/2014   SpO2 96%   BMI 28.73 kg/m    Physical Exam  GENERAL: healthy, alert and no distress  EYES: Eyes grossly normal to inspection, PERRL and conjunctivae and sclerae normal  HENT: ear canals and TM's normal, nose and mouth without ulcers or lesions  NECK: no adenopathy, no asymmetry, masses, or scars and thyroid normal to palpation  RESP: lungs clear to auscultation - no rales, rhonchi or wheezes  CV: regular rate and rhythm, normal S1 S2, no S3 or S4, no murmur, click or rub, no peripheral edema and peripheral pulses strong  ABDOMEN: soft, nontender, no hepatosplenomegaly, no masses and bowel sounds normal  MS: no gross musculoskeletal defects noted, no edema  SKIN: no suspicious lesions or rashes  NEURO: Normal strength and tone, mentation intact and speech normal  PSYCH: mentation appears normal, affect normal/bright    Diagnostic Test Results:  Labs reviewed in Epic    ASSESSMENT/PLAN:   (Z00.00) Preventative health care  (primary encounter diagnosis)  Comment:   Plan: aspirin (ASA) 81 MG chewable tablet, Lipid         panel reflex to direct LDL Non-fasting, CBC         with platelets    (N30.00) Acute cystitis without hematuria, (N39.0) Recurrent UTI  Comment: UA in clinic with evidence of UTI. Treat with keflex x7 days, continue macrobid prophylaxis   Plan: cephALEXin (KEFLEX) 500 MG capsule     UA with Microscopic reflex to Culture - lab         collect, estradiol (ESTRACE) 0.1 MG/GM vaginal         cream, nitroFURantoin macrocrystal-monohydrate         (MACROBID) 100 MG capsule, UA Microscopic with         Reflex to Culture, " Urine Culture    (I10) Benign essential hypertension  Comment: controlled, continue current medications, refilled for 1 year   Plan: amLODIPine (NORVASC) 5 MG tablet, Basic         metabolic panel  (Ca, Cl, CO2, Creat, Gluc, K,         Na, BUN)    (F90.2) Attention deficit hyperactivity disorder (ADHD), combined type  Comment: controlled, continue current medications  Plan: amphetamine-dextroamphetamine (ADDERALL) 30 MG         tablet    (J30.2) Acute seasonal allergic rhinitis  Comment: controlled, continue current medications, refilled for 1 year   Plan: fexofenadine (ALLEGRA) 180 MG tablet,         fluticasone (FLONASE) 50 MCG/ACT nasal spray    (L21.9) Seborrheic dermatitis of scalp  Comment: controlled, continue current medications, refilled for 1 year   Plan: Fluocinolone Acetonide Scalp 0.01 % OIL oil,         ketoconazole (NIZORAL) 2 % external shampoo    (L29.9) Generalized pruritus  Comment:continue current medications, refilled for 1 year   Plan: hydrOXYzine (ATARAX) 25 MG tablet    (M79.671,  M79.672) Pain in both feet  Comment: continue current medications, refilled for 1 year   Plan: Menthol, Topical Analgesic, (BIOFREEZE         COLORLESS) 4 % GEL    (M72.2) Plantar fasciitis, bilateral  Comment: continue current medications, refilled for 1 year   Plan: Menthol, Topical Analgesic, (BIOFREEZE         COLORLESS) 4 % GEL, naproxen (NAPROSYN) 500 MG         tablet    (M21.41,  M21.42) Pes planus of both feet  Comment: continue current medications, refilled for 1 year   Plan: Menthol, Topical Analgesic, (BIOFREEZE         COLORLESS) 4 % GEL    (M62.838) Muscle spasm  Comment: related to chronic left shoulder pain, continue current medications, refilled for 1 year   Plan: methocarbamol (ROBAXIN) 500 MG tablet      (M54.50,  G89.29) Chronic left-sided low back pain without sciatica  Comment: continue current medications, refilled for 1 year   Plan: methocarbamol (ROBAXIN) 500 MG tablet    (R39.89) Urinary  "problem  Comment: recurrent UTIs, continue current medications, refilled for 1 year   Plan: phenazopyridine (PYRIDIUM) 200 MG tablet    (B00.9) Herpes simplex virus infection  Comment: continue current medications, refilled for 1 year   Plan: acyclovir (ZOVIRAX) 5 % external ointment,         valACYclovir (VALTREX) 1000 mg tablet    (N39.46) Mixed incontinence urge and stress (male)(female)  Comment: continue current medications, refilled for 1 year   Plan: solifenacin (VESICARE) 10 MG tablet    (G47.9) Sleep disorder  Comment: continue current medications  Plan: zolpidem (AMBIEN) 5 MG tablet - PRN, refill sent for 15 tablets     (Z13.1) Screening for diabetes mellitus  Comment:   Plan: Hemoglobin A1c    (Z12.31) Encounter for screening mammogram for breast cancer  Comment:   Plan: CANCELED: *MA Screening Digital Bilateral      (G43.809) Other migraine without status migrainosus, not intractable  Comment: continue current medications, refilled for 1 year   Plan: naratriptan (AMERGE) 2.5 MG tablet    (E55.9) Vitamin D deficiency  Comment: continue current medications, refilled for 1 year   Plan: Vitamin D3 50 mcg (2000 units) tablet      Patient has been advised of split billing requirements and indicates understanding: Yes      COUNSELING:  Reviewed preventive health counseling, as reflected in patient instructions      BMI:   Estimated body mass index is 28.73 kg/m  as calculated from the following:    Height as of this encounter: 1.626 m (5' 4\").    Weight as of this encounter: 75.9 kg (167 lb 6 oz).   Weight management plan: Discussed healthy diet and exercise guidelines      She reports that she has never smoked. She has never used smokeless tobacco.      Azucena Cash PA-C  [facility]"

## 2023-04-05 RX ORDER — CEFDINIR 300 MG/1
300 CAPSULE ORAL 2 TIMES DAILY
Status: CANCELLED | OUTPATIENT
Start: 2023-04-05

## 2023-04-05 RX ORDER — CIPROFLOXACIN 250 MG/1
250 TABLET, FILM COATED ORAL 2 TIMES DAILY
Qty: 14 TABLET | Refills: 0 | Status: CANCELLED | OUTPATIENT
Start: 2023-04-05 | End: 2023-04-12

## 2023-04-05 RX ORDER — CEPHALEXIN 500 MG/1
500 CAPSULE ORAL 2 TIMES DAILY
Qty: 14 CAPSULE | Refills: 0 | Status: SHIPPED | OUTPATIENT
Start: 2023-04-05 | End: 2023-04-12

## 2023-04-06 ENCOUNTER — HOSPITAL ENCOUNTER (OUTPATIENT)
Dept: MAMMOGRAPHY | Facility: CLINIC | Age: 58
Discharge: HOME | End: 2023-04-06
Attending: STUDENT IN AN ORGANIZED HEALTH CARE EDUCATION/TRAINING PROGRAM | Admitting: STUDENT IN AN ORGANIZED HEALTH CARE EDUCATION/TRAINING PROGRAM
Payer: COMMERCIAL

## 2023-04-06 DIAGNOSIS — Z12.31 ENCOUNTER FOR SCREENING MAMMOGRAM FOR BREAST CANCER: ICD-10-CM

## 2023-04-06 LAB — BACTERIA UR CULT: NO GROWTH

## 2023-04-06 PROCEDURE — 77067 SCR MAMMO BI INCL CAD: CPT

## 2023-05-08 ENCOUNTER — PATIENT OUTREACH (OUTPATIENT)
Dept: CARE COORDINATION | Facility: CLINIC | Age: 58
End: 2023-05-08
Payer: COMMERCIAL

## 2023-05-17 ENCOUNTER — TELEPHONE (OUTPATIENT)
Dept: FAMILY MEDICINE | Facility: CLINIC | Age: 58
End: 2023-05-17
Payer: COMMERCIAL

## 2023-05-17 DIAGNOSIS — F90.2 ATTENTION DEFICIT HYPERACTIVITY DISORDER (ADHD), COMBINED TYPE: ICD-10-CM

## 2023-05-17 RX ORDER — DEXTROAMPHETAMINE SACCHARATE, AMPHETAMINE ASPARTATE, DEXTROAMPHETAMINE SULFATE AND AMPHETAMINE SULFATE 7.5; 7.5; 7.5; 7.5 MG/1; MG/1; MG/1; MG/1
TABLET ORAL
Qty: 90 TABLET | Refills: 0 | Status: SHIPPED | OUTPATIENT
Start: 2023-05-17 | End: 2023-10-05

## 2023-05-17 NOTE — TELEPHONE ENCOUNTER
Called patient. Informed rx done, and to please allow at least 3 business days for refills if possible. Ruth Behrens.

## 2023-05-17 NOTE — TELEPHONE ENCOUNTER
Medication Question or Refill        What medication are you calling about (include dose and sig)?: Adderall - Pt wants asap she is out    Preferred Pharmacy:       [id number] [place], [place] - [address]   [address] [facility] [address]  Phone: [phone] Fax: [phone]      Controlled Substance Agreement on file:   CSA -- Patient Level:     [Media Unavailable] Controlled Substance Agreement - Non - Opioid - Scan on 9/5/2019 10:50 AM: NON-OPIOID CONTROLLED SUBSTANCE AGREEMENT       Who prescribed the medication?: Coming Hay    Do you need a refill? Yes    When did you use the medication last?     Patient offered an appointment? No    Do you have any questions or concerns?  No      Could we send this information to you in [facility] or would you prefer to receive a phone call?:   Patient would prefer a phone call   Okay to leave a detailed message?: Yes at Cell number on file:    Telephone Information:   Mobile [phone]

## 2023-05-17 NOTE — TELEPHONE ENCOUNTER
Please let patient know that I have sent the prescription in, but we are not often able to do same day refill requests.  Please remind her that we need at least 3 business days notice for fills.

## 2023-05-22 ENCOUNTER — PATIENT OUTREACH (OUTPATIENT)
Dept: CARE COORDINATION | Facility: CLINIC | Age: 58
End: 2023-05-22
Payer: COMMERCIAL

## 2023-07-18 DIAGNOSIS — N39.46 MIXED INCONTINENCE URGE AND STRESS (MALE)(FEMALE): ICD-10-CM

## 2023-07-21 RX ORDER — SOLIFENACIN SUCCINATE 10 MG/1
10 TABLET, FILM COATED ORAL DAILY
Qty: 90 TABLET | Refills: 0 | Status: SHIPPED | OUTPATIENT
Start: 2023-07-21 | End: 2024-01-10

## 2023-08-26 ENCOUNTER — HEALTH MAINTENANCE LETTER (OUTPATIENT)
Age: 58
End: 2023-08-26

## 2023-10-03 DIAGNOSIS — F90.2 ATTENTION DEFICIT HYPERACTIVITY DISORDER (ADHD), COMBINED TYPE: ICD-10-CM

## 2023-10-03 DIAGNOSIS — I10 BENIGN ESSENTIAL HYPERTENSION: ICD-10-CM

## 2023-10-03 RX ORDER — DEXTROAMPHETAMINE SACCHARATE, AMPHETAMINE ASPARTATE, DEXTROAMPHETAMINE SULFATE AND AMPHETAMINE SULFATE 7.5; 7.5; 7.5; 7.5 MG/1; MG/1; MG/1; MG/1
TABLET ORAL
Qty: 90 TABLET | Refills: 0 | OUTPATIENT
Start: 2023-10-03

## 2023-10-05 RX ORDER — DEXTROAMPHETAMINE SACCHARATE, AMPHETAMINE ASPARTATE, DEXTROAMPHETAMINE SULFATE AND AMPHETAMINE SULFATE 7.5; 7.5; 7.5; 7.5 MG/1; MG/1; MG/1; MG/1
TABLET ORAL
Qty: 90 TABLET | Refills: 0 | Status: SHIPPED | OUTPATIENT
Start: 2023-10-05 | End: 2023-11-07

## 2023-10-05 NOTE — TELEPHONE ENCOUNTER
Pt calls, informs:    ~saw Miesha 6 months ago and discussed  ~agrees to schedule appointment but wants to confirm with PCP, currently no insurance and will have new insurance 1/1/24  ~wonders if can do E visit for med check for this time? (More affordable) if not, wonders if can be virtual?, willing to do F2F in Jan 2024    ROUTED TO April Coming-MD Cuong, PLEASE ADVISE, ROUTE TO TC TO SCHEDULE/INFORM PT    Telephone Information:   Mobile [phone]     Genet Wolfe, RN, BSN  [facility]

## 2023-10-05 NOTE — TELEPHONE ENCOUNTER
Patient needs to be seen every 6 months for her ADHD medication.  She can do telephone or video visit, but not an e-visit.

## 2023-10-10 NOTE — TELEPHONE ENCOUNTER
Called patient and appt Scheduled for 6 months, Patient wanted the price of the visit and was given the Consumer Price Line number.    Orion Rascon CMA on 10/10/2023 at 4:34 PM

## 2023-11-07 DIAGNOSIS — F90.2 ATTENTION DEFICIT HYPERACTIVITY DISORDER (ADHD), COMBINED TYPE: ICD-10-CM

## 2023-11-07 NOTE — TELEPHONE ENCOUNTER
Dr. Sheila Hutchins,    Pt calls to request Adderall refill. Pended med and pharmacy, please review and advise.     Thanks!  Albaro Mary RN on 11/7/2023 at 8:45 AM

## 2023-11-08 RX ORDER — DEXTROAMPHETAMINE SACCHARATE, AMPHETAMINE ASPARTATE, DEXTROAMPHETAMINE SULFATE AND AMPHETAMINE SULFATE 7.5; 7.5; 7.5; 7.5 MG/1; MG/1; MG/1; MG/1
TABLET ORAL
Qty: 90 TABLET | Refills: 0 | Status: SHIPPED | OUTPATIENT
Start: 2023-11-08 | End: 2023-12-11

## 2023-12-11 ENCOUNTER — TELEPHONE (OUTPATIENT)
Dept: FAMILY MEDICINE | Facility: CLINIC | Age: 58
End: 2023-12-11

## 2023-12-11 DIAGNOSIS — I10 BENIGN ESSENTIAL HYPERTENSION: ICD-10-CM

## 2023-12-11 DIAGNOSIS — M62.838 MUSCLE SPASM: ICD-10-CM

## 2023-12-11 DIAGNOSIS — F90.2 ATTENTION DEFICIT HYPERACTIVITY DISORDER (ADHD), COMBINED TYPE: ICD-10-CM

## 2023-12-11 DIAGNOSIS — M54.50 CHRONIC LEFT-SIDED LOW BACK PAIN WITHOUT SCIATICA: ICD-10-CM

## 2023-12-11 DIAGNOSIS — G89.29 CHRONIC LEFT-SIDED LOW BACK PAIN WITHOUT SCIATICA: ICD-10-CM

## 2023-12-11 RX ORDER — AMLODIPINE BESYLATE 5 MG/1
5 TABLET ORAL DAILY
Qty: 90 TABLET | Refills: 0 | Status: SHIPPED | OUTPATIENT
Start: 2023-12-11 | End: 2024-03-05

## 2023-12-11 RX ORDER — METHOCARBAMOL 500 MG/1
1000 TABLET, FILM COATED ORAL 4 TIMES DAILY PRN
Qty: 60 TABLET | Refills: 0 | Status: SHIPPED | OUTPATIENT
Start: 2023-12-11

## 2023-12-11 RX ORDER — DEXTROAMPHETAMINE SACCHARATE, AMPHETAMINE ASPARTATE, DEXTROAMPHETAMINE SULFATE AND AMPHETAMINE SULFATE 7.5; 7.5; 7.5; 7.5 MG/1; MG/1; MG/1; MG/1
TABLET ORAL
Qty: 90 TABLET | Refills: 0 | Status: SHIPPED | OUTPATIENT
Start: 2023-12-11 | End: 2024-04-17

## 2023-12-11 NOTE — TELEPHONE ENCOUNTER
Called patient. Scheduled a adhd f/unit(s) 12/22/23, scheduled a physical 04/17/24. Ruth Behrens.

## 2023-12-11 NOTE — TELEPHONE ENCOUNTER
Medication Question or Refill    Contacts         Type Contact Phone/Fax    12/11/2023 09:44 AM CST Phone (Incoming) Laney Acevedo (Self) [phone] (M)            What medication are you calling about (include dose and sig)?: Adderall. Amlodipine, Methocarbamol    Preferred Pharmacy:     [id number] [place], [place] - [address] [address]  [address] [facility] [address]  Phone: [phone] Fax: [phone]      Controlled Substance Agreement on file:   CSA -- Patient Level:     [Media Unavailable] Controlled Substance Agreement - Non - Opioid - Scan on 9/5/2019 10:50 AM: NON-OPIOID CONTROLLED SUBSTANCE AGREEMENT       Who prescribed the medication?: Coming Hay    Do you need a refill? yes    When did you use the medication last?     Patient offered an appointment? No    Do you have any questions or concerns?  No      Could we send this information to you in [facility] or would you prefer to receive a phone call?:

## 2023-12-11 NOTE — TELEPHONE ENCOUNTER
All meds given refills.  I have not seen her for almost a year, so she needs to schedule a visit with me to continue refills.  She also needs ADHD follow up every 6 months.  If she schedules a visit we can cover it all.

## 2023-12-22 ENCOUNTER — VIRTUAL VISIT (OUTPATIENT)
Dept: FAMILY MEDICINE | Facility: CLINIC | Age: 58
End: 2023-12-22

## 2023-12-22 DIAGNOSIS — F90.0 ATTENTION DEFICIT HYPERACTIVITY DISORDER (ADHD), PREDOMINANTLY INATTENTIVE TYPE: Primary | ICD-10-CM

## 2023-12-22 PROCEDURE — 99213 OFFICE O/P EST LOW 20 MIN: CPT | Mod: 95 | Performed by: FAMILY MEDICINE

## 2023-12-22 RX ORDER — DEXTROAMPHETAMINE SACCHARATE, AMPHETAMINE ASPARTATE, DEXTROAMPHETAMINE SULFATE AND AMPHETAMINE SULFATE 7.5; 7.5; 7.5; 7.5 MG/1; MG/1; MG/1; MG/1
30 TABLET ORAL 3 TIMES DAILY
Qty: 90 TABLET | Refills: 0 | Status: SHIPPED | OUTPATIENT
Start: 2024-02-10 | End: 2024-03-11

## 2023-12-22 RX ORDER — DEXTROAMPHETAMINE SACCHARATE, AMPHETAMINE ASPARTATE, DEXTROAMPHETAMINE SULFATE AND AMPHETAMINE SULFATE 7.5; 7.5; 7.5; 7.5 MG/1; MG/1; MG/1; MG/1
30 TABLET ORAL 3 TIMES DAILY
Qty: 90 TABLET | Refills: 0 | Status: SHIPPED | OUTPATIENT
Start: 2024-01-10 | End: 2024-02-09

## 2023-12-22 RX ORDER — DEXTROAMPHETAMINE SACCHARATE, AMPHETAMINE ASPARTATE, DEXTROAMPHETAMINE SULFATE AND AMPHETAMINE SULFATE 7.5; 7.5; 7.5; 7.5 MG/1; MG/1; MG/1; MG/1
30 TABLET ORAL 3 TIMES DAILY
Qty: 90 TABLET | Refills: 0 | Status: SHIPPED | OUTPATIENT
Start: 2024-03-11 | End: 2024-04-10

## 2023-12-22 NOTE — PROGRESS NOTES
"Laney is a 58 year old who is being evaluated via a billable telephone visit.      What phone number would you like to be contacted at? [phone]   How would you like to obtain your AVS? Candice    Distant Location (provider location):  Off-site    Assessment & Plan     Attention deficit hyperactivity disorder (ADHD), predominantly inattentive type  Stable overall. PDMP reviewed, no red flags.  Will keep patient at current dosing. Follow up in 6 months or sooner as needed.  - amphetamine-dextroamphetamine (ADDERALL) 30 MG tablet; Take 1 tablet (30 mg) by mouth 3 times daily for 30 days  - amphetamine-dextroamphetamine (ADDERALL) 30 MG tablet; Take 1 tablet (30 mg) by mouth 3 times daily for 30 days  - amphetamine-dextroamphetamine (ADDERALL) 30 MG tablet; Take 1 tablet (30 mg) by mouth 3 times daily for 30 days    10 minutes spent by me on the date of the encounter doing chart review, history and exam, documentation and further activities per the note       BMI:   Estimated body mass index is 28.73 kg/m  as calculated from the following:    Height as of 4/4/23: 1.626 m (5' 4\").    Weight as of 4/4/23: 75.9 kg (167 lb 6 oz).       See Patient Instructions    Leah Hutchins MD  [facility]    Subjective   Laney is a 58 year old, presenting for the following health issues:  A.D.H.SOILA      12/22/2023    10:30 AM   Additional Questions   Roomed by MR   Accompanied by MARCI         12/22/2023    10:30 AM   Patient Reported Additional Medications   Patient reports taking the following new medications MARCI BACH.SOILA.H.SOILA    History of Present Illness       Reason for visit:  Adderall    She eats 0-1 servings of fruits and vegetables daily.She consumes 1 sweetened beverage(s) daily.She exercises with enough effort to increase her heart rate 20 to 29 minutes per day.  She exercises with enough effort to increase her heart rate 3 or less days per week.   She is taking medications regularly.   "     Medication Followup of Adderall 30 mg  Taking Medication as prescribed: yes  Side Effects:  None  Medication Helping Symptoms:  yes    Medication seems to be losing its affect but she does not want to increase the dose.  Trying to manage on her own.  Sometimes she struggles with her attention span, not terribly but aware of the struggle.  No side effects, no trouble sleeping (has always had problems sleeping but nothing new or different).  Uses Zolpidem rarely, maybe once or twice per week if needed.        Review of Systems   Constitutional, HEENT, cardiovascular, pulmonary, gi and gu systems are negative, except as otherwise noted.      Objective           Vitals:  No vitals were obtained today due to virtual visit.    Physical Exam   healthy, alert, and no distress  PSYCH: Alert and oriented times 3; coherent speech, normal   rate and volume, able to articulate logical thoughts, able   to abstract reason, no tangential thoughts, no hallucinations   or delusions  Her affect is normal  RESP: No cough, no audible wheezing, able to talk in full sentences  Remainder of exam unable to be completed due to telephone visits            Phone call duration: 9 minutes

## 2024-01-10 ENCOUNTER — MYC REFILL (OUTPATIENT)
Dept: FAMILY MEDICINE | Facility: CLINIC | Age: 59
End: 2024-01-10

## 2024-01-10 DIAGNOSIS — F90.0 ATTENTION DEFICIT HYPERACTIVITY DISORDER (ADHD), PREDOMINANTLY INATTENTIVE TYPE: ICD-10-CM

## 2024-01-10 DIAGNOSIS — N39.46 MIXED INCONTINENCE URGE AND STRESS (MALE)(FEMALE): ICD-10-CM

## 2024-01-10 RX ORDER — DEXTROAMPHETAMINE SACCHARATE, AMPHETAMINE ASPARTATE, DEXTROAMPHETAMINE SULFATE AND AMPHETAMINE SULFATE 7.5; 7.5; 7.5; 7.5 MG/1; MG/1; MG/1; MG/1
30 TABLET ORAL 3 TIMES DAILY
Qty: 90 TABLET | Refills: 0 | Status: SHIPPED | OUTPATIENT
Start: 2024-01-10 | End: 2024-02-09

## 2024-01-10 RX ORDER — SOLIFENACIN SUCCINATE 10 MG/1
10 TABLET, FILM COATED ORAL DAILY
Qty: 90 TABLET | Refills: 0 | Status: SHIPPED | OUTPATIENT
Start: 2024-01-10 | End: 2024-04-01

## 2024-01-10 RX ORDER — DEXTROAMPHETAMINE SACCHARATE, AMPHETAMINE ASPARTATE, DEXTROAMPHETAMINE SULFATE AND AMPHETAMINE SULFATE 7.5; 7.5; 7.5; 7.5 MG/1; MG/1; MG/1; MG/1
30 TABLET ORAL 3 TIMES DAILY
Qty: 30 TABLET | Refills: 0 | Status: SHIPPED | OUTPATIENT
Start: 2024-02-10 | End: 2024-03-11

## 2024-01-10 RX ORDER — DEXTROAMPHETAMINE SACCHARATE, AMPHETAMINE ASPARTATE, DEXTROAMPHETAMINE SULFATE AND AMPHETAMINE SULFATE 7.5; 7.5; 7.5; 7.5 MG/1; MG/1; MG/1; MG/1
30 TABLET ORAL 3 TIMES DAILY
Qty: 90 TABLET | Refills: 0 | Status: CANCELLED | OUTPATIENT
Start: 2024-01-10

## 2024-01-10 RX ORDER — DEXTROAMPHETAMINE SACCHARATE, AMPHETAMINE ASPARTATE, DEXTROAMPHETAMINE SULFATE AND AMPHETAMINE SULFATE 7.5; 7.5; 7.5; 7.5 MG/1; MG/1; MG/1; MG/1
30 TABLET ORAL 3 TIMES DAILY
Qty: 90 TABLET | Refills: 0 | Status: SHIPPED | OUTPATIENT
Start: 2024-03-12 | End: 2024-04-11

## 2024-01-10 NOTE — TELEPHONE ENCOUNTER
Has not been more than 90 days of a break in medication.     EMILY Arizmendi on 1/10/2024 at 11:54 AM

## 2024-03-05 DIAGNOSIS — N39.0 RECURRENT UTI: ICD-10-CM

## 2024-03-05 DIAGNOSIS — I10 BENIGN ESSENTIAL HYPERTENSION: ICD-10-CM

## 2024-03-05 DIAGNOSIS — B00.9 HERPES SIMPLEX VIRUS INFECTION: ICD-10-CM

## 2024-03-05 RX ORDER — VALACYCLOVIR HYDROCHLORIDE 1 G/1
1000 TABLET, FILM COATED ORAL DAILY
Qty: 90 TABLET | Refills: 0 | Status: SHIPPED | OUTPATIENT
Start: 2024-03-05 | End: 2024-08-21

## 2024-03-05 RX ORDER — AMLODIPINE BESYLATE 5 MG/1
5 TABLET ORAL DAILY
Qty: 90 TABLET | Refills: 0 | Status: SHIPPED | OUTPATIENT
Start: 2024-03-05 | End: 2024-04-17

## 2024-03-05 NOTE — TELEPHONE ENCOUNTER
Last refill until visit.   Prescription approved per [facility] Refill Protocol.

## 2024-03-08 RX ORDER — NITROFURANTOIN 25; 75 MG/1; MG/1
100 CAPSULE ORAL DAILY
Qty: 90 CAPSULE | Refills: 1 | Status: SHIPPED | OUTPATIENT
Start: 2024-03-08 | End: 2024-08-21

## 2024-04-01 DIAGNOSIS — N39.46 MIXED INCONTINENCE URGE AND STRESS (MALE)(FEMALE): ICD-10-CM

## 2024-04-01 RX ORDER — SOLIFENACIN SUCCINATE 10 MG/1
10 TABLET, FILM COATED ORAL DAILY
Qty: 90 TABLET | Refills: 0 | Status: SHIPPED | OUTPATIENT
Start: 2024-04-01

## 2024-04-16 SDOH — HEALTH STABILITY: PHYSICAL HEALTH: ON AVERAGE, HOW MANY MINUTES DO YOU ENGAGE IN EXERCISE AT THIS LEVEL?: 20 MIN

## 2024-04-16 SDOH — HEALTH STABILITY: PHYSICAL HEALTH: ON AVERAGE, HOW MANY DAYS PER WEEK DO YOU ENGAGE IN MODERATE TO STRENUOUS EXERCISE (LIKE A BRISK WALK)?: 4 DAYS

## 2024-04-16 ASSESSMENT — SOCIAL DETERMINANTS OF HEALTH (SDOH)
HOW OFTEN DO YOU ATTEND CHURCH OR RELIGIOUS SERVICES?: NEVER
IN A TYPICAL WEEK, HOW MANY TIMES DO YOU TALK ON THE PHONE WITH FAMILY, FRIENDS, OR NEIGHBORS?: ONCE A WEEK
HOW OFTEN DO YOU GET TOGETHER WITH FRIENDS OR RELATIVES?: ONCE A WEEK
HOW OFTEN DO YOU ATTENT MEETINGS OF THE CLUB OR ORGANIZATION YOU BELONG TO?: NEVER
HOW OFTEN DO YOU GET TOGETHER WITH FRIENDS OR RELATIVES?: ONCE A WEEK
DO YOU BELONG TO ANY CLUBS OR ORGANIZATIONS SUCH AS CHURCH GROUPS UNIONS, FRATERNAL OR ATHLETIC GROUPS, OR SCHOOL GROUPS?: NO

## 2024-04-16 ASSESSMENT — LIFESTYLE VARIABLES
HOW OFTEN DO YOU HAVE SIX OR MORE DRINKS ON ONE OCCASION: NEVER
SKIP TO QUESTIONS 9-10: 1
HOW OFTEN DO YOU HAVE A DRINK CONTAINING ALCOHOL: MONTHLY OR LESS
AUDIT-C TOTAL SCORE: 1
HOW MANY STANDARD DRINKS CONTAINING ALCOHOL DO YOU HAVE ON A TYPICAL DAY: PATIENT DOES NOT DRINK

## 2024-04-17 ENCOUNTER — OFFICE VISIT (OUTPATIENT)
Dept: FAMILY MEDICINE | Facility: CLINIC | Age: 59
End: 2024-04-17

## 2024-04-17 VITALS
WEIGHT: 158 LBS | RESPIRATION RATE: 14 BRPM | HEIGHT: 64 IN | SYSTOLIC BLOOD PRESSURE: 99 MMHG | OXYGEN SATURATION: 96 % | TEMPERATURE: 98.4 F | BODY MASS INDEX: 26.98 KG/M2 | DIASTOLIC BLOOD PRESSURE: 65 MMHG | HEART RATE: 106 BPM

## 2024-04-17 DIAGNOSIS — F90.2 ATTENTION DEFICIT HYPERACTIVITY DISORDER (ADHD), COMBINED TYPE: Primary | ICD-10-CM

## 2024-04-17 DIAGNOSIS — I10 BENIGN ESSENTIAL HYPERTENSION: ICD-10-CM

## 2024-04-17 PROCEDURE — 99214 OFFICE O/P EST MOD 30 MIN: CPT | Performed by: FAMILY MEDICINE

## 2024-04-17 RX ORDER — AMLODIPINE BESYLATE 5 MG/1
5 TABLET ORAL DAILY
Qty: 90 TABLET | Refills: 1 | Status: SHIPPED | OUTPATIENT
Start: 2024-04-17

## 2024-04-17 RX ORDER — DEXTROAMPHETAMINE SACCHARATE, AMPHETAMINE ASPARTATE, DEXTROAMPHETAMINE SULFATE AND AMPHETAMINE SULFATE 7.5; 7.5; 7.5; 7.5 MG/1; MG/1; MG/1; MG/1
TABLET ORAL
Qty: 90 TABLET | Refills: 0 | Status: SHIPPED | OUTPATIENT
Start: 2024-04-17 | End: 2024-07-23

## 2024-04-17 ASSESSMENT — PAIN SCALES - GENERAL: PAINLEVEL: NO PAIN (0)

## 2024-04-17 NOTE — PROGRESS NOTES
Preventive Care Visit  [facility]  April SAMINA Hutchins MD, Family Medicine  Apr 17, 2024  {Provider  Link to SmartSet :550182}    {PROVIDER CHARTING PREFERENCE:830242}    Aylin Davison is a 59 year old, presenting for the following:  Physical        4/17/2024    10:45 AM   Additional Questions   Roomed by Emily Phillips        Health Care Directive  Patient does not have a Health Care Directive or Living Will: Discussed advance care planning with patient; information given to patient to review.    HPI    Here for wellness.    She has been using semaglutide, and that seems to have helped her ADHD and her Adderall seems to be helping her more.        4/16/2024   General Health   How would you rate your overall physical health? Good   Feel stress (tense, anxious, or unable to sleep) Not at all    Not at all         4/16/2024   Nutrition   Three or more servings of calcium each day? Yes   Diet: Low fat/cholesterol   How many servings of fruit and vegetables per day? (!) 2-3   How many sweetened beverages each day? 0-1         4/16/2024   Exercise   Days per week of moderate/strenous exercise 4 days    4 days   Average minutes spent exercising at this level 20 min    20 min         4/16/2024   Social Factors   Frequency of gathering with friends or relatives Once a week    Once a week   Worry food won't last until get money to buy more No    No   Food not last or not have enough money for food? No    No   Do you have housing?  Yes    Yes   Are you worried about losing your housing? No    No   Lack of transportation? No    No   Unable to get utilities (heat,electricity)? No    No         4/16/2024   Fall Risk   Fallen 2 or more times in the past year? No   Trouble with walking or balance? No          4/16/2024   Dental   Dentist two times every year? (!) NO         4/16/2024   TB Screening   Were you born outside of the [place]? No     Today's PHQ-2 Score:       4/16/2024    11:43 AM   PHQ-2  ( 1999 Pf[employer])   Q1: Little interest or pleasure in doing things 0   Q2: Feeling down, depressed or hopeless 0   PHQ-2 Score 0   Q1: Little interest or pleasure in doing things Not at all   Q2: Feeling down, depressed or hopeless Not at all   PHQ-2 Score 0           4/16/2024   Substance Use   Frequency of drinking alcohol? Monthly or less   Alcohol more than 3/day or more than 7/wk No   Do you use any other substances recreationally? No     Social History     Tobacco Use    Smoking status: Never    Smokeless tobacco: Never   Vaping Use    Vaping status: Never Used   Substance Use Topics    Alcohol use: No     Alcohol/week: 0.0 standard drinks of alcohol    Drug use: No           4/16/2024   Breast Cancer Screening   Family history of breast, colon, or ovarian cancer? No / Unknown         4/6/2023   LAST FHS-7 RESULTS   1st degree relative breast or ovarian cancer No   Any relative bilateral breast cancer No   Any male have breast cancer No   Any ONE woman have BOTH breast AND ovarian cancer No   Any woman with breast cancer before 50yrs No   2 or more relatives with breast AND/OR ovarian cancer No   2 or more relatives with breast AND/OR bowel cancer No     {If any of the questions to the FHS7 are answered yes, consider referral for genetic counseling.    Additional indications for genetic referral include personal history of breast or ovarian cancer, genetic mutation in 1st degree relative which increases risk of breast cancer including BRCA1, BRCA2, RODOLFO, PALB 2, TP53, CHEK2, PTEN, CDH1, STK11 (per ACS) and/or 1st degree relative with history of pancreatic or high-risk prostate cancer (per NCCN):317794}           4/16/2024   STI Screening   New sexual partner(s) since last STI/HIV test? No     History of abnormal Pap smear: { :367791}        Latest Ref Rng & Units 5/6/2019     9:47 AM 5/6/2019     9:40 AM 9/28/2015     9:49 AM   PAP / HPV   PAP (Historical)  NIL      HPV 16 DNA NEG^Negative  Negative  Negative   "  HPV 18 DNA NEG^Negative  Negative  Negative    Other HR HPV NEG^Negative  Negative  Negative      ASCVD Risk   The 10-year ASCVD risk score (Briana YE, et al., 2019) is: 3.9%    Values used to calculate the score:      Age: 59 years      Sex: Female      Is Non- : Yes      Diabetic: No      Tobacco smoker: No      Systolic Blood Pressure: 99 mmHg      Is BP treated: Yes      HDL Cholesterol: 54 mg/dL      Total Cholesterol: 243 mg/dL    {Link to Fracture Risk Assessment Tool (Optional):620664}    {Provider  Use the storyboard to review patient history, after sections have been marked as reviewed, refresh note to capture documentation:291899}   Reviewed and updated as needed this visit by Provider                    {HISTORY OPTIONS (Optional):504533}    {ROS Picklists (Optional):981639}     Objective    Exam  BP 99/65 (BP Location: Right arm, Patient Position: Sitting, Cuff Size: Adult Regular)   Pulse 106   Temp 98.4  F (36.9  C) (Oral)   Resp 14   Ht 1.626 m (5' 4\")   Wt 71.7 kg (158 lb)   LMP 02/25/2014   SpO2 96%   Breastfeeding No   BMI 27.12 kg/m     Estimated body mass index is 27.12 kg/m  as calculated from the following:    Height as of this encounter: 1.626 m (5' 4\").    Weight as of this encounter: 71.7 kg (158 lb).    Physical Exam  {Exam Choices (Optional):445927}        Signed Electronically by: Leah Hutchins MD  {Email feedback regarding this note to [email or web address]   :328717}  "

## 2024-04-17 NOTE — PATIENT INSTRUCTIONS
Preventive Care Advice   This is general advice given by our system to help you stay healthy. However, your care team may have specific advice just for you. Please talk to your care team about your preventive care needs.  Nutrition  Eat 5 or more servings of fruits and vegetables each day.  Try wheat bread, brown rice and whole grain pasta (instead of white bread, rice, and pasta).  Get enough calcium and vitamin D. Check the label on foods and aim for 100% of the RDA (recommended daily allowance).  Lifestyle  Exercise at least 150 minutes each week   (30 minutes a day, 5 days a week).  Do muscle strengthening activities 2 days a week. These help control your weight and prevent disease.  No smoking.  Wear sunscreen to prevent skin cancer.  Have a dental exam and cleaning every 6 months.  Yearly exams  See your health care team every year to talk about:  Any changes in your health.  Any medicines your care team has prescribed.  Preventive care, family planning, and ways to prevent chronic diseases.  Shots (vaccines)   HPV shots (up to age 26), if you've never had them before.  Hepatitis B shots (up to age 59), if you've never had them before.  COVID-19 shot: Get this shot when it's due.  Flu shot: Get a flu shot every year.  Tetanus shot: Get a tetanus shot every 10 years.  Pneumococcal, hepatitis A, and RSV shots: Ask your care team if you need these based on your risk.  Shingles shot (for age 50 and up).  General health tests  Diabetes screening:  Starting at age 35, Get screened for diabetes at least every 3 years.  If you are younger than age 35, ask your care team if you should be screened for diabetes.  Cholesterol test: At age 39, start having a cholesterol test every 5 years, or more often if advised.  Bone density scan (DEXA): At age 50, ask your care team if you should have this scan for osteoporosis (brittle bones).  Hepatitis C: Get tested at least once in your life.  STIs (sexually transmitted  infections)  Before age 24: Ask your care team if you should be screened for STIs.  After age 24: Get screened for STIs if you're at risk. You are at risk for STIs (including HIV) if:  You are sexually active with more than one person.  You don't use condoms every time.  You or a partner was diagnosed with a sexually transmitted infection.  If you are at risk for HIV, ask about PrEP medicine to prevent HIV.  Get tested for HIV at least once in your life, whether you are at risk for HIV or not.  Cancer screening tests  Cervical cancer screening: If you have a cervix, begin getting regular cervical cancer screening tests at age 21. Most people who have regular screenings with normal results can stop after age 65. Talk about this with your provider.  Breast cancer scan (mammogram): If you've ever had breasts, begin having regular mammograms starting at age 40. This is a scan to check for breast cancer.  Colon cancer screening: It is important to start screening for colon cancer at age 45.  Have a colonoscopy test every 10 years (or more often if you're at risk) Or, ask your provider about stool tests like a FIT test every year or Cologuard test every 3 years.  To learn more about your testing options, visit: https://www.[employer]Color/868046.pdf.  For help making a decision, visit: https://bit.ly/aa37587.  Prostate cancer screening test: If you have a prostate and are age 55 to 69, ask your provider if you would benefit from a yearly prostate cancer screening test.  Lung cancer screening: If you are a current or former smoker age 50 to 80, ask your care team if ongoing lung cancer screenings are right for you.  For informational purposes only. Not to replace the advice of your health care provider. Copyright   2023 [employer]. All rights reserved. Clinically reviewed by the [facility]. [employer] 725127 - REV 01/24.

## 2024-04-17 NOTE — PROGRESS NOTES
"  Assessment & Plan     Attention deficit hyperactivity disorder (ADHD), combined type  Controlled, continue current regimen.  Follow up in 6 months or sooner as needed.  - amphetamine-dextroamphetamine (ADDERALL) 30 MG tablet; TAKE ONE TABLET BY MOUTH THREE TIMES A DAY    Benign essential hypertension  Discussed decreasing amlodipine to 2.5 mg to see if her pressures remain controlled, as she is borderline low today.  Return to 5 mg daily if pressures get back up close to the 140s/90s.   - amLODIPine (NORVASC) 5 MG tablet; Take 1 tablet (5 mg) by mouth daily      20 minutes spent by me on the date of the encounter doing chart review, history and exam, documentation and further activities per the note      BMI  Estimated body mass index is 27.12 kg/m  as calculated from the following:    Height as of this encounter: 1.626 m (5' 4\").    Weight as of this encounter: 71.7 kg (158 lb).         See Patient Instructions    Subjective   Laney is a 59 year old, presenting for the following health issues:  Physical      4/17/2024    10:45 AM   Additional Questions   Roomed by Emily GREER       Here for med check for the Adderall.  It has been working well for her, especially with some weight loss.  She has been on Semaglutide for several months now, which has been going well for her.    BP controlled.  She is taking the 5 mg tablets.  At home, her pressures are typically in the 110-120s.    No other concerns today.        Objective    BP 99/65 (BP Location: Right arm, Patient Position: Sitting, Cuff Size: Adult Regular)   Pulse 106   Temp 98.4  F (36.9  C) (Oral)   Resp 14   Ht 1.626 m (5' 4\")   Wt 71.7 kg (158 lb)   LMP 02/25/2014   SpO2 96%   Breastfeeding No   BMI 27.12 kg/m    Body mass index is 27.12 kg/m .  Physical Exam   GENERAL: alert and no distress  RESP: lungs clear to auscultation - no rales, rhonchi or wheezes  CV: regular rates and rhythm  PSYCH: mentation appears normal, affect " normal/bright          Signed Electronically by: Leah Hutchins MD

## 2024-05-30 ENCOUNTER — TELEPHONE (OUTPATIENT)
Dept: FAMILY MEDICINE | Facility: CLINIC | Age: 59
End: 2024-05-30

## 2024-05-30 NOTE — TELEPHONE ENCOUNTER
Patient Quality Outreach    Patient is due for the following:   Breast Cancer Screening - Mammogram  Cervical Cancer Screening - PAP Needed  Physical Preventive Adult Physical      Topic Date Due    COVID-19 Vaccine (6 - 2023-24 season) 09/01/2023       Next Steps:   Schedule a Adult Preventative with pap smear    Type of outreach:    Sent MRI Interventions message.    Next Steps:  Reach out within 90 days via Letter.    Max number of attempts reached: No. Will try again in 90 days if patient still on fail list.    Questions for provider review:    None           Orion Rascon, CMA

## 2024-06-17 DIAGNOSIS — L29.9 GENERALIZED PRURITUS: ICD-10-CM

## 2024-06-17 DIAGNOSIS — M72.2 PLANTAR FASCIITIS, BILATERAL: ICD-10-CM

## 2024-06-19 RX ORDER — HYDROXYZINE HYDROCHLORIDE 25 MG/1
TABLET, FILM COATED ORAL
Qty: 30 TABLET | Refills: 1 | Status: SHIPPED | OUTPATIENT
Start: 2024-06-19

## 2024-06-19 RX ORDER — NAPROXEN 500 MG/1
500 TABLET ORAL 2 TIMES DAILY WITH MEALS
Qty: 180 TABLET | Refills: 0 | Status: SHIPPED | OUTPATIENT
Start: 2024-06-19 | End: 2024-09-17

## 2024-06-26 ENCOUNTER — HOSPITAL ENCOUNTER (OUTPATIENT)
Facility: HOSPITAL | Age: 59
Discharge: HOME | End: 2024-06-26
Attending: INTERNAL MEDICINE | Admitting: INTERNAL MEDICINE

## 2024-06-26 VITALS
HEIGHT: 64 IN | SYSTOLIC BLOOD PRESSURE: 107 MMHG | HEART RATE: 78 BPM | RESPIRATION RATE: 16 BRPM | TEMPERATURE: 97.2 F | WEIGHT: 160 LBS | OXYGEN SATURATION: 100 % | BODY MASS INDEX: 27.31 KG/M2 | DIASTOLIC BLOOD PRESSURE: 56 MMHG

## 2024-06-26 LAB — COLONOSCOPY: NORMAL

## 2024-06-26 PROCEDURE — 99153 MOD SED SAME PHYS/QHP EA: CPT | Performed by: INTERNAL MEDICINE

## 2024-06-26 PROCEDURE — 45380 COLONOSCOPY AND BIOPSY: CPT | Performed by: INTERNAL MEDICINE

## 2024-06-26 PROCEDURE — 88305 TISSUE EXAM BY PATHOLOGIST: CPT | Mod: TC | Performed by: INTERNAL MEDICINE

## 2024-06-26 PROCEDURE — 250N000011 HC RX IP 250 OP 636: Mod: JZ | Performed by: INTERNAL MEDICINE

## 2024-06-26 PROCEDURE — 88305 TISSUE EXAM BY PATHOLOGIST: CPT | Mod: 26 | Performed by: PATHOLOGY

## 2024-06-26 PROCEDURE — 45385 COLONOSCOPY W/LESION REMOVAL: CPT | Mod: PT | Performed by: INTERNAL MEDICINE

## 2024-06-26 PROCEDURE — G0500 MOD SEDAT ENDO SERVICE >5YRS: HCPCS | Performed by: INTERNAL MEDICINE

## 2024-06-26 RX ORDER — PROCHLORPERAZINE MALEATE 10 MG
10 TABLET ORAL EVERY 6 HOURS PRN
Status: CANCELLED | OUTPATIENT
Start: 2024-06-26

## 2024-06-26 RX ORDER — NALOXONE HYDROCHLORIDE 0.4 MG/ML
0.4 INJECTION, SOLUTION INTRAMUSCULAR; INTRAVENOUS; SUBCUTANEOUS
Status: DISCONTINUED | OUTPATIENT
Start: 2024-06-26 | End: 2024-06-26 | Stop reason: HOSPADM

## 2024-06-26 RX ORDER — LIDOCAINE 40 MG/G
CREAM TOPICAL
Status: DISCONTINUED | OUTPATIENT
Start: 2024-06-26 | End: 2024-06-26 | Stop reason: HOSPADM

## 2024-06-26 RX ORDER — NALOXONE HYDROCHLORIDE 0.4 MG/ML
0.2 INJECTION, SOLUTION INTRAMUSCULAR; INTRAVENOUS; SUBCUTANEOUS
Status: DISCONTINUED | OUTPATIENT
Start: 2024-06-26 | End: 2024-06-26 | Stop reason: HOSPADM

## 2024-06-26 RX ORDER — ONDANSETRON 2 MG/ML
4 INJECTION INTRAMUSCULAR; INTRAVENOUS EVERY 6 HOURS PRN
Status: CANCELLED | OUTPATIENT
Start: 2024-06-26

## 2024-06-26 RX ORDER — ONDANSETRON 4 MG/1
4 TABLET, ORALLY DISINTEGRATING ORAL EVERY 6 HOURS PRN
Status: CANCELLED | OUTPATIENT
Start: 2024-06-26

## 2024-06-26 RX ORDER — SIMETHICONE 40MG/0.6ML
133 SUSPENSION, DROPS(FINAL DOSAGE FORM)(ML) ORAL
Status: DISCONTINUED | OUTPATIENT
Start: 2024-06-26 | End: 2024-06-26 | Stop reason: HOSPADM

## 2024-06-26 RX ORDER — ONDANSETRON 2 MG/ML
4 INJECTION INTRAMUSCULAR; INTRAVENOUS
Status: DISCONTINUED | OUTPATIENT
Start: 2024-06-26 | End: 2024-06-26 | Stop reason: HOSPADM

## 2024-06-26 RX ORDER — ATROPINE SULFATE 0.1 MG/ML
1 INJECTION INTRAVENOUS
Status: DISCONTINUED | OUTPATIENT
Start: 2024-06-26 | End: 2024-06-26 | Stop reason: HOSPADM

## 2024-06-26 RX ORDER — FENTANYL CITRATE 50 UG/ML
INJECTION, SOLUTION INTRAMUSCULAR; INTRAVENOUS PRN
Status: DISCONTINUED | OUTPATIENT
Start: 2024-06-26 | End: 2024-06-26 | Stop reason: HOSPADM

## 2024-06-26 RX ORDER — FLUMAZENIL 0.1 MG/ML
0.2 INJECTION, SOLUTION INTRAVENOUS
Status: CANCELLED | OUTPATIENT
Start: 2024-06-26 | End: 2024-06-26

## 2024-06-26 RX ORDER — FLUMAZENIL 0.1 MG/ML
0.2 INJECTION, SOLUTION INTRAVENOUS
Status: DISCONTINUED | OUTPATIENT
Start: 2024-06-26 | End: 2024-06-26 | Stop reason: HOSPADM

## 2024-06-26 RX ORDER — EPINEPHRINE 1 MG/ML
0.1 INJECTION, SOLUTION INTRAMUSCULAR; SUBCUTANEOUS
Status: DISCONTINUED | OUTPATIENT
Start: 2024-06-26 | End: 2024-06-26 | Stop reason: HOSPADM

## 2024-06-26 RX ORDER — DIPHENHYDRAMINE HYDROCHLORIDE 50 MG/ML
25-50 INJECTION INTRAMUSCULAR; INTRAVENOUS
Status: DISCONTINUED | OUTPATIENT
Start: 2024-06-26 | End: 2024-06-26 | Stop reason: HOSPADM

## 2024-06-26 RX ORDER — FENTANYL CITRATE 50 UG/ML
50-100 INJECTION, SOLUTION INTRAMUSCULAR; INTRAVENOUS EVERY 5 MIN PRN
Status: DISCONTINUED | OUTPATIENT
Start: 2024-06-26 | End: 2024-06-26 | Stop reason: HOSPADM

## 2024-06-26 ASSESSMENT — ACTIVITIES OF DAILY LIVING (ADL)
ADLS_ACUITY_SCORE: 35
ADLS_ACUITY_SCORE: 35

## 2024-06-28 LAB
PATH REPORT.COMMENTS IMP SPEC: NORMAL
PATH REPORT.COMMENTS IMP SPEC: NORMAL
PATH REPORT.FINAL DX SPEC: NORMAL
PATH REPORT.GROSS SPEC: NORMAL
PATH REPORT.MICROSCOPIC SPEC OTHER STN: NORMAL
PATH REPORT.RELEVANT HX SPEC: NORMAL
PHOTO IMAGE: NORMAL

## 2024-07-22 DIAGNOSIS — F90.2 ATTENTION DEFICIT HYPERACTIVITY DISORDER (ADHD), COMBINED TYPE: ICD-10-CM

## 2024-07-23 RX ORDER — DEXTROAMPHETAMINE SACCHARATE, AMPHETAMINE ASPARTATE, DEXTROAMPHETAMINE SULFATE AND AMPHETAMINE SULFATE 7.5; 7.5; 7.5; 7.5 MG/1; MG/1; MG/1; MG/1
TABLET ORAL
Qty: 90 TABLET | Refills: 0 | Status: SHIPPED | OUTPATIENT
Start: 2024-07-23 | End: 2024-08-21

## 2024-08-10 ENCOUNTER — HEALTH MAINTENANCE LETTER (OUTPATIENT)
Age: 59
End: 2024-08-10

## 2024-08-21 DIAGNOSIS — I10 BENIGN ESSENTIAL HYPERTENSION: ICD-10-CM

## 2024-08-21 DIAGNOSIS — F90.2 ATTENTION DEFICIT HYPERACTIVITY DISORDER (ADHD), COMBINED TYPE: ICD-10-CM

## 2024-08-21 DIAGNOSIS — B00.9 HERPES SIMPLEX VIRUS INFECTION: ICD-10-CM

## 2024-08-21 DIAGNOSIS — J30.2 ACUTE SEASONAL ALLERGIC RHINITIS: ICD-10-CM

## 2024-08-21 DIAGNOSIS — N39.0 RECURRENT UTI: ICD-10-CM

## 2024-08-21 RX ORDER — FEXOFENADINE HCL 180 MG/1
180 TABLET ORAL DAILY PRN
Qty: 30 TABLET | Refills: 2 | Status: SHIPPED | OUTPATIENT
Start: 2024-08-21

## 2024-08-21 RX ORDER — VALACYCLOVIR HYDROCHLORIDE 1 G/1
1000 TABLET, FILM COATED ORAL DAILY
Qty: 90 TABLET | Refills: 0 | Status: SHIPPED | OUTPATIENT
Start: 2024-08-21

## 2024-08-21 RX ORDER — NITROFURANTOIN 25; 75 MG/1; MG/1
100 CAPSULE ORAL DAILY
Qty: 90 CAPSULE | Refills: 1 | Status: SHIPPED | OUTPATIENT
Start: 2024-08-21

## 2024-08-21 RX ORDER — AMLODIPINE BESYLATE 5 MG/1
5 TABLET ORAL DAILY
Qty: 90 TABLET | Refills: 1 | OUTPATIENT
Start: 2024-08-21

## 2024-08-21 RX ORDER — ESTRADIOL 0.1 MG/G
2 CREAM VAGINAL
Qty: 42.5 G | Refills: 3 | Status: SHIPPED | OUTPATIENT
Start: 2024-08-22

## 2024-08-21 RX ORDER — DEXTROAMPHETAMINE SACCHARATE, AMPHETAMINE ASPARTATE, DEXTROAMPHETAMINE SULFATE AND AMPHETAMINE SULFATE 7.5; 7.5; 7.5; 7.5 MG/1; MG/1; MG/1; MG/1
TABLET ORAL
Qty: 90 TABLET | Refills: 0 | Status: SHIPPED | OUTPATIENT
Start: 2024-08-21 | End: 2024-09-16

## 2024-08-23 DIAGNOSIS — B00.9 HERPES SIMPLEX VIRUS INFECTION: ICD-10-CM

## 2024-08-23 RX ORDER — ACYCLOVIR 50 MG/G
OINTMENT TOPICAL 2 TIMES DAILY PRN
Qty: 15 G | Refills: 3 | Status: SHIPPED | OUTPATIENT
Start: 2024-08-23

## 2024-09-15 DIAGNOSIS — F90.2 ATTENTION DEFICIT HYPERACTIVITY DISORDER (ADHD), COMBINED TYPE: ICD-10-CM

## 2024-09-16 RX ORDER — DEXTROAMPHETAMINE SACCHARATE, AMPHETAMINE ASPARTATE, DEXTROAMPHETAMINE SULFATE AND AMPHETAMINE SULFATE 7.5; 7.5; 7.5; 7.5 MG/1; MG/1; MG/1; MG/1
TABLET ORAL
Qty: 90 TABLET | Refills: 0 | Status: SHIPPED | OUTPATIENT
Start: 2024-09-22

## 2024-09-17 DIAGNOSIS — M72.2 PLANTAR FASCIITIS, BILATERAL: ICD-10-CM

## 2024-09-17 RX ORDER — NAPROXEN 500 MG/1
TABLET ORAL
Qty: 180 TABLET | Refills: 0 | Status: SHIPPED | OUTPATIENT
Start: 2024-09-17

## 2024-10-19 ENCOUNTER — HEALTH MAINTENANCE LETTER (OUTPATIENT)
Age: 59
End: 2024-10-19

## 2024-10-25 DIAGNOSIS — F90.2 ATTENTION DEFICIT HYPERACTIVITY DISORDER (ADHD), COMBINED TYPE: ICD-10-CM

## 2024-10-25 RX ORDER — DEXTROAMPHETAMINE SACCHARATE, AMPHETAMINE ASPARTATE, DEXTROAMPHETAMINE SULFATE AND AMPHETAMINE SULFATE 7.5; 7.5; 7.5; 7.5 MG/1; MG/1; MG/1; MG/1
TABLET ORAL
Qty: 90 TABLET | Refills: 0 | Status: SHIPPED | OUTPATIENT
Start: 2024-10-25

## 2024-10-25 NOTE — TELEPHONE ENCOUNTER
Patient called and requested a refill for Adderall 30 mg tablet. RN noted pharmacy also requested the medication. She said she is scheduled for her med check next week.     Future Appointments 10/25/2024 - 4/23/2025        Date Visit Type Length Department Provider     11/1/2024 10:30 AM OFFICE VISIT 30 min [facility] Coming Leah Hutchins MD    Location Instructions:     [facility] is located at [address] next to [employer]. Free parking is available; access the lot by turning east from [facility] onto [address] [place] [address].                   Routing to provider to please review request and approve or advise. Thank you.    Lor Jaeger, RN, BSN, PHN  [facility]      [facility] - Neurosurgery ([facility])  [facility]  Progress Note    Patient Name: Pernell Ly  MRN: [id number]  Patient Class: IP- Inpatient   Admission Date: 8/8/2024  Length of Stay: 3 days  Attending Physician: Maite Hargrove MD  Primary Care Provider: Zoey Nelson MD        Subjective:     Principal Problem:Seizure        HPI:  42-year-old female with HTN, migraines, epilepsy who presented with a seizure 2 hours prior to presenting in the ER.  She also reported difficulty controlling her seizures with increase in seizure activity with an episode 2 weeks prior to this last episode.  Seizures last typically for 3-5 minutes.  Another episode of seizure activity witnessed in the ER.  Her medications has been titrated up without improvement as outpatient.  Patient reported intermittent left-sided tingling and numbness sensation, mostly at night that can be associated with a headache and there was concern that this may be seizures as well, and she was working to get EEG monitoring (EMU) with her epileptologist but was unable to have that covered by insurance.  Case was discussed with Neurology by ER physician who recommended increasing clobazam 20 mg p.o. q.h.s. to 30 mg p.o. q.h.s., and transferred to [facility] for continuous EEG monitoring which is only available at that facility.  Patient was accepted for transfer, however no beds have been available, and it was past 24 hours while awaiting in the ER therefore Hospital Medicine was asked to admit patient while awaiting bed for transfer.  No further seizure activity after medication adjustment since last night.  Patient reports nasal congestion, but otherwise is without complaints.  No reported headache at this time.         Overview/Hospital Course:  No notes on file    Interval History:   8/13: Continuing EEG monitoring.     Review of Systems   Constitutional:  Negative for chills and fever.   HENT:  Positive for congestion.    Eyes:  Negative for visual  disturbance.   Respiratory:  Negative for shortness of breath.    Cardiovascular:  Negative for chest pain.   Gastrointestinal:  Negative for abdominal pain, nausea and vomiting.   Endocrine: Negative for polyuria.   Genitourinary:  Negative for dysuria.   Musculoskeletal:  Negative for back pain.   Skin:  Negative for rash.   Neurological:  Positive for seizures. Negative for tremors, weakness and headaches.   Hematological:  Does not bruise/bleed easily.   Psychiatric/Behavioral:  Negative for confusion.      Objective:     Vital Signs (Most Recent):  Temp: 97.9 °F (36.6 °C) (08/13/24 1213)  Pulse: 69 (08/13/24 1500)  Resp: 20 (08/13/24 1213)  BP: 127/85 (08/13/24 1213)  SpO2: 99 % (08/13/24 1213) Vital Signs (24h Range):  Temp:  [97.9 °F (36.6 °C)-98.1 °F (36.7 °C)] 97.9 °F (36.6 °C)  Pulse:  [66-90] 69  Resp:  [16-20] 20  SpO2:  [96 %-99 %] 99 %  BP: (105-127)/(57-85) 127/85     Weight: 77.2 kg (170 lb 3.5 oz)  Body mass index is 29.22 kg/m².    Intake/Output Summary (Last 24 hours) at 8/13/2024 1710  Last data filed at 8/13/2024 0600  Gross per 24 hour   Intake 20 ml   Output --   Net 20 ml         Physical Exam  Vitals and nursing note reviewed.   Constitutional:       Appearance: She is not ill-appearing.   HENT:      Head: Normocephalic and atraumatic.      Nose: Nose normal.   Eyes:      Extraocular Movements: Extraocular movements intact.      Conjunctiva/sclera: Conjunctivae normal.   Cardiovascular:      Rate and Rhythm: Normal rate and regular rhythm.   Pulmonary:      Effort: Pulmonary effort is normal.      Breath sounds: Normal breath sounds.   Abdominal:      General: Bowel sounds are normal. There is no distension.      Palpations: Abdomen is soft.      Tenderness: There is no abdominal tenderness. There is no guarding or rebound.   Musculoskeletal:         General: Normal range of motion.      Cervical back: Normal range of motion.      Right lower leg: No edema.      Left lower leg: No edema.    Skin:     General: Skin is warm and dry.      Capillary Refill: Capillary refill takes less than 2 seconds.   Neurological:      Mental Status: She is alert and oriented to person, place, and time.      Motor: No weakness.   Psychiatric:         Mood and Affect: Mood normal.         Behavior: Behavior normal.         Thought Content: Thought content normal.             Significant Labs: All pertinent labs within the past 24 hours have been reviewed.    Significant Imaging: I have reviewed all pertinent imaging results/findings within the past 24 hours.    Assessment/Plan:      * Seizure  Atypical migraines  - No infection as possible nidus for worsening seizure activity and no identifiable triggers as per patient  - Continue lamotrigine 150 mg p.o. b.i.d. and increased clobazam 30 mg p.o. q.h.s. as previously discussed with Neurology by ER physician  - Seizure precautions and will have diazepam 5 mg IV q.4 hours as needed for any return of seizure activity  - Also have medication for headaches as needed  - Await transfer to [facility]  - Trend with labs and correct electrolytes as needed    Gastroesophageal reflux disease with esophagitis without hemorrhage  - Reports she takes PPI as needed but not on a daily basis    HTN (hypertension)  Chronic, controlled. Latest blood pressure and vitals reviewed-     Temp:  [97.9 °F (36.6 °C)-98.1 °F (36.7 °C)]   Pulse:  [66-90]   Resp:  [16-20]   BP: (105-127)/(57-85)   SpO2:  [96 %-99 %] .   Home meds for hypertension were reviewed and noted below.   Hypertension Medications               NIFEdipine (PROCARDIA-XL) 90 MG (OSM) 24 hr tablet TAKE 1 TABLET(90 MG) BY MOUTH EVERY DAY            While in the hospital, will manage blood pressure as follows; Continue home antihypertensive regimen    Will utilize p.r.n. blood pressure medication only if patient's blood pressure greater than 180/110 and she develops symptoms such as worsening chest pain or shortness of breath.    Essential  hypertension  - Resume home regimen of nifedipine XL 90 mg p.o. daily      VTE Risk Mitigation (From admission, onward)           Ordered     IP VTE LOW RISK PATIENT  Once         08/09/24 1754     Place sequential compression device  Until discontinued         08/09/24 1754                    Discharge Planning   SY: 8/14/2024     Code Status: Full Code   Is the patient medically ready for discharge?:     Reason for patient still in hospital (select all that apply): Patient trending condition  Discharge Plan A: Home                  Maite Hargrove MD  Department of Hospital Medicine   [facility] - Neurosurgery ([facility])

## 2024-10-25 NOTE — TELEPHONE ENCOUNTER
Dr. Sheila Hutchins is out of office. PDMP reviewed. 30 day refill provided. Follow up at upcoming visit too continue.     RON Melendrez CNP]

## 2024-11-01 ENCOUNTER — TELEPHONE (OUTPATIENT)
Dept: FAMILY MEDICINE | Facility: CLINIC | Age: 59
End: 2024-11-01

## 2024-11-01 DIAGNOSIS — F90.2 ATTENTION DEFICIT HYPERACTIVITY DISORDER (ADHD), COMBINED TYPE: ICD-10-CM

## 2024-11-01 RX ORDER — DEXTROAMPHETAMINE SACCHARATE, AMPHETAMINE ASPARTATE, DEXTROAMPHETAMINE SULFATE AND AMPHETAMINE SULFATE 7.5; 7.5; 7.5; 7.5 MG/1; MG/1; MG/1; MG/1
TABLET ORAL
Qty: 90 TABLET | Refills: 0 | Status: CANCELLED | OUTPATIENT
Start: 2024-11-01

## 2024-11-01 NOTE — TELEPHONE ENCOUNTER
Clinic RN: Please investigate patient's chart or contact patient if the information cannot be found because  please determine pharmacy and route back to refill pool.  Marla Mckeon RN, BSN  [facility]

## 2024-11-01 NOTE — TELEPHONE ENCOUNTER
Pt has been waiting to see Dr Palma for an appointment-has been w/out Adderall for over a week now-provider called out-pt would like enough to get to her appointment 11/04/2024 with Logan Calvert.    Yasmeen Motta/TC

## 2024-11-01 NOTE — TELEPHONE ENCOUNTER
Message #1 left for patient to return call     Per chart review rx was sent to the pharmacy on 10/25/2024. Please advise patient to follow up with the pharmacy     amphetamine-dextroamphetamine (ADDERALL) 30 MG tablet 90 tablet 0 10/25/2024 -- No   Sig: TAKE ONE TABLET BY MOUTH THREE TIMES A DAY   Sent to pharmacy as: Amphetamine-Dextroamphetamine 30 MG Oral Tablet (ADDERALL)   Class: E-Prescribe   Earliest Fill Date: 10/25/2024   Order: [id number]   E-Prescribing Status: Receipt confirmed by pharmacy (10/25/2024  5:06 PM CDT)     Jazzmine Duncan Registered Nurse  [facility]

## 2024-11-01 NOTE — TELEPHONE ENCOUNTER
Pt called back. Informed of below. Pt stated understanding.    Rescheduled med-check appt for January with pts PCP    Tara Griffiths RN on 11/1/2024 at 2:13 PM

## 2024-11-12 DIAGNOSIS — M72.2 PLANTAR FASCIITIS, BILATERAL: ICD-10-CM

## 2024-11-12 DIAGNOSIS — I10 BENIGN ESSENTIAL HYPERTENSION: ICD-10-CM

## 2024-11-12 DIAGNOSIS — N39.0 RECURRENT UTI: ICD-10-CM

## 2024-11-12 DIAGNOSIS — N39.46 MIXED INCONTINENCE URGE AND STRESS (MALE)(FEMALE): ICD-10-CM

## 2024-11-12 RX ORDER — SOLIFENACIN SUCCINATE 10 MG/1
10 TABLET, FILM COATED ORAL DAILY
Qty: 90 TABLET | Refills: 1 | Status: SHIPPED | OUTPATIENT
Start: 2024-11-12

## 2024-11-12 RX ORDER — NAPROXEN 500 MG/1
500 TABLET ORAL 2 TIMES DAILY WITH MEALS
Qty: 180 TABLET | Refills: 1 | Status: SHIPPED | OUTPATIENT
Start: 2024-11-12

## 2024-11-12 RX ORDER — NITROFURANTOIN 25; 75 MG/1; MG/1
100 CAPSULE ORAL DAILY
Qty: 90 CAPSULE | Refills: 1 | Status: SHIPPED | OUTPATIENT
Start: 2024-11-12

## 2024-11-12 RX ORDER — AMLODIPINE BESYLATE 5 MG/1
5 TABLET ORAL DAILY
Qty: 90 TABLET | Refills: 1 | Status: SHIPPED | OUTPATIENT
Start: 2024-11-12

## 2024-11-12 NOTE — TELEPHONE ENCOUNTER
Dr. Sheila Hutchins   Please review pended refills     Patient states she did not have her vesicare with for her vacation so missed approx 1 1/2 weeks, otherwise has been taking as directed     Thank you   Jazzmine Duncan, Registered Nurse  [facility]

## 2024-11-12 NOTE — TELEPHONE ENCOUNTER
Clinic RN: (solifenacin succinate) Please investigate patient's chart or contact patient if the information cannot be found because patient should have run out of this medication on 10/2024. Confirm patient is taking this medication as prescribed. Document findings and route refill encounter to provider for approval or denial.

## 2024-12-03 DIAGNOSIS — F90.2 ATTENTION DEFICIT HYPERACTIVITY DISORDER (ADHD), COMBINED TYPE: ICD-10-CM

## 2024-12-03 RX ORDER — DEXTROAMPHETAMINE SACCHARATE, AMPHETAMINE ASPARTATE, DEXTROAMPHETAMINE SULFATE AND AMPHETAMINE SULFATE 7.5; 7.5; 7.5; 7.5 MG/1; MG/1; MG/1; MG/1
TABLET ORAL
Qty: 90 TABLET | Refills: 0 | Status: SHIPPED | OUTPATIENT
Start: 2024-12-03

## 2024-12-03 NOTE — TELEPHONE ENCOUNTER
Received a call from patient requesting a refill of her Adderall. Pended. Please review/sign if appropriate.  Caro SCHREIBER RN, BSN  Clinic RN  [facility]

## 2024-12-28 DIAGNOSIS — F90.2 ATTENTION DEFICIT HYPERACTIVITY DISORDER (ADHD), COMBINED TYPE: ICD-10-CM

## 2024-12-31 RX ORDER — DEXTROAMPHETAMINE SACCHARATE, AMPHETAMINE ASPARTATE, DEXTROAMPHETAMINE SULFATE AND AMPHETAMINE SULFATE 7.5; 7.5; 7.5; 7.5 MG/1; MG/1; MG/1; MG/1
TABLET ORAL
Qty: 90 TABLET | Refills: 0 | Status: SHIPPED | OUTPATIENT
Start: 2024-12-31

## 2025-01-08 ENCOUNTER — VIRTUAL VISIT (OUTPATIENT)
Dept: FAMILY MEDICINE | Facility: CLINIC | Age: 60
End: 2025-01-08

## 2025-01-08 DIAGNOSIS — I10 BENIGN ESSENTIAL HYPERTENSION: Primary | ICD-10-CM

## 2025-01-08 DIAGNOSIS — F90.2 ATTENTION DEFICIT HYPERACTIVITY DISORDER (ADHD), COMBINED TYPE: ICD-10-CM

## 2025-01-08 DIAGNOSIS — Z12.31 VISIT FOR SCREENING MAMMOGRAM: ICD-10-CM

## 2025-01-08 DIAGNOSIS — E66.3 OVERWEIGHT (BMI 25.0-29.9): ICD-10-CM

## 2025-01-08 PROCEDURE — 98014 SYNCH AUDIO-ONLY EST MOD 30: CPT | Performed by: FAMILY MEDICINE

## 2025-01-08 RX ORDER — DEXTROAMPHETAMINE SACCHARATE, AMPHETAMINE ASPARTATE, DEXTROAMPHETAMINE SULFATE AND AMPHETAMINE SULFATE 7.5; 7.5; 7.5; 7.5 MG/1; MG/1; MG/1; MG/1
30 TABLET ORAL 3 TIMES DAILY
Qty: 90 TABLET | Refills: 0 | Status: SHIPPED | OUTPATIENT
Start: 2025-04-05 | End: 2025-05-05

## 2025-01-08 RX ORDER — DEXTROAMPHETAMINE SACCHARATE, AMPHETAMINE ASPARTATE, DEXTROAMPHETAMINE SULFATE AND AMPHETAMINE SULFATE 5; 5; 5; 5 MG/1; MG/1; MG/1; MG/1
20 TABLET ORAL DAILY
Qty: 30 TABLET | Refills: 0 | Status: SHIPPED | OUTPATIENT
Start: 2025-03-05 | End: 2025-01-08

## 2025-01-08 RX ORDER — DEXTROAMPHETAMINE SACCHARATE, AMPHETAMINE ASPARTATE, DEXTROAMPHETAMINE SULFATE AND AMPHETAMINE SULFATE 7.5; 7.5; 7.5; 7.5 MG/1; MG/1; MG/1; MG/1
30 TABLET ORAL 3 TIMES DAILY
Qty: 90 TABLET | Refills: 0 | Status: SHIPPED | OUTPATIENT
Start: 2025-02-02 | End: 2025-03-04

## 2025-01-08 RX ORDER — DEXTROAMPHETAMINE SACCHARATE, AMPHETAMINE ASPARTATE, DEXTROAMPHETAMINE SULFATE AND AMPHETAMINE SULFATE 5; 5; 5; 5 MG/1; MG/1; MG/1; MG/1
20 TABLET ORAL DAILY
Qty: 30 TABLET | Refills: 0 | Status: SHIPPED | OUTPATIENT
Start: 2025-02-02 | End: 2025-01-08

## 2025-01-08 RX ORDER — DEXTROAMPHETAMINE SACCHARATE, AMPHETAMINE ASPARTATE, DEXTROAMPHETAMINE SULFATE AND AMPHETAMINE SULFATE 7.5; 7.5; 7.5; 7.5 MG/1; MG/1; MG/1; MG/1
30 TABLET ORAL 3 TIMES DAILY
Qty: 90 TABLET | Refills: 0 | Status: SHIPPED | OUTPATIENT
Start: 2025-03-05 | End: 2025-04-04

## 2025-01-08 RX ORDER — DEXTROAMPHETAMINE SACCHARATE, AMPHETAMINE ASPARTATE, DEXTROAMPHETAMINE SULFATE AND AMPHETAMINE SULFATE 5; 5; 5; 5 MG/1; MG/1; MG/1; MG/1
20 TABLET ORAL DAILY
Qty: 30 TABLET | Refills: 0 | Status: SHIPPED | OUTPATIENT
Start: 2025-04-05 | End: 2025-01-08

## 2025-01-08 NOTE — PROGRESS NOTES
"Laney is a 60 year old who is being evaluated via a billable video visit.    How would you like to obtain your AVS? MyChart  If the video visit is dropped, the invitation should be resent by: Text to cell phone: [phone]  Will anyone else be joining your video visit? No  {If patient encounters technical issues they should call [phone] :690817}    {PROVIDER CHARTING PREFERENCE:158008}    Subjective   Laney is a 60 year old, presenting for the following health issues:  Recheck Medication (All of them )        1/8/2025     1:38 PM   Additional Questions   Roomed by hope r   Accompanied by self         1/8/2025     1:38 PM   Patient Reported Additional Medications   Patient reports taking the following new medications n/a     Video Start Time: {video visit start/end time for provider to select:493503}    History of Present Illness       Reason for visit:  Medication review   She is taking medications regularly.       BP has been good, 120/80s. She is on Amlodipine.    No problems with her Adderall    Her weight is fluctuating.  She at 171 lbs.     15 minutes  {ROS Picklists (Optional):851079}      Objective    Vitals - Patient Reported  Systolic (Patient Reported): 124  Diastolic (Patient Reported): 82  Weight (Patient Reported): 77.6 kg (171 lb)  Pain Score: No Pain (0)        Physical Exam   {video visit exam brief selected:613851}    {Diagnostic Test Results (Optional):150645}      Video-Visit Details    Type of service:  Video Visit   Video End Time:{video visit start/end time for provider to select:249054}  Originating Location (pt. Location): {video visit patient location:088255::\"Home\"}  {PROVIDER LOCATION On-site should be selected for visits conducted from your clinic location or adjoining [facility] hospital, academic office, or other nearby [facility] building. Off-site should be selected for all other provider locations, including home:275556}  Distant Location (provider location):  {virtual location " "provider:378512}  Platform used for Video Visit: {Virtual Visit Platforms:364884::\"Zilico\"}  Signed Electronically by: Leah Hutchins MD  {Email feedback regarding this note to [email or web address]   :527079}  "

## 2025-01-08 NOTE — PROGRESS NOTES
Laney is a 60 year old who is being evaluated via a billable telephone visit.    What phone number would you like to be contacted at? [phone]   How would you like to obtain your AVS? Candice  Originating Location (pt. Location): Home    Distant Location (provider location):  On-site  Telephone visit completed due to the patient having technical difficulties and not able to open video visit.    Assessment & Plan     Benign essential hypertension  Well-controlled, continue amlodipine 5 mg p.o. daily.  Refill when due.    Attention deficit hyperactivity disorder (ADHD), combined type  Stable, due for yearly urine drug screen, orders placed.  Continue current Adderall dosing.  Follow-up in 6 months or sooner as needed.  - Drug Confirmation Panel Urine with Creat - lab collect; Future  - amphetamine-dextroamphetamine (ADDERALL) 30 MG tablet; Take 1 tablet (30 mg) by mouth 3 times daily.  - amphetamine-dextroamphetamine (ADDERALL) 30 MG tablet; Take 1 tablet (30 mg) by mouth 3 times daily.  - amphetamine-dextroamphetamine (ADDERALL) 30 MG tablet; Take 1 tablet (30 mg) by mouth 3 times daily.    Overweight (BMI 25.0-29.9)  Current BMI is 29.4 and patient does have comorbid conditions.  Will send in semaglutide 0.25 mg once weekly.  Patient will follow-up after her fourth injection and let us know if she is tolerating the medication and if she would like to increase to 0.5 mg weekly or stay at 0.25 mg weekly.  Follow-up in 3 months or sooner as needed.  - COMPOUNDED NON-CONTROLLED SUBSTANCE (CMPD RX) - PHARMACY TO MIX COMPOUNDED MEDICATION; Semaglutide, 0.25 mg SubQ once weekly  - Basic metabolic panel  (Ca, Cl, CO2, Creat, Gluc, K, Na, BUN); Future    Visit for screening mammogram  - MA Screening Bilateral w/ Nikolas; Future      The longitudinal plan of care for the diagnosis(es)/condition(s) as documented were addressed during this visit. Due to the added complexity in care, I will continue to support Laney in the  "subsequent management and with ongoing continuity of care.    32 minutes spent by me on the date of the encounter doing chart review, history and exam, documentation and further activities per the note    BMI  Estimated body mass index is 27.46 kg/m  as calculated from the following:    Height as of 6/26/24: 1.626 m (5' 4\").    Weight as of 6/26/24: 72.6 kg (160 lb).   Weight management plan: Discussed healthy diet and exercise guidelines      See Patient Instructions    Subjective   Laney is a 60 year old, presenting for the following health issues:  Recheck Medication (All of them )      1/8/2025     1:38 PM   Additional Questions   Roomed by hope r   Accompanied by self         1/8/2025     1:38 PM   Patient Reported Additional Medications   Patient reports taking the following new medications n/a     History of Present Illness       Reason for visit:  Medication review   She is taking medications regularly.        BP has been good, 120/80s. She is on Amlodipine 5 mg.     No problems with her Adderall, working well for her.     Her weight is fluctuating.  She at 171 lbs. she is interested in starting something for weight loss.  BMI is 29.4.      Objective    Vitals - Patient Reported  Systolic (Patient Reported): 124  Diastolic (Patient Reported): 82  Weight (Patient Reported): 77.6 kg (171 lb)  Pain Score: No Pain (0)        Physical Exam   General: Alert and no distress //Respiratory: No audible wheeze, cough, or shortness of breath // Psychiatric:  Appropriate affect, tone, and pace of words      Labs reviewed in chart      Phone call duration: 15 minutes  Signed Electronically by: Leah Hutchins MD      "

## 2025-01-09 ENCOUNTER — PATIENT OUTREACH (OUTPATIENT)
Dept: CARE COORDINATION | Facility: CLINIC | Age: 60
End: 2025-01-09

## 2025-01-15 ENCOUNTER — TELEPHONE (OUTPATIENT)
Dept: FAMILY MEDICINE | Facility: CLINIC | Age: 60
End: 2025-01-15

## 2025-01-15 DIAGNOSIS — F90.2 ATTENTION DEFICIT HYPERACTIVITY DISORDER (ADHD), COMBINED TYPE: ICD-10-CM

## 2025-01-15 NOTE — TELEPHONE ENCOUNTER
RN chart review     Message below states patient's rx for amphetamine salts were sent to  compounding pharmacy and requesting  specialty pharmacy     However per chart review 3 months of adderall were sent to [facility]     Will need to discuss with patient where she would like medication filled - if filling at [facility] she can just call them to fill and nothing further needed     If requesting specialty pharmacy will need to have pcp sign new orders     Jazzmine Duncan, Registered Nurse  [facility]

## 2025-01-15 NOTE — TELEPHONE ENCOUNTER
Hello we are requesting the Amphetamine salts 20 mg tabs to be sent to the [facility] they were sent to the compounding pharm and we can't transfer them

## 2025-01-15 NOTE — TELEPHONE ENCOUNTER
RN spoke to patient     Asked where she would like to fill adderall? Patient advised it is to be filled at [facility]     Refills on file at [facility], RN advised to contact them to get refilled     Patient states she was talking to the pharmacy regarding a different medication and this message was sent incorrectly     RN advised to contact the specialty pharmacy again to discuss     Jazzmine Duncan Registered Nurse  [facility]

## 2025-01-15 NOTE — TELEPHONE ENCOUNTER
Closing this encounter - duplicate exists see notes below on other encounter     Maryan Irvin    1/15/25  9:01 AM  Note  Hello we are requesting the Amphetamine salts 20 mg tabs to be sent to the [facility] they were sent to the compounding pharm and we can't transfer them

## 2025-02-03 DIAGNOSIS — M62.838 MUSCLE SPASM: ICD-10-CM

## 2025-02-03 DIAGNOSIS — G89.29 CHRONIC LEFT-SIDED LOW BACK PAIN WITHOUT SCIATICA: ICD-10-CM

## 2025-02-03 DIAGNOSIS — M54.50 CHRONIC LEFT-SIDED LOW BACK PAIN WITHOUT SCIATICA: ICD-10-CM

## 2025-02-03 RX ORDER — METHOCARBAMOL 500 MG/1
1000 TABLET, FILM COATED ORAL 4 TIMES DAILY PRN
Qty: 60 TABLET | Refills: 2 | Status: SHIPPED | OUTPATIENT
Start: 2025-02-03

## 2025-02-03 NOTE — TELEPHONE ENCOUNTER
Patient calls requesting refill of methocarbamol.  Last appt VV 1/8/25    Medication pended, please review and sign if appropriate.    Winsome Donald RN

## 2025-02-18 NOTE — LETTER
10/17/2019       RE: Laney Maynard  [id number] [address] [id number]     Dear Colleague,    Thank you for referring your patient, Laney Maynard, to the [facility] [place] at [facility]. Please see a copy of my visit note below.    History: It is a pleasure to see this very pleasant 54-year-old lady in follow-up consultation today.  She has a significant history of recurrent urinary stone disease.  She had ESWL in 2016 and 2018.  Currently she has some fairly mild discomfort in the left flank which is probably ranted about a 2 or 3 out of 10.  There is been no evidence of nausea.  She has seen blood in the urine but recently has had a urinary tract infection which is recently been treated this was Enterococcus faecalis was sensitive to amoxicillin and the urinalysis today is quite negative.  There are no other major complaints.    CT was done recently.  CT ABDOMEN/PELVIS WITHOUT CONTRAST October 10, 2019 3:04 PM      HISTORY: Flank pain, stone disease suspected, left. History of stones  with recent gross hematuria. Personal history of urinary tract  infection. Gross hematuria. Nephrolithiasis.     TECHNIQUE: Noncontrast CT abdomen and pelvis was performed. Radiation  dose for this scan was reduced using automated exposure control,  adjustment of the mA and/or kV according to patient size, or iterative  reconstruction technique.     COMPARISON: 3/29/2018.     FINDINGS: Visualized lung bases are unremarkable.     Evaluation of the solid organs is limited due to absence of contrast  administration. Subcentimeter hypoattenuating lesion in the left  hepatic lobe is too small to characterize. No intrahepatic or  extrahepatic biliary duct dilatation is present. The gallbladder is  surgically absent.     The spleen, adrenal glands and pancreas are unremarkable. Multiple  nonobstructive intrarenal calculi seen  Pt on HFNC continuously through out shift, currently on 85%/45L. Skin intact, mepilex is in place.     RT will continue to  follow and weanas tolerated.     Lucy Caldera, RT  2/17/2025     bilaterally. Some of the  largest calculi measure up to 4 mm along the inferior pole of the  right kidney and 3 mm along the mid pole of the left kidney. No  hydronephrosis is seen.     Stomach is mildly distended with ingested contents and air. Small and  large bowel loops are nondilated. The appendix is normal.     Abdominal aorta and IVC are of normal course and caliber. No  retroperitoneal, mesenteric or pelvic lymphadenopathy is seen although  evaluation is limited due to absence of contrast administration.     Urinary bladder is mildly distended. Uterus is unremarkable.     No suspicious osseous lesion seen.                                                                      IMPRESSION: No hydronephrosis seen. Multiple bilateral intrarenal,  nonobstructive calculi measuring up to 4 mm in the inferior pole of  the right kidney.     MAGGIE LANDRY MD    Past Medical History:   Diagnosis Date     Abnormal glandular Papanicolaou smear of cervix      Allergic rhinitis, cause unspecified      Constipation      Gastro-oesophageal reflux disease      Heart murmur     murmur     Hematuria      Herpes simplex virus infection      Hydronephrosis, right      Migraine, unspecified, without mention of intractable migraine without mention of status migrainosus      Mumps      Numbness and tingling     LEFT ARM     Palpitations      Renal disease     hx stones x 2 episodes     Ureterolithiasis      Urinary frequency     burning     Vertigo        Social History     Socioeconomic History     Marital status:      Spouse name: None     Number of children: 2     Years of education: None     Highest education level: None   Occupational History     Occupation: LPN     Employer: [employer]     Comment: self employed, lifting, wheeling, walking     Employer: [employer]   Social Needs     Financial resource strain: None     Food insecurity:     Worry: None     Inability: None     Transportation needs:      Medical: None     Non-medical: None   Tobacco Use     Smoking status: Never Smoker     Smokeless tobacco: Never Used   Substance and Sexual Activity     Alcohol use: No     Alcohol/week: 0.0 standard drinks     Drug use: No     Sexual activity: Yes     Partners: Male     Birth control/protection: Surgical     Comment: tubal   Lifestyle     Physical activity:     Days per week: None     Minutes per session: None     Stress: None   Relationships     Social connections:     Talks on phone: None     Gets together: None     Attends Mandaeism service: None     Active member of club or organization: None     Attends meetings of clubs or organizations: None     Relationship status: None     Intimate partner violence:     Fear of current or ex partner: None     Emotionally abused: None     Physically abused: None     Forced sexual activity: None   Other Topics Concern      Service No     Blood Transfusions No     Caffeine Concern No     Occupational Exposure No     Hobby Hazards Not Asked     Sleep Concern Yes     Stress Concern No     Weight Concern No     Special Diet No     Back Care Not Asked     Exercise Yes     Comment: 2 days/week     Bike Helmet Not Asked     Seat Belt No     Self-Exams Yes     Parent/sibling w/ CABG, MI or angioplasty before 65F 55M? No   Social History Narrative     None       Past Surgical History:   Procedure Laterality Date     ARTHROSCOPY KNEE Right 04/26/2017    Procedure: Right knee arthroscopy and anterior fat pad resection with medial plica resection. Partial lateral menisectomy. Surgeon:  Fredi Lindsay MD  Location: [facility]     ARTHROSCOPY SHOULDER, OPEN ROTATOR CUFF REPAIR, COMBINED  7/31/2013    Procedure: COMBINED ARTHROSCOPY SHOULDER, OPEN ROTATOR CUFF REPAIR;  Left Shoulder Arthroscopy, Distal Clavicale Resection, Decompression, Mini Open Rotator Cuff Repair    ;  Surgeon: Fredi Lindsay MD;  Location: [facility] OR     C NONSPECIFIC PROCEDURE  age 16     colposcopy for abnormal pap     C NONSPECIFIC PROCEDURE      surgery L thumb     C NONSPECIFIC PROCEDURE      breast augmentation-silicone     C NONSPECIFIC PROCEDURE      s/p  x 2     C NONSPECIFIC PROCEDURE      Bilateral tubal ligation     C REMOVAL OF KIDNEY STONE       COLONOSCOPY  2014    Procedure: COLONOSCOPY;  Colonoscopy/WW;  Surgeon: Pancho Olsen MD;  Location:  GI     COMBINED CYSTOSCOPY, RETROGRADES, URETEROSCOPY, LASER HOLMIUM LITHOTRIPSY URETER(S), INSERT STENT Right 2016    Procedure: COMBINED CYSTOSCOPY, RETROGRADES, URETEROSCOPY, LASER HOLMIUM LITHOTRIPSY URETER(S), INSERT STENT;  Surgeon: Kushal Noriega MD;  Location: [facility] OR     CYSTOSCOPY       CYSTOSCOPY, RETROGRADES, EXTRACT STONE, COMBINED  2013    Procedure: COMBINED CYSTOSCOPY, RETROGRADES, EXTRACT STONE;  Video Cystoscopy,  Right Ureteroscopy, ureteral dilatation,  Stone extraction;  Surgeon: Subhash Vann MD;  Location:  OR     EXTRACORPOREAL SHOCK WAVE LITHOTRIPSY (ESWL) Bilateral 2016    Procedure: EXTRACORPOREAL SHOCK WAVE LITHOTRIPSY (ESWL);  Surgeon: Bassam Vu MD;  Location:  OR     EXTRACORPOREAL SHOCK WAVE LITHOTRIPSY, CYSTOSCOPY, INSERT STENT URETER(S), COMBINED Bilateral 2018    Procedure: COMBINED EXTRACORPOREAL SHOCK WAVE LITHOTRIPSY, CYSTOSCOPY, INSERT STENT URETER(S);  BILATERAL COMBINED EXTRACORPOREAL SHOCK WAVE LITHOTRIPSY, CYSTOSCOPY, LEFT STENT PLACEMENT;  Surgeon: Bassam Vu MD;  Location:  OR     GYN SURGERY      laparoscopy     LAPAROSCOPIC CHOLECYSTECTOMY WITH CHOLANGIOGRAMS  2012    Procedure: LAPAROSCOPIC CHOLECYSTECTOMY WITH CHOLANGIOGRAMS;  LAPAROSCOPIC CHOLECYSTECTOMY WITH CHOLANGIOGRAMS ;  Surgeon: Nataliya Gabriel MD;  Location: [facility] OR     TUBAL LIGATION         Family History   Problem Relation Age of Onset     Diabetes Mother         type 2     Alcohol/Drug Mother         alcohol         Current Outpatient Medications:       amoxicillin (AMOXIL) 500 MG capsule, Take 1 capsule (500 mg) by mouth 2 times daily for 7 days, Disp: 14 capsule, Rfl: 0     amphetamine-dextroamphetamine (ADDERALL) 20 MG tablet, TAKE ONE TABLET BY MOUTH THREE TIMES A DAY, Disp: 90 tablet, Rfl: 0     amphetamine-dextroamphetamine (ADDERALL) 20 MG tablet, Take 1 tablet (20 mg) by mouth 3 times daily, Disp: 90 tablet, Rfl: 0     fexofenadine (ALLEGRA) 180 MG tablet, Take 1 tablet (180 mg) by mouth daily as needed for allergies, Disp: 30 tablet, Rfl: 6     fluocinolone acetonide 0.01 % OIL, Use on scalp once a week, as needed., Disp: 1 Bottle, Rfl: 11     Fluocinolone Acetonide Scalp 0.01 % OIL oil, , Disp: , Rfl:      fluticasone (FLONASE) 50 MCG/ACT nasal spray, Spray 2 sprays into both nostrils daily as needed for allergies, Disp: 16 g, Rfl: 11     ketoconazole (NIZORAL) 2 % external shampoo, Apply to the affected area and wash off after 5 minutes, as needed., Disp: 120 mL, Rfl: 11     ketorolac (TORADOL) 10 MG tablet, Take 1 tablet (10 mg) by mouth every 6 hours as needed for moderate pain, Disp: 10 tablet, Rfl: 0     meclizine (ANTIVERT) 25 MG tablet, Take 1 tablet (25 mg) by mouth 3 times daily as needed for nausea, Disp: 30 tablet, Rfl: 3     naproxen (NAPROSYN) 500 MG tablet, Take 1 tablet (500 mg) by mouth 2 times daily (with meals), Disp: 60 tablet, Rfl: 3     order for DME, Equipment being ordered:heel pads, Disp: 1 Package, Rfl: 0     phenazopyridine (PYRIDIUM) 200 MG tablet, Take 1 tablet (200 mg) by mouth 3 times daily as needed for irritation, Disp: 30 tablet, Rfl: 3     potassium citrate (UROCIT-K) 10 MEQ (1080 MG) CR tablet, , Disp: , Rfl:      solifenacin (VESICARE) 5 MG tablet, Take 1 tablet (5 mg) by mouth every morning, Disp: 90 tablet, Rfl: 1     tobramycin-dexamethasone (TOBRADEX) ophthalmic suspension, Place 2 drops into both eyes daily (Patient taking differently: Place 2 drops into both eyes daily as needed ), Disp: 1 Bottle, Rfl: 11      "Topiramate (TOPAMAX PO), Take 150 mg by mouth every morning (3 X 50MG = 150MG), Disp: , Rfl:      valACYclovir (VALTREX) 1000 mg tablet, Take 1 tablet (1,000 mg) by mouth daily, Disp: 90 tablet, Rfl: 3     zolpidem (AMBIEN) 5 MG tablet, Take 1 tablet (5 mg) by mouth nightly as needed for sleep, Disp: 30 tablet, Rfl: 2     acyclovir (ZOVIRAX) 5 % ointment, Apply topically 5 times daily (Patient taking differently: Apply topically 2 times daily as needed (outbreak) ), Disp: 30 g, Rfl: 5     Menthol, Topical Analgesic, (BIOFREEZE COLORLESS) 4 % GEL, Externally apply topically 3 times daily as needed (Patient not taking: Reported on 10/17/2019), Disp: 118 mL, Rfl: 1     ondansetron (ZOFRAN ODT) 4 MG ODT tab, Take 1-2 tablets (4-8 mg) by mouth daily as needed for nausea (Patient not taking: Reported on 10/9/2019), Disp: 15 tablet, Rfl: 5     order for DME, Equipment being ordered: tall aircast boot (Patient not taking: Reported on 10/9/2019), Disp: 1 Device, Rfl: 0    10 point ROS of systems including Constitutional, Eyes, Respiratory, Cardiovascular, Gastroenterology, Genitourinary, Integumentary, Muscularskeletal, Psychiatric and Neurologic were all negative except for pertinent positives noted in my HPI.    Examination:   BP (!) 140/70 (BP Location: Right arm, Patient Position: Sitting, Cuff Size: Adult Regular)   Ht 1.6 m (5' 3\")   Wt 70.3 kg (155 lb)   LMP 02/25/2014   BMI 27.46 kg/m     General Impression: Very pleasant patient in no acute distress, well-oriented in time place and person and quite conversational  Mental Status: Normal  HEENT: Extraocular movements intact.  No clinical evidence of jaundice on examination of eyes.  Mucous membranes are unremarkable  Skin: Warm.  No other abnormalities  Respiratory System: Unlabored on room air.  Respiratory cycle normal  Lymph Nodes: Normal  Back/Flank Tenderness: There is no evidence of kyphosis, scoliosis or lordosis.  There is no flank tenderness.  There is no " loss or increase in sensation over the left flank.  There is no accentuation of discomfort on movements of the spine  Cardiovascular System: No significant peripheral pitting edema  Abdominal Examination: Unremarkable  Extremities: Extremities are otherwise unremarkable  Genitial: Not examined  Rectal Examination: Not examined  Neurologic System: There are no significant acute abnormal neurological signs in the central or peripheral nervous systems    Impression: His symptoms are very mild and in my opinion probably do not represent visceral pain associated with renal stones.  Review of the CT scan and comparison to previous CT scan shows that there is a marked decrease now in the stone volume although there are still multiple very small stones in each kidney and the calyces.  There is no evidence of hydronephrosis and no evidence of stones in the ureter.  The largest stones are only 4 mm in diameter.  I had a careful discussion with her about this today.  She has had a careful metabolic stone evaluation by our nephrologist already and is now taking potassium citrate.  Predominant stone analysis is calcium oxalate 10% and calcium phosphate 90%.  We talked about whether we should consider treating the small stones.  The options for treatment would be either ESWL again or ureteroscopy with the use of the holmium laser.  Either way, it would rely to a certain extent on her passing fragments.  If she did have ESWL she would not require a stent if she had ureteroscopy show more certainly would.  The other option is to continue to observe these although small stones may pass from time to time.  The patient is quite keen to try and resolve this issue.  I am therefore going to arrange for KUB to see how easy it is to see the stones on the KUB which would be important if we are considering ESWL.  It is my opinion that ESWL would be the primary treatment option if we do to consider intervention of some sort.  I did discuss  "this in careful detail whether today.  I answered many questions    Plan: ANDREY and I will see her after    Time: 25 minutes.  Greater than 50% in discussion and consultation we did have an extended discussion due to some of the previous challenges we have encountered and to discuss therapeutic options in detail.    \"This dictation was performed with voice recognition software and may contain errors,  omissions and inadvertent word substitution.\"        Again, thank you for allowing me to participate in the care of your patient.      Sincerely,    Bassam Vu MD      "

## 2025-03-03 DIAGNOSIS — I10 BENIGN ESSENTIAL HYPERTENSION: ICD-10-CM

## 2025-03-03 RX ORDER — AMLODIPINE BESYLATE 2.5 MG/1
5 TABLET ORAL DAILY
Qty: 180 TABLET | Refills: 3 | OUTPATIENT
Start: 2025-03-03

## 2025-03-03 NOTE — TELEPHONE ENCOUNTER
"Incoming call from patient. Requests to take two 2.5 MG tablets instead of one 5 MG tablet. Pended for provider review below.    Last visit with April Coming MD [place] 1/8/25:  \"Benign essential hypertension  Well-controlled, continue amlodipine 5 mg p.o. daily.  Refill when due.\"    Concetta Farris RN    "

## 2025-03-06 DIAGNOSIS — I10 BENIGN ESSENTIAL HYPERTENSION: Primary | ICD-10-CM

## 2025-03-06 RX ORDER — AMLODIPINE BESYLATE 2.5 MG/1
5 TABLET ORAL DAILY
Qty: 180 TABLET | Refills: 1 | Status: SHIPPED | OUTPATIENT
Start: 2025-03-06

## 2025-03-06 NOTE — TELEPHONE ENCOUNTER
Patient calling and states she has spoken to [facility] twice and they do not have RX for amlodipine for her.  Discussed she should have refill of amlodipine 5 mg at her pharmacy.  She states [facility] does not have RX on file.  She is requesting RX for amlodipine 2.5 mg two tabs daily.  Discussed may not be covered but she states she does not have insurance and pays cash.  T'd up.  Please advise.  Samra Jalloh RN

## 2025-05-05 DIAGNOSIS — F90.2 ATTENTION DEFICIT HYPERACTIVITY DISORDER (ADHD), COMBINED TYPE: ICD-10-CM

## 2025-05-05 RX ORDER — DEXTROAMPHETAMINE SACCHARATE, AMPHETAMINE ASPARTATE, DEXTROAMPHETAMINE SULFATE AND AMPHETAMINE SULFATE 7.5; 7.5; 7.5; 7.5 MG/1; MG/1; MG/1; MG/1
30 TABLET ORAL 3 TIMES DAILY
Qty: 90 TABLET | Refills: 0 | Status: SHIPPED | OUTPATIENT
Start: 2025-05-05

## 2025-05-05 NOTE — TELEPHONE ENCOUNTER
Patient calls requesting refill of adderall. Pended sent to refill team.    Patient also requesting to schedule next appointment, transferred to TC line to assist with scheduling.    Winsome Donald RN   [facility]

## 2025-05-12 DIAGNOSIS — N39.46 MIXED INCONTINENCE URGE AND STRESS (MALE)(FEMALE): ICD-10-CM

## 2025-05-12 RX ORDER — SOLIFENACIN SUCCINATE 10 MG/1
10 TABLET, FILM COATED ORAL DAILY
Qty: 90 TABLET | Refills: 2 | Status: SHIPPED | OUTPATIENT
Start: 2025-05-12

## 2025-06-02 NOTE — PROGRESS NOTES
"May 23, 2018    Return visit    Patient returns today for follow up.  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  She has had a history of UTIs and kidney stones.  This appointment was made awhile ago when she started having UTIs but was found to have kidney stones that required surgery, stents removed a couple weeks ago.  No UTI since. She denies any changes in her health since last visit.    She kept the visit to establish care.    /74  Ht 1.626 m (5' 4\")  Wt 65.8 kg (145 lb)  LMP 02/25/2014  BMI 24.89 kg/m2  She is comfortable, in no distress, non-labored breathing.      A/P: 53 year old F with Benedict in the setting of nephrolithiasis that has now been treated    Nephrology for kidney stone prevention    Urine culture if she thinks she has symptoms.    Discussed supplements to prevent UTI    RTC 6 months, sooner if needed    15 minutes were spent with the patient today, > 50% in counseling and coordination of care    Shawna Wei MD MPH   of Urology    CC  Patient Care Team:  Haase, Susan Rachele, APRN CNP as PCP - General (Nurse Practitioner)  Reyna Angelo MD as MD (Family Practice)                " VITALS:   T(C): 36.8 (06-02-25 @ 18:21), Max: 36.8 (06-02-25 @ 18:21)  HR: 78 (06-02-25 @ 18:21) (73 - 85)  BP: 139/79 (06-02-25 @ 18:21) (100/62 - 139/79)  RR: 17 (06-02-25 @ 18:21) (17 - 20)  SpO2: 100% (06-02-25 @ 18:21) (95% - 100%)    GENERAL: NAD, lying in bed comfortably  HEAD:  Atraumatic, Normocephalic  EYES: EOMI, conjunctiva and sclera clear  ENT: Moist mucous membranes  CHEST/LUNG: Diminished breath sounds at R base; No rales, rhonchi, wheezing, or rubs. Unlabored respirations  HEART: Irregularly irregular rhythm; No murmurs, rubs, or gallops  ABDOMEN: BSx4; Soft, nontender, nondistended  EXTREMITIES:  No clubbing, cyanosis, 1+ pitting edema in dependent areas of ankles  NERVOUS SYSTEM:  A&Ox3, no focal deficits   SKIN: No rashes or lesions

## 2025-06-03 DIAGNOSIS — M54.50 CHRONIC LEFT-SIDED LOW BACK PAIN WITHOUT SCIATICA: ICD-10-CM

## 2025-06-03 DIAGNOSIS — M62.838 MUSCLE SPASM: ICD-10-CM

## 2025-06-03 DIAGNOSIS — G89.29 CHRONIC LEFT-SIDED LOW BACK PAIN WITHOUT SCIATICA: ICD-10-CM

## 2025-06-04 ENCOUNTER — TELEPHONE (OUTPATIENT)
Dept: SCHEDULING | Facility: CLINIC | Age: 60
End: 2025-06-04

## 2025-06-04 DIAGNOSIS — F90.2 ATTENTION DEFICIT HYPERACTIVITY DISORDER (ADHD), COMBINED TYPE: ICD-10-CM

## 2025-06-04 NOTE — TELEPHONE ENCOUNTER
Medication Question or Refill    Contacts       Contact Date/Time Type Contact Phone/Fax    06/04/2025 06:34 PM CDT Phone (Incoming) Laney Acevedo (Self) [phone] (M)            What medication are you calling about (include dose and sig)?:   amphetamine-dextroamphetamine (ADDERALL) 30 MG tablet          Preferred Pharmacy:   [id number] [place], [place] - [address] [address]  [address] [address] [address]  Phone: [phone] Fax: [phone]      Controlled Substance Agreement on file:   CSA -- Patient Level:     [Media Unavailable] Controlled Substance Agreement - Non - Opioid - Scan on 9/5/2019 10:50 AM: NON-OPIOID CONTROLLED SUBSTANCE AGREEMENT       Who prescribed the medication?: PCP    Do you need a refill? Yes    When did you use the medication last? 6/4/25    Patient offered an appointment? No    Do you have any questions or concerns?  No. Has appt with PCP on 6/9/25      Could we send this information to you in [employer] or would you prefer to receive a phone call?:   No preference   Okay to leave a detailed message?: N/A at Cell number on file:    Telephone Information:   Mobile [phone]

## 2025-06-05 RX ORDER — DEXTROAMPHETAMINE SACCHARATE, AMPHETAMINE ASPARTATE, DEXTROAMPHETAMINE SULFATE AND AMPHETAMINE SULFATE 7.5; 7.5; 7.5; 7.5 MG/1; MG/1; MG/1; MG/1
30 TABLET ORAL 3 TIMES DAILY
Qty: 90 TABLET | Refills: 0 | Status: SHIPPED | OUTPATIENT
Start: 2025-06-05

## 2025-06-05 RX ORDER — METHOCARBAMOL 500 MG/1
TABLET, FILM COATED ORAL
Qty: 60 TABLET | Refills: 0 | Status: SHIPPED | OUTPATIENT
Start: 2025-06-05

## 2025-06-05 NOTE — TELEPHONE ENCOUNTER
Clinic RN: Please investigate patient's chart or contact patient if the information cannot be found because please T up pharmacy and pend medication.  Then send to refill team.

## 2025-06-05 NOTE — TELEPHONE ENCOUNTER
Dr. Sheila Hutchins   Please review pended refill request   Last in person visit over 1 year ago 4/17/2024, virtual 1/8/2025     Thank you   Jazzmine Duncan, Registered Nurse  [facility]

## 2025-06-09 ENCOUNTER — OFFICE VISIT (OUTPATIENT)
Dept: FAMILY MEDICINE | Facility: CLINIC | Age: 60
End: 2025-06-09

## 2025-06-09 VITALS
WEIGHT: 163.8 LBS | BODY MASS INDEX: 27.96 KG/M2 | OXYGEN SATURATION: 97 % | HEART RATE: 105 BPM | SYSTOLIC BLOOD PRESSURE: 128 MMHG | DIASTOLIC BLOOD PRESSURE: 76 MMHG | RESPIRATION RATE: 11 BRPM | HEIGHT: 64 IN | TEMPERATURE: 98.2 F

## 2025-06-09 DIAGNOSIS — M62.838 MUSCLE SPASM: ICD-10-CM

## 2025-06-09 DIAGNOSIS — B00.9 HERPES SIMPLEX VIRUS INFECTION: ICD-10-CM

## 2025-06-09 DIAGNOSIS — G89.29 CHRONIC LEFT-SIDED LOW BACK PAIN WITHOUT SCIATICA: ICD-10-CM

## 2025-06-09 DIAGNOSIS — M54.50 CHRONIC LEFT-SIDED LOW BACK PAIN WITHOUT SCIATICA: ICD-10-CM

## 2025-06-09 DIAGNOSIS — N39.0 RECURRENT UTI: ICD-10-CM

## 2025-06-09 DIAGNOSIS — M72.2 PLANTAR FASCIITIS, BILATERAL: ICD-10-CM

## 2025-06-09 DIAGNOSIS — F90.2 ATTENTION DEFICIT HYPERACTIVITY DISORDER (ADHD), COMBINED TYPE: Primary | ICD-10-CM

## 2025-06-09 DIAGNOSIS — E66.3 OVERWEIGHT (BMI 25.0-29.9): ICD-10-CM

## 2025-06-09 PROCEDURE — G2211 COMPLEX E/M VISIT ADD ON: HCPCS | Performed by: FAMILY MEDICINE

## 2025-06-09 PROCEDURE — 99214 OFFICE O/P EST MOD 30 MIN: CPT | Performed by: FAMILY MEDICINE

## 2025-06-09 RX ORDER — VALACYCLOVIR HYDROCHLORIDE 1 G/1
1000 TABLET, FILM COATED ORAL DAILY
Qty: 90 TABLET | Refills: 3 | Status: SHIPPED | OUTPATIENT
Start: 2025-06-09

## 2025-06-09 RX ORDER — METHOCARBAMOL 500 MG/1
1500 TABLET, FILM COATED ORAL 4 TIMES DAILY
Qty: 180 TABLET | Refills: 1 | Status: SHIPPED | OUTPATIENT
Start: 2025-06-09

## 2025-06-09 RX ORDER — METHOCARBAMOL 500 MG/1
1500 TABLET, FILM COATED ORAL 4 TIMES DAILY
Qty: 180 TABLET | Refills: 1 | Status: SHIPPED | OUTPATIENT
Start: 2025-06-09 | End: 2025-06-09

## 2025-06-09 RX ORDER — NAPROXEN 500 MG/1
500 TABLET ORAL 2 TIMES DAILY WITH MEALS
Qty: 180 TABLET | Refills: 1 | Status: SHIPPED | OUTPATIENT
Start: 2025-06-09

## 2025-06-09 RX ORDER — NITROFURANTOIN 25; 75 MG/1; MG/1
100 CAPSULE ORAL DAILY
Qty: 90 CAPSULE | Refills: 3 | Status: SHIPPED | OUTPATIENT
Start: 2025-06-09

## 2025-06-09 NOTE — PROGRESS NOTES
Assessment & Plan     Attention deficit hyperactivity disorder (ADHD), combined type  We are already over the maximum recommended daily dose for Adderall. Will look into alternative options, patient not sure what she has tried in the past. Continue current dosing regimen.    Overweight (BMI 25.0-29.9)  Trial of sublingual semaglutide, monitor.  - COMPOUNDED NON-CONTROLLED SUBSTANCE (CMPD RX) - PHARMACY TO MIX COMPOUNDED MEDICATION; Semaglutide, 2 mg/mL, Take 0.25 mL (0.5 mg) SL once daily for two weeks, then increase to 0.5 mL (1 mg) SL once daily.    Muscle spasm  Chronic left-sided low back pain without sciatica  Can use methocarbamol 1500 mg QID for a couple of days, but do not recommend she do this daily.  - methocarbamol (ROBAXIN) 500 MG tablet; Take 3 tablets (1,500 mg) by mouth 4 times daily.    Plantar fasciitis, bilateral  Stable, continue naproxen.  - naproxen (NAPROSYN) 500 MG tablet; Take 1 tablet (500 mg) by mouth 2 times daily (with meals).    Recurrent UTI  Stable, continue daily dosing.  - nitroFURantoin macrocrystal-monohydrate (MACROBID) 100 MG capsule; Take 1 capsule (100 mg) by mouth daily.    Herpes simplex virus infection  Controlled, continue.  - valACYclovir (VALTREX) 1000 mg tablet; Take 1 tablet (1,000 mg) by mouth daily.    The longitudinal plan of care for the diagnosis(es)/condition(s) as documented were addressed during this visit. Due to the added complexity in care, I will continue to support Laney in the subsequent management and with ongoing continuity of care.        Follow-up   Return in about 6 months (around 12/9/2025) for ADHD Medication Recheck.        Subjective   Laney is a 60 year old, presenting for the following health issues:  Recheck Medication (amphetamine-dextroamphetamine (ADDERALL) 30 MG tablet)        6/9/2025     2:05 PM   Additional Questions   Roomed by vinnie [email or web address]     History of Present Illness       Reason for visit:  Annual medication review   She is taking  medications regularly.        Medication Followup of amphetamine-dextroamphetamine (ADDERALL) 30 MG tablet   Taking Medication as prescribed: yes  Side Effects:  None  Medication Helping Symptoms:  wants to discuss    She notes it has been harder for her to focus. Does not want to increase her medication dose again but is having a hard time with concentration.    Weight aquino, starting a new medicine- There are some supplements NutraFlax, etc priyanka she is going to be trying.  She would be open to trying the sublingual semaglutide.    Current Outpatient Medications   Medication Sig Dispense Refill    acyclovir (ZOVIRAX) 5 % external ointment Apply topically 2 times daily as needed (Outbreak). 15 g 3    amLODIPine (NORVASC) 2.5 MG tablet Take 2 tablets (5 mg) by mouth daily. 180 tablet 1    amphetamine-dextroamphetamine (ADDERALL) 30 MG tablet Take 1 tablet (30 mg) by mouth 3 times daily. 90 tablet 0    aspirin (ASA) 81 MG chewable tablet Take 1 tablet (81 mg) by mouth daily 90 tablet 3    COMPOUNDED NON-CONTROLLED SUBSTANCE (CMPD RX) - PHARMACY TO MIX COMPOUNDED MEDICATION Semaglutide, 0.25 mg SubQ once weekly 4 each 2    estradiol (ESTRACE) 0.1 MG/GM vaginal cream Place 2 g vaginally twice a week. Please use quarter size amount in the vagina nightly for two weeks and then three times a week at night thereafter 42.5 g 3    fexofenadine (ALLEGRA) 180 MG tablet Take 1 tablet (180 mg) by mouth daily as needed for allergies. 30 tablet 2    Fluocinolone Acetonide Scalp 0.01 % OIL oil USE ON SCALP ONCE A WEEK AS NEEDED 118.28 mL 11    fluticasone (FLONASE) 50 MCG/ACT nasal spray Spray 2 sprays into both nostrils daily as needed for rhinitis or allergies 16 g 11    hydrOXYzine HCl (ATARAX) 25 MG tablet TAKE ONE TABLET BY MOUTH THREE TIMES A DAY AS NEEDED FOR ITCHING 30 tablet 1    ketoconazole (NIZORAL) 2 % external shampoo APPLY TO AFFECTED AREA(S) AND WASH OFF AFTER 5 MINUTES AS NEEDED 120 mL 11    ketorolac (TORADOL) 10  "MG tablet Take 1 tablet (10 mg) by mouth every 6 hours as needed for moderate pain 30 tablet 1    meclizine (ANTIVERT) 25 MG tablet Take 1 tablet (25 mg) by mouth 3 times daily as needed for nausea 30 tablet 3    Menthol, Topical Analgesic, (BIOFREEZE COLORLESS) 4 % GEL Externally apply topically 3 times daily as needed (for pain) 118 mL 1    methocarbamol (ROBAXIN) 500 MG tablet TAKE TWO TABLETS BY MOUTH FOUR TIMES A DAY AS NEEDED FOR MUSCLE SPASMS 60 tablet 0    naproxen (NAPROSYN) 500 MG tablet TAKE ONE TABLET BY MOUTH TWICE A DAY WITH MEALS 180 tablet 1    naratriptan (AMERGE) 2.5 MG tablet Take 1 tablet (2.5 mg) by mouth at onset of headache for migraine 12 tablet 3    nitroFURantoin macrocrystal-monohydrate (MACROBID) 100 MG capsule TAKE ONE CAPSULE BY MOUTH EVERY DAY 90 capsule 1    phenazopyridine (PYRIDIUM) 200 MG tablet Take 1 tablet (200 mg) by mouth 3 times daily as needed for irritation 30 tablet 3    solifenacin (VESICARE) 10 MG tablet TAKE 1 TABLET BY MOUTH DAILY 90 tablet 2    valACYclovir (VALTREX) 1000 mg tablet TAKE ONE TABLET BY MOUTH EVERY DAY 90 tablet 0    Vitamin D3 50 mcg (2000 units) tablet Take 1 tablet (50 mcg) by mouth daily 30 tablet 11    zolpidem (AMBIEN) 5 MG tablet Take 1 tablet (5 mg) by mouth nightly as needed for sleep 15 tablet 0    amLODIPine (NORVASC) 5 MG tablet TAKE ONE TABLET BY MOUTH EVERY DAY 90 tablet 1    amphetamine-dextroamphetamine (ADDERALL) 30 MG tablet TAKE ONE TABLET BY MOUTH THREE TIMES A DAY (Patient not taking: Reported on 6/9/2025) 90 tablet 0           Objective    /76 (BP Location: Right arm, Patient Position: Sitting, Cuff Size: Adult Regular)   Pulse 105   Temp 98.2  F (36.8  C) (Oral)   Resp 11   Ht 1.626 m (5' 4\")   Wt 74.3 kg (163 lb 12.8 oz)   LMP 02/25/2014   SpO2 97%   BMI 28.12 kg/m    Body mass index is 28.12 kg/m .  Physical Exam   GENERAL: alert and no distress  MS: no gross musculoskeletal defects noted, no edema  PSYCH: mentation " appears normal, affect normal/bright          Signed Electronically by: Leah Hutchins MD

## 2025-06-09 NOTE — PATIENT INSTRUCTIONS
Shake well, prior to each dose (it is very THICK).  Store at room temperature. It is stable at room temperature for 90 days.  Hold under your tongue as long as you can to maximize absorption.  If there is too much volume for you to handle, you can split into two doses 5 minutes apart.  Do not eat or drink for 30 minutes after the dose.

## 2025-06-16 ENCOUNTER — PATIENT OUTREACH (OUTPATIENT)
Dept: FAMILY MEDICINE | Facility: CLINIC | Age: 60
End: 2025-06-16

## 2025-06-16 NOTE — TELEPHONE ENCOUNTER
Patient Quality Outreach    Patient is due for the following:   Breast Cancer Screening - Mammogram  Cervical Cancer Screening - PAP Needed  Physical Preventive Adult Physical    Action(s) Taken:   Schedule a office visit for mammo Adult Preventative    Type of outreach:    Sent [employer] message.    Questions for provider review:    None         Mami Hdz, [facility]  Chart routed to None.

## 2025-07-07 DIAGNOSIS — F90.2 ATTENTION DEFICIT HYPERACTIVITY DISORDER (ADHD), COMBINED TYPE: ICD-10-CM

## 2025-07-07 RX ORDER — DEXTROAMPHETAMINE SACCHARATE, AMPHETAMINE ASPARTATE, DEXTROAMPHETAMINE SULFATE AND AMPHETAMINE SULFATE 7.5; 7.5; 7.5; 7.5 MG/1; MG/1; MG/1; MG/1
30 TABLET ORAL 3 TIMES DAILY
Qty: 90 TABLET | Refills: 0 | Status: SHIPPED | OUTPATIENT
Start: 2025-07-07

## 2025-07-07 NOTE — TELEPHONE ENCOUNTER
Pt calling to request refill on her adderall. Pended med, please review & sign if appropriate. Thanks.    amphetamine-dextroamphetamine (ADDERALL) 30 MG tablet 90 tablet 0 6/5/2025 -- No   Sig - Route: Take 1 tablet (30 mg) by mouth 3 times daily. - Dawood Dickson RN  [facility]

## 2025-08-13 DIAGNOSIS — F90.2 ATTENTION DEFICIT HYPERACTIVITY DISORDER (ADHD), COMBINED TYPE: ICD-10-CM

## 2025-08-13 RX ORDER — DEXTROAMPHETAMINE SACCHARATE, AMPHETAMINE ASPARTATE, DEXTROAMPHETAMINE SULFATE AND AMPHETAMINE SULFATE 7.5; 7.5; 7.5; 7.5 MG/1; MG/1; MG/1; MG/1
30 TABLET ORAL 3 TIMES DAILY
Qty: 90 TABLET | Refills: 0 | Status: SHIPPED | OUTPATIENT
Start: 2025-08-13

## 2025-08-16 ENCOUNTER — HEALTH MAINTENANCE LETTER (OUTPATIENT)
Age: 60
End: 2025-08-16

## (undated) DEVICE — TUBING ENDOGATOR + H2O PORT CO

## (undated) DEVICE — KIT SCOPE CLEANING ENDOSCOPY BX00719705

## (undated) DEVICE — KIT IV START W/O CATH

## (undated) DEVICE — SOL WATER IRRIG 1000ML BOTTLE 2F7114

## (undated) DEVICE — SWABCAP DISINFECTANT GREEN CFF10-250

## (undated) DEVICE — Device

## (undated) DEVICE — ENDO ADAPTER CHECK-FLO DISP 27550-CKU

## (undated) DEVICE — SOL WATER IRRIG 3000ML BAG 2B7117

## (undated) DEVICE — BAG CYSTO TABLE DRAIN

## (undated) DEVICE — GLOVE PROTEXIS W/NEU-THERA 8.0  2D73TE80

## (undated) DEVICE — RAD RX ISOVUE 300 (50ML) 61% IOPAMIDOL CHARGE PER ML

## (undated) DEVICE — CATH FOLEY 18FR 5ML LATEX 0165SI18

## (undated) DEVICE — ENDO SNARE EXACTO COLD 9MM LOOP 2.4MMX230CM 00711115

## (undated) DEVICE — PACK CYSTOSCOPY SBA15CYFSI

## (undated) DEVICE — CATH URETERAL OPEN END 6FR AXXCESS

## (undated) DEVICE — SYR 03ML LL W/O NDL 309657

## (undated) DEVICE — GUIDEWIRE URO SOLO FLEX STR 0.035"X150CM HW35FS

## (undated) DEVICE — CATH URETERAL DL 6FR FLEX-TIP 10FRX50CM G17323 AQ-022610

## (undated) DEVICE — TUBING SUCTION 12"X1/4" N612

## (undated) DEVICE — SYR 20ML LL W/O NDL

## (undated) DEVICE — SHEATH URETERAL ACCESS NAVIGATOR 11/13FRX36CM 250-203

## (undated) DEVICE — KIT CONNECTOR FOR OLYMPUS ENDOSCOPES DEFENDO 100310

## (undated) DEVICE — PAD CHUX UNDERPAD 23X24" 7136

## (undated) DEVICE — SUCTION CANISTER 1000ML 65651-510

## (undated) DEVICE — CANNULA NASAL COMFORT SOFT 25FT 0537

## (undated) DEVICE — ESU GROUND PAD ADULT REM W/15' CORD E7507DB

## (undated) DEVICE — LASER FIBER HOLMIUM FLEXIVA 200UM M0068403910 840-391

## (undated) DEVICE — SOL WATER IRRIG 500ML BOTTLE 2F7113

## (undated) DEVICE — INFLATION DEVICE ENCORE  26 PRESSURE GAUGE 20ML M0067101140

## (undated) DEVICE — DRAPE POUCH IRR 1016

## (undated) DEVICE — GUIDEWIRE SENSOR DUAL FLEX STR 0.035"X150CM M0066703080

## (undated) DEVICE — CONNECTOR ONE-LINK INJECTION SITE LF 7N8399

## (undated) DEVICE — CATH BALLOON DILATATION UROMAX ULTRA 18FRX4CM M0062251020

## (undated) DEVICE — BASKET STONE RETRIEVAL NTNL ZERO TIP 1.9FRX90CM M0063901050

## (undated) DEVICE — WIRE GUIDE AMPLATZ SUPER STIFF 0.035"X145CM 46-524

## (undated) RX ORDER — LIDOCAINE HYDROCHLORIDE 20 MG/ML
INJECTION, SOLUTION EPIDURAL; INFILTRATION; INTRACAUDAL; PERINEURAL
Status: DISPENSED
Start: 2019-10-30

## (undated) RX ORDER — FENTANYL CITRATE 50 UG/ML
INJECTION, SOLUTION INTRAMUSCULAR; INTRAVENOUS
Status: DISPENSED
Start: 2019-10-30

## (undated) RX ORDER — ONDANSETRON 2 MG/ML
INJECTION INTRAMUSCULAR; INTRAVENOUS
Status: DISPENSED
Start: 2019-10-30

## (undated) RX ORDER — CEFAZOLIN SODIUM 2 G/100ML
INJECTION, SOLUTION INTRAVENOUS
Status: DISPENSED
Start: 2018-04-26

## (undated) RX ORDER — PROPOFOL 10 MG/ML
INJECTION, EMULSION INTRAVENOUS
Status: DISPENSED
Start: 2019-10-30

## (undated) RX ORDER — KETOROLAC TROMETHAMINE 30 MG/ML
INJECTION, SOLUTION INTRAMUSCULAR; INTRAVENOUS
Status: DISPENSED
Start: 2018-04-26

## (undated) RX ORDER — ATROPA BELLADONNA AND OPIUM 16.2; 3 MG/1; MG/1
SUPPOSITORY RECTAL
Status: DISPENSED
Start: 2019-10-30

## (undated) RX ORDER — KETOROLAC TROMETHAMINE 15 MG/ML
INJECTION, SOLUTION INTRAMUSCULAR; INTRAVENOUS
Status: DISPENSED
Start: 2019-10-30

## (undated) RX ORDER — FENTANYL CITRATE 0.05 MG/ML
INJECTION, SOLUTION INTRAMUSCULAR; INTRAVENOUS
Status: DISPENSED
Start: 2019-10-30

## (undated) RX ORDER — PROPOFOL 10 MG/ML
INJECTION, EMULSION INTRAVENOUS
Status: DISPENSED
Start: 2018-04-26

## (undated) RX ORDER — FENTANYL CITRATE 50 UG/ML
INJECTION, SOLUTION INTRAMUSCULAR; INTRAVENOUS
Status: DISPENSED
Start: 2018-04-26

## (undated) RX ORDER — ONDANSETRON 2 MG/ML
INJECTION INTRAMUSCULAR; INTRAVENOUS
Status: DISPENSED
Start: 2018-04-26

## (undated) RX ORDER — DEXAMETHASONE SODIUM PHOSPHATE 4 MG/ML
INJECTION, SOLUTION INTRA-ARTICULAR; INTRALESIONAL; INTRAMUSCULAR; INTRAVENOUS; SOFT TISSUE
Status: DISPENSED
Start: 2019-10-30

## (undated) RX ORDER — LIDOCAINE HYDROCHLORIDE 20 MG/ML
INJECTION, SOLUTION EPIDURAL; INFILTRATION; INTRACAUDAL; PERINEURAL
Status: DISPENSED
Start: 2018-04-26